# Patient Record
Sex: FEMALE | Race: WHITE | NOT HISPANIC OR LATINO
[De-identification: names, ages, dates, MRNs, and addresses within clinical notes are randomized per-mention and may not be internally consistent; named-entity substitution may affect disease eponyms.]

---

## 2017-01-06 ENCOUNTER — APPOINTMENT (OUTPATIENT)
Dept: FAMILY MEDICINE | Facility: CLINIC | Age: 72
End: 2017-01-06

## 2017-01-06 VITALS
WEIGHT: 162.5 LBS | BODY MASS INDEX: 29.9 KG/M2 | HEIGHT: 62 IN | DIASTOLIC BLOOD PRESSURE: 70 MMHG | SYSTOLIC BLOOD PRESSURE: 100 MMHG

## 2017-01-31 RX ORDER — MOMETASONE 50 UG/1
50 SPRAY, METERED NASAL DAILY
Qty: 1 | Refills: 3 | Status: COMPLETED | COMMUNITY
Start: 1900-01-01 | End: 2017-01-31

## 2017-03-27 DIAGNOSIS — Z87.891 PERSONAL HISTORY OF NICOTINE DEPENDENCE: ICD-10-CM

## 2017-04-07 ENCOUNTER — APPOINTMENT (OUTPATIENT)
Dept: FAMILY MEDICINE | Facility: CLINIC | Age: 72
End: 2017-04-07

## 2017-04-07 VITALS
WEIGHT: 164 LBS | SYSTOLIC BLOOD PRESSURE: 120 MMHG | DIASTOLIC BLOOD PRESSURE: 64 MMHG | HEIGHT: 62 IN | BODY MASS INDEX: 30.18 KG/M2

## 2017-04-07 DIAGNOSIS — R94.39 ABNORMAL RESULT OF OTHER CARDIOVASCULAR FUNCTION STUDY: ICD-10-CM

## 2017-04-07 RX ORDER — LEVOTHYROXINE SODIUM 0.07 MG/1
75 TABLET ORAL
Qty: 90 | Refills: 0 | Status: COMPLETED | COMMUNITY
Start: 2016-12-14

## 2017-07-07 ENCOUNTER — RX RENEWAL (OUTPATIENT)
Age: 72
End: 2017-07-07

## 2017-07-07 ENCOUNTER — APPOINTMENT (OUTPATIENT)
Dept: FAMILY MEDICINE | Facility: CLINIC | Age: 72
End: 2017-07-07

## 2017-07-07 VITALS
SYSTOLIC BLOOD PRESSURE: 130 MMHG | DIASTOLIC BLOOD PRESSURE: 60 MMHG | HEIGHT: 62 IN | BODY MASS INDEX: 30.73 KG/M2 | WEIGHT: 167 LBS

## 2017-07-07 DIAGNOSIS — Z92.29 PERSONAL HISTORY OF OTHER DRUG THERAPY: ICD-10-CM

## 2017-09-08 ENCOUNTER — APPOINTMENT (OUTPATIENT)
Dept: DERMATOLOGY | Facility: CLINIC | Age: 72
End: 2017-09-08
Payer: MEDICARE

## 2017-09-08 PROCEDURE — 99214 OFFICE O/P EST MOD 30 MIN: CPT

## 2017-10-20 ENCOUNTER — APPOINTMENT (OUTPATIENT)
Dept: FAMILY MEDICINE | Facility: CLINIC | Age: 72
End: 2017-10-20
Payer: MEDICARE

## 2017-10-20 VITALS
HEIGHT: 62 IN | SYSTOLIC BLOOD PRESSURE: 138 MMHG | OXYGEN SATURATION: 97 % | HEART RATE: 77 BPM | DIASTOLIC BLOOD PRESSURE: 60 MMHG

## 2017-10-20 LAB
BILIRUB UR QL STRIP: NORMAL
GLUCOSE UR-MCNC: NORMAL
HCG UR QL: NORMAL EU/DL
HGB UR QL STRIP.AUTO: NORMAL
KETONES UR-MCNC: NORMAL
LEUKOCYTE ESTERASE UR QL STRIP: NORMAL
NITRITE UR QL STRIP: POSITIVE
PH UR STRIP: 5
PROT UR STRIP-MCNC: NORMAL
SP GR UR STRIP: <=1.005

## 2017-10-20 PROCEDURE — 99214 OFFICE O/P EST MOD 30 MIN: CPT | Mod: 25

## 2017-10-20 PROCEDURE — 81003 URINALYSIS AUTO W/O SCOPE: CPT | Mod: QW

## 2017-10-20 PROCEDURE — 90662 IIV NO PRSV INCREASED AG IM: CPT

## 2017-10-20 PROCEDURE — G0008: CPT

## 2017-10-20 RX ORDER — LEVOTHYROXINE SODIUM 0.11 MG/1
112 TABLET ORAL
Qty: 90 | Refills: 0 | Status: COMPLETED | COMMUNITY
Start: 2017-07-28

## 2017-10-25 LAB
APPEARANCE: ABNORMAL
BACTERIA UR CULT: NORMAL
BACTERIA: NEGATIVE
BILIRUBIN URINE: NEGATIVE
BLOOD URINE: ABNORMAL
COLOR: ABNORMAL
GLUCOSE QUALITATIVE U: NEGATIVE MG/DL
HYALINE CASTS: 2 /LPF
KETONES URINE: NEGATIVE
LEUKOCYTE ESTERASE URINE: ABNORMAL
MICROSCOPIC-UA: NORMAL
NITRITE URINE: POSITIVE
PH URINE: 5.5
PROTEIN URINE: NEGATIVE MG/DL
RED BLOOD CELLS URINE: 1 /HPF
SPECIFIC GRAVITY URINE: 1.01
SQUAMOUS EPITHELIAL CELLS: 0 /HPF
URINE COMMENTS: NORMAL
UROBILINOGEN URINE: NEGATIVE MG/DL
WHITE BLOOD CELLS URINE: 303 /HPF

## 2017-11-03 ENCOUNTER — APPOINTMENT (OUTPATIENT)
Dept: FAMILY MEDICINE | Facility: CLINIC | Age: 72
End: 2017-11-03
Payer: MEDICARE

## 2017-11-03 VITALS
BODY MASS INDEX: 30.73 KG/M2 | SYSTOLIC BLOOD PRESSURE: 140 MMHG | OXYGEN SATURATION: 98 % | HEART RATE: 80 BPM | WEIGHT: 167 LBS | DIASTOLIC BLOOD PRESSURE: 68 MMHG | HEIGHT: 62 IN

## 2017-11-03 VITALS — TEMPERATURE: 98.3 F

## 2017-11-03 LAB
BILIRUB UR QL STRIP: NORMAL
GLUCOSE UR-MCNC: NORMAL
HCG UR QL: 0.2 EU/DL
HGB UR QL STRIP.AUTO: NORMAL
KETONES UR-MCNC: NORMAL
LEUKOCYTE ESTERASE UR QL STRIP: NORMAL
NITRITE UR QL STRIP: NORMAL
PH UR STRIP: 5.5
PROT UR STRIP-MCNC: 100
SP GR UR STRIP: 1.02

## 2017-11-03 PROCEDURE — 99213 OFFICE O/P EST LOW 20 MIN: CPT | Mod: 25

## 2017-11-03 PROCEDURE — 81003 URINALYSIS AUTO W/O SCOPE: CPT | Mod: QW

## 2017-11-03 RX ORDER — CIPROFLOXACIN HYDROCHLORIDE 500 MG/1
500 TABLET, FILM COATED ORAL
Qty: 10 | Refills: 0 | Status: COMPLETED | COMMUNITY
Start: 2017-10-23 | End: 2017-11-03

## 2017-11-13 LAB
APPEARANCE: ABNORMAL
BACTERIA UR CULT: NORMAL
BACTERIA: ABNORMAL
BILIRUBIN URINE: NEGATIVE
BLOOD URINE: ABNORMAL
COLOR: YELLOW
GLUCOSE QUALITATIVE U: NEGATIVE MG/DL
GRANULAR CASTS: 1 /LPF
HYALINE CASTS: 1 /LPF
KETONES URINE: NEGATIVE
LEUKOCYTE ESTERASE URINE: ABNORMAL
MICROSCOPIC-UA: NORMAL
NITRITE URINE: NEGATIVE
PH URINE: 5
PROTEIN URINE: 30 MG/DL
RED BLOOD CELLS URINE: 8 /HPF
SPECIFIC GRAVITY URINE: 1.02
SQUAMOUS EPITHELIAL CELLS: 8 /HPF
URINE COMMENTS: NORMAL
UROBILINOGEN URINE: NEGATIVE MG/DL
WHITE BLOOD CELLS URINE: 525 /HPF

## 2017-11-29 ENCOUNTER — APPOINTMENT (OUTPATIENT)
Dept: PULMONOLOGY | Facility: CLINIC | Age: 72
End: 2017-11-29
Payer: MEDICARE

## 2017-11-29 VITALS
OXYGEN SATURATION: 98 % | BODY MASS INDEX: 30.82 KG/M2 | SYSTOLIC BLOOD PRESSURE: 116 MMHG | HEART RATE: 82 BPM | DIASTOLIC BLOOD PRESSURE: 60 MMHG | WEIGHT: 168.5 LBS

## 2017-11-29 PROCEDURE — 99213 OFFICE O/P EST LOW 20 MIN: CPT

## 2017-11-29 RX ORDER — SULFAMETHOXAZOLE AND TRIMETHOPRIM 800; 160 MG/1; MG/1
800-160 TABLET ORAL TWICE DAILY
Qty: 10 | Refills: 0 | Status: DISCONTINUED | COMMUNITY
Start: 2017-11-03 | End: 2017-11-29

## 2017-11-29 RX ORDER — PHENAZOPYRIDINE HYDROCHLORIDE 200 MG/1
200 TABLET ORAL
Qty: 6 | Refills: 0 | Status: DISCONTINUED | COMMUNITY
Start: 2017-10-09 | End: 2017-11-29

## 2017-11-29 RX ORDER — NITROFURANTOIN (MONOHYDRATE/MACROCRYSTALS) 25; 75 MG/1; MG/1
100 CAPSULE ORAL
Qty: 14 | Refills: 0 | Status: DISCONTINUED | COMMUNITY
Start: 2017-10-09 | End: 2017-11-29

## 2017-12-28 ENCOUNTER — MEDICATION RENEWAL (OUTPATIENT)
Age: 72
End: 2017-12-28

## 2018-01-10 ENCOUNTER — APPOINTMENT (OUTPATIENT)
Dept: FAMILY MEDICINE | Facility: CLINIC | Age: 73
End: 2018-01-10
Payer: MEDICARE

## 2018-01-10 VITALS
WEIGHT: 168 LBS | BODY MASS INDEX: 30.91 KG/M2 | HEIGHT: 62 IN | SYSTOLIC BLOOD PRESSURE: 134 MMHG | DIASTOLIC BLOOD PRESSURE: 68 MMHG

## 2018-01-10 DIAGNOSIS — N13.30 UNSPECIFIED HYDRONEPHROSIS: ICD-10-CM

## 2018-01-10 PROCEDURE — 81003 URINALYSIS AUTO W/O SCOPE: CPT | Mod: QW

## 2018-01-10 PROCEDURE — 99214 OFFICE O/P EST MOD 30 MIN: CPT | Mod: 25

## 2018-01-12 LAB
APPEARANCE: ABNORMAL
BACTERIA UR CULT: NORMAL
BACTERIA: NEGATIVE
BILIRUB UR QL STRIP: NORMAL
BILIRUBIN URINE: NEGATIVE
BLOOD URINE: ABNORMAL
COLOR: YELLOW
GLUCOSE QUALITATIVE U: NEGATIVE MG/DL
GLUCOSE UR-MCNC: NORMAL
HCG UR QL: 0.2 EU/DL
HGB UR QL STRIP.AUTO: NORMAL
KETONES UR-MCNC: NORMAL
KETONES URINE: NEGATIVE
LEUKOCYTE ESTERASE UR QL STRIP: NORMAL
LEUKOCYTE ESTERASE URINE: ABNORMAL
MICROSCOPIC-UA: NORMAL
NITRITE UR QL STRIP: NORMAL
NITRITE URINE: NEGATIVE
PH UR STRIP: 5.5
PH URINE: 5.5
PROT UR STRIP-MCNC: NORMAL
PROTEIN URINE: NEGATIVE MG/DL
RED BLOOD CELLS URINE: 2 /HPF
SP GR UR STRIP: 1.01
SPECIFIC GRAVITY URINE: 1.01
SQUAMOUS EPITHELIAL CELLS: 1 /HPF
UROBILINOGEN URINE: NEGATIVE MG/DL
WHITE BLOOD CELLS URINE: 357 /HPF

## 2018-04-06 ENCOUNTER — APPOINTMENT (OUTPATIENT)
Dept: DERMATOLOGY | Facility: CLINIC | Age: 73
End: 2018-04-06
Payer: MEDICARE

## 2018-04-06 PROCEDURE — 99212 OFFICE O/P EST SF 10 MIN: CPT

## 2018-04-17 ENCOUNTER — APPOINTMENT (OUTPATIENT)
Dept: FAMILY MEDICINE | Facility: CLINIC | Age: 73
End: 2018-04-17
Payer: MEDICARE

## 2018-04-17 VITALS
BODY MASS INDEX: 29.44 KG/M2 | HEIGHT: 62 IN | SYSTOLIC BLOOD PRESSURE: 139 MMHG | DIASTOLIC BLOOD PRESSURE: 70 MMHG | HEART RATE: 77 BPM | WEIGHT: 160 LBS | OXYGEN SATURATION: 99 %

## 2018-04-17 DIAGNOSIS — E11.9 TYPE 2 DIABETES MELLITUS W/OUT COMPLICATIONS: ICD-10-CM

## 2018-04-17 DIAGNOSIS — Z92.29 PERSONAL HISTORY OF OTHER DRUG THERAPY: ICD-10-CM

## 2018-04-17 LAB — HBA1C MFR BLD HPLC: 6.4

## 2018-04-17 PROCEDURE — 99214 OFFICE O/P EST MOD 30 MIN: CPT

## 2018-04-17 RX ORDER — LEVOTHYROXINE SODIUM 0.12 MG/1
125 TABLET ORAL DAILY
Refills: 0 | Status: DISCONTINUED | COMMUNITY
End: 2018-04-17

## 2018-04-17 RX ORDER — HYDROCHLOROTHIAZIDE 25 MG/1
25 TABLET ORAL
Qty: 90 | Refills: 2 | Status: DISCONTINUED | COMMUNITY
End: 2018-04-17

## 2018-05-23 ENCOUNTER — CHART COPY (OUTPATIENT)
Age: 73
End: 2018-05-23

## 2018-06-21 ENCOUNTER — RESULT CHARGE (OUTPATIENT)
Age: 73
End: 2018-06-21

## 2018-06-21 ENCOUNTER — APPOINTMENT (OUTPATIENT)
Dept: UROGYNECOLOGY | Facility: CLINIC | Age: 73
End: 2018-06-21
Payer: MEDICARE

## 2018-06-21 VITALS
DIASTOLIC BLOOD PRESSURE: 65 MMHG | HEIGHT: 62 IN | BODY MASS INDEX: 30.18 KG/M2 | SYSTOLIC BLOOD PRESSURE: 160 MMHG | WEIGHT: 164 LBS

## 2018-06-21 LAB
BILIRUB UR QL STRIP: NEGATIVE
CLARITY UR: CLEAR
COLLECTION METHOD: NORMAL
GLUCOSE UR-MCNC: NORMAL
HCG UR QL: 0.2 EU/DL
HGB UR QL STRIP.AUTO: NEGATIVE
KETONES UR-MCNC: NORMAL
LEUKOCYTE ESTERASE UR QL STRIP: NEGATIVE
NITRITE UR QL STRIP: NEGATIVE
PH UR STRIP: 5.5
PROT UR STRIP-MCNC: NEGATIVE
SP GR UR STRIP: 1

## 2018-06-21 PROCEDURE — 51701 INSERT BLADDER CATHETER: CPT

## 2018-06-21 PROCEDURE — 81003 URINALYSIS AUTO W/O SCOPE: CPT | Mod: QW

## 2018-06-21 PROCEDURE — 99204 OFFICE O/P NEW MOD 45 MIN: CPT

## 2018-06-22 ENCOUNTER — RESULT REVIEW (OUTPATIENT)
Age: 73
End: 2018-06-22

## 2018-06-22 LAB

## 2018-06-25 ENCOUNTER — RESULT REVIEW (OUTPATIENT)
Age: 73
End: 2018-06-25

## 2018-06-25 LAB
BACTERIA UR CULT: NORMAL
CORE LAB FLUID CYTOLOGY: NORMAL

## 2018-06-28 ENCOUNTER — APPOINTMENT (OUTPATIENT)
Dept: FAMILY MEDICINE | Facility: CLINIC | Age: 73
End: 2018-06-28
Payer: MEDICARE

## 2018-06-28 VITALS
SYSTOLIC BLOOD PRESSURE: 140 MMHG | DIASTOLIC BLOOD PRESSURE: 60 MMHG | OXYGEN SATURATION: 98 % | HEART RATE: 79 BPM | HEIGHT: 62 IN | WEIGHT: 167 LBS | BODY MASS INDEX: 30.73 KG/M2

## 2018-06-28 PROCEDURE — 99214 OFFICE O/P EST MOD 30 MIN: CPT

## 2018-06-29 ENCOUNTER — RX RENEWAL (OUTPATIENT)
Age: 73
End: 2018-06-29

## 2018-06-30 NOTE — ASSESSMENT
[FreeTextEntry1] : Decrease Candesartan from 32 mg/d to 16 mg/d.\par Increase Amlodipine from 5 mg/d to 10 mg/d/\par Check 24 h urine, Creatinine clearance.\par Nephrology consult.\par F/U 1 month.\par Pt advised to monitor bp at home.

## 2018-06-30 NOTE — HISTORY OF PRESENT ILLNESS
[FreeTextEntry1] : Patient here for blood work results. Pt had blood work done June 08 2018. [de-identified] : Pt with increasing BUN/creat with recent results of 46/1.68 compared to creat 1.48 in 4/2018.\par HBA1c was 6.4 in 4/2018. \par Pt feels well. She has seen urology for pelvic organ prolapse and incomplete bladder emptying with rare urinary incontinence.

## 2018-07-17 ENCOUNTER — APPOINTMENT (OUTPATIENT)
Dept: UROGYNECOLOGY | Facility: CLINIC | Age: 73
End: 2018-07-17
Payer: MEDICARE

## 2018-07-17 LAB
BILIRUB UR QL STRIP: NEGATIVE
CLARITY UR: CLEAR
COLLECTION METHOD: NORMAL
GLUCOSE UR-MCNC: NEGATIVE
HCG UR QL: 0.2 EU/DL
HGB UR QL STRIP.AUTO: NEGATIVE
KETONES UR-MCNC: NEGATIVE
LEUKOCYTE ESTERASE UR QL STRIP: NORMAL
NITRITE UR QL STRIP: NEGATIVE
PH UR STRIP: 5.5
PROT UR STRIP-MCNC: NEGATIVE
SP GR UR STRIP: 1.01

## 2018-07-17 PROCEDURE — 51798 US URINE CAPACITY MEASURE: CPT

## 2018-07-17 PROCEDURE — 81003 URINALYSIS AUTO W/O SCOPE: CPT | Mod: QW

## 2018-07-17 PROCEDURE — 99213 OFFICE O/P EST LOW 20 MIN: CPT

## 2018-07-25 LAB — CYTOLOGY CVX/VAG DOC THIN PREP: NORMAL

## 2018-08-01 ENCOUNTER — RESULT REVIEW (OUTPATIENT)
Age: 73
End: 2018-08-01

## 2018-08-03 ENCOUNTER — APPOINTMENT (OUTPATIENT)
Dept: FAMILY MEDICINE | Facility: CLINIC | Age: 73
End: 2018-08-03
Payer: MEDICARE

## 2018-08-03 VITALS
BODY MASS INDEX: 30.55 KG/M2 | DIASTOLIC BLOOD PRESSURE: 56 MMHG | WEIGHT: 166 LBS | OXYGEN SATURATION: 99 % | HEIGHT: 62 IN | HEART RATE: 78 BPM | SYSTOLIC BLOOD PRESSURE: 130 MMHG

## 2018-08-03 DIAGNOSIS — I35.1 NONRHEUMATIC AORTIC (VALVE) INSUFFICIENCY: ICD-10-CM

## 2018-08-03 DIAGNOSIS — E11.9 TYPE 2 DIABETES MELLITUS W/OUT COMPLICATIONS: ICD-10-CM

## 2018-08-03 PROCEDURE — 99214 OFFICE O/P EST MOD 30 MIN: CPT

## 2018-08-03 RX ORDER — DESONIDE 0.5 MG/G
0.05 OINTMENT TOPICAL
Qty: 60 | Refills: 0 | Status: DISCONTINUED | COMMUNITY
Start: 2018-04-09 | End: 2018-08-03

## 2018-08-05 NOTE — PHYSICAL EXAM
[No Acute Distress] : no acute distress [Normal Oropharynx] : the oropharynx was normal [No Respiratory Distress] : no respiratory distress  [Clear to Auscultation] : lungs were clear to auscultation bilaterally [Normal Rate] : normal [Rhythm Regular] : regular [Normal S1] : normal S1 [Normal S2] : normal S2 [II] : a grade 2 [No Edema] : there was no peripheral edema [Soft] : abdomen soft [Non Tender] : non-tender [No HSM] : no HSM [Normal Bowel Sounds] : normal bowel sounds [Normal Anterior Cervical Nodes] : no anterior cervical lymphadenopathy [Normal Mood] : the mood was normal

## 2018-08-05 NOTE — HISTORY OF PRESENT ILLNESS
[FreeTextEntry1] : Patient here to follow up on her blood pressure. Last visit pt had Candesartan decreased from 32 mg/d to 16 mg/d and Amlodipine increased from 5 mg/ d to 10 mg/d. Her creatinine has increased from 1.48 to 1.68 on 6/2018 bw. Creatine was 1.04 1 yr ago 7/17/17.\par  [de-identified] : BS still labile. Pt sometimes eats when she is not hungry to avoid hypoglycemia and she is having difficulty getting the wt down.\par Sugars on avg are 140s at home. She sees Dr. Pierre, endocrine for diabetes management.

## 2018-08-10 ENCOUNTER — APPOINTMENT (OUTPATIENT)
Dept: NEPHROLOGY | Facility: CLINIC | Age: 73
End: 2018-08-10
Payer: MEDICARE

## 2018-08-10 VITALS
SYSTOLIC BLOOD PRESSURE: 136 MMHG | OXYGEN SATURATION: 99 % | WEIGHT: 168 LBS | HEART RATE: 78 BPM | BODY MASS INDEX: 30.91 KG/M2 | DIASTOLIC BLOOD PRESSURE: 68 MMHG | HEIGHT: 62 IN

## 2018-08-10 PROCEDURE — 36415 COLL VENOUS BLD VENIPUNCTURE: CPT

## 2018-08-10 PROCEDURE — 99205 OFFICE O/P NEW HI 60 MIN: CPT | Mod: 25

## 2018-08-11 LAB
25(OH)D3 SERPL-MCNC: 44.6 NG/ML
ALBUMIN SERPL ELPH-MCNC: 4.9 G/DL
ANION GAP SERPL CALC-SCNC: 18 MMOL/L
APPEARANCE: CLEAR
BACTERIA: ABNORMAL
BASOPHILS # BLD AUTO: 0.03 K/UL
BASOPHILS NFR BLD AUTO: 0.4 %
BILIRUBIN URINE: NEGATIVE
BLOOD URINE: NEGATIVE
BUN SERPL-MCNC: 39 MG/DL
CALCIUM SERPL-MCNC: 10.2 MG/DL
CHLORIDE SERPL-SCNC: 97 MMOL/L
CO2 SERPL-SCNC: 24 MMOL/L
COLOR: YELLOW
CREAT SERPL-MCNC: 1.65 MG/DL
EOSINOPHIL # BLD AUTO: 0.22 K/UL
EOSINOPHIL NFR BLD AUTO: 2.8 %
GLUCOSE QUALITATIVE U: NEGATIVE MG/DL
GLUCOSE SERPL-MCNC: 99 MG/DL
HBA1C MFR BLD HPLC: 7.8 %
HCT VFR BLD CALC: 38.4 %
HGB BLD-MCNC: 11.9 G/DL
HYALINE CASTS: 0 /LPF
IMM GRANULOCYTES NFR BLD AUTO: 0.3 %
KETONES URINE: NEGATIVE
LEUKOCYTE ESTERASE URINE: ABNORMAL
LYMPHOCYTES # BLD AUTO: 1.83 K/UL
LYMPHOCYTES NFR BLD AUTO: 23.5 %
MAN DIFF?: NORMAL
MCHC RBC-ENTMCNC: 29.9 PG
MCHC RBC-ENTMCNC: 31 GM/DL
MCV RBC AUTO: 96.5 FL
MICROSCOPIC-UA: NORMAL
MONOCYTES # BLD AUTO: 0.61 K/UL
MONOCYTES NFR BLD AUTO: 7.8 %
NEUTROPHILS # BLD AUTO: 5.09 K/UL
NEUTROPHILS NFR BLD AUTO: 65.2 %
NITRITE URINE: NEGATIVE
PH URINE: 5.5
PHOSPHATE SERPL-MCNC: 4.1 MG/DL
PLATELET # BLD AUTO: 238 K/UL
POTASSIUM SERPL-SCNC: 4.9 MMOL/L
PROTEIN URINE: NEGATIVE MG/DL
RBC # BLD: 3.98 M/UL
RBC # FLD: 15.8 %
RED BLOOD CELLS URINE: 1 /HPF
SODIUM SERPL-SCNC: 139 MMOL/L
SPECIFIC GRAVITY URINE: 1.01
SQUAMOUS EPITHELIAL CELLS: 1 /HPF
UROBILINOGEN URINE: NEGATIVE MG/DL
WBC # FLD AUTO: 7.8 K/UL
WHITE BLOOD CELLS URINE: 6 /HPF

## 2018-08-12 LAB
CALCIUM SERPL-MCNC: 10.2 MG/DL
CREAT SPEC-SCNC: 48 MG/DL
CREAT/PROT UR: 0.3 RATIO
PARATHYROID HORMONE INTACT: 31 PG/ML
PROT UR-MCNC: 14 MG/DL

## 2018-08-15 ENCOUNTER — RX RENEWAL (OUTPATIENT)
Age: 73
End: 2018-08-15

## 2018-08-29 ENCOUNTER — RESULT CHARGE (OUTPATIENT)
Age: 73
End: 2018-08-29

## 2018-08-29 ENCOUNTER — MED ADMIN CHARGE (OUTPATIENT)
Age: 73
End: 2018-08-29

## 2018-09-24 ENCOUNTER — APPOINTMENT (OUTPATIENT)
Dept: NEPHROLOGY | Facility: CLINIC | Age: 73
End: 2018-09-24
Payer: MEDICARE

## 2018-09-24 VITALS
BODY MASS INDEX: 31.46 KG/M2 | OXYGEN SATURATION: 98 % | SYSTOLIC BLOOD PRESSURE: 156 MMHG | HEART RATE: 85 BPM | DIASTOLIC BLOOD PRESSURE: 62 MMHG | WEIGHT: 172 LBS

## 2018-09-24 PROCEDURE — 36415 COLL VENOUS BLD VENIPUNCTURE: CPT

## 2018-09-24 PROCEDURE — 99215 OFFICE O/P EST HI 40 MIN: CPT | Mod: 25

## 2018-09-24 RX ORDER — CANDESARTAN CILEXETIL 32 MG/1
32 TABLET ORAL
Qty: 90 | Refills: 0 | Status: DISCONTINUED | COMMUNITY
Start: 2018-04-26 | End: 2018-09-24

## 2018-09-24 RX ORDER — AMLODIPINE BESYLATE 5 MG/1
5 TABLET ORAL
Qty: 90 | Refills: 0 | Status: DISCONTINUED | COMMUNITY
Start: 2018-03-26 | End: 2018-09-24

## 2018-09-24 RX ORDER — CARVEDILOL 25 MG/1
TABLET, FILM COATED ORAL
Refills: 0 | Status: DISCONTINUED | COMMUNITY
End: 2018-09-24

## 2018-09-25 LAB
ALBUMIN SERPL ELPH-MCNC: 4.8 G/DL
ANION GAP SERPL CALC-SCNC: 16 MMOL/L
APPEARANCE: CLEAR
BACTERIA: NEGATIVE
BASOPHILS # BLD AUTO: 0.04 K/UL
BASOPHILS NFR BLD AUTO: 0.4 %
BILIRUBIN URINE: NEGATIVE
BLOOD URINE: NEGATIVE
BUN SERPL-MCNC: 38 MG/DL
CALCIUM SERPL-MCNC: 10 MG/DL
CHLORIDE SERPL-SCNC: 99 MMOL/L
CO2 SERPL-SCNC: 23 MMOL/L
COLOR: YELLOW
CREAT SERPL-MCNC: 1.47 MG/DL
CREAT SPEC-SCNC: 79 MG/DL
CREAT/PROT UR: 0.2 RATIO
EOSINOPHIL # BLD AUTO: 0.28 K/UL
EOSINOPHIL NFR BLD AUTO: 2.9 %
GLUCOSE QUALITATIVE U: 1000 MG/DL
GLUCOSE SERPL-MCNC: 314 MG/DL
HCT VFR BLD CALC: 35.8 %
HGB BLD-MCNC: 11 G/DL
HYALINE CASTS: 0 /LPF
IMM GRANULOCYTES NFR BLD AUTO: 0.3 %
KETONES URINE: NEGATIVE
LEUKOCYTE ESTERASE URINE: ABNORMAL
LYMPHOCYTES # BLD AUTO: 1.99 K/UL
LYMPHOCYTES NFR BLD AUTO: 20.6 %
MAN DIFF?: NORMAL
MCHC RBC-ENTMCNC: 29.6 PG
MCHC RBC-ENTMCNC: 30.7 GM/DL
MCV RBC AUTO: 96.5 FL
MICROSCOPIC-UA: NORMAL
MONOCYTES # BLD AUTO: 0.8 K/UL
MONOCYTES NFR BLD AUTO: 8.3 %
NEUTROPHILS # BLD AUTO: 6.54 K/UL
NEUTROPHILS NFR BLD AUTO: 67.5 %
NITRITE URINE: NEGATIVE
PH URINE: 5
PHOSPHATE SERPL-MCNC: 3.4 MG/DL
PLATELET # BLD AUTO: 255 K/UL
POTASSIUM SERPL-SCNC: 5.2 MMOL/L
PROT UR-MCNC: 18 MG/DL
PROTEIN URINE: NEGATIVE MG/DL
RBC # BLD: 3.71 M/UL
RBC # FLD: 15.6 %
RED BLOOD CELLS URINE: 0 /HPF
SODIUM SERPL-SCNC: 138 MMOL/L
SPECIFIC GRAVITY URINE: 1.02
SQUAMOUS EPITHELIAL CELLS: 3 /HPF
URINE COMMENTS: NORMAL
UROBILINOGEN URINE: NEGATIVE MG/DL
WBC # FLD AUTO: 9.68 K/UL
WHITE BLOOD CELLS URINE: 17 /HPF

## 2018-09-28 ENCOUNTER — APPOINTMENT (OUTPATIENT)
Dept: DERMATOLOGY | Facility: CLINIC | Age: 73
End: 2018-09-28
Payer: MEDICARE

## 2018-09-28 PROCEDURE — 99214 OFFICE O/P EST MOD 30 MIN: CPT

## 2018-10-08 ENCOUNTER — RX RENEWAL (OUTPATIENT)
Age: 73
End: 2018-10-08

## 2018-10-09 ENCOUNTER — APPOINTMENT (OUTPATIENT)
Dept: UROGYNECOLOGY | Facility: CLINIC | Age: 73
End: 2018-10-09

## 2018-10-12 ENCOUNTER — APPOINTMENT (OUTPATIENT)
Dept: UROGYNECOLOGY | Facility: CLINIC | Age: 73
End: 2018-10-12
Payer: MEDICARE

## 2018-10-12 VITALS — SYSTOLIC BLOOD PRESSURE: 138 MMHG | DIASTOLIC BLOOD PRESSURE: 64 MMHG

## 2018-10-12 PROCEDURE — 99213 OFFICE O/P EST LOW 20 MIN: CPT

## 2018-10-12 PROCEDURE — 51798 US URINE CAPACITY MEASURE: CPT

## 2018-10-15 DIAGNOSIS — I34.0 NONRHEUMATIC MITRAL (VALVE) INSUFFICIENCY: ICD-10-CM

## 2018-11-07 ENCOUNTER — APPOINTMENT (OUTPATIENT)
Dept: FAMILY MEDICINE | Facility: CLINIC | Age: 73
End: 2018-11-07
Payer: MEDICARE

## 2018-11-07 VITALS
HEIGHT: 62 IN | SYSTOLIC BLOOD PRESSURE: 124 MMHG | BODY MASS INDEX: 30.36 KG/M2 | WEIGHT: 165 LBS | DIASTOLIC BLOOD PRESSURE: 68 MMHG

## 2018-11-07 PROCEDURE — 90662 IIV NO PRSV INCREASED AG IM: CPT

## 2018-11-07 PROCEDURE — 99214 OFFICE O/P EST MOD 30 MIN: CPT | Mod: 25

## 2018-11-07 PROCEDURE — G0008: CPT

## 2018-11-07 RX ORDER — AMLODIPINE BESYLATE 10 MG/1
10 TABLET ORAL DAILY
Qty: 90 | Refills: 1 | Status: DISCONTINUED | COMMUNITY
Start: 2016-10-26 | End: 2018-11-07

## 2018-11-07 RX ORDER — HYDROCHLOROTHIAZIDE 25 MG/1
25 TABLET ORAL DAILY
Qty: 90 | Refills: 3 | Status: DISCONTINUED | COMMUNITY
Start: 2018-09-24 | End: 2018-11-07

## 2018-11-09 NOTE — HISTORY OF PRESENT ILLNESS
[FreeTextEntry1] : Pt here for hypertension follow up. \par Pt needs flu vaccine. \par Pt denies needing any refills at this time.  [de-identified] : Candesartan decreased from 32 mg/d to 16 mg/d due to renal insufficiency. HCTZ was also added. Creatinine has stabilized.\par Pt has large left renal cyst, slightly larger, on recent u/s.

## 2018-11-09 NOTE — PHYSICAL EXAM
[No Acute Distress] : no acute distress [Normal Oropharynx] : the oropharynx was normal [No Respiratory Distress] : no respiratory distress  [Clear to Auscultation] : lungs were clear to auscultation bilaterally [Normal Rate] : normal [Rhythm Regular] : regular [Normal S1] : normal S1 [Normal S2] : normal S2 [II] : a grade 2 [No Edema] : there was no peripheral edema [Soft] : abdomen soft [Non Tender] : non-tender [No HSM] : no HSM [Normal Bowel Sounds] : normal bowel sounds [Normal Anterior Cervical Nodes] : no anterior cervical lymphadenopathy [Normal Affect] : the affect was normal [Normal Mood] : the mood was normal [Normal Insight/Judgement] : insight and judgment were intact [Loss Of Normal Lordosis] : a loss of normal lordosis [Left Paraspinal ___ (level)] : ~Ulevel [unfilled] left paraspinal [Paraspinal] : paraspinal [3] : L3 [4] : L4 [5] : L5 [Muscle Spasms, Bilateral] : bilateral muscle spasms

## 2018-11-09 NOTE — HEALTH RISK ASSESSMENT
[No falls in past year] : Patient reported no falls in the past year [0] : 2) Feeling down, depressed, or hopeless: Not at all (0) [] : No [de-identified] : very rare [[id number]] : 0

## 2018-11-09 NOTE — DATA REVIEWED
[FreeTextEntry1] : Cardio, Nephrology, Vascular and Endocrine notes reviewed.\par Abd u/s 8/20/18: No evidence of an abd aorta aneurysm.\par GULSHAN 8/20/18: mild distal trifurcation disease. R  .92   L  .80\par Echo 10/18/18 reviewed. EF 50%.

## 2018-11-09 NOTE — ASSESSMENT
[FreeTextEntry1] : Warm compresses, low back stretches and Tylenol prn for back pain. Consider PT if symptoms progress.\par Risks, benefits and potential side effects of the Shingrix vaccine was reviewed with the patient, including risk for low grade fever, myalgias, fatigue, malaise, headache and redness or soreness at the site of the injection. The pt is aware that two shots are recommended, two to six months apart, to complete the Shingrix vaccination series.\par F/U 3 months.

## 2018-11-12 ENCOUNTER — APPOINTMENT (OUTPATIENT)
Dept: FAMILY MEDICINE | Facility: CLINIC | Age: 73
End: 2018-11-12
Payer: MEDICARE

## 2018-11-12 ENCOUNTER — RX RENEWAL (OUTPATIENT)
Age: 73
End: 2018-11-12

## 2018-11-12 VITALS
HEART RATE: 68 BPM | HEIGHT: 62 IN | OXYGEN SATURATION: 98 % | BODY MASS INDEX: 30.36 KG/M2 | SYSTOLIC BLOOD PRESSURE: 120 MMHG | DIASTOLIC BLOOD PRESSURE: 60 MMHG | WEIGHT: 165 LBS

## 2018-11-12 PROCEDURE — G0009: CPT

## 2018-11-12 PROCEDURE — 90732 PPSV23 VACC 2 YRS+ SUBQ/IM: CPT

## 2018-11-26 ENCOUNTER — APPOINTMENT (OUTPATIENT)
Dept: UROGYNECOLOGY | Facility: CLINIC | Age: 73
End: 2018-11-26
Payer: MEDICARE

## 2018-11-26 ENCOUNTER — APPOINTMENT (OUTPATIENT)
Dept: NEPHROLOGY | Facility: CLINIC | Age: 73
End: 2018-11-26
Payer: MEDICARE

## 2018-11-26 VITALS
DIASTOLIC BLOOD PRESSURE: 64 MMHG | SYSTOLIC BLOOD PRESSURE: 162 MMHG | BODY MASS INDEX: 31.28 KG/M2 | HEIGHT: 62 IN | WEIGHT: 170 LBS | HEART RATE: 73 BPM | OXYGEN SATURATION: 98 %

## 2018-11-26 PROCEDURE — 99214 OFFICE O/P EST MOD 30 MIN: CPT | Mod: 25

## 2018-11-26 PROCEDURE — 99215 OFFICE O/P EST HI 40 MIN: CPT | Mod: 25

## 2018-11-26 PROCEDURE — 36415 COLL VENOUS BLD VENIPUNCTURE: CPT

## 2018-11-26 PROCEDURE — 52000 CYSTOURETHROSCOPY: CPT

## 2018-11-27 ENCOUNTER — RESULT REVIEW (OUTPATIENT)
Age: 73
End: 2018-11-27

## 2018-11-27 LAB
25(OH)D3 SERPL-MCNC: 29.7 NG/ML
ALBUMIN SERPL ELPH-MCNC: 4.6 G/DL
ANION GAP SERPL CALC-SCNC: 13 MMOL/L
APPEARANCE: ABNORMAL
BACTERIA: NEGATIVE
BASOPHILS # BLD AUTO: 0.03 K/UL
BASOPHILS NFR BLD AUTO: 0.3 %
BILIRUBIN URINE: NEGATIVE
BLOOD URINE: NEGATIVE
BUN SERPL-MCNC: 40 MG/DL
CALCIUM SERPL-MCNC: 9.9 MG/DL
CALCIUM SERPL-MCNC: 9.9 MG/DL
CHLORIDE SERPL-SCNC: 99 MMOL/L
CO2 SERPL-SCNC: 26 MMOL/L
COLOR: YELLOW
CREAT SERPL-MCNC: 1.75 MG/DL
CREAT SPEC-SCNC: 71 MG/DL
CREAT/PROT UR: 0.2 RATIO
EOSINOPHIL # BLD AUTO: 0.23 K/UL
EOSINOPHIL NFR BLD AUTO: 2.7 %
GLUCOSE QUALITATIVE U: NEGATIVE MG/DL
GLUCOSE SERPL-MCNC: 236 MG/DL
HBA1C MFR BLD HPLC: 8.4 %
HCT VFR BLD CALC: 35.1 %
HGB BLD-MCNC: 10.9 G/DL
HYALINE CASTS: 0 /LPF
IMM GRANULOCYTES NFR BLD AUTO: 0.3 %
KETONES URINE: NEGATIVE
LEUKOCYTE ESTERASE URINE: NEGATIVE
LYMPHOCYTES # BLD AUTO: 1.77 K/UL
LYMPHOCYTES NFR BLD AUTO: 20.5 %
MAN DIFF?: NORMAL
MCHC RBC-ENTMCNC: 29.8 PG
MCHC RBC-ENTMCNC: 31.1 GM/DL
MCV RBC AUTO: 95.9 FL
MICROSCOPIC-UA: NORMAL
MONOCYTES # BLD AUTO: 0.79 K/UL
MONOCYTES NFR BLD AUTO: 9.1 %
NEUTROPHILS # BLD AUTO: 5.79 K/UL
NEUTROPHILS NFR BLD AUTO: 67.1 %
NITRITE URINE: NEGATIVE
NT-PROBNP SERPL-MCNC: 56 PG/ML
PARATHYROID HORMONE INTACT: 36 PG/ML
PH URINE: 5.5
PHOSPHATE SERPL-MCNC: 4.7 MG/DL
PLATELET # BLD AUTO: 246 K/UL
POTASSIUM SERPL-SCNC: 4.6 MMOL/L
PROT UR-MCNC: 12 MG/DL
PROTEIN URINE: NEGATIVE MG/DL
RBC # BLD: 3.66 M/UL
RBC # FLD: 15.9 %
RED BLOOD CELLS URINE: 2 /HPF
SODIUM SERPL-SCNC: 138 MMOL/L
SPECIFIC GRAVITY URINE: 1.02
SQUAMOUS EPITHELIAL CELLS: 3 /HPF
UROBILINOGEN URINE: NEGATIVE MG/DL
WBC # FLD AUTO: 8.64 K/UL
WHITE BLOOD CELLS URINE: 4 /HPF

## 2018-11-28 ENCOUNTER — RESULT REVIEW (OUTPATIENT)
Age: 73
End: 2018-11-28

## 2018-11-28 LAB — BACTERIA UR CULT: NORMAL

## 2018-12-27 ENCOUNTER — MEDICATION RENEWAL (OUTPATIENT)
Age: 73
End: 2018-12-27

## 2018-12-28 ENCOUNTER — RX RENEWAL (OUTPATIENT)
Age: 73
End: 2018-12-28

## 2018-12-31 ENCOUNTER — RECORD ABSTRACTING (OUTPATIENT)
Age: 73
End: 2018-12-31

## 2018-12-31 ENCOUNTER — RX RENEWAL (OUTPATIENT)
Age: 73
End: 2018-12-31

## 2019-01-02 ENCOUNTER — RX RENEWAL (OUTPATIENT)
Age: 74
End: 2019-01-02

## 2019-01-02 ENCOUNTER — MEDICATION RENEWAL (OUTPATIENT)
Age: 74
End: 2019-01-02

## 2019-01-03 ENCOUNTER — RX RENEWAL (OUTPATIENT)
Age: 74
End: 2019-01-03

## 2019-01-08 ENCOUNTER — RESULT CHARGE (OUTPATIENT)
Age: 74
End: 2019-01-08

## 2019-01-08 ENCOUNTER — APPOINTMENT (OUTPATIENT)
Dept: ENDOCRINOLOGY | Facility: CLINIC | Age: 74
End: 2019-01-08
Payer: MEDICARE

## 2019-01-08 VITALS
BODY MASS INDEX: 31.65 KG/M2 | WEIGHT: 172 LBS | SYSTOLIC BLOOD PRESSURE: 140 MMHG | HEIGHT: 62 IN | DIASTOLIC BLOOD PRESSURE: 60 MMHG | HEART RATE: 84 BPM

## 2019-01-08 LAB — GLUCOSE BLDC GLUCOMTR-MCNC: 184

## 2019-01-08 PROCEDURE — 99214 OFFICE O/P EST MOD 30 MIN: CPT | Mod: 25

## 2019-01-08 PROCEDURE — 82962 GLUCOSE BLOOD TEST: CPT

## 2019-01-08 NOTE — HISTORY OF PRESENT ILLNESS
[FreeTextEntry1] : Quality:  Type 2.   \par Severity:  Moderate\par Duration:  21 years\par Associated Symptoms:  Retinopathy. Hypoglycemia. Nephropathy\par Modifying Factors:  Better with diet.   \par Notes:  Current regimen:\par 	metformin 1000 bid - stopped due to renal insufficiency\par 	glimepiride 2 bid\par 	januvia, precose ineffective. On avandia in the past.\par Levemir 54 units\par humalog before meals:\par 		2-8 units\par \par Routine cardiac evaluation-fluid around the heart. Eventual w/u included PET scan and sonography revealing subcentimeric thyroid nodules, pulmonary abnormality, and fatty liver. SHASHA 1:160, speckled, no diagnosis made by rheumatology.\par \par \par \par \par \par Diet:  weight watchers\par \par Weight History:  \par losing weight\par \par Blood Glucose Testing Information \par \par Patient is using the  meter and is testing 4 times per day.\par \par Past Medical History\par Notes:  ASHD, pacemaker, defibrillator\par PVD, s/p stents. \par renal cysts\par thyroid nodules, subcentimeric\par vitamin D deficiency\par

## 2019-01-08 NOTE — PHYSICAL EXAM
[Thyroid Not Enlarged] : the thyroid was not enlarged [Clear to Auscultation] : lungs were clear to auscultation bilaterally [Regular Rhythm] : with a regular rhythm

## 2019-01-08 NOTE — ASSESSMENT
[FreeTextEntry1] : 1. DM type 2, insulin treated, complicated by renal insufficiency. Pt. is not a candidate for very tight control due to risk of hypoglycemia, previously demonstrated on CGMS. Current A1C of 7.3% is satisfactory.\par 2. Hypothyroid, clinically euthyroid on replacement. Slight TSH elevation not signficant.\par 3. Hyperlipidemia, controlled

## 2019-01-09 ENCOUNTER — APPOINTMENT (OUTPATIENT)
Dept: NEPHROLOGY | Facility: CLINIC | Age: 74
End: 2019-01-09
Payer: MEDICARE

## 2019-01-09 VITALS
HEART RATE: 80 BPM | DIASTOLIC BLOOD PRESSURE: 68 MMHG | SYSTOLIC BLOOD PRESSURE: 128 MMHG | HEIGHT: 62 IN | WEIGHT: 172 LBS | BODY MASS INDEX: 31.65 KG/M2

## 2019-01-09 PROCEDURE — 99215 OFFICE O/P EST HI 40 MIN: CPT

## 2019-01-09 NOTE — HISTORY OF PRESENT ILLNESS
[FreeTextEntry1] : DM > 15 Years , \par \par +HTN - on ARBs,  + DMR ( Laser Treatment )\par \par + Vascular Disease ( Macro )\par \par A1 C 7.3 %,  Meds & Labs reviewed,

## 2019-01-09 NOTE — ASSESSMENT
[FreeTextEntry1] : DX : DMN , HTN , CKD \par \par ARB Dose Halved by ,\par \par Continue the current management,\par \par Thiazide Resumed for HTN Control(  154/60 - 160/70 )\par \par Needs Tighter control of DM, HTN,  Amlodipine & HCTZ  Uptitrated,\par \par Evaluate Home BP ,\par \par Controlled type 2 diabetes mellitus with chronic kidney disease, with long-term current\par use of insulin, unspecified CKD stage (250.40,585.9,V58.67) (E11.22,Z79.4)\par Hypothyroidism, unspecified (244.9) (E03.9)\par Elevated cholesterol (272.0) (E78.00)\par \par 1. DM type 2, insulin treated, complicated by renal insufficiency. Pt. is not a candidate for very tight control due to risk of hypoglycemia, previously demonstrated on CGMS. Current A1C of 7.3% is satisfactory.\par 2. Hypothyroid, clinically euthyroid on replacement. Slight TSH elevation not signficant.\par 3. Hyperlipidemia, controlled. \par

## 2019-01-10 LAB
APPEARANCE: ABNORMAL
BACTERIA: NEGATIVE
BILIRUBIN URINE: NEGATIVE
BLOOD URINE: NEGATIVE
COLOR: YELLOW
CREAT SPEC-SCNC: 97 MG/DL
CREAT/PROT UR: 0.1 RATIO
GLUCOSE QUALITATIVE U: NEGATIVE MG/DL
HYALINE CASTS: 1 /LPF
KETONES URINE: NEGATIVE
LEUKOCYTE ESTERASE URINE: ABNORMAL
MICROSCOPIC-UA: NORMAL
NITRITE URINE: NEGATIVE
PH URINE: 5
PROT UR-MCNC: 10 MG/DL
PROTEIN URINE: NEGATIVE MG/DL
RED BLOOD CELLS URINE: 1 /HPF
SPECIFIC GRAVITY URINE: 1.02
SQUAMOUS EPITHELIAL CELLS: 1 /HPF
UROBILINOGEN URINE: NEGATIVE MG/DL
WHITE BLOOD CELLS URINE: 3 /HPF

## 2019-01-15 ENCOUNTER — APPOINTMENT (OUTPATIENT)
Dept: UROGYNECOLOGY | Facility: CLINIC | Age: 74
End: 2019-01-15

## 2019-02-12 ENCOUNTER — APPOINTMENT (OUTPATIENT)
Dept: DERMATOLOGY | Facility: CLINIC | Age: 74
End: 2019-02-12
Payer: MEDICARE

## 2019-02-12 PROCEDURE — 99213 OFFICE O/P EST LOW 20 MIN: CPT

## 2019-02-13 ENCOUNTER — APPOINTMENT (OUTPATIENT)
Dept: FAMILY MEDICINE | Facility: CLINIC | Age: 74
End: 2019-02-13
Payer: MEDICARE

## 2019-02-13 VITALS
DIASTOLIC BLOOD PRESSURE: 72 MMHG | BODY MASS INDEX: 31.28 KG/M2 | OXYGEN SATURATION: 98 % | HEIGHT: 62 IN | HEART RATE: 85 BPM | SYSTOLIC BLOOD PRESSURE: 128 MMHG | WEIGHT: 170 LBS

## 2019-02-13 DIAGNOSIS — M53.9 DORSOPATHY, UNSPECIFIED: ICD-10-CM

## 2019-02-13 DIAGNOSIS — E11.9 TYPE 2 DIABETES MELLITUS W/OUT COMPLICATIONS: ICD-10-CM

## 2019-02-13 DIAGNOSIS — I51.9 HEART DISEASE, UNSPECIFIED: ICD-10-CM

## 2019-02-13 LAB — S PYO AG SPEC QL IA: NEGATIVE

## 2019-02-13 PROCEDURE — 87880 STREP A ASSAY W/OPTIC: CPT | Mod: QW

## 2019-02-13 PROCEDURE — 99214 OFFICE O/P EST MOD 30 MIN: CPT | Mod: 25

## 2019-02-13 NOTE — REVIEW OF SYSTEMS
[Fever] : no fever [Nasal Discharge] : nasal discharge [Sore Throat] : no sore throat [Postnasal Drip] : postnasal drip [Skin Rash] : skin rash [Negative] : Psychiatric [FreeTextEntry4] : clear

## 2019-02-13 NOTE — ASSESSMENT
[FreeTextEntry1] : Decrease amlodipine from 10 mg/d to 7.5 mg /d.\par Has repeat renal panel ordered.\par F/U 1 month, sooner if worse or new symptoms arise.

## 2019-02-13 NOTE — HISTORY OF PRESENT ILLNESS
[FreeTextEntry1] : Pt here to follow up on hypertension. \par Pt has seen Dr. Mcallister, nephrology and Dr. Odom, Dermatology. Dr. Odom recommended pt has a throat culture to rule out strep. Pt has a rash that he suspects is due to psoriasis but would like to rule out strep throat. \par c/o increase ankle and foot edema since increasing the amlodipine from 5 mg/d to 10 mg/d. No sob.\par c/o increased neck stiffness and pain x 1 month. No trauma. [de-identified] : Pt with new onset of guttate psoriasis 2 wkeeks ago. Pt denies sore throat, fever, ear pain.\par  has sore throat x 2-3 days. No fever or cough.\par

## 2019-02-13 NOTE — PHYSICAL EXAM
[No Acute Distress] : no acute distress [Normal Sclera/Conjunctiva] : normal sclera/conjunctiva [Normal Oropharynx] : the oropharynx was normal [Normal TMs] : both tympanic membranes were normal [No Respiratory Distress] : no respiratory distress  [Clear to Auscultation] : lungs were clear to auscultation bilaterally [Normal Rate] : normal [Rhythm Regular] : regular [Normal S1] : normal S1 [Normal S2] : normal S2 [II] : a grade 2 [No Edema] : there was no peripheral edema [Soft] : abdomen soft [Non Tender] : non-tender [No Masses] : no abdominal mass palpated [No HSM] : no HSM [Normal Bowel Sounds] : normal bowel sounds [No Hernias] : no hernias [Normal Anterior Cervical Nodes] : no anterior cervical lymphadenopathy [No Joint Swelling] : no joint swelling [Normal Affect] : the affect was normal [Normal Mood] : the mood was normal [Normal Insight/Judgement] : insight and judgment were intact [de-identified] : increased paracervical tone R>L with restricted ROM primarily rotation. s/p soft tissue OMT with some improvement in cervical rotation. [de-identified] : small round erythematous rash with mild white scales

## 2019-02-18 ENCOUNTER — APPOINTMENT (OUTPATIENT)
Dept: UROGYNECOLOGY | Facility: CLINIC | Age: 74
End: 2019-02-18

## 2019-02-20 LAB — BACTERIA THROAT CULT: NORMAL

## 2019-03-18 ENCOUNTER — RX RENEWAL (OUTPATIENT)
Age: 74
End: 2019-03-18

## 2019-03-26 ENCOUNTER — RX RENEWAL (OUTPATIENT)
Age: 74
End: 2019-03-26

## 2019-03-29 ENCOUNTER — RX RENEWAL (OUTPATIENT)
Age: 74
End: 2019-03-29

## 2019-04-10 ENCOUNTER — APPOINTMENT (OUTPATIENT)
Dept: UROGYNECOLOGY | Facility: CLINIC | Age: 74
End: 2019-04-10
Payer: MEDICARE

## 2019-04-10 VITALS
HEIGHT: 62 IN | WEIGHT: 170 LBS | DIASTOLIC BLOOD PRESSURE: 69 MMHG | SYSTOLIC BLOOD PRESSURE: 171 MMHG | BODY MASS INDEX: 31.28 KG/M2

## 2019-04-10 LAB
BILIRUB UR QL STRIP: NEGATIVE
CLARITY UR: CLEAR
COLLECTION METHOD: NORMAL
GLUCOSE UR-MCNC: NEGATIVE
HCG UR QL: 0.2 EU/DL
HGB UR QL STRIP.AUTO: NEGATIVE
KETONES UR-MCNC: NEGATIVE
LEUKOCYTE ESTERASE UR QL STRIP: NORMAL
NITRITE UR QL STRIP: NEGATIVE
PH UR STRIP: 5.5
PROT UR STRIP-MCNC: NORMAL
SP GR UR STRIP: 1.01

## 2019-04-10 PROCEDURE — 99213 OFFICE O/P EST LOW 20 MIN: CPT

## 2019-04-10 PROCEDURE — 81003 URINALYSIS AUTO W/O SCOPE: CPT | Mod: QW

## 2019-04-10 PROCEDURE — 51798 US URINE CAPACITY MEASURE: CPT

## 2019-05-08 ENCOUNTER — RECORD ABSTRACTING (OUTPATIENT)
Age: 74
End: 2019-05-08

## 2019-05-24 ENCOUNTER — APPOINTMENT (OUTPATIENT)
Dept: ENDOCRINOLOGY | Facility: CLINIC | Age: 74
End: 2019-05-24
Payer: MEDICARE

## 2019-05-24 ENCOUNTER — RESULT CHARGE (OUTPATIENT)
Age: 74
End: 2019-05-24

## 2019-05-24 VITALS
SYSTOLIC BLOOD PRESSURE: 150 MMHG | HEART RATE: 82 BPM | DIASTOLIC BLOOD PRESSURE: 60 MMHG | WEIGHT: 170 LBS | BODY MASS INDEX: 31.28 KG/M2 | HEIGHT: 62 IN

## 2019-05-24 LAB — GLUCOSE BLDC GLUCOMTR-MCNC: 230

## 2019-05-24 PROCEDURE — 99214 OFFICE O/P EST MOD 30 MIN: CPT | Mod: 25

## 2019-05-24 PROCEDURE — 82962 GLUCOSE BLOOD TEST: CPT

## 2019-05-24 NOTE — ASSESSMENT
[FreeTextEntry1] : 1. DM type 2, insulin treated, complicated by renal insufficiency. Pt. is not a candidate for very tight control due to risk of hypoglycemia, previously demonstrated on CGMS. Current A1C of 7.1% is satisfactory.Due to occasional hypoglycemia advised reduction in mealtime insulin by 1-2 units\par 2. Hypothyroid, euthyroid on replacement.\par 3. Hyperlipidemia, controlled

## 2019-05-24 NOTE — HISTORY OF PRESENT ILLNESS
[FreeTextEntry1] : Quality:  Type 2.   \par Severity:  Moderate\par Duration:  21 years\par Associated Symptoms:  Retinopathy. Hypoglycemia. Nephropathy\par Modifying Factors:  Better with diet.   \par Notes:  Current regimen:\par 	metformin 1000 bid - stopped due to renal insufficiency\par 	glimepiride 2 bid\par 	januvia, precose ineffective. On avandia in the past.\par Levemir 54 units\par humalog before meals:\par 		2-8 units\par \par Routine cardiac evaluation-fluid around the heart. Eventual w/u included PET scan and sonography revealing subcentimeric thyroid nodules, pulmonary abnormality, and fatty liver. SHASHA 1:160, speckled, no diagnosis made by rheumatology.\par \par \par \par \par \par Diet:  weight watchers\par \par Weight History:  \par losing weight\par \par Blood Glucose Testing Information - pt. reports variable glucose levels, and some lows after eating\par \par Patient is using the  meter and is testing 4 times per day.\par \par Past Medical History\par Notes:  ASHD, pacemaker, defibrillator\par PVD, s/p stents. \par renal cysts\par thyroid nodules, subcentimeric\par vitamin D deficiency\par

## 2019-05-28 ENCOUNTER — APPOINTMENT (OUTPATIENT)
Dept: FAMILY MEDICINE | Facility: CLINIC | Age: 74
End: 2019-05-28
Payer: MEDICARE

## 2019-05-28 VITALS
HEIGHT: 62 IN | WEIGHT: 165 LBS | HEART RATE: 80 BPM | SYSTOLIC BLOOD PRESSURE: 120 MMHG | OXYGEN SATURATION: 98 % | DIASTOLIC BLOOD PRESSURE: 60 MMHG | BODY MASS INDEX: 30.36 KG/M2

## 2019-05-28 DIAGNOSIS — Z87.2 PERSONAL HISTORY OF DISEASES OF THE SKIN AND SUBCUTANEOUS TISSUE: ICD-10-CM

## 2019-05-28 DIAGNOSIS — M48.061 SPINAL STENOSIS, LUMBAR REGION WITHOUT NEUROGENIC CLAUDICATION: ICD-10-CM

## 2019-05-28 DIAGNOSIS — Z92.29 PERSONAL HISTORY OF OTHER DRUG THERAPY: ICD-10-CM

## 2019-05-28 DIAGNOSIS — Z87.19 PERSONAL HISTORY OF OTHER DISEASES OF THE DIGESTIVE SYSTEM: ICD-10-CM

## 2019-05-28 DIAGNOSIS — M54.5 LOW BACK PAIN: ICD-10-CM

## 2019-05-28 DIAGNOSIS — Z92.89 PERSONAL HISTORY OF OTHER MEDICAL TREATMENT: ICD-10-CM

## 2019-05-28 DIAGNOSIS — Z87.440 PERSONAL HISTORY OF URINARY (TRACT) INFECTIONS: ICD-10-CM

## 2019-05-28 DIAGNOSIS — G89.29 LOW BACK PAIN: ICD-10-CM

## 2019-05-28 PROCEDURE — 99214 OFFICE O/P EST MOD 30 MIN: CPT

## 2019-05-28 RX ORDER — ACETAMINOPHEN 500 MG/1
500 TABLET ORAL EVERY 8 HOURS
Qty: 24 | Refills: 0 | Status: DISCONTINUED | COMMUNITY
Start: 2019-02-13 | End: 2019-05-28

## 2019-05-28 RX ORDER — LOVASTATIN 40 MG/1
40 TABLET ORAL
Refills: 0 | Status: DISCONTINUED | COMMUNITY
End: 2019-05-28

## 2019-05-28 NOTE — ASSESSMENT
[FreeTextEntry1] : Risks, benefits and potential side effects of the Shingrix vaccine was reviewed with the patient, including risk for low grade fever, myalgias, fatigue, malaise, headache and redness or soreness at the site of the injection. The pt is aware that two shots are recommended, two to six months apart, to complete the Shingrix vaccination series.\par F/U after PT. Xray L/S first.\par Return to office sooner if symptoms worsen.\par Continue Tylenol and Icy Hot.\par Will try to obtain blood  test results from endocrine.

## 2019-05-28 NOTE — PHYSICAL EXAM
[No Acute Distress] : no acute distress [Normal Oropharynx] : the oropharynx was normal [Normal Sclera/Conjunctiva] : normal sclera/conjunctiva [Normal TMs] : both tympanic membranes were normal [No Respiratory Distress] : no respiratory distress  [Clear to Auscultation] : lungs were clear to auscultation bilaterally [Normal Rate] : normal [Rhythm Regular] : regular [Normal S2] : normal S2 [Normal S1] : normal S1 [No Edema] : there was no peripheral edema [II] : a grade 2 [Left Carotid Bruit] : left carotid bruit heard [2+] : left 2+ [Left Femoral Bruit] : left femoral bruit heard [1+] : right 1+ [Soft] : abdomen soft [Non Tender] : non-tender [No Masses] : no abdominal mass palpated [No HSM] : no HSM [Normal Bowel Sounds] : normal bowel sounds [Normal Anterior Cervical Nodes] : no anterior cervical lymphadenopathy [No Hernias] : no hernias [Normal Affect] : the affect was normal [No Joint Swelling] : no joint swelling [Normal Mood] : the mood was normal [Normal Insight/Judgement] : insight and judgment were intact [de-identified] : No calf tenderness to palpation. [Loss Of Normal Lordosis] : a loss of normal lordosis [None] : None [Restricted] : was restricted [Pain] : was painful [FreeTextEntry3] : flexion 80 degrees

## 2019-05-28 NOTE — REVIEW OF SYSTEMS
[Muscle Pain] : muscle pain [Back Pain] : back pain [Unsteady Walking] : no ataxia [Negative] : Integumentary

## 2019-05-28 NOTE — COUNSELING
[Weight management counseling provided] : Weight management [Activity counseling provided] : activity [Healthy eating counseling provided] : healthy eating [Good understanding] : Patient has a good understanding of lifestyle changes and the steps needed to achieve self management goals

## 2019-05-28 NOTE — HISTORY OF PRESENT ILLNESS
[FreeTextEntry1] : c/o exacerbation of her chronic low back pain x 1 month, worse with bending forward. She is unable to walk moderate distance without stopping due to worsening low back pain. She is using acetaminophen with partial relief. Occ feels weakness in the legs, and intermittent cramping of the calves. Is due to f/u with vascular surgeon this summer. Has hx of aortic infrarenal endarterectomy with superficial femoral artery angioplasty with stenting. \par Patient is also here to follow up on her hypertension. \par Requesting refills on her Amlodipine and Hydrochlorothiazide. [de-identified] : Endocrine consult 5/24/19 reviewed.\par Cardio consult 4/8/19 reviewed.\par Had bw recently through endocrinologist. Results not available for review.

## 2019-05-31 ENCOUNTER — APPOINTMENT (OUTPATIENT)
Dept: NEPHROLOGY | Facility: CLINIC | Age: 74
End: 2019-05-31
Payer: MEDICARE

## 2019-05-31 VITALS
HEART RATE: 81 BPM | SYSTOLIC BLOOD PRESSURE: 140 MMHG | DIASTOLIC BLOOD PRESSURE: 68 MMHG | BODY MASS INDEX: 31.1 KG/M2 | WEIGHT: 169 LBS | HEIGHT: 62 IN

## 2019-05-31 PROCEDURE — 99215 OFFICE O/P EST HI 40 MIN: CPT | Mod: 25

## 2019-05-31 PROCEDURE — 36415 COLL VENOUS BLD VENIPUNCTURE: CPT

## 2019-05-31 NOTE — ASSESSMENT
[FreeTextEntry1] : DX : DMN , HTN , CKD , S/P AVR , AICD,  \par \par ARB Dose Halved by ,\par \par Continue the current management,\par \par Thiazide Resumed for HTN Control(  154/60 - 160/70 )\par \par Needs Tighter control of DM, HTN,  Amlodipine & HCTZ  Uptitrated,\par \par Evaluate Home BP ,\par \par Controlled type 2 diabetes mellitus with chronic kidney disease, with long-term current\par use of insulin, unspecified CKD stage (250.40,585.9,V58.67) (E11.22,Z79.4)\par Hypothyroidism, unspecified (244.9) (E03.9)\par Elevated cholesterol (272.0) (E78.00)\par \par 1. DM type 2, insulin treated, complicated by renal insufficiency. Pt. is not a candidate for very tight control due to risk of hypoglycemia, previously demonstrated on CGMS. Current A1C of 7.3% is satisfactory.\par 2. Hypothyroid, clinically euthyroid on replacement. Slight TSH elevation not significant.\par 3. Hyperlipidemia, controlled. \par \par Enrolled in [employer],\par

## 2019-06-01 LAB
APPEARANCE: CLEAR
BACTERIA: NEGATIVE
BASOPHILS # BLD AUTO: 0.05 K/UL
BASOPHILS NFR BLD AUTO: 0.5 %
BILIRUBIN URINE: NEGATIVE
BLOOD URINE: NEGATIVE
COLOR: NORMAL
CREAT SPEC-SCNC: 41 MG/DL
CREAT/PROT UR: 0.2 RATIO
EOSINOPHIL # BLD AUTO: 0.22 K/UL
EOSINOPHIL NFR BLD AUTO: 2.1 %
GLUCOSE QUALITATIVE U: NEGATIVE
HCT VFR BLD CALC: 35.6 %
HGB BLD-MCNC: 10.9 G/DL
HYALINE CASTS: 0 /LPF
IMM GRANULOCYTES NFR BLD AUTO: 0.3 %
KETONES URINE: NEGATIVE
LEUKOCYTE ESTERASE URINE: ABNORMAL
LYMPHOCYTES # BLD AUTO: 1.81 K/UL
LYMPHOCYTES NFR BLD AUTO: 17.5 %
MAN DIFF?: NORMAL
MCHC RBC-ENTMCNC: 29.4 PG
MCHC RBC-ENTMCNC: 30.6 GM/DL
MCV RBC AUTO: 96 FL
MICROSCOPIC-UA: NORMAL
MONOCYTES # BLD AUTO: 0.81 K/UL
MONOCYTES NFR BLD AUTO: 7.8 %
NEUTROPHILS # BLD AUTO: 7.4 K/UL
NEUTROPHILS NFR BLD AUTO: 71.8 %
NITRITE URINE: NEGATIVE
PH URINE: 5
PLATELET # BLD AUTO: 225 K/UL
PROT UR-MCNC: 9 MG/DL
PROTEIN URINE: NEGATIVE
RBC # BLD: 3.71 M/UL
RBC # FLD: 15.8 %
RED BLOOD CELLS URINE: 0 /HPF
SPECIFIC GRAVITY URINE: 1.01
SQUAMOUS EPITHELIAL CELLS: 2 /HPF
UROBILINOGEN URINE: NORMAL
WBC # FLD AUTO: 10.32 K/UL
WHITE BLOOD CELLS URINE: 8 /HPF

## 2019-06-04 ENCOUNTER — RX RENEWAL (OUTPATIENT)
Age: 74
End: 2019-06-04

## 2019-06-24 ENCOUNTER — RX RENEWAL (OUTPATIENT)
Age: 74
End: 2019-06-24

## 2019-06-24 ENCOUNTER — FORM ENCOUNTER (OUTPATIENT)
Age: 74
End: 2019-06-24

## 2019-06-25 ENCOUNTER — OUTPATIENT (OUTPATIENT)
Dept: OUTPATIENT SERVICES | Facility: HOSPITAL | Age: 74
LOS: 1 days | End: 2019-06-25
Payer: MEDICARE

## 2019-06-25 ENCOUNTER — APPOINTMENT (OUTPATIENT)
Dept: CT IMAGING | Facility: CLINIC | Age: 74
End: 2019-06-25
Payer: MEDICARE

## 2019-06-25 DIAGNOSIS — M54.16 RADICULOPATHY, LUMBAR REGION: ICD-10-CM

## 2019-06-25 DIAGNOSIS — H26.9 UNSPECIFIED CATARACT: Chronic | ICD-10-CM

## 2019-06-25 DIAGNOSIS — Z98.89 OTHER SPECIFIED POSTPROCEDURAL STATES: Chronic | ICD-10-CM

## 2019-06-25 PROCEDURE — 72131 CT LUMBAR SPINE W/O DYE: CPT

## 2019-06-25 PROCEDURE — 72131 CT LUMBAR SPINE W/O DYE: CPT | Mod: 26

## 2019-07-02 ENCOUNTER — APPOINTMENT (OUTPATIENT)
Dept: FAMILY MEDICINE | Facility: CLINIC | Age: 74
End: 2019-07-02
Payer: MEDICARE

## 2019-07-02 VITALS
HEIGHT: 62 IN | OXYGEN SATURATION: 97 % | WEIGHT: 165 LBS | HEART RATE: 70 BPM | BODY MASS INDEX: 30.36 KG/M2 | DIASTOLIC BLOOD PRESSURE: 60 MMHG | SYSTOLIC BLOOD PRESSURE: 130 MMHG

## 2019-07-02 PROCEDURE — 99214 OFFICE O/P EST MOD 30 MIN: CPT

## 2019-07-02 RX ORDER — MOMETASONE 50 UG/1
50 SPRAY, METERED NASAL DAILY
Qty: 1 | Refills: 2 | Status: DISCONTINUED | COMMUNITY
Start: 2019-05-28 | End: 2019-07-02

## 2019-07-02 RX ORDER — ACETAMINOPHEN 500 MG/1
500 TABLET ORAL EVERY 8 HOURS
Qty: 60 | Refills: 0 | Status: DISCONTINUED | COMMUNITY
Start: 2019-05-28 | End: 2019-07-02

## 2019-07-02 NOTE — PHYSICAL EXAM
[Normal Sclera/Conjunctiva] : normal sclera/conjunctiva [Normal Oropharynx] : the oropharynx was normal [No Respiratory Distress] : no respiratory distress  [No Lymphadenopathy] : no lymphadenopathy [Normal Rate] : normal [Rhythm Regular] : regular [Normal S2] : normal S2 [Normal S1] : normal S1 [II] : a grade 2 [1+] : left 1+ [No Edema] : there was no peripheral edema [No Focal Deficits] : no focal deficits [Deep Tendon Reflexes (DTR)] : deep tendon reflexes were 2+ and symmetric [Normal] : affect was normal and insight and judgment were intact

## 2019-07-02 NOTE — DATA REVIEWED
[FreeTextEntry1] : \par EXAM: CT LUMBAR SPINE \par \par \par PROCEDURE DATE: 06/25/2019 \par \par \par \par INTERPRETATION: EXAMINATION: CT of the lumbar spine without contrast \par \par CLINICAL INFORMATION: Low back pain. Spinal stenosis. \par \par TECHNIQUE: Axial CT images were obtained through the lumbar spine. Coronal \par and sagittal reformatted images were made. 3-D reconstruction images were \par performed on an independent workstation. \par \par Comparison is made to a prior CT of the lumbar spine from 9/19/2014 \par \par FINDINGS: Vertebral body heights are maintained. Alignment is normal. There \par is no acute fracture. Small multilevel anterior marginal osteophytes are \par present. \par \par T12-L1: No central canal or foraminal stenosis. \par \par L1-L2: Minimal circumferential disc bulge with a superimposed small right \par paracentral disc protrusion. No central canal or foraminal stenosis. \par \par L2-L3: No central canal or foraminal stenosis. \par \par L3-L4: Small circumferential disc bulge and mild bilateral facet \par arthropathy. There is a superimposed suspected \par Left foraminal disc herniation which likely abuts the left L3 nerve root. \par This is similar to prior. \par Mild central canal stenosis as well as moderate left and mild right \par foraminal stenosis. \par \par L4-L5: Disc bulge and moderate bilateral facet \par arthropathy with thickening of ligamentum flavum. These findings contribute \par to moderate central canal stenosis and moderate bilateral foraminal \par stenoses. This appears slightly progressed from the prior exam. \par \par L5-S1: Small circumferential disc bulge with mild posterior osseous ridging. \par Mild bilateral facet arthropathy with thickening of ligamentum flavum. These \par findings contribute to mild central canal stenosis and bilateral \par foraminal stenoses. \par \par There is an aortobiiliac stent graft with extensive vascular calcifications \par of the aorta and the iliacs which are poorly evaluated in the absence of \par contrast. There is a partially imaged fluid attenuation mass \par emanating from the left kidney which may represent a cyst but is \par incompletely evaluated on this exam. There are nonobstructing renal stones \par versus vascular calcifications in the right kidney. \par \par There are degenerative changes at the sacroiliac joints bilaterally. \par \par IMPRESSION: Multilevel lumbar spondylosis contributing to multilevel central \par canal stenosis and foraminal narrowing, as above. This is most pronounced at \par the L4-5 level where there is suspected to be at least moderate central \par canal stenosis, which appears slightly progressed from the prior exam. \par \par \par \par \par \par \par \par \par \par \par \par \par \par SWETA LUGO M.D., ATTENDING RADIOLOGIST \par This document has been electronically signed. Jun 27 2019 2:45PM \par \par Labs 5/21/19 reviewed.  BUN 39  Creat 1.56,  TC 11  HDL 36   LDL  47   LFTs wnl   HBA1 C 7.1  H&H  11.1 / 33.6\par \par

## 2019-07-02 NOTE — HISTORY OF PRESENT ILLNESS
[FreeTextEntry1] : Pt is here  to f/u on her low back pain, and to go over a CT scan of her spine from 06/25/19. Low back pain is persistent, radiates down right leg to level of calf, worse with bending forward. Unable to walk distance due to low back pain. Must stop and rest due to pain. Denies leg weakness or tingling.

## 2019-07-09 ENCOUNTER — APPOINTMENT (OUTPATIENT)
Dept: UROGYNECOLOGY | Facility: CLINIC | Age: 74
End: 2019-07-09
Payer: MEDICARE

## 2019-07-09 VITALS
HEIGHT: 62 IN | DIASTOLIC BLOOD PRESSURE: 58 MMHG | WEIGHT: 165 LBS | BODY MASS INDEX: 30.36 KG/M2 | SYSTOLIC BLOOD PRESSURE: 164 MMHG

## 2019-07-09 LAB
BILIRUB UR QL STRIP: NEGATIVE
CLARITY UR: CLEAR
COLLECTION METHOD: NORMAL
GLUCOSE UR-MCNC: NEGATIVE
HCG UR QL: 0.2 EU/DL
HGB UR QL STRIP.AUTO: NORMAL
KETONES UR-MCNC: NEGATIVE
LEUKOCYTE ESTERASE UR QL STRIP: NORMAL
NITRITE UR QL STRIP: NEGATIVE
PH UR STRIP: 5
PROT UR STRIP-MCNC: NORMAL
SP GR UR STRIP: 1.01

## 2019-07-09 PROCEDURE — 51798 US URINE CAPACITY MEASURE: CPT

## 2019-07-09 PROCEDURE — 81003 URINALYSIS AUTO W/O SCOPE: CPT | Mod: QW

## 2019-07-09 PROCEDURE — 99213 OFFICE O/P EST LOW 20 MIN: CPT

## 2019-08-05 ENCOUNTER — RX RENEWAL (OUTPATIENT)
Age: 74
End: 2019-08-05

## 2019-08-09 ENCOUNTER — APPOINTMENT (OUTPATIENT)
Dept: NEPHROLOGY | Facility: CLINIC | Age: 74
End: 2019-08-09
Payer: MEDICARE

## 2019-08-09 VITALS
HEIGHT: 62 IN | WEIGHT: 172 LBS | SYSTOLIC BLOOD PRESSURE: 140 MMHG | DIASTOLIC BLOOD PRESSURE: 62 MMHG | OXYGEN SATURATION: 99 % | BODY MASS INDEX: 31.65 KG/M2 | HEART RATE: 75 BPM

## 2019-08-09 PROCEDURE — 36415 COLL VENOUS BLD VENIPUNCTURE: CPT

## 2019-08-09 PROCEDURE — 99215 OFFICE O/P EST HI 40 MIN: CPT | Mod: 25

## 2019-08-09 RX ORDER — LIDOCAINE 5% 700 MG/1
5 PATCH TOPICAL
Qty: 1 | Refills: 0 | Status: DISCONTINUED | COMMUNITY
Start: 2019-07-02 | End: 2019-08-09

## 2019-08-09 RX ORDER — ZOSTER VACCINE RECOMBINANT, ADJUVANTED 50 MCG/0.5
50 KIT INTRAMUSCULAR
Qty: 1 | Refills: 1 | Status: DISCONTINUED | OUTPATIENT
Start: 2019-06-24 | End: 2019-08-09

## 2019-08-09 RX ORDER — PEN NEEDLE, DIABETIC 29 G X1/2"
31G X 8 MM NEEDLE, DISPOSABLE MISCELLANEOUS
Qty: 1 | Refills: 1 | Status: DISCONTINUED | COMMUNITY
Start: 2019-01-02 | End: 2019-08-09

## 2019-08-09 NOTE — PHYSICAL EXAM
[General Appearance - Alert] : alert [General Appearance - In No Acute Distress] : in no acute distress [General Appearance - Well Nourished] : well nourished [General Appearance - Well Developed] : well developed [General Appearance - Well-Appearing] : healthy appearing [Sclera] : the sclera and conjunctiva were normal [PERRL With Normal Accommodation] : pupils were equal in size, round, and reactive to light [Extraocular Movements] : extraocular movements were intact [Strabismus] : no strabismus was seen [Retinal Vessels - Neovascularization] : neovascularization noted [Outer Ear] : the ears and nose were normal in appearance [Hearing Threshold Finger Rub Not [place]] : hearing was normal [Examination Of The Oral Cavity] : the lips and gums were normal [Both Tympanic Membranes Were Examined] : both tympanic membranes were normal [Nasal Cavity] : the nasal mucosa and septum were normal [Oropharynx] : the oropharynx was normal [Neck Appearance] : the appearance of the neck was normal [Neck Cervical Mass (___cm)] : no neck mass was observed [Jugular Venous Distention Increased] : there was no jugular-venous distention [Thyroid Diffuse Enlargement] : the thyroid was not enlarged [Thyroid Nodule] : there were no palpable thyroid nodules [Neck Circumference: ___] : neck circumference is [unfilled] [Respiration, Rhythm And Depth] : normal respiratory rhythm and effort [Exaggerated Use Of Accessory Muscles For Inspiration] : no accessory muscle use [Auscultation Breath Sounds / Voice Sounds] : lungs were clear to auscultation bilaterally [Chest Palpation] : palpation of the chest revealed no abnormalities [Lungs Percussion] : the lungs were normal to percussion [Apical Impulse] : the apical impulse was normal [Heart Rate And Rhythm] : heart rate was normal and rhythm regular [Heart Sounds] : normal S1 and S2 [Heart Sounds Gallop] : no gallops [Heart Sounds Pericardial Friction Rub] : no pericardial rub [Systolic grade ___/6] : A grade [unfilled]/6 systolic murmur was heard. [Arterial Pulses Carotid] : carotid pulses were normal with no bruits [Arterial Pulses Femoral] : femoral pulses were normal without bruits [Full Pulse] : the pedal pulses are present [Edema] : there was no peripheral edema [Veins - Varicosity Changes] : there were no varicosital changes [Breast Palpation Mass] : no palpable masses [Breast Appearance] : normal in appearance [Breast Abnormal Lactation (Galactorrhea)] : no nipple discharge [Bowel Sounds] : normal bowel sounds [Abdomen Soft] : soft [Abdomen Tenderness] : non-tender [Abdomen Mass (___ Cm)] : no abdominal mass palpated [Abdomen Hernia] : no hernia was discovered [Normal Sphincter Tone] : normal sphincter tone [No Rectal Mass] : no rectal mass [Urinary Bladder Findings] : the bladder was normal on palpation [Cervical Lymph Nodes Enlarged Posterior Bilaterally] : posterior cervical [Cervical Lymph Nodes Enlarged Anterior Bilaterally] : anterior cervical [Supraclavicular Lymph Nodes Enlarged Bilaterally] : supraclavicular [Axillary Lymph Nodes Enlarged Bilaterally] : axillary [Inguinal Lymph Nodes Enlarged Bilaterally] : inguinal [Femoral Lymph Nodes Enlarged Bilaterally] : femoral [No CVA Tenderness] : no ~M costovertebral angle tenderness [No Spinal Tenderness] : no spinal tenderness [Abnormal Walk] : normal gait [Nail Clubbing] : no clubbing  or cyanosis of the fingernails [Involuntary Movements] : no involuntary movements were seen [Musculoskeletal - Swelling] : no joint swelling seen [Motor Tone] : muscle strength and tone were normal [Skin Color & Pigmentation] : normal skin color and pigmentation Admitted [] : no rash [Skin Turgor] : normal skin turgor [Cranial Nerves] : cranial nerves 2-12 were intact [Deep Tendon Reflexes (DTR)] : deep tendon reflexes were 2+ and symmetric [Sensation] : the sensory exam was normal to light touch and pinprick [No Focal Deficits] : no focal deficits [Motor Exam] : the motor exam was normal [Oriented To Time, Place, And Person] : oriented to person, place, and time [Impaired Insight] : insight and judgment were intact [Affect] : the affect was normal [Mood] : the mood was normal [Memory Recent] : recent memory was not impaired [Memory Remote] : remote memory was not impaired [FreeTextEntry1] : Pale, [Occult Blood Positive] : stool was negative for occult blood

## 2019-08-09 NOTE — ASSESSMENT
[FreeTextEntry1] : DX : DMN , HTN , CKD , S/P AVR , AICD,  \par \par Continue the current management,\par \par Needs Tighter control of DM, HTN,  Amlodipine & HCTZ  Uptitrated,\par \par Controlled type 2 diabetes mellitus with chronic kidney disease, with long-term current\par use of insulin, unspecified CKD stage (250.40,585.9,V58.67) (E11.22,Z79.4)\par Hypothyroidism, unspecified (244.9) (E03.9)\par Elevated cholesterol (272.0) (E78.00)\par \par 1. DM type 2, insulin treated, complicated by CKD, \par 2. Hypothyroid, clinically euthyroid on replacement.\par 3. Hyperlipidemia, controlled. \par \par Enrolled in [place],\par

## 2019-08-09 NOTE — HISTORY OF PRESENT ILLNESS
[FreeTextEntry1] : DM > 15 Years , \par \par +HTN - on ARBs,  + DMR ( Laser Treatment )\par \par + Vascular Disease ( Macro )\par \par A1 C 7.3 %,  \par \par Meds & Labs reviewed,

## 2019-08-10 LAB
25(OH)D3 SERPL-MCNC: 37.3 NG/ML
ALBUMIN SERPL ELPH-MCNC: 4.3 G/DL
ANION GAP SERPL CALC-SCNC: 13 MMOL/L
APPEARANCE: CLEAR
BACTERIA: NEGATIVE
BASOPHILS # BLD AUTO: 0.05 K/UL
BASOPHILS NFR BLD AUTO: 0.6 %
BILIRUBIN URINE: NEGATIVE
BLOOD URINE: NORMAL
BUN SERPL-MCNC: 29 MG/DL
CALCIUM SERPL-MCNC: 9.6 MG/DL
CALCIUM SERPL-MCNC: 9.6 MG/DL
CHLORIDE SERPL-SCNC: 95 MMOL/L
CO2 SERPL-SCNC: 24 MMOL/L
COLOR: NORMAL
CREAT SERPL-MCNC: 1.52 MG/DL
CREAT SPEC-SCNC: 39 MG/DL
CREAT/PROT UR: 0.2 RATIO
EOSINOPHIL # BLD AUTO: 0.18 K/UL
EOSINOPHIL NFR BLD AUTO: 2.1 %
ESTIMATED AVERAGE GLUCOSE: 174 MG/DL
GLUCOSE QUALITATIVE U: NEGATIVE
GLUCOSE SERPL-MCNC: 136 MG/DL
HBA1C MFR BLD HPLC: 7.7 %
HCT VFR BLD CALC: 31.7 %
HGB BLD-MCNC: 9.9 G/DL
HYALINE CASTS: 2 /LPF
IMM GRANULOCYTES NFR BLD AUTO: 0.5 %
KETONES URINE: NEGATIVE
LEUKOCYTE ESTERASE URINE: ABNORMAL
LYMPHOCYTES # BLD AUTO: 1.84 K/UL
LYMPHOCYTES NFR BLD AUTO: 21.1 %
MAN DIFF?: NORMAL
MCHC RBC-ENTMCNC: 29.4 PG
MCHC RBC-ENTMCNC: 31.2 GM/DL
MCV RBC AUTO: 94.1 FL
MICROSCOPIC-UA: NORMAL
MONOCYTES # BLD AUTO: 0.68 K/UL
MONOCYTES NFR BLD AUTO: 7.8 %
NEUTROPHILS # BLD AUTO: 5.94 K/UL
NEUTROPHILS NFR BLD AUTO: 67.9 %
NITRITE URINE: NEGATIVE
PARATHYROID HORMONE INTACT: 44 PG/ML
PH URINE: 6
PHOSPHATE SERPL-MCNC: 3.5 MG/DL
PLATELET # BLD AUTO: 233 K/UL
POTASSIUM SERPL-SCNC: 4.3 MMOL/L
PROT UR-MCNC: 6 MG/DL
PROTEIN URINE: NEGATIVE
RBC # BLD: 3.37 M/UL
RBC # FLD: 15.2 %
RED BLOOD CELLS URINE: 2 /HPF
SODIUM SERPL-SCNC: 132 MMOL/L
SPECIFIC GRAVITY URINE: 1.01
SQUAMOUS EPITHELIAL CELLS: 1 /HPF
UROBILINOGEN URINE: NORMAL
WBC # FLD AUTO: 8.73 K/UL
WHITE BLOOD CELLS URINE: 64 /HPF

## 2019-08-12 ENCOUNTER — RX RENEWAL (OUTPATIENT)
Age: 74
End: 2019-08-12

## 2019-08-27 ENCOUNTER — APPOINTMENT (OUTPATIENT)
Dept: FAMILY MEDICINE | Facility: CLINIC | Age: 74
End: 2019-08-27
Payer: MEDICARE

## 2019-08-27 ENCOUNTER — RX RENEWAL (OUTPATIENT)
Age: 74
End: 2019-08-27

## 2019-08-27 VITALS
OXYGEN SATURATION: 98 % | SYSTOLIC BLOOD PRESSURE: 110 MMHG | BODY MASS INDEX: 30.91 KG/M2 | HEIGHT: 62 IN | HEART RATE: 74 BPM | DIASTOLIC BLOOD PRESSURE: 60 MMHG | WEIGHT: 168 LBS

## 2019-08-27 DIAGNOSIS — I10 ESSENTIAL (PRIMARY) HYPERTENSION: ICD-10-CM

## 2019-08-27 PROCEDURE — 36415 COLL VENOUS BLD VENIPUNCTURE: CPT

## 2019-08-27 PROCEDURE — 99214 OFFICE O/P EST MOD 30 MIN: CPT | Mod: 25

## 2019-08-27 NOTE — ASSESSMENT
[FreeTextEntry1] : Increase Gabapentin from 100 mg bid to tid.\par Pt to call me with nasal rx medication she is using.\par Check CBC for stability. Check iFOBT\par Eye scrub, no tears shampoo with warm water to right lid 2x/d, avoid eye contact.\par F/U 2 months.\par Have lipid levels at f/u with endocrine and forward here.

## 2019-08-27 NOTE — PHYSICAL EXAM
[No Acute Distress] : no acute distress [Normal Sclera/Conjunctiva] : normal sclera/conjunctiva [PERRL] : pupils equal round and reactive to light [EOMI] : extraocular movements intact [No Lymphadenopathy] : no lymphadenopathy [No Respiratory Distress] : no respiratory distress  [Supple] : supple [Clear to Auscultation] : lungs were clear to auscultation bilaterally [Normal Rate] : normal [Rhythm Regular] : regular [Normal S1] : normal S1 [Normal S2] : normal S2 [II] : a grade 2 [Pedal Pulses Present] : the pedal pulses are present [No Edema] : there was no peripheral edema [Normal Anterior Cervical Nodes] : no anterior cervical lymphadenopathy [Normal] : affect was normal and insight and judgment were intact [de-identified] : Mild medial upper right lid edema, erythema. No d/c. Scant dried flaking at upper lid [de-identified] : Increased paralumbar spasm left>right

## 2019-08-27 NOTE — DATA REVIEWED
[FreeTextEntry1] : Vascular testing 8/5/19 reveiwed.\par Arterial doppler of the LEs b/l:  aorto iliac disease b/l, left superficial femoral stenosis mid thigh and distal trifurcation disease b/l. This is a change since the prior exam.

## 2019-08-27 NOTE — HISTORY OF PRESENT ILLNESS
[FreeTextEntry1] : Pt is here to follow up on her htn, low back pain and her diabetes. \par Low back pain b/l L>R persists, occ radiating down the legs b/l making it difficult to walk, or with prolonged standing. Legs feel heavy at times. Legs are painful at night sometimes.\par c/o nasal congestion x 2 wks. She is on a rx nasal spray, and using it prn. She does not recall the name.\par c/o right eyelid swelling x 2 wks.\par Saw Vascular surgeon. Has b/l aorto-iilac disease, progressed. Is due to have angiogram and poss surgical intervention.\par Saw Nephrology 2 wks ago. No change in meds.\par To see Endocrine next month. [de-identified] : She had 1st Shingrix vaccine, about 1-2 months ago.

## 2019-08-27 NOTE — REVIEW OF SYSTEMS
[Back Pain] : back pain [Negative] : Psychiatric [Discharge] : no discharge [Nasal Discharge] : no nasal discharge [Sore Throat] : no sore throat [Postnasal Drip] : no postnasal drip [Easy Bleeding] : no easy bleeding [FreeTextEntry3] : mild redness right upper medial eyelid [FreeTextEntry4] : nasal congestion

## 2019-09-06 ENCOUNTER — APPOINTMENT (OUTPATIENT)
Dept: FAMILY MEDICINE | Facility: CLINIC | Age: 74
End: 2019-09-06
Payer: MEDICARE

## 2019-09-06 VITALS
BODY MASS INDEX: 30.91 KG/M2 | TEMPERATURE: 98 F | HEART RATE: 82 BPM | WEIGHT: 168 LBS | HEIGHT: 62 IN | SYSTOLIC BLOOD PRESSURE: 130 MMHG | DIASTOLIC BLOOD PRESSURE: 70 MMHG

## 2019-09-06 DIAGNOSIS — H01.003 UNSPECIFIED BLEPHARITIS RIGHT EYE, UNSPECIFIED EYELID: ICD-10-CM

## 2019-09-06 PROCEDURE — 99214 OFFICE O/P EST MOD 30 MIN: CPT

## 2019-09-09 ENCOUNTER — RX RENEWAL (OUTPATIENT)
Age: 74
End: 2019-09-09

## 2019-09-09 PROBLEM — H01.003 BLEPHARITIS OF RIGHT EYE: Status: RESOLVED | Noted: 2019-08-27 | Resolved: 2019-09-09

## 2019-09-09 NOTE — HEALTH RISK ASSESSMENT
[Never (0 pts)] : Never (0 points) [No] : In the past 12 months have you used drugs other than those required for medical reasons? No [No falls in past year] : Patient reported no falls in the past year [0] : 2) Feeling down, depressed, or hopeless: Not at all (0) [] : No [[id number]] : 0 [Audit-CScore] : 0

## 2019-09-09 NOTE — HISTORY OF PRESENT ILLNESS
[FreeTextEntry1] : Pt is here for Physical therapy referral\par Stated she has bilateral Iliac stents and under care of Vascular Surgeon\par Also, Aortic Stent due to 90% blockage. [Spouse] : spouse [de-identified] : Diet is balanced and drinking water daily. Compliant w/medications

## 2019-09-09 NOTE — PLAN
[FreeTextEntry1] : Advised to continue medications as directed. Life style and diet modifications were discussed\par Referral for PT was provided to the patient.

## 2019-09-09 NOTE — PHYSICAL EXAM
[No Acute Distress] : no acute distress [Normal Sclera/Conjunctiva] : normal sclera/conjunctiva [Well Nourished] : well nourished [Well Developed] : well developed [EOMI] : extraocular movements intact [PERRL] : pupils equal round and reactive to light [Normal Outer Ear/Nose] : the outer ears and nose were normal in appearance [Normal Oropharynx] : the oropharynx was normal [No Lymphadenopathy] : no lymphadenopathy [Clear to Auscultation] : lungs were clear to auscultation bilaterally [No Respiratory Distress] : no respiratory distress  [Supple] : supple [Normal Rate] : normal rate  [Normal S1, S2] : normal S1 and S2 [Regular Rhythm] : with a regular rhythm [Coordination Grossly Intact] : coordination grossly intact [Grossly Normal Strength/Tone] : grossly normal strength/tone [Normal Affect] : the affect was normal [Normal Insight/Judgement] : insight and judgment were intact

## 2019-09-09 NOTE — REVIEW OF SYSTEMS
[Joint Pain] : joint pain [Back Pain] : back pain [Muscle Pain] : muscle pain [Negative] : Heme/Lymph

## 2019-09-10 LAB
BASOPHILS # BLD AUTO: 0.08 K/UL
BASOPHILS NFR BLD AUTO: 1 %
EOSINOPHIL # BLD AUTO: 0.21 K/UL
EOSINOPHIL NFR BLD AUTO: 2.6 %
FERRITIN SERPL-MCNC: 135 NG/ML
HCT VFR BLD CALC: 32.7 %
HGB BLD-MCNC: 10.2 G/DL
IMM GRANULOCYTES NFR BLD AUTO: 0.5 %
IRON SATN MFR SERPL: 19 %
IRON SERPL-MCNC: 56 UG/DL
LYMPHOCYTES # BLD AUTO: 1.68 K/UL
LYMPHOCYTES NFR BLD AUTO: 20.5 %
MAN DIFF?: NORMAL
MCHC RBC-ENTMCNC: 30 PG
MCHC RBC-ENTMCNC: 31.2 GM/DL
MCV RBC AUTO: 96.2 FL
MONOCYTES # BLD AUTO: 0.73 K/UL
MONOCYTES NFR BLD AUTO: 8.9 %
NEUTROPHILS # BLD AUTO: 5.45 K/UL
NEUTROPHILS NFR BLD AUTO: 66.5 %
PLATELET # BLD AUTO: 244 K/UL
RBC # BLD: 3.4 M/UL
RBC # FLD: 15.1 %
TIBC SERPL-MCNC: 302 UG/DL
TRANSFERRIN SERPL-MCNC: 249 MG/DL
UIBC SERPL-MCNC: 246 UG/DL
WBC # FLD AUTO: 8.19 K/UL

## 2019-09-27 ENCOUNTER — APPOINTMENT (OUTPATIENT)
Dept: DERMATOLOGY | Facility: CLINIC | Age: 74
End: 2019-09-27
Payer: MEDICARE

## 2019-09-27 ENCOUNTER — RX RENEWAL (OUTPATIENT)
Age: 74
End: 2019-09-27

## 2019-09-27 PROCEDURE — 99214 OFFICE O/P EST MOD 30 MIN: CPT

## 2019-09-30 ENCOUNTER — APPOINTMENT (OUTPATIENT)
Dept: ENDOCRINOLOGY | Facility: CLINIC | Age: 74
End: 2019-09-30
Payer: MEDICARE

## 2019-09-30 VITALS
BODY MASS INDEX: 31.28 KG/M2 | DIASTOLIC BLOOD PRESSURE: 64 MMHG | SYSTOLIC BLOOD PRESSURE: 120 MMHG | HEIGHT: 62 IN | WEIGHT: 170 LBS | HEART RATE: 72 BPM

## 2019-09-30 LAB
GLUCOSE BLDC GLUCOMTR-MCNC: 117
GLUCOSE SERPL-MCNC: 163
HBA1C MFR BLD HPLC: 7.3
LDLC SERPL DIRECT ASSAY-MCNC: 47
MICROALBUMIN/CREAT 24H UR-RTO: 36.8

## 2019-09-30 PROCEDURE — 99214 OFFICE O/P EST MOD 30 MIN: CPT | Mod: 25

## 2019-09-30 PROCEDURE — 82962 GLUCOSE BLOOD TEST: CPT

## 2019-09-30 NOTE — ASSESSMENT
[FreeTextEntry1] : 1. DM type 2, insulin treated, complicated by renal insufficiency. Pt. is not a candidate for very tight control due to risk of hypoglycemia, previously demonstrated on CGMS. Current A1C of 7.3% is satisfactory.\par 2. Hypothyroid, euthyroid on replacement.\par 3. Hyperlipidemia, controlled

## 2019-10-08 ENCOUNTER — APPOINTMENT (OUTPATIENT)
Dept: UROGYNECOLOGY | Facility: CLINIC | Age: 74
End: 2019-10-08
Payer: MEDICARE

## 2019-10-08 VITALS
BODY MASS INDEX: 31.28 KG/M2 | HEIGHT: 62 IN | DIASTOLIC BLOOD PRESSURE: 66 MMHG | WEIGHT: 170 LBS | SYSTOLIC BLOOD PRESSURE: 186 MMHG

## 2019-10-08 LAB
BILIRUB UR QL STRIP: NEGATIVE
CLARITY UR: CLEAR
COLLECTION METHOD: NORMAL
GLUCOSE UR-MCNC: NEGATIVE
HCG UR QL: 0.2 EU/DL
HGB UR QL STRIP.AUTO: NORMAL
KETONES UR-MCNC: NEGATIVE
LEUKOCYTE ESTERASE UR QL STRIP: NORMAL
NITRITE UR QL STRIP: NEGATIVE
PH UR STRIP: 5.5
PROT UR STRIP-MCNC: NEGATIVE
SP GR UR STRIP: 1.01

## 2019-10-08 PROCEDURE — 51798 US URINE CAPACITY MEASURE: CPT

## 2019-10-08 PROCEDURE — 81003 URINALYSIS AUTO W/O SCOPE: CPT | Mod: QW

## 2019-10-08 PROCEDURE — 99213 OFFICE O/P EST LOW 20 MIN: CPT

## 2019-10-30 ENCOUNTER — RX RENEWAL (OUTPATIENT)
Age: 74
End: 2019-10-30

## 2019-11-01 ENCOUNTER — APPOINTMENT (OUTPATIENT)
Dept: FAMILY MEDICINE | Facility: CLINIC | Age: 74
End: 2019-11-01
Payer: MEDICARE

## 2019-11-01 VITALS
DIASTOLIC BLOOD PRESSURE: 56 MMHG | TEMPERATURE: 97.8 F | BODY MASS INDEX: 31.28 KG/M2 | SYSTOLIC BLOOD PRESSURE: 122 MMHG | WEIGHT: 170 LBS | HEIGHT: 62 IN | HEART RATE: 71 BPM | OXYGEN SATURATION: 98 %

## 2019-11-01 PROCEDURE — 90653 IIV ADJUVANT VACCINE IM: CPT

## 2019-11-01 PROCEDURE — 99214 OFFICE O/P EST MOD 30 MIN: CPT | Mod: 25

## 2019-11-01 PROCEDURE — G0008: CPT

## 2019-11-01 NOTE — HISTORY OF PRESENT ILLNESS
[FreeTextEntry1] : Patient presents today for her annual flu shot. Feels well.Denies cough,fever or sore throat [de-identified] : Diet is good and drinking water daily.Compliant w/medications

## 2019-11-03 LAB — HEMOCCULT STL QL IA: NEGATIVE

## 2019-11-05 ENCOUNTER — RX RENEWAL (OUTPATIENT)
Age: 74
End: 2019-11-05

## 2019-11-08 ENCOUNTER — APPOINTMENT (OUTPATIENT)
Dept: NEPHROLOGY | Facility: CLINIC | Age: 74
End: 2019-11-08
Payer: MEDICARE

## 2019-11-08 VITALS
WEIGHT: 171 LBS | BODY MASS INDEX: 31.47 KG/M2 | DIASTOLIC BLOOD PRESSURE: 60 MMHG | HEIGHT: 62 IN | SYSTOLIC BLOOD PRESSURE: 154 MMHG | HEART RATE: 80 BPM

## 2019-11-08 PROCEDURE — 36415 COLL VENOUS BLD VENIPUNCTURE: CPT

## 2019-11-08 PROCEDURE — 99215 OFFICE O/P EST HI 40 MIN: CPT | Mod: 25

## 2019-11-09 LAB
25(OH)D3 SERPL-MCNC: 39.5 NG/ML
ALBUMIN SERPL ELPH-MCNC: 4.3 G/DL
ANION GAP SERPL CALC-SCNC: 13 MMOL/L
APPEARANCE: CLEAR
BACTERIA: ABNORMAL
BASOPHILS # BLD AUTO: 0.08 K/UL
BASOPHILS NFR BLD AUTO: 0.9 %
BILIRUBIN URINE: NEGATIVE
BLOOD URINE: NEGATIVE
BUN SERPL-MCNC: 30 MG/DL
CALCIUM SERPL-MCNC: 10.2 MG/DL
CALCIUM SERPL-MCNC: 10.2 MG/DL
CHLORIDE SERPL-SCNC: 96 MMOL/L
CO2 SERPL-SCNC: 26 MMOL/L
COLOR: NORMAL
CREAT SERPL-MCNC: 1.52 MG/DL
CREAT SPEC-SCNC: 32 MG/DL
CREAT/PROT UR: 0.3 RATIO
EOSINOPHIL # BLD AUTO: 0.22 K/UL
EOSINOPHIL NFR BLD AUTO: 2.4 %
ESTIMATED AVERAGE GLUCOSE: 157 MG/DL
GLUCOSE QUALITATIVE U: NEGATIVE
GLUCOSE SERPL-MCNC: 140 MG/DL
HBA1C MFR BLD HPLC: 7.1 %
HCT VFR BLD CALC: 32.6 %
HGB BLD-MCNC: 10.4 G/DL
HYALINE CASTS: 0 /LPF
IMM GRANULOCYTES NFR BLD AUTO: 0.4 %
KETONES URINE: NEGATIVE
LEUKOCYTE ESTERASE URINE: ABNORMAL
LYMPHOCYTES # BLD AUTO: 1.69 K/UL
LYMPHOCYTES NFR BLD AUTO: 18.2 %
MAN DIFF?: NORMAL
MCHC RBC-ENTMCNC: 29.8 PG
MCHC RBC-ENTMCNC: 31.9 GM/DL
MCV RBC AUTO: 93.4 FL
MICROSCOPIC-UA: NORMAL
MONOCYTES # BLD AUTO: 0.6 K/UL
MONOCYTES NFR BLD AUTO: 6.5 %
NEUTROPHILS # BLD AUTO: 6.65 K/UL
NEUTROPHILS NFR BLD AUTO: 71.6 %
NITRITE URINE: NEGATIVE
PARATHYROID HORMONE INTACT: 42 PG/ML
PH URINE: 6.5
PHOSPHATE SERPL-MCNC: 4.1 MG/DL
PLATELET # BLD AUTO: 234 K/UL
POTASSIUM SERPL-SCNC: 5.1 MMOL/L
PROT UR-MCNC: 10 MG/DL
PROTEIN URINE: NEGATIVE
RBC # BLD: 3.49 M/UL
RBC # FLD: 15 %
RED BLOOD CELLS URINE: 1 /HPF
SODIUM SERPL-SCNC: 135 MMOL/L
SPECIFIC GRAVITY URINE: 1.01
SQUAMOUS EPITHELIAL CELLS: 1 /HPF
UROBILINOGEN URINE: NORMAL
WBC # FLD AUTO: 9.28 K/UL
WHITE BLOOD CELLS URINE: 8 /HPF

## 2019-11-09 RX ORDER — MOMETASONE FUROATE MONOHYDRATE 50 UG/1
50 SPRAY, METERED NASAL
Refills: 0 | Status: DISCONTINUED | COMMUNITY
End: 2019-11-09

## 2019-11-09 RX ORDER — AMLODIPINE BESYLATE 5 MG/1
5 TABLET ORAL TWICE DAILY
Qty: 180 | Refills: 2 | Status: DISCONTINUED | COMMUNITY
End: 2019-11-09

## 2019-11-09 NOTE — HISTORY OF PRESENT ILLNESS
[FreeTextEntry1] : DM > 15 Years , \par \par +HTN - on ARBs,  + DMR ( Laser Treatment )\par \par + Vascular Disease ( Macro )\par \par A1 C 7.3 %,  \par \par Meds & Labs reviewed, Doing well,

## 2019-11-09 NOTE — ASSESSMENT
[FreeTextEntry1] : DX : DMN , HTN , CKD , S/P AVR , AICD,  \par \par Continue the current management,\par \par Needs Tighter control of DM, HTN,  Amlodipine & HCTZ  Uptitrated,\par \par Controlled type 2 diabetes mellitus with chronic kidney disease, with long-term current\par use of insulin, unspecified CKD stage (250.40,585.9,V58.67) (E11.22,Z79.4)\par Hypothyroidism, unspecified (244.9) (E03.9)\par Elevated cholesterol (272.0) (E78.00)\par \par 1. DM type 2, insulin treated, complicated by CKD, \par 2. Hypothyroid, clinically euthyroid on replacement.\par 3. Hyperlipidemia, controlled. \par \par F.U PCP Re: DM Control, \par \par Meds & Labs Reviewed, \par

## 2019-11-09 NOTE — PHYSICAL EXAM
[General Appearance - In No Acute Distress] : in no acute distress [General Appearance - Alert] : alert [General Appearance - Well Developed] : well developed [General Appearance - Well Nourished] : well nourished [General Appearance - Well-Appearing] : healthy appearing [Sclera] : the sclera and conjunctiva were normal [PERRL With Normal Accommodation] : pupils were equal in size, round, and reactive to light [Extraocular Movements] : extraocular movements were intact [Strabismus] : no strabismus was seen [Outer Ear] : the ears and nose were normal in appearance [Retinal Vessels - Neovascularization] : neovascularization noted [Hearing Threshold Finger Rub Not [place]] : hearing was normal [Both Tympanic Membranes Were Examined] : both tympanic membranes were normal [Examination Of The Oral Cavity] : the lips and gums were normal [Oropharynx] : the oropharynx was normal [Nasal Cavity] : the nasal mucosa and septum were normal [Neck Appearance] : the appearance of the neck was normal [Jugular Venous Distention Increased] : there was no jugular-venous distention [Neck Cervical Mass (___cm)] : no neck mass was observed [Thyroid Diffuse Enlargement] : the thyroid was not enlarged [Thyroid Nodule] : there were no palpable thyroid nodules [Neck Circumference: ___] : neck circumference is [unfilled] [Respiration, Rhythm And Depth] : normal respiratory rhythm and effort [Exaggerated Use Of Accessory Muscles For Inspiration] : no accessory muscle use [Auscultation Breath Sounds / Voice Sounds] : lungs were clear to auscultation bilaterally [Chest Palpation] : palpation of the chest revealed no abnormalities [Lungs Percussion] : the lungs were normal to percussion [Apical Impulse] : the apical impulse was normal [Heart Sounds] : normal S1 and S2 [Heart Rate And Rhythm] : heart rate was normal and rhythm regular [Systolic grade ___/6] : A grade [unfilled]/6 systolic murmur was heard. [Heart Sounds Pericardial Friction Rub] : no pericardial rub [Heart Sounds Gallop] : no gallops [Arterial Pulses Femoral] : femoral pulses were normal without bruits [Arterial Pulses Carotid] : carotid pulses were normal with no bruits [Edema] : there was no peripheral edema [Full Pulse] : the pedal pulses are present [Veins - Varicosity Changes] : there were no varicosital changes [Breast Appearance] : normal in appearance [Bowel Sounds] : normal bowel sounds [Breast Abnormal Lactation (Galactorrhea)] : no nipple discharge [Breast Palpation Mass] : no palpable masses [Abdomen Soft] : soft [Abdomen Tenderness] : non-tender [Normal Sphincter Tone] : normal sphincter tone [Abdomen Mass (___ Cm)] : no abdominal mass palpated [Abdomen Hernia] : no hernia was discovered [No Rectal Mass] : no rectal mass [Cervical Lymph Nodes Enlarged Posterior Bilaterally] : posterior cervical [Urinary Bladder Findings] : the bladder was normal on palpation [Supraclavicular Lymph Nodes Enlarged Bilaterally] : supraclavicular [Cervical Lymph Nodes Enlarged Anterior Bilaterally] : anterior cervical [Axillary Lymph Nodes Enlarged Bilaterally] : axillary [No CVA Tenderness] : no ~M costovertebral angle tenderness [Inguinal Lymph Nodes Enlarged Bilaterally] : inguinal [Femoral Lymph Nodes Enlarged Bilaterally] : femoral [Abnormal Walk] : normal gait [Nail Clubbing] : no clubbing  or cyanosis of the fingernails [No Spinal Tenderness] : no spinal tenderness [Motor Tone] : muscle strength and tone were normal [Involuntary Movements] : no involuntary movements were seen [Musculoskeletal - Swelling] : no joint swelling seen [Skin Color & Pigmentation] : normal skin color and pigmentation [Skin Turgor] : normal skin turgor [] : no rash [Cranial Nerves] : cranial nerves 2-12 were intact [Deep Tendon Reflexes (DTR)] : deep tendon reflexes were 2+ and symmetric [Sensation] : the sensory exam was normal to light touch and pinprick [Motor Exam] : the motor exam was normal [No Focal Deficits] : no focal deficits [Oriented To Time, Place, And Person] : oriented to person, place, and time [Memory Recent] : recent memory was not impaired [Affect] : the affect was normal [Mood] : the mood was normal [Impaired Insight] : insight and judgment were intact [Memory Remote] : remote memory was not impaired [FreeTextEntry1] : Pale, [Occult Blood Positive] : stool was negative for occult blood

## 2019-11-11 ENCOUNTER — RX RENEWAL (OUTPATIENT)
Age: 74
End: 2019-11-11

## 2019-11-14 ENCOUNTER — OUTPATIENT (OUTPATIENT)
Dept: OUTPATIENT SERVICES | Facility: HOSPITAL | Age: 74
LOS: 1 days | End: 2019-11-14
Payer: MEDICARE

## 2019-11-14 VITALS
RESPIRATION RATE: 20 BRPM | HEART RATE: 86 BPM | TEMPERATURE: 98 F | DIASTOLIC BLOOD PRESSURE: 80 MMHG | WEIGHT: 169.98 LBS | HEIGHT: 62 IN | SYSTOLIC BLOOD PRESSURE: 160 MMHG

## 2019-11-14 DIAGNOSIS — H26.9 UNSPECIFIED CATARACT: Chronic | ICD-10-CM

## 2019-11-14 DIAGNOSIS — Z86.79 PERSONAL HISTORY OF OTHER DISEASES OF THE CIRCULATORY SYSTEM: ICD-10-CM

## 2019-11-14 DIAGNOSIS — Z98.890 OTHER SPECIFIED POSTPROCEDURAL STATES: Chronic | ICD-10-CM

## 2019-11-14 DIAGNOSIS — Z01.818 ENCOUNTER FOR OTHER PREPROCEDURAL EXAMINATION: ICD-10-CM

## 2019-11-14 DIAGNOSIS — Z46.89 ENCOUNTER FOR FITTING AND ADJUSTMENT OF OTHER SPECIFIED DEVICES: Chronic | ICD-10-CM

## 2019-11-14 DIAGNOSIS — E11.9 TYPE 2 DIABETES MELLITUS WITHOUT COMPLICATIONS: ICD-10-CM

## 2019-11-14 DIAGNOSIS — N18.3 CHRONIC KIDNEY DISEASE, STAGE 3 (MODERATE): ICD-10-CM

## 2019-11-14 DIAGNOSIS — I73.9 PERIPHERAL VASCULAR DISEASE, UNSPECIFIED: ICD-10-CM

## 2019-11-14 DIAGNOSIS — Z98.89 OTHER SPECIFIED POSTPROCEDURAL STATES: Chronic | ICD-10-CM

## 2019-11-14 DIAGNOSIS — H34.8311 TRIBUTARY (BRANCH) RETINAL VEIN OCCLUSION, RIGHT EYE, WITH RETINAL NEOVASCULARIZATION: Chronic | ICD-10-CM

## 2019-11-14 DIAGNOSIS — Z29.9 ENCOUNTER FOR PROPHYLACTIC MEASURES, UNSPECIFIED: ICD-10-CM

## 2019-11-14 LAB
ALBUMIN SERPL ELPH-MCNC: 4.7 G/DL — SIGNIFICANT CHANGE UP (ref 3.3–5.2)
ALP SERPL-CCNC: 74 U/L — SIGNIFICANT CHANGE UP (ref 40–120)
ALT FLD-CCNC: 18 U/L — SIGNIFICANT CHANGE UP
ANION GAP SERPL CALC-SCNC: 17 MMOL/L — SIGNIFICANT CHANGE UP (ref 5–17)
APTT BLD: 30.6 SEC — SIGNIFICANT CHANGE UP (ref 27.5–36.3)
AST SERPL-CCNC: 20 U/L — SIGNIFICANT CHANGE UP
BASOPHILS # BLD AUTO: 0.05 K/UL — SIGNIFICANT CHANGE UP (ref 0–0.2)
BASOPHILS NFR BLD AUTO: 0.5 % — SIGNIFICANT CHANGE UP (ref 0–2)
BILIRUB SERPL-MCNC: 0.4 MG/DL — SIGNIFICANT CHANGE UP (ref 0.4–2)
BLD GP AB SCN SERPL QL: SIGNIFICANT CHANGE UP
BUN SERPL-MCNC: 30 MG/DL — HIGH (ref 8–20)
CALCIUM SERPL-MCNC: 10.2 MG/DL — SIGNIFICANT CHANGE UP (ref 8.6–10.2)
CHLORIDE SERPL-SCNC: 100 MMOL/L — SIGNIFICANT CHANGE UP (ref 98–107)
CO2 SERPL-SCNC: 24 MMOL/L — SIGNIFICANT CHANGE UP (ref 22–29)
CREAT SERPL-MCNC: 1.4 MG/DL — HIGH (ref 0.5–1.3)
EOSINOPHIL # BLD AUTO: 0.16 K/UL — SIGNIFICANT CHANGE UP (ref 0–0.5)
EOSINOPHIL NFR BLD AUTO: 1.7 % — SIGNIFICANT CHANGE UP (ref 0–6)
GLUCOSE SERPL-MCNC: 76 MG/DL — SIGNIFICANT CHANGE UP (ref 70–115)
HBA1C BLD-MCNC: 6.9 % — HIGH (ref 4–5.6)
HCT VFR BLD CALC: 34.4 % — LOW (ref 34.5–45)
HGB BLD-MCNC: 10.8 G/DL — LOW (ref 11.5–15.5)
IMM GRANULOCYTES NFR BLD AUTO: 0.5 % — SIGNIFICANT CHANGE UP (ref 0–1.5)
INR BLD: 1.06 RATIO — SIGNIFICANT CHANGE UP (ref 0.88–1.16)
LYMPHOCYTES # BLD AUTO: 1.54 K/UL — SIGNIFICANT CHANGE UP (ref 1–3.3)
LYMPHOCYTES # BLD AUTO: 16.2 % — SIGNIFICANT CHANGE UP (ref 13–44)
MCHC RBC-ENTMCNC: 29.7 PG — SIGNIFICANT CHANGE UP (ref 27–34)
MCHC RBC-ENTMCNC: 31.4 GM/DL — LOW (ref 32–36)
MCV RBC AUTO: 94.5 FL — SIGNIFICANT CHANGE UP (ref 80–100)
MONOCYTES # BLD AUTO: 0.71 K/UL — SIGNIFICANT CHANGE UP (ref 0–0.9)
MONOCYTES NFR BLD AUTO: 7.5 % — SIGNIFICANT CHANGE UP (ref 2–14)
NEUTROPHILS # BLD AUTO: 7 K/UL — SIGNIFICANT CHANGE UP (ref 1.8–7.4)
NEUTROPHILS NFR BLD AUTO: 73.6 % — SIGNIFICANT CHANGE UP (ref 43–77)
PLATELET # BLD AUTO: 260 K/UL — SIGNIFICANT CHANGE UP (ref 150–400)
POTASSIUM SERPL-MCNC: 4.5 MMOL/L — SIGNIFICANT CHANGE UP (ref 3.5–5.3)
POTASSIUM SERPL-SCNC: 4.5 MMOL/L — SIGNIFICANT CHANGE UP (ref 3.5–5.3)
PROT SERPL-MCNC: 9 G/DL — HIGH (ref 6.6–8.7)
PROTHROM AB SERPL-ACNC: 12.2 SEC — SIGNIFICANT CHANGE UP (ref 10–12.9)
RBC # BLD: 3.64 M/UL — LOW (ref 3.8–5.2)
RBC # FLD: 15 % — HIGH (ref 10.3–14.5)
SODIUM SERPL-SCNC: 141 MMOL/L — SIGNIFICANT CHANGE UP (ref 135–145)
WBC # BLD: 9.51 K/UL — SIGNIFICANT CHANGE UP (ref 3.8–10.5)
WBC # FLD AUTO: 9.51 K/UL — SIGNIFICANT CHANGE UP (ref 3.8–10.5)

## 2019-11-14 PROCEDURE — 93010 ELECTROCARDIOGRAM REPORT: CPT

## 2019-11-14 PROCEDURE — 93005 ELECTROCARDIOGRAM TRACING: CPT

## 2019-11-14 PROCEDURE — G0463: CPT

## 2019-11-14 NOTE — H&P PST ADULT - RS GEN PE MLT RESP DETAILS PC
no rhonchi/no rales/good air movement/breath sounds equal/airway patent/no wheezes/respirations non-labored/clear to auscultation bilaterally

## 2019-11-14 NOTE — H&P PST ADULT - NSICDXFAMILYHX_GEN_ALL_CORE_FT
FAMILY HISTORY:  Father  Still living? No  Family history of heart attack, Age at diagnosis: 41-50    Mother  Still living? No  Family history of colon cancer in mother, Age at diagnosis: 61-70    Sibling  Still living? Yes, Estimated age: 61-70  Family history of heart attack, Age at diagnosis: Age Unknown

## 2019-11-14 NOTE — H&P PST ADULT - HISTORY OF PRESENT ILLNESS
A 74-year-old female with evidence of aortoiliac occlusive disease who has had prior iliac stents in the past.  Patient state on recent Vascular Pressure Monitoring test it was noted with decrease in pressure readings.  She is now schedule for open left femoral access, aortogram with runoff, left iliac and left femoral artery angioplasty and stenting.

## 2019-11-14 NOTE — H&P PST ADULT - MUSCULOSKELETAL
negative detailed exam no joint swelling/no joint erythema/ROM intact/no joint warmth/no calf tenderness/normal strength/decreased ROM due to pain

## 2019-11-14 NOTE — H&P PST ADULT - NSICDXPASTSURGICALHX_GEN_ALL_CORE_FT
PAST SURGICAL HISTORY:  Acute cataract     Branch retinal vein occlusion with neovascularization of right eye treated with laser    H/O vascular surgery Infrarenal aortic endarterectomy with aortic bi iliac bypass, and superficial femoral artery  angioplasty and stenting, 3/24/16.    History of intravascular stent placement femoral artery angioplasty and stents, 3/24/16 PAST SURGICAL HISTORY:  Acute cataract     Branch retinal vein occlusion with neovascularization of right eye treated with laser    H/O vascular surgery Infrarenal aortic endarterectomy with aortic bi iliac bypass, and superficial femoral artery  angioplasty and stenting, 3/24/16.    History of intravascular stent placement femoral artery angioplasty and stents, 3/24/16    Pessary maintenance     S/P evacuation of hematoma right groin, then required wound vac

## 2019-11-14 NOTE — H&P PST ADULT - ASSESSMENT
A 74-year-old female with h/o IDDM, CKD, CHF s/p AICD, HTn, HLD, PVD present with evidence of aortoiliac occlusive disease who has had prior iliac stents in the past.  Patient state on recent Vascular Pressure Monitoring test it was noted with decrease in pressure readings.  She is now schedule for open left femoral access, aortogram with runoff, left iliac and left femoral artery angioplasty and stenting.     CAPRINI VTE 2.0 SCORE [CLOT updated 2019]    AGE RELATED RISK FACTORS                                                       MOBILITY RELATED FACTORS  [ ] Age 41-60 years                                            (1 Point)                    [ ] Bed rest                                                        (1 Point)  [ ] Age: 61-74 years                                           (2 Points)                  [ ] Plaster cast                                                   (2 Points)  [x] Age= 75 years                                              (3 Points)                    [ ] Bed bound for more than 72 hours                 (2 Points)    DISEASE RELATED RISK FACTORS                                               GENDER SPECIFIC FACTORS  [ ] Edema in the lower extremities                       (1 Point)              [ ] Pregnancy                                                     (1 Point)  [x ] Varicose veins                                               (1 Point)                     [ ] Post-partum < 6 weeks                                   (1 Point)             [ x] BMI > 25 Kg/m2                                            (1 Point)                     [ ] Hormonal therapy  or oral contraception          (1 Point)                 [ ] Sepsis (in the previous month)                        (1 Point)               [ ] History of pregnancy complications                 (1 point)  [ ] Pneumonia or serious lung disease                                               [ ] Unexplained or recurrent                     (1 Point)           (in the previous month)                               (1 Point)  [ ] Abnormal pulmonary function test                     (1 Point)                 SURGERY RELATED RISK FACTORS  [ ] Acute myocardial infarction                              (1 Point)               [ ]  Section                                             (1 Point)  [ ] Congestive heart failure (in the previous month)  (1 Point)      [ ] Minor surgery                                                  (1 Point)   [ ] Inflammatory bowel disease                             (1 Point)               [ ] Arthroscopic surgery                                        (2 Points)  [ ] Central venous access                                      (2 Points)                x] General surgery lasting more than 45 minutes (2 points)  [ ] Malignancy- Present or previous                   (2 Points)                [ ] Elective arthroplasty                                         (5 points)    [ ] Stroke (in the previous month)                          (5 Points)                                                                                                                                                           HEMATOLOGY RELATED FACTORS                                                 TRAUMA RELATED RISK FACTORS  [ ] Prior episodes of VTE                                     (3 Points)                [ ] Fracture of the hip, pelvis, or leg                       (5 Points)  [ ] Positive family history for VTE                         (3 Points)             [ ] Acute spinal cord injury (in the previous month)  (5 Points)  [ ] Prothrombin 00158 A                                     (3 Points)               [ ] Paralysis  (less than 1 month)                             (5 Points)  [ ] Factor V Leiden                                             (3 Points)                  [ ] Multiple Trauma within 1 month                        (5 Points)  [ ] Lupus anticoagulants                                     (3 Points)                                                           [ ] Anticardiolipin antibodies                               (3 Points)                                                       [ ] High homocysteine in the blood                      (3 Points)                                             [ ] Other congenital or acquired thrombophilia      (3 Points)                                                [ ] Heparin induced thrombocytopenia                  (3 Points)                                     Total Score [    7    ]

## 2019-11-14 NOTE — H&P PST ADULT - NSICDXPASTMEDICALHX_GEN_ALL_CORE_FT
PAST MEDICAL HISTORY:  AICD (automatic cardioverter/defibrillator) present rep notified [facility]    Anxiety     Aorto-iliac disease     CAD (coronary artery disease)     Carotid stenosis, bilateral     CKD (chronic kidney disease) stage 3, GFR 30-59 ml/min     Congenital heart failure     Diabetes IDDM    History of anemia due to chronic kidney disease     HLD (hyperlipidemia)     Hypertension     Hypothyroid     Lung nodule     Peripheral neuropathy     Psoriasis     Pulmonic regurgitation     PVD (peripheral vascular disease) with claudication     Spinal stenosis, lumbar region, with neurogenic claudication

## 2019-11-14 NOTE — ASU PATIENT PROFILE, ADULT - PMH
AICD (automatic cardioverter/defibrillator) present  rep notified [facility]  Anxiety    Aorto-iliac disease    CAD (coronary artery disease)    Carotid stenosis, bilateral    CKD (chronic kidney disease) stage 3, GFR 30-59 ml/min    Congenital heart failure    Diabetes  IDDM  History of anemia due to chronic kidney disease    HLD (hyperlipidemia)    Hypertension    Hypothyroid    Lung nodule    Peripheral neuropathy    Psoriasis    Pulmonic regurgitation    PVD (peripheral vascular disease) with claudication    Spinal stenosis, lumbar region, with neurogenic claudication

## 2019-11-14 NOTE — H&P PST ADULT - NSICDXPROBLEM_GEN_ALL_CORE_FT
PROBLEM DIAGNOSES  Problem: History of heart disease  Assessment and Plan: continue medication as directed  cardiac clearance     Problem: DM (diabetes mellitus)  Assessment and Plan: routine labs and EKg  medical clearance     Problem: CKD (chronic kidney disease) stage 3, GFR 30-59 ml/min  Assessment and Plan: will follow up with Dr. Dino jerez last renal office note     Problem: Need for prophylactic measure  Assessment and Plan: high risk for VTE event, the surgical team will evaluate for appropriate VTE prohylaxis     Problem: PVD (peripheral vascular disease)  Assessment and Plan: open left femoral access, aortogram with runoff, left iliac and left femoral artery angioplasty and stenting.

## 2019-11-14 NOTE — ASU PATIENT PROFILE, ADULT - PSH
Acute cataract    Branch retinal vein occlusion with neovascularization of right eye  treated with laser  H/O vascular surgery  Infrarenal aortic endarterectomy with aortic bi iliac bypass, and superficial femoral artery  angioplasty and stenting, 3/24/16.  History of intravascular stent placement  femoral artery angioplasty and stents, 3/24/16

## 2019-11-18 RX ORDER — CEFAZOLIN SODIUM 1 G
2000 VIAL (EA) INJECTION ONCE
Refills: 0 | Status: DISCONTINUED | OUTPATIENT
Start: 2019-11-26 | End: 2019-11-27

## 2019-11-20 ENCOUNTER — APPOINTMENT (OUTPATIENT)
Dept: FAMILY MEDICINE | Facility: CLINIC | Age: 74
End: 2019-11-20

## 2019-11-22 ENCOUNTER — APPOINTMENT (OUTPATIENT)
Dept: FAMILY MEDICINE | Facility: CLINIC | Age: 74
End: 2019-11-22
Payer: MEDICARE

## 2019-11-22 VITALS
TEMPERATURE: 98 F | SYSTOLIC BLOOD PRESSURE: 138 MMHG | DIASTOLIC BLOOD PRESSURE: 70 MMHG | HEIGHT: 62 IN | BODY MASS INDEX: 30.55 KG/M2 | OXYGEN SATURATION: 97 % | HEART RATE: 72 BPM | WEIGHT: 166 LBS

## 2019-11-22 DIAGNOSIS — Z86.79 PERSONAL HISTORY OF OTHER DISEASES OF THE CIRCULATORY SYSTEM: ICD-10-CM

## 2019-11-22 DIAGNOSIS — Z80.9 FAMILY HISTORY OF MALIGNANT NEOPLASM, UNSPECIFIED: ICD-10-CM

## 2019-11-22 DIAGNOSIS — Z92.29 PERSONAL HISTORY OF OTHER DRUG THERAPY: ICD-10-CM

## 2019-11-22 DIAGNOSIS — Z86.39 PERSONAL HISTORY OF OTHER ENDOCRINE, NUTRITIONAL AND METABOLIC DISEASE: ICD-10-CM

## 2019-11-22 DIAGNOSIS — Z23 ENCOUNTER FOR IMMUNIZATION: ICD-10-CM

## 2019-11-22 DIAGNOSIS — D64.9 ANEMIA, UNSPECIFIED: ICD-10-CM

## 2019-11-22 PROCEDURE — 99215 OFFICE O/P EST HI 40 MIN: CPT

## 2019-11-22 NOTE — ASSESSMENT
[No Further Testing Recommended] : no further testing recommended [Patient Optimized for Surgery] : Patient optimized for surgery [Cardiology consultation] : Cardiology consultation [As per surgery] : as per surgery [FreeTextEntry4] : There is no medical contraindication to the proposed procedure. The patient is medically cleared to proceed with the planned surgery.\par Pt to receive Mucomyst and IV hydration prior to her procedure.\par  [FreeTextEntry6] : Continue aspirin 81 mg 1 tab/ day on the day of the procedure. [FreeTextEntry7] : The evening prior to her procedure, the pt is to take 30 units of her Levemir. In the am of the procedure, she is to hold her Glimepiride and her Novolog. She will take Carvedilol 25 mg, and Amlodipine 5 mg with a small sip of water.

## 2019-11-22 NOTE — REVIEW OF SYSTEMS
[Negative] : Heme/Lymph [FreeTextEntry9] : Chronic low back pain, radiates to the legs.   B/l calf pain worse with walking. [de-identified] : m

## 2019-11-22 NOTE — RESULTS/DATA
[] : results reviewed [de-identified] : Labs 11/14/19: H&H  10.8/34.4 [de-identified] : WNL [de-identified] : BUN 30   Creatinine 1.40.   tot prot 9.0 [de-identified] : Biventricular paced 79 bpm. [de-identified] : HBA1C 6.9\par ABO Rh:  A neg\par Antibody screen: neg

## 2019-11-22 NOTE — HISTORY OF PRESENT ILLNESS
[FreeTextEntry1] : Open left femoral access aortogram with runoff. Left Iliac and left femoral artery angioplasty and stenting. [FreeTextEntry2] : 11/26/19 [FreeTextEntry3] : Dr. Bean [FreeTextEntry4] : Pt with hx of htn, insulin dependent DM, chronic kidney disease, cardiomyopathy, lumbar radiculopathy, and peripheral arterial disease with symptoms of lower extremity claudication is here for preoperative clearance for a peripheral angiogram on 11/26/19 with Dr. Bean.  [FreeTextEntry6] : Pt denies any cp, sob, or palpitations. [FreeTextEntry7] : Hx of Infrarenal aortic endarterectomy with aortic iliac bypass with superficial femoral artery bypass and stenting, 3/24/19.\par Nonischemic cardiomyopathy, cardiac pacemaker, cardiac defibrillator, aortic valve replacement.\par Echo 10/8/19: LVEF 50%, Asynchronous septal contractions secondary to pacemaker wire. Apical wall motin abnormality may reflect pacemaker activation. There is borderline concentric LVH. The LA is mildly dilated. There is trace to mild MR. There is mild TR. RVSP is approx. 37 mm HG. Mod AI. Tr to mild pulmonic valvular regurgitation. Small pericardial effusion. \par Myocardial perfusion scan 1/21/2015, negative for ischemia.

## 2019-11-22 NOTE — PHYSICAL EXAM
[Normal] : no acute distress, well nourished, well developed and well-appearing [Normal Sclera/Conjunctiva] : normal sclera/conjunctiva [PERRL] : pupils equal round and reactive to light [EOMI] : extraocular movements intact [Normal Oropharynx] : the oropharynx was normal [Normal TMs] : both tympanic membranes were normal [Normal Nasal Mucosa] : the nasal mucosa was normal [No JVD] : no jugular venous distention [No Lymphadenopathy] : no lymphadenopathy [Supple] : supple [No Respiratory Distress] : no respiratory distress  [Clear to Auscultation] : lungs were clear to auscultation bilaterally [Normal Rate] : normal rate  [Regular Rhythm] : with a regular rhythm [Normal S1, S2] : normal S1 and S2 [II] : a grade 2 [0] : left 0 [1+] : left 1+ [No Edema] : there was no peripheral edema [Soft] : abdomen soft [Non Tender] : non-tender [Non-distended] : non-distended [No Masses] : no abdominal mass palpated [No HSM] : no HSM [Normal Bowel Sounds] : normal bowel sounds [Normal Posterior Cervical Nodes] : no posterior cervical lymphadenopathy [Normal Anterior Cervical Nodes] : no anterior cervical lymphadenopathy [No Spinal Tenderness] : no spinal tenderness [Grossly Normal Strength/Tone] : grossly normal strength/tone [No Focal Deficits] : no focal deficits [Normal Gait] : normal gait [Deep Tendon Reflexes (DTR)] : deep tendon reflexes were 2+ and symmetric [Normal Affect] : the affect was normal [Normal Mood] : the mood was normal [Normal Insight/Judgement] : insight and judgment were intact [de-identified] : Lumbar lordosis absent. [de-identified] : macular rash left inguinal region.

## 2019-11-25 ENCOUNTER — TRANSCRIPTION ENCOUNTER (OUTPATIENT)
Age: 74
End: 2019-11-25

## 2019-11-26 ENCOUNTER — INPATIENT (INPATIENT)
Facility: HOSPITAL | Age: 74
LOS: 0 days | Discharge: HOME | DRG: 253 | End: 2019-11-27
Attending: SURGERY | Admitting: SURGERY
Payer: MEDICARE

## 2019-11-26 ENCOUNTER — RESULT REVIEW (OUTPATIENT)
Age: 74
End: 2019-11-26

## 2019-11-26 VITALS
WEIGHT: 169.76 LBS | SYSTOLIC BLOOD PRESSURE: 154 MMHG | HEART RATE: 80 BPM | RESPIRATION RATE: 16 BRPM | DIASTOLIC BLOOD PRESSURE: 50 MMHG | OXYGEN SATURATION: 100 % | HEIGHT: 62 IN | TEMPERATURE: 98 F

## 2019-11-26 DIAGNOSIS — H26.9 UNSPECIFIED CATARACT: Chronic | ICD-10-CM

## 2019-11-26 DIAGNOSIS — Z98.890 OTHER SPECIFIED POSTPROCEDURAL STATES: Chronic | ICD-10-CM

## 2019-11-26 DIAGNOSIS — Z46.89 ENCOUNTER FOR FITTING AND ADJUSTMENT OF OTHER SPECIFIED DEVICES: Chronic | ICD-10-CM

## 2019-11-26 DIAGNOSIS — I73.9 PERIPHERAL VASCULAR DISEASE, UNSPECIFIED: ICD-10-CM

## 2019-11-26 DIAGNOSIS — H34.8311 TRIBUTARY (BRANCH) RETINAL VEIN OCCLUSION, RIGHT EYE, WITH RETINAL NEOVASCULARIZATION: Chronic | ICD-10-CM

## 2019-11-26 DIAGNOSIS — Z98.89 OTHER SPECIFIED POSTPROCEDURAL STATES: Chronic | ICD-10-CM

## 2019-11-26 LAB
ALBUMIN SERPL ELPH-MCNC: 3.8 G/DL — SIGNIFICANT CHANGE UP (ref 3.3–5.2)
ALP SERPL-CCNC: 62 U/L — SIGNIFICANT CHANGE UP (ref 40–120)
ALT FLD-CCNC: 17 U/L — SIGNIFICANT CHANGE UP
ANION GAP SERPL CALC-SCNC: 17 MMOL/L — SIGNIFICANT CHANGE UP (ref 5–17)
AST SERPL-CCNC: 19 U/L — SIGNIFICANT CHANGE UP
BILIRUB SERPL-MCNC: 0.4 MG/DL — SIGNIFICANT CHANGE UP (ref 0.4–2)
BLD GP AB SCN SERPL QL: SIGNIFICANT CHANGE UP
BUN SERPL-MCNC: 28 MG/DL — HIGH (ref 8–20)
CALCIUM SERPL-MCNC: 8.6 MG/DL — SIGNIFICANT CHANGE UP (ref 8.6–10.2)
CHLORIDE SERPL-SCNC: 100 MMOL/L — SIGNIFICANT CHANGE UP (ref 98–107)
CO2 SERPL-SCNC: 20 MMOL/L — LOW (ref 22–29)
CREAT SERPL-MCNC: 1.45 MG/DL — HIGH (ref 0.5–1.3)
GLUCOSE SERPL-MCNC: 329 MG/DL — HIGH (ref 70–115)
HCT VFR BLD CALC: 28.6 % — LOW (ref 34.5–45)
HGB BLD-MCNC: 9.4 G/DL — LOW (ref 11.5–15.5)
MCHC RBC-ENTMCNC: 30 PG — SIGNIFICANT CHANGE UP (ref 27–34)
MCHC RBC-ENTMCNC: 32.9 GM/DL — SIGNIFICANT CHANGE UP (ref 32–36)
MCV RBC AUTO: 91.4 FL — SIGNIFICANT CHANGE UP (ref 80–100)
PLATELET # BLD AUTO: 212 K/UL — SIGNIFICANT CHANGE UP (ref 150–400)
POTASSIUM SERPL-MCNC: 4.2 MMOL/L — SIGNIFICANT CHANGE UP (ref 3.5–5.3)
POTASSIUM SERPL-SCNC: 4.2 MMOL/L — SIGNIFICANT CHANGE UP (ref 3.5–5.3)
PROT SERPL-MCNC: 7.2 G/DL — SIGNIFICANT CHANGE UP (ref 6.6–8.7)
RBC # BLD: 3.13 M/UL — LOW (ref 3.8–5.2)
RBC # FLD: 15.2 % — HIGH (ref 10.3–14.5)
SODIUM SERPL-SCNC: 137 MMOL/L — SIGNIFICANT CHANGE UP (ref 135–145)
TROPONIN T SERPL-MCNC: <0.01 NG/ML — SIGNIFICANT CHANGE UP (ref 0–0.06)
WBC # BLD: 11.91 K/UL — HIGH (ref 3.8–10.5)
WBC # FLD AUTO: 11.91 K/UL — HIGH (ref 3.8–10.5)

## 2019-11-26 PROCEDURE — 88311 DECALCIFY TISSUE: CPT | Mod: 26

## 2019-11-26 PROCEDURE — 88304 TISSUE EXAM BY PATHOLOGIST: CPT | Mod: 26

## 2019-11-26 PROCEDURE — 71045 X-RAY EXAM CHEST 1 VIEW: CPT | Mod: 26

## 2019-11-26 RX ORDER — ENOXAPARIN SODIUM 100 MG/ML
30 INJECTION SUBCUTANEOUS DAILY
Refills: 0 | Status: DISCONTINUED | OUTPATIENT
Start: 2019-11-26 | End: 2019-11-27

## 2019-11-26 RX ORDER — SODIUM CHLORIDE 9 MG/ML
1000 INJECTION INTRAMUSCULAR; INTRAVENOUS; SUBCUTANEOUS
Refills: 0 | Status: DISCONTINUED | OUTPATIENT
Start: 2019-11-26 | End: 2019-11-26

## 2019-11-26 RX ORDER — SODIUM CHLORIDE 9 MG/ML
3 INJECTION INTRAMUSCULAR; INTRAVENOUS; SUBCUTANEOUS ONCE
Refills: 0 | Status: DISCONTINUED | OUTPATIENT
Start: 2019-11-26 | End: 2019-11-26

## 2019-11-26 RX ORDER — GLUCAGON INJECTION, SOLUTION 0.5 MG/.1ML
1 INJECTION, SOLUTION SUBCUTANEOUS ONCE
Refills: 0 | Status: DISCONTINUED | OUTPATIENT
Start: 2019-11-26 | End: 2019-11-27

## 2019-11-26 RX ORDER — SODIUM CHLORIDE 9 MG/ML
1000 INJECTION, SOLUTION INTRAVENOUS
Refills: 0 | Status: DISCONTINUED | OUTPATIENT
Start: 2019-11-26 | End: 2019-11-26

## 2019-11-26 RX ORDER — ONDANSETRON 8 MG/1
4 TABLET, FILM COATED ORAL ONCE
Refills: 0 | Status: DISCONTINUED | OUTPATIENT
Start: 2019-11-26 | End: 2019-11-26

## 2019-11-26 RX ORDER — SODIUM CHLORIDE 9 MG/ML
1000 INJECTION, SOLUTION INTRAVENOUS
Refills: 0 | Status: DISCONTINUED | OUTPATIENT
Start: 2019-11-26 | End: 2019-11-27

## 2019-11-26 RX ORDER — OXYCODONE HYDROCHLORIDE 5 MG/1
5 TABLET ORAL ONCE
Refills: 0 | Status: DISCONTINUED | OUTPATIENT
Start: 2019-11-26 | End: 2019-11-26

## 2019-11-26 RX ORDER — OXYCODONE AND ACETAMINOPHEN 5; 325 MG/1; MG/1
2 TABLET ORAL EVERY 6 HOURS
Refills: 0 | Status: DISCONTINUED | OUTPATIENT
Start: 2019-11-26 | End: 2019-11-27

## 2019-11-26 RX ORDER — ACETYLCYSTEINE 200 MG/ML
1200 VIAL (ML) MISCELLANEOUS EVERY 12 HOURS
Refills: 0 | Status: COMPLETED | OUTPATIENT
Start: 2019-11-26 | End: 2019-11-27

## 2019-11-26 RX ORDER — LOSARTAN POTASSIUM 100 MG/1
100 TABLET, FILM COATED ORAL DAILY
Refills: 0 | Status: DISCONTINUED | OUTPATIENT
Start: 2019-11-26 | End: 2019-11-27

## 2019-11-26 RX ORDER — DEXTROSE 50 % IN WATER 50 %
12.5 SYRINGE (ML) INTRAVENOUS ONCE
Refills: 0 | Status: DISCONTINUED | OUTPATIENT
Start: 2019-11-26 | End: 2019-11-27

## 2019-11-26 RX ORDER — ASPIRIN/CALCIUM CARB/MAGNESIUM 324 MG
81 TABLET ORAL DAILY
Refills: 0 | Status: DISCONTINUED | OUTPATIENT
Start: 2019-11-26 | End: 2019-11-27

## 2019-11-26 RX ORDER — DEXTROSE 50 % IN WATER 50 %
25 SYRINGE (ML) INTRAVENOUS ONCE
Refills: 0 | Status: DISCONTINUED | OUTPATIENT
Start: 2019-11-26 | End: 2019-11-27

## 2019-11-26 RX ORDER — ACETYLCYSTEINE 200 MG/ML
1200 VIAL (ML) MISCELLANEOUS
Refills: 0 | Status: DISCONTINUED | OUTPATIENT
Start: 2019-11-26 | End: 2019-11-26

## 2019-11-26 RX ORDER — FUROSEMIDE 40 MG
40 TABLET ORAL ONCE
Refills: 0 | Status: COMPLETED | OUTPATIENT
Start: 2019-11-26 | End: 2019-11-26

## 2019-11-26 RX ORDER — DEXTROSE 50 % IN WATER 50 %
15 SYRINGE (ML) INTRAVENOUS ONCE
Refills: 0 | Status: DISCONTINUED | OUTPATIENT
Start: 2019-11-26 | End: 2019-11-27

## 2019-11-26 RX ORDER — IPRATROPIUM/ALBUTEROL SULFATE 18-103MCG
3 AEROSOL WITH ADAPTER (GRAM) INHALATION ONCE
Refills: 0 | Status: COMPLETED | OUTPATIENT
Start: 2019-11-26 | End: 2019-11-26

## 2019-11-26 RX ORDER — OXYCODONE AND ACETAMINOPHEN 5; 325 MG/1; MG/1
1 TABLET ORAL EVERY 4 HOURS
Refills: 0 | Status: DISCONTINUED | OUTPATIENT
Start: 2019-11-26 | End: 2019-11-27

## 2019-11-26 RX ORDER — INSULIN LISPRO 100/ML
VIAL (ML) SUBCUTANEOUS AT BEDTIME
Refills: 0 | Status: DISCONTINUED | OUTPATIENT
Start: 2019-11-26 | End: 2019-11-27

## 2019-11-26 RX ORDER — INSULIN LISPRO 100/ML
VIAL (ML) SUBCUTANEOUS
Refills: 0 | Status: DISCONTINUED | OUTPATIENT
Start: 2019-11-26 | End: 2019-11-27

## 2019-11-26 RX ORDER — FENTANYL CITRATE 50 UG/ML
25 INJECTION INTRAVENOUS
Refills: 0 | Status: DISCONTINUED | OUTPATIENT
Start: 2019-11-26 | End: 2019-11-26

## 2019-11-26 RX ADMIN — Medication 8: at 12:46

## 2019-11-26 RX ADMIN — Medication 3 MILLILITER(S): at 13:20

## 2019-11-26 RX ADMIN — Medication 1200 MILLIGRAM(S): at 08:24

## 2019-11-26 RX ADMIN — ENOXAPARIN SODIUM 30 MILLIGRAM(S): 100 INJECTION SUBCUTANEOUS at 18:41

## 2019-11-26 RX ADMIN — Medication 1200 MILLIGRAM(S): at 18:42

## 2019-11-26 RX ADMIN — Medication 40 MILLIGRAM(S): at 13:48

## 2019-11-26 RX ADMIN — Medication 6: at 21:27

## 2019-11-26 NOTE — BRIEF OPERATIVE NOTE - NSICDXBRIEFPOSTOP_GEN_ALL_CORE_FT
POST-OP DIAGNOSIS:  PVD (peripheral vascular disease) with claudication 26-Nov-2019 12:18:05  Juan Ramon Bean

## 2019-11-26 NOTE — PROGRESS NOTE ADULT - SUBJECTIVE AND OBJECTIVE BOX
POST-OPERATIVE CHECK NOTE    Subjective:    Patient is s/p thromboendarterectomy of the deep femoral artery, endarterectomy of the Left CFA, insertion of stent in the superficial femoral artery. Patient is recovering appropriately post procedure. Pulses DP were 2+ biphasic bilaterally. Patient has no complaints of pain, or numbness down both lower extremities. Patient's left dressing is secure. Patient denies fevers/chills, nausea, vomiting, chest pain, shortness of breath, no abdominal pain, and no pain at incision site. Patient is urinating adequately, and is back on a regular diet.      Vital Signs Last 24 Hrs  T(C): 36.7 (26 Nov 2019 17:47), Max: 36.9 (26 Nov 2019 16:06)  T(F): 98.1 (26 Nov 2019 17:47), Max: 98.5 (26 Nov 2019 16:06)  HR: 81 (26 Nov 2019 17:47) (71 - 81)  BP: 122/56 (26 Nov 2019 17:47) (115/61 - 154/51)  BP(mean): --  RR: 17 (26 Nov 2019 17:47) (11 - 18)  SpO2: 94% (26 Nov 2019 17:47) (93% - 100%)  I&O's Detail    26 Nov 2019 07:01  -  26 Nov 2019 19:40  --------------------------------------------------------  IN:  Total IN: 0 mL    OUT:    Indwelling Catheter - Urethral: 1700 mL    Voided: 700 mL  Total OUT: 2400 mL    Total NET: -2400 mL    ceFAZolin   IVPB 2000  aspirin enteric coated 81  ceFAZolin   IVPB 2000  enoxaparin Injectable 30  losartan 100    PAST MEDICAL & SURGICAL HISTORY:  Pulmonic regurgitation  HLD (hyperlipidemia)  Lung nodule  Spinal stenosis, lumbar region, with neurogenic claudication  Carotid stenosis, bilateral  Aorto-iliac disease  Psoriasis  Congenital heart failure  History of anemia due to chronic kidney disease  Peripheral neuropathy  Anxiety  CKD (chronic kidney disease) stage 3, GFR 30-59 ml/min  PVD (peripheral vascular disease) with claudication  AICD (automatic cardioverter/defibrillator) present: rep notified senia hoang sci  CAD (coronary artery disease)  Hypertension  Hypothyroid  Diabetes: IDDM  S/P evacuation of hematoma: right groin, then required wound vac  Pessary maintenance  Branch retinal vein occlusion with neovascularization of right eye: treated with laser  H/O vascular surgery: Infrarenal aortic endarterectomy with aortic bi iliac bypass, and superficial femoral artery  angioplasty and stenting, 3/24/16.  History of intravascular stent placement: femoral artery angioplasty and stents, 3/24/16  Acute cataract        Physical Exam:  General: NAD, resting comfortably in bed  Pulmonary: Nonlabored breathing, no respiratory distress  Cardiovascular: NSR  Abdominal: soft, Non tender, non distended  Extremities: RLL+ LLL Normal ROM, pulses PT, and femoral 2+ B/L and equal. Dorsalis pedis- monophasic and 2+ B/L for LLE and RLE. Dressing is secure at incision site. Some mild saturation noted, but gail blood through dressing was not observed.       LABS:                        9.4    11.91 )-----------( 212      ( 26 Nov 2019 13:40 )             28.6     11-26    137  |  100  |  28.0<H>  ----------------------------<  329<H>  4.2   |  20.0<L>  |  1.45<H>    Ca    8.6      26 Nov 2019 13:40    TPro  7.2  /  Alb  3.8  /  TBili  0.4  /  DBili  x   /  AST  19  /  ALT  17  /  AlkPhos  62  11-26      CAPILLARY BLOOD GLUCOSE      POCT Blood Glucose.: 275 mg/dL (26 Nov 2019 14:51)  POCT Blood Glucose.: 311 mg/dL (26 Nov 2019 12:39)  POCT Blood Glucose.: 214 mg/dL (26 Nov 2019 07:10)      Radiology and Additional Studies:    Assessment:  The patient is a 74y Female who is now several hours post-op from a     Plan:  - Pain control as needed  - DVT ppx  - OOB and ambulating as tolerated  - F/u AM labs

## 2019-11-26 NOTE — CONSULT NOTE ADULT - SUBJECTIVE AND OBJECTIVE BOX
[facility]                              [address] [address]                                                 PHONE: [phone]                                                 FAX: [phone]  ------------------------------------------------------------------------------------------------  74y Female with past medical history as under s/p thromboendarterectomy of deep femoral artery and Endarterectomy of left common femoral artery for symptomatic PAD. Pt reported chest pressure and some difficulty in catching her breath in the PACU.   At the time of evaluation, pt is on o2. Received about 1500 ml of iv fluids due to CRI. Pt appears mildly dyspneic and reports mild tightness in her throat. She is hypertensive as well.   She follows with Dr. Rasmussen.    PAST MEDICAL & SURGICAL HISTORY:  Pulmonic regurgitation  HLD (hyperlipidemia)  Lung nodule  Spinal stenosis, lumbar region, with neurogenic claudication  Carotid stenosis, bilateral  Aorto-iliac disease  Psoriasis  Congenital heart failure  History of anemia due to chronic kidney disease  Peripheral neuropathy  Anxiety  CKD (chronic kidney disease) stage 3, GFR 30-59 ml/min  PVD (peripheral vascular disease) with claudication  AICD (automatic cardioverter/defibrillator) present: rep notified senia hoang sci  CAD (coronary artery disease)  Hypertension  Hypothyroid  Diabetes: IDDM  S/P evacuation of hematoma: right groin, then required wound vac  Pessary maintenance  Branch retinal vein occlusion with neovascularization of right eye: treated with laser  H/O vascular surgery: Infrarenal aortic endarterectomy with aortic bi iliac bypass, and superficial femoral artery  angioplasty and stenting, 3/24/16.  History of intravascular stent placement: femoral artery angioplasty and stents, 3/24/16  Acute cataract      latex (Rash)  No Known Drug Allergies      Review of Systems:  Positive for shortness of breath  Rest of the systems were reviewed and was negative.     Family history:   No pertinent family history in first degree relative of early CAD, sudden cardiac death or  congenital heart disease    Social History:  No smoking   No alcohol  No other drug use    Vital Signs Last 24 Hrs  T(C): 36.4 (26 Nov 2019 12:39), Max: 36.7 (26 Nov 2019 07:31)  T(F): 97.5 (26 Nov 2019 12:39), Max: 98 (26 Nov 2019 07:31)  HR: 72 (26 Nov 2019 13:15) (72 - 80)  BP: 146/50 (26 Nov 2019 13:15) (134/49 - 154/50)  BP(mean): --  RR: 15 (26 Nov 2019 13:15) (14 - 16)  SpO2: 100% (26 Nov 2019 13:15) (97% - 100%)    PHYSICAL EXAM:  Constitutional: Appears well developed, well nourished; alert and co-operative  HEENT:     Head: Normocephalic and atraumatic  Eyes: Conjunctiva normal, No scleral icterus  Neck: Supple, No JVD  Mouth/Throat: Mucous membranes are normal. Mucosa are not pale or dry.  Cardiovascular: regular S1, S2  Respiratory: Lungs clear to auscultation; basal anterior crepitations, no wheeze  Gastrointestinal:  Soft, Non-tender, + BS	  Musculoskeletal: Normal range of motion. No edema  Skin: Warm and dry. No cyanosis . No diaphoresis.  Neurologic: Alert oriented to time place and person. Normal strength and no tremor.  Psychiatric: Normal mood and affect, Speech is normal and behavior is normal.        LABS:    RADIOLOGY & ADDITIONAL STUDIES: (reviewed)    CARDIOLOGY TESTING:(reviewed)     ECG (Independent visualization): Atrial paced rhythm with no significant ST changes    Echocardiogram from October 2018 showed LVEF 50%, asynchonrous septal contraction secdnary to pacemaker wire, borderline LVH, trace to mild MR, moderate AI, mild TR,  RVSP 37 mmHg, small pericardial effusion, no indications of cardiac tamponade.     Carotid US in June 2019 showed no significant stenosis    MEDICATIONS:(reviewed)  Home Medications:  Home Medications:  candesartan 32 mg oral tablet: 1 tab(s) orally once a day (26 Nov 2019 07:31)  glimepiride 2 mg oral tablet: 1 tab(s) orally once a day (26 Nov 2019 07:31)  Levemir 100 units/mL subcutaneous solution: 54 unit(s) subcutaneous once a day (26 Nov 2019 07:31)  LORazepam 0.5 mg oral tablet: 1 tab(s) orally 3 times a day, As Needed (26 Nov 2019 07:31)  lovastatin 40 mg oral tablet: 1 tab(s) orally once a day (26 Nov 2019 07:31)  metFORMIN 1000 mg oral tablet: 1 tab(s) orally 2 times a day (26 Nov 2019 07:31)  NovoLOG 100 units/mL subcutaneous solution:  subcutaneous 3 times a day (26 Nov 2019 07:31)      MEDICATIONS  (STANDING):  acetylcysteine  Oral Solution 1200 milliGRAM(s) Oral every 12 hours  aspirin enteric coated 81 milliGRAM(s) Oral daily  ceFAZolin   IVPB 2000 milliGRAM(s) IV Intermittent Once  dextrose 5%. 1000 milliLiter(s) (50 mL/Hr) IV Continuous <Continuous>  dextrose 50% Injectable 12.5 Gram(s) IV Push once  dextrose 50% Injectable 25 Gram(s) IV Push once  dextrose 50% Injectable 25 Gram(s) IV Push once  enoxaparin Injectable 30 milliGRAM(s) SubCutaneous daily  furosemide   Injectable 40 milliGRAM(s) IV Push once  insulin lispro (HumaLOG) corrective regimen sliding scale   SubCutaneous three times a day before meals  insulin lispro (HumaLOG) corrective regimen sliding scale   SubCutaneous at bedtime  lactated ringers. 1000 milliLiter(s) (75 mL/Hr) IV Continuous <Continuous>  lactated ringers. 1000 milliLiter(s) (125 mL/Hr) IV Continuous <Continuous>  losartan 100 milliGRAM(s) Oral daily    MEDICATIONS  (PRN):  dextrose 40% Gel 15 Gram(s) Oral once PRN Blood Glucose LESS THAN 70 milliGRAM(s)/deciliter  fentaNYL    Injectable 25 MICROGram(s) IV Push every 5 minutes PRN Moderate Pain (4 - 6)  glucagon  Injectable 1 milliGRAM(s) IntraMuscular once PRN Glucose LESS THAN 70 milligrams/deciliter  LORazepam     Tablet 0.5 milliGRAM(s) Oral three times a day PRN Anxiety  ondansetron Injectable 4 milliGRAM(s) IV Push once PRN Nausea and/or Vomiting  oxycodone    5 mG/acetaminophen 325 mG 1 Tablet(s) Oral every 4 hours PRN Moderate Pain (4 - 6)  oxycodone    5 mG/acetaminophen 325 mG 2 Tablet(s) Oral every 6 hours PRN Severe Pain (7 - 10)  oxyCODONE    IR 5 milliGRAM(s) Oral once PRN Moderate Pain (4 - 6)

## 2019-11-26 NOTE — CONSULT NOTE ADULT - ASSESSMENT
74-year-old female with a nonischemic cardiomyopathy with normalization of her LV function, biventricular pacemaker with AICD, moderate CAD in 2007, hypertension, hyperlipidemia, diabetes, peripheral vascular disease status post peripheral intervention s/p thromboendarterectomy of deep femoral artery and Endarterectomy of left common femoral artery for symptomatic PAD. Pt reported chest pressure and some difficulty in catching her breath in the PACU.     Shortness of breath  - Likely volume overload from fluids in the setting of mild CRI  - Will give 1 dose of 40 mg iv lasix now  - ECG with no ST changes although masked by paced rhythm  - Telemetry monitoring    PAD  - Continue ASA    HTN  - BP elevated. Reassess post lasix and resuming po meds    Dyslipidemia  - Continue statin

## 2019-11-26 NOTE — PROGRESS NOTE ADULT - ASSESSMENT
Assessment:    74-year-old female with a H/O Infrarenal aortic endarterectomy with aortic bi Iliac bypass, and superficial femoral artery angioplasty and stenting, 3/24/16. History of intravascular stent placement: femoral artery angioplasty and stents, 3/24/16. A biventricular pacemaker with AICD, peripheral vascular disease s/p thromboendarterectomy of deep femoral artery and Endarterectomy of left common femoral artery for symptomatic PAD on 11/26/19. Insertion of stent in the superficial femoral artery.     Plan:  -Continue to monitor pulses bilaterally, DP and and femoral RLE and LLE  -Monitor urinary output 24hrs  -Check for hematoma at site of incision daily  -Continue to monitor vitals  -Regular diet as tolerated  -Await Bowel function  -dispo: home tomorrow 11/27; pending clinical eval in AM

## 2019-11-26 NOTE — BRIEF OPERATIVE NOTE - NSICDXBRIEFPROCEDURE_GEN_ALL_CORE_FT
PROCEDURES:  Insertion, femoral artery stent 26-Nov-2019 12:17:22  Juan Ramon Bean  Thromboendarterectomy of deep femoral artery 26-Nov-2019 12:16:53  Juan Ramon Bean  Endarterectomy of left common femoral artery 26-Nov-2019 12:16:31  Juan Ramon Bean

## 2019-11-26 NOTE — BRIEF OPERATIVE NOTE - NSICDXBRIEFPREOP_GEN_ALL_CORE_FT
PRE-OP DIAGNOSIS:  PVD (peripheral vascular disease) with claudication 26-Nov-2019 12:17:54  Juan Ramon Bean

## 2019-11-27 ENCOUNTER — TRANSCRIPTION ENCOUNTER (OUTPATIENT)
Age: 74
End: 2019-11-27

## 2019-11-27 VITALS
HEART RATE: 85 BPM | SYSTOLIC BLOOD PRESSURE: 126 MMHG | RESPIRATION RATE: 18 BRPM | DIASTOLIC BLOOD PRESSURE: 64 MMHG | TEMPERATURE: 98 F | OXYGEN SATURATION: 95 %

## 2019-11-27 LAB
ANION GAP SERPL CALC-SCNC: 16 MMOL/L — SIGNIFICANT CHANGE UP (ref 5–17)
BASOPHILS # BLD AUTO: 0.02 K/UL — SIGNIFICANT CHANGE UP (ref 0–0.2)
BASOPHILS NFR BLD AUTO: 0.1 % — SIGNIFICANT CHANGE UP (ref 0–2)
BUN SERPL-MCNC: 25 MG/DL — HIGH (ref 8–20)
CALCIUM SERPL-MCNC: 8.8 MG/DL — SIGNIFICANT CHANGE UP (ref 8.6–10.2)
CHLORIDE SERPL-SCNC: 99 MMOL/L — SIGNIFICANT CHANGE UP (ref 98–107)
CO2 SERPL-SCNC: 23 MMOL/L — SIGNIFICANT CHANGE UP (ref 22–29)
CREAT SERPL-MCNC: 1.31 MG/DL — HIGH (ref 0.5–1.3)
EOSINOPHIL # BLD AUTO: 0 K/UL — SIGNIFICANT CHANGE UP (ref 0–0.5)
EOSINOPHIL NFR BLD AUTO: 0 % — SIGNIFICANT CHANGE UP (ref 0–6)
GLUCOSE SERPL-MCNC: 268 MG/DL — HIGH (ref 70–115)
HCT VFR BLD CALC: 29.1 % — LOW (ref 34.5–45)
HGB BLD-MCNC: 9.3 G/DL — LOW (ref 11.5–15.5)
IMM GRANULOCYTES NFR BLD AUTO: 0.5 % — SIGNIFICANT CHANGE UP (ref 0–1.5)
LYMPHOCYTES # BLD AUTO: 0.87 K/UL — LOW (ref 1–3.3)
LYMPHOCYTES # BLD AUTO: 5.9 % — LOW (ref 13–44)
MCHC RBC-ENTMCNC: 30 PG — SIGNIFICANT CHANGE UP (ref 27–34)
MCHC RBC-ENTMCNC: 32 GM/DL — SIGNIFICANT CHANGE UP (ref 32–36)
MCV RBC AUTO: 93.9 FL — SIGNIFICANT CHANGE UP (ref 80–100)
MONOCYTES # BLD AUTO: 1.01 K/UL — HIGH (ref 0–0.9)
MONOCYTES NFR BLD AUTO: 6.8 % — SIGNIFICANT CHANGE UP (ref 2–14)
NEUTROPHILS # BLD AUTO: 12.86 K/UL — HIGH (ref 1.8–7.4)
NEUTROPHILS NFR BLD AUTO: 86.7 % — HIGH (ref 43–77)
PLATELET # BLD AUTO: 233 K/UL — SIGNIFICANT CHANGE UP (ref 150–400)
POTASSIUM SERPL-MCNC: 4.3 MMOL/L — SIGNIFICANT CHANGE UP (ref 3.5–5.3)
POTASSIUM SERPL-SCNC: 4.3 MMOL/L — SIGNIFICANT CHANGE UP (ref 3.5–5.3)
RBC # BLD: 3.1 M/UL — LOW (ref 3.8–5.2)
RBC # FLD: 15.3 % — HIGH (ref 10.3–14.5)
SODIUM SERPL-SCNC: 138 MMOL/L — SIGNIFICANT CHANGE UP (ref 135–145)
WBC # BLD: 14.83 K/UL — HIGH (ref 3.8–10.5)
WBC # FLD AUTO: 14.83 K/UL — HIGH (ref 3.8–10.5)

## 2019-11-27 PROCEDURE — C1874: CPT

## 2019-11-27 PROCEDURE — 86900 BLOOD TYPING SEROLOGIC ABO: CPT

## 2019-11-27 PROCEDURE — C1769: CPT

## 2019-11-27 PROCEDURE — C1725: CPT

## 2019-11-27 PROCEDURE — 88304 TISSUE EXAM BY PATHOLOGIST: CPT

## 2019-11-27 PROCEDURE — 94640 AIRWAY INHALATION TREATMENT: CPT

## 2019-11-27 PROCEDURE — 80053 COMPREHEN METABOLIC PANEL: CPT

## 2019-11-27 PROCEDURE — 86850 RBC ANTIBODY SCREEN: CPT

## 2019-11-27 PROCEDURE — 86901 BLOOD TYPING SEROLOGIC RH(D): CPT

## 2019-11-27 PROCEDURE — C1889: CPT

## 2019-11-27 PROCEDURE — 80048 BASIC METABOLIC PNL TOTAL CA: CPT

## 2019-11-27 PROCEDURE — 88311 DECALCIFY TISSUE: CPT

## 2019-11-27 PROCEDURE — C1894: CPT

## 2019-11-27 PROCEDURE — 84484 ASSAY OF TROPONIN QUANT: CPT

## 2019-11-27 PROCEDURE — 85027 COMPLETE CBC AUTOMATED: CPT

## 2019-11-27 PROCEDURE — C1887: CPT

## 2019-11-27 PROCEDURE — 36415 COLL VENOUS BLD VENIPUNCTURE: CPT

## 2019-11-27 PROCEDURE — 71045 X-RAY EXAM CHEST 1 VIEW: CPT

## 2019-11-27 PROCEDURE — 93005 ELECTROCARDIOGRAM TRACING: CPT

## 2019-11-27 PROCEDURE — 82962 GLUCOSE BLOOD TEST: CPT

## 2019-11-27 PROCEDURE — 76000 FLUOROSCOPY <1 HR PHYS/QHP: CPT

## 2019-11-27 RX ORDER — CLOPIDOGREL BISULFATE 75 MG/1
75 TABLET, FILM COATED ORAL DAILY
Refills: 0 | Status: DISCONTINUED | OUTPATIENT
Start: 2019-11-27 | End: 2019-11-27

## 2019-11-27 RX ORDER — ASPIRIN/CALCIUM CARB/MAGNESIUM 324 MG
1 TABLET ORAL
Qty: 0 | Refills: 0 | DISCHARGE
Start: 2019-11-27

## 2019-11-27 RX ORDER — CLOPIDOGREL BISULFATE 75 MG/1
1 TABLET, FILM COATED ORAL
Qty: 30 | Refills: 0
Start: 2019-11-27

## 2019-11-27 RX ADMIN — Medication 81 MILLIGRAM(S): at 12:35

## 2019-11-27 RX ADMIN — Medication 1200 MILLIGRAM(S): at 05:49

## 2019-11-27 RX ADMIN — LOSARTAN POTASSIUM 100 MILLIGRAM(S): 100 TABLET, FILM COATED ORAL at 05:50

## 2019-11-27 RX ADMIN — Medication 12: at 08:27

## 2019-11-27 RX ADMIN — CLOPIDOGREL BISULFATE 75 MILLIGRAM(S): 75 TABLET, FILM COATED ORAL at 12:35

## 2019-11-27 RX ADMIN — Medication 6: at 12:35

## 2019-11-27 RX ADMIN — ENOXAPARIN SODIUM 30 MILLIGRAM(S): 100 INJECTION SUBCUTANEOUS at 12:35

## 2019-11-27 NOTE — DISCHARGE NOTE PROVIDER - NSDCMRMEDTOKEN_GEN_ALL_CORE_FT
aspirin 81 mg oral delayed release tablet: 1 tab(s) orally once a day  candesartan 32 mg oral tablet: 1 tab(s) orally once a day  glimepiride 2 mg oral tablet: 1 tab(s) orally once a day  Levemir 100 units/mL subcutaneous solution: 54 unit(s) subcutaneous once a day  LORazepam 0.5 mg oral tablet: 1 tab(s) orally 3 times a day, As Needed  lovastatin 40 mg oral tablet: 1 tab(s) orally once a day  metFORMIN 1000 mg oral tablet: 1 tab(s) orally 2 times a day  NovoLOG 100 units/mL subcutaneous solution:  subcutaneous 3 times a day  oxycodone-acetaminophen 5 mg-325 mg oral tablet: 1 tab(s) orally every 6 hours, As Needed -Pain  MDD:4  Plavix 75 mg oral tablet: 1 tab(s) orally once a day

## 2019-11-27 NOTE — PROGRESS NOTE ADULT - SUBJECTIVE AND OBJECTIVE BOX
Chief Complaint: chart and hx reviewed.    HPI: 73 yo F s/p surgery left leg for pvd developed sob. Hx of NICM with LVEF 50%/cad/bi V aicd/cri/hypothyroidism/hpt.    PAST MEDICAL & SURGICAL HISTORY:  Pulmonic regurgitation  HLD (hyperlipidemia)  Lung nodule  Spinal stenosis, lumbar region, with neurogenic claudication  Carotid stenosis, bilateral  Aorto-iliac disease  Psoriasis  Congenital heart failure  History of anemia due to chronic kidney disease  Peripheral neuropathy  Anxiety  CKD (chronic kidney disease) stage 3, GFR 30-59 ml/min  PVD (peripheral vascular disease) with claudication  AICD (automatic cardioverter/defibrillator) present: rep notified senia hoang sci  CAD (coronary artery disease)  Hypertension  Hypothyroid  Diabetes: IDDM  S/P evacuation of hematoma: right groin, then required wound vac  Pessary maintenance  Branch retinal vein occlusion with neovascularization of right eye: treated with laser  H/O vascular surgery: Infrarenal aortic endarterectomy with aortic bi iliac bypass, and superficial femoral artery  angioplasty and stenting, 3/24/16.  History of intravascular stent placement: femoral artery angioplasty and stents, 3/24/16  Acute cataract      PREVIOUS DIAGNOSTIC TESTING:      ECHO  FINDINGS:    STRESS  FINDINGS:    CATHETERIZATION  FINDINGS:    MEDICATIONS  (STANDING):  aspirin enteric coated 81 milliGRAM(s) Oral daily  ceFAZolin   IVPB 2000 milliGRAM(s) IV Intermittent Once  clopidogrel Tablet 75 milliGRAM(s) Oral daily  dextrose 5%. 1000 milliLiter(s) (50 mL/Hr) IV Continuous <Continuous>  dextrose 50% Injectable 12.5 Gram(s) IV Push once  dextrose 50% Injectable 25 Gram(s) IV Push once  dextrose 50% Injectable 25 Gram(s) IV Push once  enoxaparin Injectable 30 milliGRAM(s) SubCutaneous daily  insulin lispro (HumaLOG) corrective regimen sliding scale   SubCutaneous three times a day before meals  insulin lispro (HumaLOG) corrective regimen sliding scale   SubCutaneous at bedtime  losartan 100 milliGRAM(s) Oral daily    MEDICATIONS  (PRN):  dextrose 40% Gel 15 Gram(s) Oral once PRN Blood Glucose LESS THAN 70 milliGRAM(s)/deciliter  glucagon  Injectable 1 milliGRAM(s) IntraMuscular once PRN Glucose LESS THAN 70 milligrams/deciliter  LORazepam     Tablet 0.5 milliGRAM(s) Oral three times a day PRN Anxiety  oxycodone    5 mG/acetaminophen 325 mG 1 Tablet(s) Oral every 4 hours PRN Moderate Pain (4 - 6)  oxycodone    5 mG/acetaminophen 325 mG 2 Tablet(s) Oral every 6 hours PRN Severe Pain (7 - 10)      FAMILY HISTORY:  Family history of heart attack (Sibling)  Family history of heart attack  Family history of colon cancer in mother      ROS: Negative other than as mentioned in HPI.    Vital Signs Last 24 Hrs  T(C): 36.8 (27 Nov 2019 07:21), Max: 36.9 (26 Nov 2019 16:06)  T(F): 98.3 (27 Nov 2019 07:21), Max: 98.5 (26 Nov 2019 16:06)  HR: 80 reg (27 Nov 2019 07:21) (71 - 93)  BP: 120/80 (27 Nov 2019 07:21) (113/55 - 154/51)  BP(mean): --  RR: 14 flat (27 Nov 2019 07:21) (11 - 18)  SpO2: 97% (27 Nov 2019 07:21) (93% - 100%)    PHYSICAL EXAM:  General: Appears well developed, well nourished alert and cooperative. afebrile elderly F nad and comfortable  HEENT: Head; normocephalic, atraumatic.  Eyes;   Pupils reactive, cornea wnl.  Neck; Supple, no nodes adenopathy, no NVD or carotid bruit or thyromegaly.  CARDIOVASCULAR; No murmur, rub, gallop or lift. Normal S1 and S2.  LUNGS; No rales, rhonchi or wheeze. Normal breath sounds bilaterally.  ABDOMEN ; Soft, nontender without mass or organomegaly. bowel sounds normoactive.  EXTREMITIES; No clubbing, cyanosis or edema. feet warm.   SKIN; warm and dry with normal turgor.  NEURO; Alert/oriented x 3/normal motor exam.     PSYCH; normal affect.            INTERPRETATION OF TELEMETRY:    ECG: SR V paced-reviewed  cxr; image reviewed: clear lungs    I&O's Detail    26 Nov 2019 07:01  -  27 Nov 2019 07:00  --------------------------------------------------------  IN:    lactated ringers.: 1500 mL    Oral Fluid: 480 mL  Total IN: 1980 mL    OUT:    Indwelling Catheter - Urethral: 2550 mL    Voided: 700 mL  Total OUT: 3250 mL    Total NET: -1270 mL          LABS:                        9.3    14.83 )-----------( 233      ( 27 Nov 2019 05:43 )             29.1     11-27    138  |  99  |  25.0<H>  ----------------------------<  268<H>  4.3   |  23.0  |  1.31<H>    Ca    8.8      27 Nov 2019 05:43    TPro  7.2  /  Alb  3.8  /  TBili  0.4  /  DBili  x   /  AST  19  /  ALT  17  /  AlkPhos  62  11-26    CARDIAC MARKERS ( 26 Nov 2019 13:40 )  x     / <0.01 ng/mL / x     / x     / x              I&O's Summary    26 Nov 2019 07:01  -  27 Nov 2019 07:00  --------------------------------------------------------  IN: 1980 mL / OUT: 3250 mL / NET: -1270 mL        RADIOLOGY & ADDITIONAL STUDIES:

## 2019-11-27 NOTE — DISCHARGE NOTE PROVIDER - HOSPITAL COURSE
74F known to Dr bhandari admitted from same day surgery s/p thromboendarterectomy of deep femoral artery, endarterectomy of left CFA insertion of SFA stent. No complications. Stable post op. Tolerating diet. Pain well controlled. Plavix started 11/27/19. Uneventful hospital course. Stable for discharge to home on 11/27/19. Will plan for outpatient follow up.

## 2019-11-27 NOTE — PROGRESS NOTE ADULT - ASSESSMENT
1. 75 yo F with PVD developed dyspnea post op-likely volume overload related-has responded todose of iv lasix  2. NICM  3. cad  4. Bi V AICD.  5. anemia-likely chronic

## 2019-11-27 NOTE — PROGRESS NOTE ADULT - ASSESSMENT
Assessment:    74-year-old female with a H/O Infrarenal aortic endarterectomy with aortic bi Iliac bypass, and superficial femoral artery angioplasty and stenting, 3/24/16. History of intravascular stent placement: femoral artery angioplasty and stents, 3/24/16. A biventricular pacemaker with AICD, peripheral vascular disease s/p thromboendarterectomy of deep femoral artery and Endarterectomy of left common femoral artery for symptomatic PAD on 11/26/19. Insertion of stent in the superficial femoral artery. Patient is POD #1 this morning.    Plan:  -Continue to monitor pulses bilaterally, DP and and femoral RLE and LLE  -Monitor urinary output 24hrs  -Check for hematoma at site of incision daily  -Continue to monitor vitals  -Regular diet as tolerated  -Await Bowel function  -dispo: home today; pending clinical eval in AM

## 2019-11-27 NOTE — DISCHARGE NOTE PROVIDER - NSDCFUSCHEDAPPT_GEN_ALL_CORE_FT
YE LAWSON ; 01/10/2020 ; NPP [facility] [address] [facility]  YE LAWSON ; 02/10/2020 ; [address] [facility] [address]

## 2019-11-27 NOTE — DISCHARGE NOTE PROVIDER - NSDCCPCAREPLAN_GEN_ALL_CORE_FT
PRINCIPAL DISCHARGE DIAGNOSIS  Diagnosis: Aortoiliac stenosis  Assessment and Plan of Treatment: s/p endartectomy , pain control , outpatient follow up. Cont with ASA and plavix as instructed in med reconcillation   Follow up: Please call and make an appointment with Dr bhandari for approx 7-10days post op.  Also, please call and make an appointment with your primary care physician as per your usual schedule. You will have any staples or sutures removed in office.   Activity: May return to normal activities as tolerated, however refrain from heavy lifting > 10-15 lbs.  Diet: May continue regular diet.  Medications: Please take all medications listed on your discharge paperwork as prescribed. Pain medication has been prescribed for you. Please, take it as it has been prescribed, do not drive or operate heavy machinery while taking narcotics.  You are encouraged to take over-the-counter tylenol and/or ibuprofen for pain relief when you feel your pain no longer warrants the use of narcotic pain medications, however DO NOT TAKE percocet and tylenol at the same time as they contain the same active ingredient (acetaminophen). Take only percocet OR tylenol.  Wound Care: Please, keep wound site clean and dry. You may shower, but do not bathe.  Patient is advised to RETURN TO THE EMERGENCY DEPARTMENT for any of the following - worsening pain, fever/chills, nausea/vomiting, altered mental status, chest pain, shortness of breath, or any other new / worsening symptom.

## 2019-11-27 NOTE — PROGRESS NOTE ADULT - SUBJECTIVE AND OBJECTIVE BOX
Pt seen and examined.  Left foot warm and well perfused.  Dressing clean and dry.  BUN/Cr stable.  Plan ambulation, d/c tyson, d/c IV fluids.  Possible d/c this afternoon pending clinical course.

## 2019-11-27 NOTE — DISCHARGE NOTE NURSING/CASE MANAGEMENT/SOCIAL WORK - PATIENT PORTAL LINK FT
You can access the FollowMyHealth Patient Portal offered by [facility] by registering at the following website: [email or web address]. By joining [facility]’s FollowMyHealth portal, you will also be able to view your health information using other applications (apps) compatible with our system.

## 2019-11-27 NOTE — PROGRESS NOTE ADULT - SUBJECTIVE AND OBJECTIVE BOX
HPI/OVERNIGHT EVENTS:    Patient was seen and examined in the AM. Patient is s/p thromboendarterectomy of the deep femoral artery, endarterectomy of the Left CFA, insertion of stent in the superficial femoral artery. Patient is sleeping comfortably in bed. Pulses DP were 2+ biphasic bilaterally. Patient has no complaints of pain, or numbness down both lower extremities. Patient's left dressing is secure. Patient denies fevers/chills, nausea, vomiting, chest pain, shortness of breath, no abdominal pain, and no pain at incision site. Patient is urinating adequately, and is back on a regular diet.     MEDICATIONS  (STANDING):  acetylcysteine  Oral Solution 1200 milliGRAM(s) Oral every 12 hours  aspirin enteric coated 81 milliGRAM(s) Oral daily  ceFAZolin   IVPB 2000 milliGRAM(s) IV Intermittent Once  dextrose 5%. 1000 milliLiter(s) (50 mL/Hr) IV Continuous <Continuous>  dextrose 50% Injectable 12.5 Gram(s) IV Push once  dextrose 50% Injectable 25 Gram(s) IV Push once  dextrose 50% Injectable 25 Gram(s) IV Push once  enoxaparin Injectable 30 milliGRAM(s) SubCutaneous daily  insulin lispro (HumaLOG) corrective regimen sliding scale   SubCutaneous three times a day before meals  insulin lispro (HumaLOG) corrective regimen sliding scale   SubCutaneous at bedtime  lactated ringers. 1000 milliLiter(s) (125 mL/Hr) IV Continuous <Continuous>  losartan 100 milliGRAM(s) Oral daily    MEDICATIONS  (PRN):  dextrose 40% Gel 15 Gram(s) Oral once PRN Blood Glucose LESS THAN 70 milliGRAM(s)/deciliter  glucagon  Injectable 1 milliGRAM(s) IntraMuscular once PRN Glucose LESS THAN 70 milligrams/deciliter  LORazepam     Tablet 0.5 milliGRAM(s) Oral three times a day PRN Anxiety  oxycodone    5 mG/acetaminophen 325 mG 1 Tablet(s) Oral every 4 hours PRN Moderate Pain (4 - 6)  oxycodone    5 mG/acetaminophen 325 mG 2 Tablet(s) Oral every 6 hours PRN Severe Pain (7 - 10)      Vital Signs Last 24 Hrs  T(C): 36.7 (26 Nov 2019 23:28), Max: 36.9 (26 Nov 2019 16:06)  T(F): 98.1 (26 Nov 2019 23:28), Max: 98.5 (26 Nov 2019 16:06)  HR: 90 (26 Nov 2019 23:28) (71 - 90)  BP: 113/55 (26 Nov 2019 23:28) (113/55 - 154/51)  BP(mean): --  RR: 18 (26 Nov 2019 23:28) (11 - 18)  SpO2: 93% (26 Nov 2019 23:28) (93% - 100%)    Constitutional: patient resting comfortably in bed, in no acute distress  HEENT: EOMI / PERRL b/l, no active drainage or redness  Neck: No JVD, full ROM without pain  Respiratory: respirations are unlabored, no accessory muscle use, no conversational dyspnea  Cardiovascular: regular rate & rhythm  Gastrointestinal: Abdomen soft, non-tender, non-distended, no rebound tenderness / guarding  Neurological: GCS: 15 (4/5/6). A&O x 3; no gross sensory / motor / coordination deficits  Extremities: RLL+ LLL Normal ROM, pulses PT, and femoral 2+ B/L and equal. Dorsalis pedis- monophasic and 2+ B/L for LLE and RLE. Dressing is secure at incision site. Some mild saturation noted, but gail blood through dressing was not observed.     Musculoskeletal: No joint pain, swelling or deformity; no limitation of movement    I&O's Detail    26 Nov 2019 07:01  -  27 Nov 2019 02:53  --------------------------------------------------------  IN:  Total IN: 0 mL    OUT:    Indwelling Catheter - Urethral: 2050 mL    Voided: 700 mL  Total OUT: 2750 mL    Total NET: -2750 mL          LABS:                        9.4    11.91 )-----------( 212      ( 26 Nov 2019 13:40 )             28.6     11-26    137  |  100  |  28.0<H>  ----------------------------<  329<H>  4.2   |  20.0<L>  |  1.45<H>    Ca    8.6      26 Nov 2019 13:40    TPro  7.2  /  Alb  3.8  /  TBili  0.4  /  DBili  x   /  AST  19  /  ALT  17  /  AlkPhos  62  11-26

## 2019-11-27 NOTE — DISCHARGE NOTE PROVIDER - CARE PROVIDER_API CALL
Juan Ramon Bean)  Surgery  [address] [place] [address]  Phone: [phone]  Fax: [phone]  Follow Up Time:

## 2019-12-02 ENCOUNTER — CHART COPY (OUTPATIENT)
Age: 74
End: 2019-12-02

## 2019-12-02 PROBLEM — N18.3 CHRONIC KIDNEY DISEASE, STAGE 3 (MODERATE): Chronic | Status: ACTIVE | Noted: 2019-11-14

## 2019-12-02 PROBLEM — I74.09 OTHER ARTERIAL EMBOLISM AND THROMBOSIS OF ABDOMINAL AORTA: Chronic | Status: ACTIVE | Noted: 2019-11-14

## 2019-12-02 PROBLEM — E78.5 HYPERLIPIDEMIA, UNSPECIFIED: Chronic | Status: ACTIVE | Noted: 2019-11-14

## 2019-12-02 PROBLEM — I37.1 NONRHEUMATIC PULMONARY VALVE INSUFFICIENCY: Chronic | Status: ACTIVE | Noted: 2019-11-14

## 2019-12-02 PROBLEM — N18.9 CHRONIC KIDNEY DISEASE, UNSPECIFIED: Chronic | Status: ACTIVE | Noted: 2019-11-14

## 2019-12-02 PROBLEM — F41.9 ANXIETY DISORDER, UNSPECIFIED: Chronic | Status: ACTIVE | Noted: 2019-11-14

## 2019-12-02 PROBLEM — G62.9 POLYNEUROPATHY, UNSPECIFIED: Chronic | Status: ACTIVE | Noted: 2019-11-14

## 2019-12-02 PROBLEM — M48.062 SPINAL STENOSIS, LUMBAR REGION WITH NEUROGENIC CLAUDICATION: Chronic | Status: ACTIVE | Noted: 2019-11-14

## 2019-12-02 PROBLEM — L40.9 PSORIASIS, UNSPECIFIED: Chronic | Status: ACTIVE | Noted: 2019-11-14

## 2019-12-02 PROBLEM — R91.1 SOLITARY PULMONARY NODULE: Chronic | Status: ACTIVE | Noted: 2019-11-14

## 2019-12-02 PROBLEM — I65.23 OCCLUSION AND STENOSIS OF BILATERAL CAROTID ARTERIES: Chronic | Status: ACTIVE | Noted: 2019-11-14

## 2019-12-09 ENCOUNTER — APPOINTMENT (OUTPATIENT)
Dept: FAMILY MEDICINE | Facility: CLINIC | Age: 74
End: 2019-12-09
Payer: MEDICARE

## 2019-12-09 VITALS
OXYGEN SATURATION: 98 % | SYSTOLIC BLOOD PRESSURE: 124 MMHG | DIASTOLIC BLOOD PRESSURE: 70 MMHG | BODY MASS INDEX: 30 KG/M2 | HEART RATE: 76 BPM | HEIGHT: 62 IN | TEMPERATURE: 98.1 F | WEIGHT: 163 LBS

## 2019-12-09 PROCEDURE — 99495 TRANSJ CARE MGMT MOD F2F 14D: CPT

## 2019-12-09 NOTE — PHYSICAL EXAM
[EOMI] : extraocular movements intact [Normal Sclera/Conjunctiva] : normal sclera/conjunctiva [Normal Outer Ear/Nose] : the outer ears and nose were normal in appearance [Supple] : supple [Pedal Pulses Present] : the pedal pulses are present [No Rash] : no rash [Normal] : affect was normal and insight and judgment were intact [de-identified] : left upper inner thigh with staples in place, C/D/I

## 2019-12-09 NOTE — HISTORY OF PRESENT ILLNESS
[Admitted on: ___] : The patient was admitted on [unfilled] [Post-hospitalization from ___ Hospital] : Post-hospitalization from [unfilled] Hospital [Discharged on ___] : discharged on [unfilled] [Discharge Summary] : discharge summary [Pertinent Labs] : pertinent labs [Med Reconciliation] : medication reconciliation has been completed [Discharge Med List] : discharge medication list [Patient Contacted By: ____] : and contacted by [unfilled] [FreeTextEntry2] : Ms. YE LAWSON is a 74 year female pmh as below, presenting for a HFU, Pt was at [facility], Pt states the scheduled procedure was an open left femoral access aortogram with runoff. Left iliac and left femoral artery angioplasty and stenting. Patient is due for follow up with vascular surgeon tomorrow 12/10/2019. Says she has no pain in left leg when she ambulates.

## 2019-12-16 NOTE — H&P PST ADULT - TEMPERATURE IN FAHRENHEIT (DEGREES F)
Cardiology Progress Note - José Luis Ramirez 66 y o  male MRN: [id number]    Unit/Bed#: [facility] 426-01 Encounter: [id number]        Subjective:    No significant events overnight  Doing well  Review of Systems   Constitution: Negative for malaise/fatigue  Cardiovascular: Negative for chest pain  Respiratory: Negative for shortness of breath  Objective:   Vitals: Blood pressure 131/62, pulse 75, temperature 97 7 °F (36 5 °C), temperature source Oral, resp  rate 16, height 5' 10" (1 778 m), weight 71 kg (156 lb 9 6 oz), SpO2 96 %  , Body mass index is 22 47 kg/m² , Orthostatic Blood Pressures      Most Recent Value   Blood Pressure  131/62 filed at 12/16/2019 0700   Patient Position - Orthostatic VS  Lying filed at 12/16/2019 7993         Systolic (99QPW), PZX:492 , Min:99 , MKW:890     Diastolic (37MAC), NYR:96, Min:55, Max:65      Intake/Output Summary (Last 24 hours) at 12/16/2019 0922  Last data filed at 12/16/2019 0502  Gross per 24 hour   Intake 629 7 ml   Output 1160 ml   Net -530 3 ml     Weight (last 2 days)     Date/Time   Weight    12/16/19 0600   71 (156 6)    12/15/19 0600   69 4 (153)    12/14/19 0600   83 3 (183 64)                Telemetry Review: No significant arrhythmias seen on telemetry review  NSR      Physical Exam   Constitutional: He is oriented to person, place, and time  No distress  HENT:   Mouth/Throat: No oropharyngeal exudate  Eyes: No scleral icterus  Neck: No JVD present  Cardiovascular: Normal rate and regular rhythm  Exam reveals no friction rub  No murmur heard  Pulmonary/Chest: Effort normal and breath sounds normal  No stridor  No respiratory distress  He has no wheezes  Abdominal: Soft  Bowel sounds are normal  He exhibits no distension  There is no tenderness  There is no guarding  Musculoskeletal: He exhibits no edema  Neurological: He is alert and oriented to person, place, and time  Skin: Skin is warm and dry  He is not diaphoretic     Psychiatric: He has a normal mood and affect   His behavior is normal          Laboratory Results:        CBC with diff: Results from last 7 days   Lab Units 12/15/19  0410 12/14/19  0406 12/13/19  1752 12/13/19  1750 12/13/19  1718 12/13/19  1658 12/13/19  1645 12/13/19  1543 12/13/19  1530   WBC Thousand/uL 13 33* 16 59*  --   --   --   --   --   --   --    HEMOGLOBIN g/dL 9 7* 10 1*  --  11 7*  --   --   --   --   --    I STAT HEMOGLOBIN g/dl  --   --  10 2*  --  10 5*  --  10 2* 9 2*  --    HEMATOCRIT % 29 7* 30 1*  --  33 5*  --   --   --   --   --    HEMATOCRIT, ISTAT %  --   --  30*  --  31*  --  30* 27*  --    MCV fL 92 92  --   --   --   --   --   --   --    PLATELETS Thousands/uL 152 242  --  101*  --  106*  --   --  172   MCH pg 29 9 30 7  --   --   --   --   --   --   --    MCHC g/dL 32 7 33 6  --   --   --   --   --   --   --    RDW % 13 8 13 3  --   --   --   --   --   --   --    MPV fL 10 1 10 1  --  9 3  --  9 3  --   --  9 6         CMP:  Results from last 7 days   Lab Units 12/16/19  0453 12/15/19  0410 12/14/19 0406 12/13/19  2143 12/13/19  1752 12/13/19  1750 12/13/19  1718 12/13/19  1645 12/13/19  1543 12/13/19  1509 12/13/19  1444 12/13/19  1404  12/10/19  0918   POTASSIUM mmol/L 3 8 3 7 4 0 3 7  --  3 2*  --   --   --   --   --   --   --  4 3   CHLORIDE mmol/L 102 105 110*  --   --  115*  --   --   --   --   --   --   --  106   CO2 mmol/L 28 32 26  --   --  25  --   --   --   --   --   --   --  30   CO2, I-STAT mmol/L  --   --   --   --  26  --  26 30 32 30 33* 30   < >  --    BUN mg/dL 19 19 14  --   --  12  --   --   --   --   --   --   --  12   CREATININE mg/dL 1 00 1 01 0 91  --   --  0 79  --   --   --   --   --   --   --  0 99   GLUCOSE, ISTAT mg/dl  --   --   --   --  100  --  114 134 158* 157* 176* 177*  --   --    CALCIUM mg/dL 8 9 8 5 7 8*  --   --  7 6*  --   --   --   --   --   --   --  9 5   EGFR ml/min/1 73sq m 75 74 84  --   --  90  --   --   --   --   --   --   --  76    < > = values in this interval not displayed  BMP:  Results from last 7 days   Lab Units 19  0453 12/15/19  0410 19  0406 19  2143 19  1752 19  1750 19  1718 19  1645  12/10/19  0918   POTASSIUM mmol/L 3 8 3 7 4 0 3 7  --  3 2*  --   --   --  4 3   CHLORIDE mmol/L 102 105 110*  --   --  115*  --   --   --  106   CO2 mmol/L 28 32 26  --   --  25  --   --   --  30   CO2, I-STAT mmol/L  --   --   --   --  26  --  26 30   < >  --    BUN mg/dL   --   --  12  --   --   --  12   CREATININE mg/dL 1 00 1 01 0 91  --   --  0 79  --   --   --  0 99   GLUCOSE, ISTAT mg/dl  --   --   --   --  100  --  114 134   < >  --    CALCIUM mg/dL 8 9 8 5 7 8*  --   --  7 6*  --   --   --  9 5    < > = values in this interval not displayed  BNP: No results for input(s): BNP in the last 72 hours  Magnesium:   Results from last 7 days   Lab Units 12/15/19  0410 19  0406   MAGNESIUM mg/dL 1 9 2 0       Coags:   Results from last 7 days   Lab Units 19  0453 19  1658 19  1328   PTT seconds  --  36 31   INR  1 28* 1 60*  --        TSH: No results found for: TSH    Hemoglobin A1C   Results from last 7 days   Lab Units 19  1328   HEMOGLOBIN A1C % 6 9*       Lipid Profile: Results from last 7 days   Lab Units 19  1328   TRIGLYCERIDES mg/dL 114   HDL mg/dL 34*       Cardiac testing:   Results for orders placed during the hospital encounter of 19   Echo complete with contrast if indicated    Lauren Nguyen 175  [address]  [phone]    Transthoracic Echocardiogram  2D, M-mode, Doppler, and Color Doppler    Study date:  09-Dec-2019    Patient: Delphine Frank  MR number: [id number]  Account number: [de-identified]  : 1948  Age: 66 years  Gender: Male  Status: Outpatient  Location: Bedside  Height: 70 in  Weight: 178 lb  BP: 116/ 62 mmHg    Indications: Coronary artery disease      Diagnoses: I25 118 - Atherosclerotic heart disease of native coronary artery with other forms of angina pectoris    Sonographer:  Eliezer Parker RDCS  Primary Physician:  Tatyana Adler MD  Referring Physician:  Keven Jackson PA-C  Group:  [facility]  Cardiology Fellow: Bisi Meraz MD  Interpreting Physician:  Phyllis Castillo MD    SUMMARY    LEFT VENTRICLE:  Systolic function was normal  Ejection fraction was estimated to be 60 %  There were no regional wall motion abnormalities  There was no evidence of concentric hypertrophy  MITRAL VALVE:  There was trace regurgitation  AORTIC VALVE:  There was trace regurgitation  TRICUSPID VALVE:  There was trace regurgitation  HISTORY: PRIOR HISTORY: Hypertension, diabetes mellitus type 2, hyperlipidemia, stable angina pectoris, cardiac catheterization 12/09/19  Family history of heart failure, coronary artery disease, myocardial infarction, and diabetes  mellitus  PROCEDURE: The procedure was performed at the bedside  This was a stat study  The transthoracic approach was used  The study included complete 2D imaging, M-mode, complete spectral Doppler, and color Doppler  The heart rate was 72 bpm, at  the start of the study  Images were obtained from the parasternal, apical, subcostal, and suprasternal notch acoustic windows  Echocardiographic views were limited due to poor acoustic window availability  Image quality was adequate  LEFT VENTRICLE: Size was normal  Systolic function was normal  Ejection fraction was estimated to be 60 %  There were no regional wall motion abnormalities  Wall thickness was normal  There was no evidence of concentric hypertrophy    DOPPLER: Left ventricular diastolic function parameters were normal     RIGHT VENTRICLE: The size was normal  Systolic function was normal  Wall thickness was normal     LEFT ATRIUM: Size was normal     RIGHT ATRIUM: Size was normal     MITRAL VALVE: Valve structure was normal  There was normal leaflet separation  DOPPLER: The transmitral velocity was within the normal range  There was no evidence for stenosis  There was trace regurgitation  AORTIC VALVE: The valve was trileaflet  Leaflets exhibited normal thickness and normal cuspal separation  DOPPLER: Transaortic velocity was within the normal range  There was no evidence for stenosis  There was trace regurgitation  TRICUSPID VALVE: The valve structure was normal  There was normal leaflet separation  DOPPLER: The transtricuspid velocity was within the normal range  There was no evidence for stenosis  There was trace regurgitation  Pulmonary artery  systolic pressure was within the normal range  Estimated peak PA pressure was 25 mmHg  PULMONIC VALVE: Leaflets exhibited normal thickness, no calcification, and normal cuspal separation  DOPPLER: The transpulmonic velocity was within the normal range  There was trace regurgitation  PERICARDIUM: There was no pericardial effusion  The pericardium was normal in appearance  AORTA: The root exhibited normal size  The ascending aorta was normal in size  SYSTEMIC VEINS: IVC: The inferior vena cava was normal in size  SYSTEM MEASUREMENT TABLES    2D mode  AV Diam: 3 2 cm  AoR Diam; Mean (2D): 3 2 cm  LA Diam (2D): 3 8 cm  LA Dimension; Mean (2D): 3 8 cm  LA/Ao (2D): 1 19  EDV (2D-Cubed): 104 cm3  EF (2D-Cubed): 68 5 %  ESV (2D-Cubed): 32 8 cm3  FS (2D-Cubed): 31 9 %  FS (2D-Teich): 31 9 %  IVS/LVPW (2D): 1  IVSd (2D): 0 8 cm  IVSd; Mean chosen (2D): 0 8 cm  LVIDd (2D): 4 7 cm  LVIDd; Mean (2D): 4 7 cm  LVIDs (2D): 3 2 cm  LVIDs; Mean (2D): 3 2 cm  LVPWd (2D): 0 8 cm  LVPWd; Mean (2D): 0 8 cm  Left Ventricular Ejection Fraction; Teichholz; 2D mode;: 59 8 %  Left Ventricular End Diastolic Volume; Teichholz; 2D mode;: 102 cm3  Left Ventricular End Systolic Volume; Teichholz; 2D mode;: 41 cm3  SV (2D-Cubed): 71 2 cm3  Stroke Volume;  Teichholz; 2D mode;: 61 cm3  RVIDd (2D): 2 7 cm  RVIDd; Mean (2D): 2 7 cm  Right and Left Ventricular End Diastolic Diameter Ratio; 2D mode;: 0 57    Apical four chamber  Left Atrium MOD Diam; Most recent value chosen; End Systole; Apical four chamber;: 1 83 cm  Left Atrium MOD Diam; Most recent value chosen; End Systole; Apical four chamber;: 1 24 cm  Left Atrium MOD Diam; Most recent value chosen; End Systole; Apical four chamber;: 1 83 cm  Left Atrium MOD Diam; Most recent value chosen; End Systole; Apical four chamber;: 3 24 cm  Left Atrium MOD Diam; Most recent value chosen; End Systole; Apical four chamber;: 3 48 cm  Left Atrium MOD Diam; Most recent value chosen; End Systole; Apical four chamber;: 3 67 cm  Left Atrium MOD Diam; Most recent value chosen; End Systole; Apical four chamber;: 3 86 cm  Left Atrium MOD Diam; Most recent value chosen; End Systole; Apical four chamber;: 3 94 cm  Left Atrium MOD Diam; Most recent value chosen; End Systole; Apical four chamber;: 3 94 cm  Left Atrium MOD Diam; Most recent value chosen; End Systole; Apical four chamber;: 3 96 cm  Left Atrium MOD Diam; Most recent value chosen; End Systole; Apical four chamber;: 3 96 cm  Left Atrium MOD Diam; Most recent value chosen; End Systole; Apical four chamber;: 3 94 cm  Left Atrium MOD Diam; Most recent value chosen; End Systole; Apical four chamber;: 3 83 cm  Left Atrium MOD Diam; Most recent value chosen; End Systole; Apical four chamber;: 3 75 cm  Left Atrium MOD Diam; Most recent value chosen; End Systole; Apical four chamber;: 3 61 cm  Left Atrium MOD Diam; Most recent value chosen; End Systole; Apical four chamber;: 3 48 cm  Left Atrium MOD Diam; Most recent value chosen; End Systole; Apical four chamber;: 3 29 cm  Left Atrium MOD Diam; Most recent value chosen; End Systole; Apical four chamber;: 2 99 cm  Left Atrium MOD Diam; Most recent value chosen; End Systole; Apical four chamber;: 2 7 cm  Left Atrium MOD Diam; Most recent value chosen; End Systole;  Apical four chamber;: 2 4 cm  Left Atrium Systolic Area; Most recent value chosen; Method of Disks, Single Plane; 2D mode; Apical four chamber;: 1320 mm2  Left Atrium Systolic Volume; Most recent value chosen; Method of Disks, Single Plane; 2D mode; Apical four chamber;: 35 6 cm3  Left Atrium systolic major axis; Most recent value chosen; Method of Disks, Single Plane; 2D mode; Apical four chamber;: 4 05 cm  LV MOD Diam; Recent value; End Diastole (A4C): 2 61 cm  LV MOD Diam; Recent value; End Diastole (A4C): 1 82 cm  LV MOD Diam; Recent value; End Diastole (A4C): 3 2 cm  LV MOD Diam; Recent value; End Diastole (A4C): 3 6 cm  LV MOD Diam; Recent value; End Diastole (A4C): 3 89 cm  LV MOD Diam; Recent value; End Diastole (A4C): 4 17 cm  LV MOD Diam; Recent value; End Diastole (A4C): 4 39 cm  LV MOD Diam; Recent value; End Diastole (A4C): 4 65 cm  LV MOD Diam; Recent value; End Diastole (A4C): 4 81 cm  LV MOD Diam; Recent value; End Diastole (A4C): 4 83 cm  LV MOD Diam; Recent value; End Diastole (A4C): 4 58 cm  LV MOD Diam; Recent value; End Diastole (A4C): 4 21 cm  LV MOD Diam; Recent value; End Diastole (A4C): 4 25 cm  LV MOD Diam; Recent value; End Diastole (A4C): 4 56 cm  LV MOD Diam; Recent value; End Diastole (A4C): 4 92 cm  LV MOD Diam; Recent value; End Diastole (A4C): 5 13 cm  LV MOD Diam; Recent value; End Diastole (A4C): 5 15 cm  LV MOD Diam; Recent value; End Diastole (A4C): 5 06 cm  LV MOD Diam; Recent value; End Diastole (A4C): 4 75 cm  LV MOD Diam; Recent value; End Diastole (A4C): 2 47 cm  LV MOD Diam; Recent value; End Systole (A4C): 3 93 cm  LV MOD Diam; Recent value; End Systole (A4C): 3 62 cm  LV MOD Diam; Recent value; End Systole (A4C): 3 79 cm  LV MOD Diam; Recent value; End Systole (A4C): 3 85 cm  LV MOD Diam; Recent value; End Systole (A4C): 3 93 cm  LV MOD Diam; Recent value; End Systole (A4C): 3 95 cm  LV MOD Diam; Recent value; End Systole (A4C): 1 04 cm  LV MOD Diam; Recent value;  End Systole (A4C): 1 54 cm  LV MOD Diam; Recent value; End Systole (A4C): 1 91 cm  LV MOD Diam; Recent value; End Systole (A4C): 2 29 cm  LV MOD Diam; Recent value; End Systole (A4C): 2 54 cm  LV MOD Diam; Recent value; End Systole (A4C): 2 79 cm  LV MOD Diam; Recent value; End Systole (A4C): 2 97 cm  LV MOD Diam; Recent value; End Systole (A4C): 3 08 cm  LV MOD Diam; Recent value; End Systole (A4C): 3 2 cm  LV MOD Diam; Recent value; End Systole (A4C): 3 25 cm  LV MOD Diam; Recent value; End Systole (A4C): 3 31 cm  LV MOD Diam; Recent value; End Systole (A4C): 3 37 cm  LV MOD Diam; Recent value; End Systole (A4C): 3 47 cm  LV MOD Diam; Recent value; End Systole (A4C): 3 89 cm  LVEF MOD A4C: 55 2 %  Left Ventricle diastolic major axis; Most recent value chosen; Method of Disks, Single Plane; 2D mode; Apical four chamber;: 8 75 cm  Left Ventricle systolic major axis; Most recent value chosen; Method of Disks, Single Plane; 2D mode; Apical four chamber;: 6 93 cm  Left Ventricular Diastolic Area; Most recent value chosen; Method of Disks, Single Plane; 2D mode; Apical four chamber;: 3640 mm2  Left Ventricular End Diastolic Volume; Most recent value chosen; Method of Disks, Single Plane; 2D mode; Apical four chamber;: 124 cm3  Left Ventricular End Systolic Volume; Most recent value chosen; Method of Disks, Single Plane; 2D mode; Apical four chamber;: 55 5 cm3  Left Ventricular Systolic Area; Most recent value chosen; Method of Disks, Single Plane; 2D mode; Apical four chamber;: 2080 mm2  SV MOD A4C: 68 5 cm3  Right Atrium Systolic Area; End Systole; 2D mode; Apical four chamber;: 1000 mm2  Right Atrium Systolic Area; Mean; Mean value chosen; End Systole; 2D mode; Apical four chamber;: 1000 mm2    Tissue Doppler Imaging  LV Peak Early Ortez Tissue Daljit; Medial MA (TDI): 49 mm/s  Left Ventricular Peak Early Diastolic Tissue Velocity; Mean; Mean value chosen; Medial Mitral Annulus;  Tissue Doppler Imaging;: 49 mm/s    Unspecified Scan Mode  Dec [place]; Mean; Regurgitant Flow: 1320 mm/s2  Dec [place]; Regurgitant Flow: 996 mm/s2  Dec [place]; Regurgitant Flow: 1630 mm/s2  Dec [place]; Regurgitant Flow: 1320 mm/s2  PHT; Mean; Regurgitant Flow: 460 ms  PHT; Regurgitant Flow: 355 ms  PHT; Regurgitant Flow: 371 ms  PHT; Regurgitant Flow: 655 ms  Peak Grad; Mean; Regurgitant Flow: 15 mm[Hg]  Vmax; Mean; Regurgitant Flow: 1960 mm/s  Vmax; Regurgitant Flow: 1980 mm/s  Vmax; Regurgitant Flow: 1670 mm/s  Vmax; Regurgitant Flow: 2230 mm/s  MV Peak Daljit/LV Peak Tissue Daljit E-Wave; Medial MA: 13  DT; Antegrade Flow: 301 ms  DT; Mean; Antegrade Flow: 301 ms  Dec [place]; Antegrade Flow: 2110 mm/s2  Dec [place]; Mean; Antegrade Flow: 2110 mm/s2  MV A Daljit: 844 mm/s  MV E Daljit: 635 mm/s  MV E/A Ratio: 0 8  MV Peak A Daljit: 844 mm/s  MV Peak E Daljit; Mean; Antegrade Flow: 635 mm/s  MVA (PHT): 250 mm2  PHT: 88 ms  PHT; Mean: 88 ms  End Ortez Daljit; Mean; Regurgitant Flow: 950 mm/s  End Ortez Daljit; Regurgitant Flow: 950 mm/s  Peak Gradient; Mean; Mean value chosen; Regurgitant Flow; End Diastole;: 4 mm[Hg]  Peak Grad; Mean; Regurgitant Flow: 16 mm[Hg]  Vmax; Mean; Regurgitant Flow: 2010 mm/s  Vmax; Regurgitant Flow: 1820 mm/s  Vmax; Regurgitant Flow: 2190 mm/s    [facility] Accredited [facility]    Prepared and electronically signed by    Nicole Gonzalez MD  Signed 09-Dec-2019 17:26:57       No results found for this or any previous visit  No results found for this or any previous visit    Results for orders placed during the hospital encounter of 19   NM myocardial perfusion spect (stress and/or rest)    Lauren Nguyen 175  [address]  [phone]    Stress Gated SPECT Myocardial Perfusion Imaging after Exercise    Patient: Lawanda Watts  MR number: [id number]  Account number: [de-identified]  : 1948  Age: 69 years  Gender: Male  Status: Outpatient  Location: [address] [place] [address] [facility]  Height: 70 in  Weight: 179 lb  BP: 122/ 78 mmHg    Allergies: POLLEN EXTRACT    Diagnosis: R07 9 - Chest pain, unspecified    Interpreting Physician:  Brit Avitia MD  Referring Physician:  Jenelle Nagy MD  Primary Physician:  Jenelle Nagy MD  Technician:  Ct Spivey  RN:  Josee Mitchell RN  Group:  [facility]  Report Prepared by[de-identified]  Josee Mitchell RN    INDICATIONS: Detection for coronary artery disease  HISTORY: The patient is a 69year old  male  Chest pain status: recent onset chest pain  Coronary artery disease risk factors: dyslipidemia, hypertension, family history of premature coronary artery disease, and diabetes mellitus  Cardiovascular history: none significant  Medications: a calcium channel blocker, a diuretic, a lipid lowering agent, and diabetic medications  PHYSICAL EXAM: Baseline physical exam screening: normal     REST ECG: Normal sinus rhythm  Normal baseline ECG  PROCEDURE: The study was performed in the the [address] [facility]  Treadmill exercise testing was performed, using the Frantz protocol  Gated SPECT myocardial perfusion imaging was performed during stress  Systolic blood  pressure was 122 mmHg, at the start of the study  Diastolic blood pressure was 78 mmHg, at the start of the study  The heart rate was 68 bpm, at the start of the study  IV double checked  FRANTZ PROTOCOL:  HR bpm SBP mmHg DBP mmHg Symptoms  Baseline 68 122 78 none  Stage 1 130 144 64 mild chest discomfort  Recovery 1 83 140 60 subsiding  Recovery 2 81 136 66 subsiding  Recovery 3 80 140 70 none  No medications or fluids given  STRESS SUMMARY: Duration of exercise was 3 min and 33 sec  The patient exercised to protocol stage 1  Maximal work rate was 4 6 METs  Maximal heart rate during stress was 130 bpm ( 87 % of maximal predicted heart rate)   The heart rate  response to stress was normal  There was normal resting blood pressure with an appropriate response to stress  The rate-pressure product for the peak heart rate and blood pressure was 82009  The patient experienced chest pain during  stress; pain resolved spontaneously  The stress test was terminated due to achievement of maximal (symptom limited) exercise  Pre oxygen saturation: 98 %  Peak oxygen saturation: 96 %  The stress ECG was consistent with myocardial  ischemia  Arrhythmia during stress: isolated atrial premature beats, isolated premature ventricular beats, pairs of premature ventricular beats, and ventricular premature beats, triplets  ISOTOPE ADMINISTRATION:  The radiopharmaceutical was injected one minute before the end of exercise  MYOCARDIAL PERFUSION IMAGING:  The image quality was good  PERFUSION DEFECTS:  -  There was a large, severe, reversible myocardial perfusion defect of the entire septal and inferior wall  -  There was a moderate-sized, moderately severe, reversible myocardial perfusion defect of the apical anterior and apical wall  GATED SPECT:  The calculated left ventricular ejection fraction was 57 %  There was mildly reduced myocardial thickening and motion of the septal wall and inferior wall of the left ventricle  SUMMARY:  -  Stress results: Duration of exercise was 3 min and 33 sec  Target heart rate was achieved  The patient experienced chest pain during stress; pain resolved spontaneously  -  ECG conclusions: The stress ECG was consistent with myocardial ischemia  -  Perfusion imaging: There was a large, severe, reversible myocardial perfusion defect of the entire septal and inferior wall  There was a moderate-sized, moderately severe, reversible myocardial perfusion defect of the apical anterior  and apical wall  -  Gated SPECT: The calculated left ventricular ejection fraction was 57 %  There was mildly reduced myocardial thickening and motion of the septal wall and inferior wall of the left ventricle      IMPRESSIONS: Markedly abnormal study after maximal exercise with reproduction of symptoms  There was a large amount of ischemia in the septal region extending to the inferior wall  There was a moderate amount of ischemia in the anterior  and apical regions      Prepared and signed by    Lawanda Marshall MD  Signed 11/27/2019 14:47:41         Meds/Allergies   current meds:   Current Facility-Administered Medications   Medication Dose Route Frequency    acetaminophen (TYLENOL) tablet 650 mg  650 mg Oral Q4H PRN    amiodarone (CORDARONE) 900 mg in dextrose 5 % 500 mL infusion  0 5 mg/min Intravenous Continuous    amiodarone tablet 200 mg  200 mg Oral Q8H [address]    aspirin tablet 325 mg  325 mg Oral Daily    atorvastatin (LIPITOR) tablet 80 mg  80 mg Oral Daily With Dinner    bisacodyl (DULCOLAX) rectal suppository 10 mg  10 mg Rectal Daily PRN    docusate sodium (COLACE) capsule 100 mg  100 mg Oral BID    fondaparinux (ARIXTRA) subcutaneous injection 2 5 mg  2 5 mg Subcutaneous Daily    furosemide (LASIX) injection 40 mg  40 mg Intravenous BID (diuretic)    insulin glargine (LANTUS) subcutaneous injection 25 Units 0 25 mL  25 Units Subcutaneous HS    insulin lispro (HumaLOG) 100 units/mL subcutaneous injection 1-5 Units  1-5 Units Subcutaneous HS    insulin lispro (HumaLOG) 100 units/mL subcutaneous injection 2-12 Units  2-12 Units Subcutaneous TID AC    insulin lispro (HumaLOG) 100 units/mL subcutaneous injection 8 Units  8 Units Subcutaneous TID With Meals    metoprolol tartrate (LOPRESSOR) partial tablet 12 5 mg  12 5 mg Oral Q12H MARISSA    mupirocin (BACTROBAN) 2 % nasal ointment 1 application  1 application Nasal E45Z [address]    ondansetron (ZOFRAN) injection 4 mg  4 mg Intravenous Q6H PRN    oxyCODONE-acetaminophen (PERCOCET) 5-325 mg per tablet 1 tablet  1 tablet Oral Q4H PRN    oxyCODONE-acetaminophen (PERCOCET) 5-325 mg per tablet 2 tablet  2 tablet Oral Q6H PRN    pantoprazole (PROTONIX) EC tablet 40 mg  40 mg Oral Early Morning    polyethylene glycol (MIRALAX) packet 17 g  17 g Oral Daily    potassium chloride (K-DUR,KLOR-CON) CR tablet 20 mEq  20 mEq Oral BID    sodium chloride infusion 0 45 %  20 mL/hr Intravenous Continuous    temazepam (RESTORIL) capsule 15 mg  15 mg Oral HS PRN     Medications Prior to Admission   Medication    amLODIPine (NORVASC) 10 mg tablet    aspirin 81 mg chewable tablet    atorvastatin (LIPITOR) 40 mg tablet    Coenzyme Q10 (COQ10) 100 MG CAPS    Glucosamine-Chondroitin 750-600 MG TABS    hydrochlorothiazide (HYDRODIURIL) 25 mg tablet    metFORMIN (GLUCOPHAGE) 1000 MG tablet    mupirocin (BACTROBAN) 2 % nasal ointment    Omega-3 Fatty Acids (FISH OIL) 645 MG CAPS         amiodarone 0 5 mg/min Last Rate: 0 5 mg/min (12/15/19 1648)   sodium chloride 20 mL/hr Last Rate: Stopped (12/15/19 2200)     Assessment:  Principal Problem: Atherosclerosis of native coronary artery with angina pectoris [facility])  Active Problems:    Benign essential hypertension    Type 2 diabetes mellitus (HCC)    Hypercholesteremia    Diabetic retinopathy (HCC)    S/P CABG x 4      Plan:    CAD s/p CABG / Post op Afib    Remains in NSR  Continue medical therapy for CAD  Diurese as tolerated  Counseling / Coordination of Care  Total floor / unit time spent today 25 minutes  Greater than 50% of total time was spent with the patient and / or family counseling and / or coordination of care  A description of the counseling / coordination of care  98.2

## 2019-12-17 ENCOUNTER — RX RENEWAL (OUTPATIENT)
Age: 74
End: 2019-12-17

## 2020-01-06 ENCOUNTER — RX RENEWAL (OUTPATIENT)
Age: 75
End: 2020-01-06

## 2020-01-09 ENCOUNTER — RX RENEWAL (OUTPATIENT)
Age: 75
End: 2020-01-09

## 2020-02-05 ENCOUNTER — RX RENEWAL (OUTPATIENT)
Age: 75
End: 2020-02-05

## 2020-02-10 ENCOUNTER — APPOINTMENT (OUTPATIENT)
Dept: FAMILY MEDICINE | Facility: CLINIC | Age: 75
End: 2020-02-10
Payer: MEDICARE

## 2020-02-10 VITALS
DIASTOLIC BLOOD PRESSURE: 56 MMHG | SYSTOLIC BLOOD PRESSURE: 124 MMHG | OXYGEN SATURATION: 97 % | BODY MASS INDEX: 30.73 KG/M2 | TEMPERATURE: 97.8 F | HEIGHT: 62 IN | HEART RATE: 66 BPM | WEIGHT: 167 LBS

## 2020-02-10 DIAGNOSIS — B36.9 SUPERFICIAL MYCOSIS, UNSPECIFIED: ICD-10-CM

## 2020-02-10 PROCEDURE — 99214 OFFICE O/P EST MOD 30 MIN: CPT

## 2020-02-10 NOTE — PHYSICAL EXAM
[Normal Sclera/Conjunctiva] : normal sclera/conjunctiva [No Acute Distress] : no acute distress [Normal Oropharynx] : the oropharynx was normal [No Respiratory Distress] : no respiratory distress  [Supple] : supple [No Lymphadenopathy] : no lymphadenopathy [Clear to Auscultation] : lungs were clear to auscultation bilaterally [Rhythm Regular] : regular [Normal Rate] : normal [Normal S1] : normal S1 [Normal S2] : normal S2 [II] : a grade 2 [Right Carotid Bruit] : right carotid bruit heard [Left Femoral Bruit] : left femoral bruit heard [1+] : right 1+ [No Edema] : there was no peripheral edema [Decreased Lordosis] : decreased lordosis [Paraspinal] : paraspinal [Left Paraspinal ___ (level)] : ~Ulevel [unfilled] left paraspinal [4] : L4 [5] : L5 [2] : S2 [1] : S1 [3] : S3 [Full] : Full [Pain] : was painful [de-identified] : No cyanosis

## 2020-02-10 NOTE — HISTORY OF PRESENT ILLNESS
[FreeTextEntry1] : Patient is here for 3 month follow up on chronic lower back pain. Low back pain is daily, persistent across lower sacral region and radiating to both legs to lateral lower lateral aspect to level of ankles. Back pain is worse with twisting or bending. She has had consult with pain management and she is considering getting shots to her back for added relief, as well as PT. The gabapentin has been helping.\par She has not had any complications since her left femoral and iliac angioplasty with stenting done on 11/26/19.\par Patient needs medication refills. [de-identified] : To see Dr. Pierre, endocrine in a few weeks and has req for lab work.

## 2020-02-10 NOTE — REVIEW OF SYSTEMS
[Back Pain] : back pain [Negative] : Integumentary [Easy Bruising] : no easy bruising [FreeTextEntry9] : radiates to legs b/l [de-identified] : leg pain radiating from back

## 2020-02-10 NOTE — PLAN
[FreeTextEntry1] : Recommend Physical Therapy for chronic low back pain and f/u with pain management for interventional approach. Cont gabapentin.\par HTN stable.\par Continue current meds.\par Request copy of bw ordered by Endocrine.\par F/U in 3 months.

## 2020-02-13 DIAGNOSIS — R93.89 ABNORMAL FINDINGS ON DIAGNOSTIC IMAGING OF OTHER SPECIFIED BODY STRUCTURES: ICD-10-CM

## 2020-02-13 DIAGNOSIS — I31.3 PERICARDIAL EFFUSION (NONINFLAMMATORY): ICD-10-CM

## 2020-02-13 DIAGNOSIS — H26.9 UNSPECIFIED CATARACT: ICD-10-CM

## 2020-02-13 DIAGNOSIS — Z87.39 PERSONAL HISTORY OF OTHER DISEASES OF THE MUSCULOSKELETAL SYSTEM AND CONNECTIVE TISSUE: ICD-10-CM

## 2020-02-13 DIAGNOSIS — I25.84 ATHEROSCLEROTIC HEART DISEASE OF NATIVE CORONARY ARTERY W/OUT ANGINA PECTORIS: ICD-10-CM

## 2020-02-13 DIAGNOSIS — Z79.4 OTHER SPECIFIED DIABETES MELLITUS WITH OTHER CIRCULATORY COMPLICATIONS: ICD-10-CM

## 2020-02-13 DIAGNOSIS — R79.89 OTHER SPECIFIED ABNORMAL FINDINGS OF BLOOD CHEMISTRY: ICD-10-CM

## 2020-02-13 DIAGNOSIS — Z86.79 PERSONAL HISTORY OF OTHER DISEASES OF THE CIRCULATORY SYSTEM: ICD-10-CM

## 2020-02-13 DIAGNOSIS — Z09 ENCOUNTER FOR FOLLOW-UP EXAMINATION AFTER COMPLETED TREATMENT FOR CONDITIONS OTHER THAN MALIGNANT NEOPLASM: ICD-10-CM

## 2020-02-13 DIAGNOSIS — R33.9 RETENTION OF URINE, UNSPECIFIED: ICD-10-CM

## 2020-02-13 DIAGNOSIS — I25.10 ATHEROSCLEROTIC HEART DISEASE OF NATIVE CORONARY ARTERY W/OUT ANGINA PECTORIS: ICD-10-CM

## 2020-02-13 DIAGNOSIS — Z87.09 PERSONAL HISTORY OF OTHER DISEASES OF THE RESPIRATORY SYSTEM: ICD-10-CM

## 2020-02-13 DIAGNOSIS — I51.7 CARDIOMEGALY: ICD-10-CM

## 2020-02-13 DIAGNOSIS — R94.30 ABNORMAL RESULT OF CARDIOVASCULAR FUNCTION STUDY, UNSPECIFIED: ICD-10-CM

## 2020-02-13 DIAGNOSIS — Z96.1 PRESENCE OF INTRAOCULAR LENS: ICD-10-CM

## 2020-02-13 DIAGNOSIS — Z86.018 PERSONAL HISTORY OF OTHER BENIGN NEOPLASM: ICD-10-CM

## 2020-02-13 DIAGNOSIS — H35.371 PUCKERING OF MACULA, RIGHT EYE: ICD-10-CM

## 2020-02-13 DIAGNOSIS — N18.9 CHRONIC KIDNEY DISEASE, UNSPECIFIED: ICD-10-CM

## 2020-02-13 DIAGNOSIS — Z87.898 PERSONAL HISTORY OF OTHER SPECIFIED CONDITIONS: ICD-10-CM

## 2020-02-13 DIAGNOSIS — H33.102 UNSPECIFIED RETINOSCHISIS, LEFT EYE: ICD-10-CM

## 2020-02-13 DIAGNOSIS — E13.59 OTHER SPECIFIED DIABETES MELLITUS WITH OTHER CIRCULATORY COMPLICATIONS: ICD-10-CM

## 2020-02-13 DIAGNOSIS — Z86.59 PERSONAL HISTORY OF OTHER MENTAL AND BEHAVIORAL DISORDERS: ICD-10-CM

## 2020-02-13 LAB — CYTOLOGY CVX/VAG DOC THIN PREP: NEGATIVE

## 2020-02-14 ENCOUNTER — RX RENEWAL (OUTPATIENT)
Age: 75
End: 2020-02-14

## 2020-02-14 ENCOUNTER — APPOINTMENT (OUTPATIENT)
Dept: NEPHROLOGY | Facility: CLINIC | Age: 75
End: 2020-02-14
Payer: MEDICARE

## 2020-02-14 VITALS
BODY MASS INDEX: 31.1 KG/M2 | SYSTOLIC BLOOD PRESSURE: 155 MMHG | DIASTOLIC BLOOD PRESSURE: 60 MMHG | WEIGHT: 169 LBS | HEART RATE: 82 BPM | HEIGHT: 62 IN

## 2020-02-14 DIAGNOSIS — R76.8 OTHER SPECIFIED ABNORMAL IMMUNOLOGICAL FINDINGS IN SERUM: ICD-10-CM

## 2020-02-14 DIAGNOSIS — Z87.2 PERSONAL HISTORY OF DISEASES OF THE SKIN AND SUBCUTANEOUS TISSUE: ICD-10-CM

## 2020-02-14 DIAGNOSIS — I37.1 NONRHEUMATIC PULMONARY VALVE INSUFFICIENCY: ICD-10-CM

## 2020-02-14 DIAGNOSIS — Z87.448 PERSONAL HISTORY OF OTHER DISEASES OF URINARY SYSTEM: ICD-10-CM

## 2020-02-14 DIAGNOSIS — M43.10 SPONDYLOLISTHESIS, SITE UNSPECIFIED: ICD-10-CM

## 2020-02-14 DIAGNOSIS — M53.9 DORSOPATHY, UNSPECIFIED: ICD-10-CM

## 2020-02-14 DIAGNOSIS — Z86.018 PERSONAL HISTORY OF OTHER BENIGN NEOPLASM: ICD-10-CM

## 2020-02-14 DIAGNOSIS — Z86.39 PERSONAL HISTORY OF OTHER ENDOCRINE, NUTRITIONAL AND METABOLIC DISEASE: ICD-10-CM

## 2020-02-14 DIAGNOSIS — N28.1 CYST OF KIDNEY, ACQUIRED: ICD-10-CM

## 2020-02-14 DIAGNOSIS — N81.10 CYSTOCELE, UNSPECIFIED: ICD-10-CM

## 2020-02-14 DIAGNOSIS — G47.9 SLEEP DISORDER, UNSPECIFIED: ICD-10-CM

## 2020-02-14 DIAGNOSIS — Z87.19 PERSONAL HISTORY OF OTHER DISEASES OF THE DIGESTIVE SYSTEM: ICD-10-CM

## 2020-02-14 DIAGNOSIS — Z87.898 PERSONAL HISTORY OF OTHER SPECIFIED CONDITIONS: ICD-10-CM

## 2020-02-14 DIAGNOSIS — Z87.39 PERSONAL HISTORY OF OTHER DISEASES OF THE MUSCULOSKELETAL SYSTEM AND CONNECTIVE TISSUE: ICD-10-CM

## 2020-02-14 DIAGNOSIS — I74.09 OTHER ARTERIAL EMBOLISM AND THROMBOSIS OF ABDOMINAL AORTA: ICD-10-CM

## 2020-02-14 DIAGNOSIS — S16.1XXA STRAIN OF MUSCLE, FASCIA AND TENDON AT NECK LEVEL, INITIAL ENCOUNTER: ICD-10-CM

## 2020-02-14 DIAGNOSIS — I70.202 UNSPECIFIED ATHEROSCLEROSIS OF NATIVE ARTERIES OF EXTREMITIES, LEFT LEG: ICD-10-CM

## 2020-02-14 DIAGNOSIS — R91.1 SOLITARY PULMONARY NODULE: ICD-10-CM

## 2020-02-14 PROCEDURE — 36415 COLL VENOUS BLD VENIPUNCTURE: CPT

## 2020-02-14 PROCEDURE — 99214 OFFICE O/P EST MOD 30 MIN: CPT | Mod: 25

## 2020-02-14 RX ORDER — CARVEDILOL 25 MG/1
25 TABLET, FILM COATED ORAL TWICE DAILY
Qty: 270 | Refills: 1 | Status: DISCONTINUED | COMMUNITY
Start: 1900-01-01 | End: 2020-02-14

## 2020-02-14 NOTE — HISTORY OF PRESENT ILLNESS
[FreeTextEntry1] : HX: CKD 3, CAD, CHF, kidney cyst, DM 2, HLD, HTN, LVEF 50%, hypothyroidism.\par \par Here for f/u visit\par \par Patient admitted to [facility] on 11/26/19\par OR procedure 11/26 (Dr. Bean) - Heavily calcified plaque left femoral artery, recurrent left SFA stenosis\par admitted for same day surgery s/p thromboendarterectomy of deep femoral artery, endarterectomy of left CFA insertion of SFA stent. \par developed sob, responded to dose of iv lasix\par \par Saw Dr. Bean last month for surgical follow-up (left leg ok post-surgery)\par Seeing Dr. Bean in 6 months again- will evaluate right leg - patient states she has decreased blood flow in right leg \par \par Went to orthopedist last month for complaint of back pain- may start injections\par \par

## 2020-02-14 NOTE — ASSESSMENT
[FreeTextEntry1] : Labs/medications reviewed\par \par New labs ordered\par \par HCTZ increased from 12.5 mg BID to 25mg BID (1+edema, HTN)\par \par F/U in 4 months

## 2020-02-14 NOTE — PHYSICAL EXAM
[General Appearance - Alert] : alert [General Appearance - In No Acute Distress] : in no acute distress [General Appearance - Well Nourished] : well nourished [General Appearance - Well Developed] : well developed [General Appearance - Well-Appearing] : healthy appearing [Sclera] : the sclera and conjunctiva were normal [Strabismus] : no strabismus was seen [Outer Ear] : the ears and nose were normal in appearance [Examination Of The Oral Cavity] : the lips and gums were normal [Hearing Threshold Finger Rub Not [place]] : hearing was normal [Neck Appearance] : the appearance of the neck was normal [Neck Cervical Mass (___cm)] : no neck mass was observed [Jugular Venous Distention Increased] : there was no jugular-venous distention [Respiration, Rhythm And Depth] : normal respiratory rhythm and effort [Exaggerated Use Of Accessory Muscles For Inspiration] : no accessory muscle use [Auscultation Breath Sounds / Voice Sounds] : lungs were clear to auscultation bilaterally [Apical Impulse] : the apical impulse was normal [Heart Rate And Rhythm] : heart rate was normal and rhythm regular [Heart Sounds] : normal S1 and S2 [Heart Sounds Gallop] : no gallops [Murmurs] : no murmurs [Heart Sounds Pericardial Friction Rub] : no pericardial rub [Arterial Pulses Carotid] : carotid pulses were normal with no bruits [Abdominal Aorta] : the abdominal aorta was normal [Pitting Edema] : pitting edema present [___ +] : bilateral [unfilled]+ pretibial pitting edema [Bowel Sounds] : normal bowel sounds [Abdomen Soft] : soft [Abdomen Tenderness] : non-tender [Abdomen Mass (___ Cm)] : no abdominal mass palpated [Abdomen Hernia] : no hernia was discovered [Cervical Lymph Nodes Enlarged Posterior Bilaterally] : posterior cervical [Cervical Lymph Nodes Enlarged Anterior Bilaterally] : anterior cervical [No CVA Tenderness] : no ~M costovertebral angle tenderness [Involuntary Movements] : no involuntary movements were seen [] : no rash [Skin Turgor] : normal skin turgor [No Focal Deficits] : no focal deficits [Oriented To Time, Place, And Person] : oriented to person, place, and time [Impaired Insight] : insight and judgment were intact [Affect] : the affect was normal [Mood] : the mood was normal [FreeTextEntry1] : +AICD

## 2020-02-17 LAB
25(OH)D3 SERPL-MCNC: 32.2 NG/ML
ALBUMIN SERPL ELPH-MCNC: 4.3 G/DL
ANION GAP SERPL CALC-SCNC: 16 MMOL/L
APPEARANCE: CLEAR
BACTERIA: NEGATIVE
BASOPHILS # BLD AUTO: 0.04 K/UL
BASOPHILS NFR BLD AUTO: 0.4 %
BILIRUBIN URINE: NEGATIVE
BLOOD URINE: NEGATIVE
BUN SERPL-MCNC: 37 MG/DL
CALCIUM SERPL-MCNC: 9.7 MG/DL
CALCIUM SERPL-MCNC: 9.7 MG/DL
CHLORIDE SERPL-SCNC: 101 MMOL/L
CO2 SERPL-SCNC: 22 MMOL/L
COLOR: NORMAL
CREAT SERPL-MCNC: 1.49 MG/DL
CREAT SPEC-SCNC: 50 MG/DL
CREAT/PROT UR: 0.2 RATIO
EOSINOPHIL # BLD AUTO: 0.14 K/UL
EOSINOPHIL NFR BLD AUTO: 1.5 %
GLUCOSE QUALITATIVE U: NEGATIVE
GLUCOSE SERPL-MCNC: 212 MG/DL
HCT VFR BLD CALC: 33.8 %
HGB BLD-MCNC: 10.6 G/DL
HYALINE CASTS: 0 /LPF
IMM GRANULOCYTES NFR BLD AUTO: 0.3 %
KETONES URINE: NEGATIVE
LEUKOCYTE ESTERASE URINE: ABNORMAL
LYMPHOCYTES # BLD AUTO: 1.24 K/UL
LYMPHOCYTES NFR BLD AUTO: 13 %
MAGNESIUM SERPL-MCNC: 1.8 MG/DL
MAN DIFF?: NORMAL
MCHC RBC-ENTMCNC: 30.3 PG
MCHC RBC-ENTMCNC: 31.4 GM/DL
MCV RBC AUTO: 96.6 FL
MICROSCOPIC-UA: NORMAL
MONOCYTES # BLD AUTO: 0.78 K/UL
MONOCYTES NFR BLD AUTO: 8.2 %
NEUTROPHILS # BLD AUTO: 7.33 K/UL
NEUTROPHILS NFR BLD AUTO: 76.6 %
NITRITE URINE: NEGATIVE
PARATHYROID HORMONE INTACT: 49 PG/ML
PH URINE: 5.5
PHOSPHATE SERPL-MCNC: 3.8 MG/DL
PLATELET # BLD AUTO: 261 K/UL
POTASSIUM SERPL-SCNC: 4.4 MMOL/L
PROT UR-MCNC: 11 MG/DL
PROTEIN URINE: NEGATIVE
RBC # BLD: 3.5 M/UL
RBC # FLD: 15.5 %
RED BLOOD CELLS URINE: 1 /HPF
SODIUM SERPL-SCNC: 139 MMOL/L
SPECIFIC GRAVITY URINE: 1.01
SQUAMOUS EPITHELIAL CELLS: 2 /HPF
UROBILINOGEN URINE: NORMAL
WBC # FLD AUTO: 9.56 K/UL
WHITE BLOOD CELLS URINE: 10 /HPF

## 2020-02-21 ENCOUNTER — LABORATORY RESULT (OUTPATIENT)
Age: 75
End: 2020-02-21

## 2020-02-26 ENCOUNTER — APPOINTMENT (OUTPATIENT)
Dept: ENDOCRINOLOGY | Facility: CLINIC | Age: 75
End: 2020-02-26
Payer: MEDICARE

## 2020-02-26 VITALS
DIASTOLIC BLOOD PRESSURE: 60 MMHG | SYSTOLIC BLOOD PRESSURE: 128 MMHG | WEIGHT: 169 LBS | BODY MASS INDEX: 31.1 KG/M2 | HEART RATE: 67 BPM | HEIGHT: 62 IN

## 2020-02-26 LAB
GLUCOSE BLDC GLUCOMTR-MCNC: 186
PODIATRY EVAL: NORMAL

## 2020-02-26 PROCEDURE — 82962 GLUCOSE BLOOD TEST: CPT

## 2020-02-26 PROCEDURE — 99214 OFFICE O/P EST MOD 30 MIN: CPT | Mod: 25

## 2020-02-26 NOTE — REVIEW OF SYSTEMS
[Back Pain] : back pain [Chest Pain] : no chest pain [FreeTextEntry9] : may need injections [Shortness Of Breath] : no shortness of breath

## 2020-02-26 NOTE — ASSESSMENT
[FreeTextEntry1] : 1. DM type 2, insulin treated, complicated by renal insufficiency. Pt. is not a candidate for very tight control due to risk of hypoglycemia, previously demonstrated on CGMS. Current A1C of 7.4% is satisfactory.\par 2. Hypothyroid, euthyroid on replacement.\par 3. Hyperlipidemia, controlled

## 2020-03-02 ENCOUNTER — APPOINTMENT (OUTPATIENT)
Dept: UROGYNECOLOGY | Facility: CLINIC | Age: 75
End: 2020-03-02
Payer: MEDICARE

## 2020-03-02 VITALS — DIASTOLIC BLOOD PRESSURE: 68 MMHG | SYSTOLIC BLOOD PRESSURE: 181 MMHG

## 2020-03-02 PROCEDURE — 51798 US URINE CAPACITY MEASURE: CPT

## 2020-03-02 PROCEDURE — 81003 URINALYSIS AUTO W/O SCOPE: CPT | Mod: QW

## 2020-03-02 PROCEDURE — 99213 OFFICE O/P EST LOW 20 MIN: CPT

## 2020-03-24 ENCOUNTER — RX RENEWAL (OUTPATIENT)
Age: 75
End: 2020-03-24

## 2020-04-27 ENCOUNTER — RX RENEWAL (OUTPATIENT)
Age: 75
End: 2020-04-27

## 2020-05-07 ENCOUNTER — RX RENEWAL (OUTPATIENT)
Age: 75
End: 2020-05-07

## 2020-05-26 ENCOUNTER — RX RENEWAL (OUTPATIENT)
Age: 75
End: 2020-05-26

## 2020-06-02 ENCOUNTER — APPOINTMENT (OUTPATIENT)
Dept: UROGYNECOLOGY | Facility: CLINIC | Age: 75
End: 2020-06-02

## 2020-06-09 ENCOUNTER — RX RENEWAL (OUTPATIENT)
Age: 75
End: 2020-06-09

## 2020-06-12 ENCOUNTER — RX RENEWAL (OUTPATIENT)
Age: 75
End: 2020-06-12

## 2020-06-15 ENCOUNTER — RX RENEWAL (OUTPATIENT)
Age: 75
End: 2020-06-15

## 2020-06-15 ENCOUNTER — APPOINTMENT (OUTPATIENT)
Dept: NEPHROLOGY | Facility: CLINIC | Age: 75
End: 2020-06-15

## 2020-06-17 ENCOUNTER — RX RENEWAL (OUTPATIENT)
Age: 75
End: 2020-06-17

## 2020-06-21 ENCOUNTER — RX RENEWAL (OUTPATIENT)
Age: 75
End: 2020-06-21

## 2020-06-26 ENCOUNTER — APPOINTMENT (OUTPATIENT)
Dept: NEPHROLOGY | Facility: CLINIC | Age: 75
End: 2020-06-26
Payer: MEDICARE

## 2020-06-26 VITALS
WEIGHT: 169 LBS | DIASTOLIC BLOOD PRESSURE: 59 MMHG | SYSTOLIC BLOOD PRESSURE: 162 MMHG | BODY MASS INDEX: 31.1 KG/M2 | HEIGHT: 62 IN | HEART RATE: 74 BPM

## 2020-06-26 DIAGNOSIS — N28.1 CYST OF KIDNEY, ACQUIRED: ICD-10-CM

## 2020-06-26 PROCEDURE — 99214 OFFICE O/P EST MOD 30 MIN: CPT | Mod: 25

## 2020-06-26 PROCEDURE — 36415 COLL VENOUS BLD VENIPUNCTURE: CPT

## 2020-06-26 RX ORDER — NYSTATIN 100000 [USP'U]/G
100000 CREAM TOPICAL TWICE DAILY
Qty: 1 | Refills: 0 | Status: DISCONTINUED | COMMUNITY
Start: 2019-11-22 | End: 2020-06-26

## 2020-06-26 RX ORDER — PEN NEEDLE, DIABETIC 29 G X1/2"
31G X 5 MM NEEDLE, DISPOSABLE MISCELLANEOUS
Qty: 4 | Refills: 3 | Status: DISCONTINUED | COMMUNITY
Start: 2020-01-09 | End: 2020-06-26

## 2020-06-26 NOTE — PHYSICAL EXAM
[General Appearance - Well Nourished] : well nourished [General Appearance - In No Acute Distress] : in no acute distress [General Appearance - Alert] : alert [General Appearance - Well-Appearing] : healthy appearing [General Appearance - Well Developed] : well developed [Sclera] : the sclera and conjunctiva were normal [Strabismus] : no strabismus was seen [Examination Of The Oral Cavity] : the lips and gums were normal [Outer Ear] : the ears and nose were normal in appearance [Hearing Threshold Finger Rub Not [place]] : hearing was normal [Neck Appearance] : the appearance of the neck was normal [Jugular Venous Distention Increased] : there was no jugular-venous distention [Neck Cervical Mass (___cm)] : no neck mass was observed [Respiration, Rhythm And Depth] : normal respiratory rhythm and effort [Exaggerated Use Of Accessory Muscles For Inspiration] : no accessory muscle use [Auscultation Breath Sounds / Voice Sounds] : lungs were clear to auscultation bilaterally [Apical Impulse] : the apical impulse was normal [Heart Rate And Rhythm] : heart rate was normal and rhythm regular [Heart Sounds] : normal S1 and S2 [Heart Sounds Gallop] : no gallops [Murmurs] : no murmurs [Heart Sounds Pericardial Friction Rub] : no pericardial rub [Abdominal Aorta] : the abdominal aorta was normal [Arterial Pulses Carotid] : carotid pulses were normal with no bruits [Pitting Edema] : pitting edema present [___ +] : bilateral [unfilled]+ pretibial pitting edema [Bowel Sounds] : normal bowel sounds [Abdomen Soft] : soft [Abdomen Tenderness] : non-tender [Abdomen Hernia] : no hernia was discovered [Abdomen Mass (___ Cm)] : no abdominal mass palpated [Cervical Lymph Nodes Enlarged Posterior Bilaterally] : posterior cervical [Cervical Lymph Nodes Enlarged Anterior Bilaterally] : anterior cervical [No CVA Tenderness] : no ~M costovertebral angle tenderness [Involuntary Movements] : no involuntary movements were seen [No Focal Deficits] : no focal deficits [Skin Turgor] : normal skin turgor [] : no rash [Oriented To Time, Place, And Person] : oriented to person, place, and time [Impaired Insight] : insight and judgment were intact [Affect] : the affect was normal [Mood] : the mood was normal [FreeTextEntry1] : +AICD

## 2020-06-26 NOTE — ASSESSMENT
[FreeTextEntry1] : Labs/medications reviewed,\par \par DX : CKD - 3, DN, PAD,\par \par Stable,\par \par F/U in 4 months

## 2020-06-26 NOTE — HISTORY OF PRESENT ILLNESS
[FreeTextEntry1] : HX: CKD 3, CAD, CHF, kidney cyst, DM 2, HLD, HTN, LVEF 50%, hypothyroidism.\par \par OR procedure 11/26 (Dr. Bean) - Heavily calcified plaque left femoral artery, recurrent left SFA stenosis\par admitted for same day surgery s/p thromboendarterectomy of deep femoral artery, endarterectomy of left CFA insertion of SFA stent. \par \par \par

## 2020-06-29 ENCOUNTER — APPOINTMENT (OUTPATIENT)
Dept: UROGYNECOLOGY | Facility: CLINIC | Age: 75
End: 2020-06-29
Payer: MEDICARE

## 2020-06-29 VITALS
OXYGEN SATURATION: 99 % | RESPIRATION RATE: 20 BRPM | SYSTOLIC BLOOD PRESSURE: 160 MMHG | HEART RATE: 80 BPM | DIASTOLIC BLOOD PRESSURE: 66 MMHG

## 2020-06-29 VITALS
SYSTOLIC BLOOD PRESSURE: 196 MMHG | BODY MASS INDEX: 31.1 KG/M2 | DIASTOLIC BLOOD PRESSURE: 64 MMHG | HEIGHT: 62 IN | WEIGHT: 169 LBS

## 2020-06-29 LAB
ALBUMIN SERPL ELPH-MCNC: 4.3 G/DL
ANION GAP SERPL CALC-SCNC: 15 MMOL/L
APPEARANCE: ABNORMAL
BACTERIA: ABNORMAL
BASOPHILS # BLD AUTO: 0.04 K/UL
BASOPHILS NFR BLD AUTO: 0.4 %
BILIRUB UR QL STRIP: NEGATIVE
BILIRUBIN URINE: NEGATIVE
BLOOD URINE: NEGATIVE
BUN SERPL-MCNC: 41 MG/DL
CALCIUM SERPL-MCNC: 9.6 MG/DL
CHLORIDE SERPL-SCNC: 95 MMOL/L
CLARITY UR: CLEAR
CO2 SERPL-SCNC: 24 MMOL/L
COLLECTION METHOD: NORMAL
COLOR: NORMAL
CREAT SERPL-MCNC: 1.66 MG/DL
CREAT SPEC-SCNC: 69 MG/DL
CREAT/PROT UR: 0.3 RATIO
EOSINOPHIL # BLD AUTO: 0.14 K/UL
EOSINOPHIL NFR BLD AUTO: 1.5 %
GLUCOSE QUALITATIVE U: NEGATIVE
GLUCOSE SERPL-MCNC: 118 MG/DL
GLUCOSE UR-MCNC: 100
HCG UR QL: 0.2 EU/DL
HCT VFR BLD CALC: 33.1 %
HGB BLD-MCNC: 10.3 G/DL
HGB UR QL STRIP.AUTO: NEGATIVE
HYALINE CASTS: 4 /LPF
IMM GRANULOCYTES NFR BLD AUTO: 0.4 %
KETONES UR-MCNC: NEGATIVE
KETONES URINE: NEGATIVE
LEUKOCYTE ESTERASE UR QL STRIP: NORMAL
LEUKOCYTE ESTERASE URINE: ABNORMAL
LYMPHOCYTES # BLD AUTO: 1.16 K/UL
LYMPHOCYTES NFR BLD AUTO: 12.6 %
MAN DIFF?: NORMAL
MCHC RBC-ENTMCNC: 30.4 PG
MCHC RBC-ENTMCNC: 31.1 GM/DL
MCV RBC AUTO: 97.6 FL
MICROSCOPIC-UA: NORMAL
MONOCYTES # BLD AUTO: 1 K/UL
MONOCYTES NFR BLD AUTO: 10.8 %
NEUTROPHILS # BLD AUTO: 6.86 K/UL
NEUTROPHILS NFR BLD AUTO: 74.3 %
NITRITE UR QL STRIP: NEGATIVE
NITRITE URINE: POSITIVE
PH UR STRIP: 5.5
PH URINE: 6
PHOSPHATE SERPL-MCNC: 4.6 MG/DL
PLATELET # BLD AUTO: 232 K/UL
POTASSIUM SERPL-SCNC: 5.1 MMOL/L
PROT UR STRIP-MCNC: NEGATIVE
PROT UR-MCNC: 17 MG/DL
PROTEIN URINE: NEGATIVE
RBC # BLD: 3.39 M/UL
RBC # FLD: 15.7 %
RED BLOOD CELLS URINE: 0 /HPF
SODIUM SERPL-SCNC: 134 MMOL/L
SP GR UR STRIP: 1.01
SPECIFIC GRAVITY URINE: 1.01
SQUAMOUS EPITHELIAL CELLS: 5 /HPF
URINE COMMENTS: NORMAL
UROBILINOGEN URINE: NORMAL
WBC # FLD AUTO: 9.24 K/UL
WHITE BLOOD CELLS URINE: 89 /HPF

## 2020-06-29 PROCEDURE — 99214 OFFICE O/P EST MOD 30 MIN: CPT | Mod: 25

## 2020-06-29 PROCEDURE — 51798 US URINE CAPACITY MEASURE: CPT

## 2020-06-29 PROCEDURE — 81003 URINALYSIS AUTO W/O SCOPE: CPT | Mod: QW

## 2020-06-29 PROCEDURE — A4561: CPT

## 2020-06-29 PROCEDURE — 57150 TREAT VAGINA INFECTION: CPT

## 2020-07-17 ENCOUNTER — APPOINTMENT (OUTPATIENT)
Dept: FAMILY MEDICINE | Facility: CLINIC | Age: 75
End: 2020-07-17
Payer: MEDICARE

## 2020-07-17 ENCOUNTER — NON-APPOINTMENT (OUTPATIENT)
Age: 75
End: 2020-07-17

## 2020-07-17 ENCOUNTER — APPOINTMENT (OUTPATIENT)
Dept: ULTRASOUND IMAGING | Facility: CLINIC | Age: 75
End: 2020-07-17

## 2020-07-17 VITALS
SYSTOLIC BLOOD PRESSURE: 132 MMHG | HEART RATE: 79 BPM | DIASTOLIC BLOOD PRESSURE: 58 MMHG | WEIGHT: 167 LBS | BODY MASS INDEX: 30.73 KG/M2 | TEMPERATURE: 98.1 F | OXYGEN SATURATION: 100 % | HEIGHT: 62 IN

## 2020-07-17 DIAGNOSIS — M54.16 RADICULOPATHY, LUMBAR REGION: ICD-10-CM

## 2020-07-17 PROCEDURE — 36415 COLL VENOUS BLD VENIPUNCTURE: CPT

## 2020-07-17 PROCEDURE — 99214 OFFICE O/P EST MOD 30 MIN: CPT | Mod: 25

## 2020-07-17 NOTE — PLAN
[FreeTextEntry1] : Patient to go to vascular surgeon's office this afternoon for evaluation.  Will hold off on venous Doppler.\par Compression socks recommended, knee-high's.\par Rule out exacerbation of chronic renal disease.\par Labs drawn in office.\par Follow-up after vascular evaluation complete.\par

## 2020-07-17 NOTE — REVIEW OF SYSTEMS
[Chest Pain] : no chest pain [Back Pain] : back pain [Negative] : Psychiatric [FreeTextEntry9] : Left leg pain and swelling of the legs bilaterally. [de-identified] : Lumbar radiculopathy

## 2020-07-17 NOTE — DATA REVIEWED
[FreeTextEntry1] : Neurology and endocrinology progress notes reviewed.\par EKG: NSR 72 bpm. IVCD. paced

## 2020-07-17 NOTE — PHYSICAL EXAM
[No Acute Distress] : no acute distress [No Lymphadenopathy] : no lymphadenopathy [Normal Sclera/Conjunctiva] : normal sclera/conjunctiva [No Respiratory Distress] : no respiratory distress  [Clear to Auscultation] : lungs were clear to auscultation bilaterally [Normal Rate] : normal [Rhythm Regular] : regular [Normal S1] : normal S1 [Normal S2] : normal S2 [II] : a grade 2 [1+] : right 1+ [0] : left 0 [Normal] : affect was normal and insight and judgment were intact [de-identified] : No calf tenderness bilaterally. [de-identified] : 1 cm round superficial abrasion of the left anterior tibial region.  Scabbed.  No discharge. No erythema

## 2020-07-17 NOTE — HISTORY OF PRESENT ILLNESS
[Spouse] : spouse [FreeTextEntry1] : Patient presents in office today to follow up on CAD and HTN with BP check.\par Pt c/o b/l LE edema L>R x 2 wks. She has also been experiencing increased pain to the left lower extremity.\par She does have a history of left femoral artery angioplasty and stenting in 11/2019.  She also has a history of lumbar spinal stenosis with radiculopathy to the left leg.  She does note that the pain to the left lower extremity is worse with walking.  She denies any chest pain, palpitations, shortness of breath.

## 2020-07-20 ENCOUNTER — APPOINTMENT (OUTPATIENT)
Dept: FAMILY MEDICINE | Facility: CLINIC | Age: 75
End: 2020-07-20
Payer: MEDICARE

## 2020-07-20 ENCOUNTER — RX RENEWAL (OUTPATIENT)
Age: 75
End: 2020-07-20

## 2020-07-20 ENCOUNTER — NON-APPOINTMENT (OUTPATIENT)
Age: 75
End: 2020-07-20

## 2020-07-20 VITALS
HEIGHT: 62 IN | OXYGEN SATURATION: 99 % | DIASTOLIC BLOOD PRESSURE: 60 MMHG | TEMPERATURE: 97.6 F | WEIGHT: 167 LBS | SYSTOLIC BLOOD PRESSURE: 122 MMHG | BODY MASS INDEX: 30.73 KG/M2 | HEART RATE: 73 BPM

## 2020-07-20 LAB
ANION GAP SERPL CALC-SCNC: 16 MMOL/L
BUN SERPL-MCNC: 36 MG/DL
CALCIUM SERPL-MCNC: 9.8 MG/DL
CHLORIDE SERPL-SCNC: 93 MMOL/L
CO2 SERPL-SCNC: 24 MMOL/L
CREAT SERPL-MCNC: 1.52 MG/DL
GLUCOSE SERPL-MCNC: 170 MG/DL
POTASSIUM SERPL-SCNC: 4.8 MMOL/L
SODIUM SERPL-SCNC: 133 MMOL/L

## 2020-07-20 PROCEDURE — 36415 COLL VENOUS BLD VENIPUNCTURE: CPT

## 2020-07-20 PROCEDURE — 99214 OFFICE O/P EST MOD 30 MIN: CPT | Mod: 25

## 2020-07-20 PROCEDURE — 93000 ELECTROCARDIOGRAM COMPLETE: CPT

## 2020-07-20 NOTE — REVIEW OF SYSTEMS
[Back Pain] : back pain [Dizziness] : dizziness [Unsteady Walking] : ataxia [Negative] : Respiratory [Fever] : no fever [Sore Throat] : no sore throat [Postnasal Drip] : no postnasal drip [Palpitations] : no palpitations [Chest Pain] : no chest pain [Skin Rash] : no skin rash [FreeTextEntry9] : Left leg pain [FreeTextEntry4] : Right ear pressure.  Mild pressure right maxillary and frontal region.

## 2020-07-20 NOTE — ASSESSMENT
[FreeTextEntry1] : Vertigo likely due to labyrinthitis.  There are no other focal neurologic deficits relating to the new onset of vertigo.\par Increase hydration.\par Start Antivert.\par Recheck in 1 week.\par Return sooner if symptoms worsen.  Patient to go directly to the ER if she develops severe or unremitting dizziness, severe headache, rash, vomiting, muscle weakness, or visual disturbance.\par Labs drawn in office.\par

## 2020-07-20 NOTE — HISTORY OF PRESENT ILLNESS
[FreeTextEntry8] : Patient presents with c/o dizziness and right-sided sinus and ear pressure x1 day. She did also notice a whooshing sound in the left ear yesterday, which has resolved. No fever, no recent URI, no hearing loss, no rash, no sore throat, cough, nasal discharge or postnasal drip. Pt reports that the dizziness is much better today than it was yesterday. She does have symptoms free intervals. She was advised to increase her gabapentin from 100 mg daily to 400 mg daily 3 days ago, but patient did not yet institute the increase.  She has been on the same dose of gabapentin for several months.  Blood work from 3 days ago showed a mild decrease in sodium, and stable renal function. \par She reports her onset was yesterday morning and worsens when she needs to stand, also when her head is flexed downward.  Dizziness is described as a room spinning sensation.  Patient denies any muscle weakness, numbness, change in speech, or vision.\par Patient was seen by vascular surgeon 3 days ago for left leg edema, and left leg pain in the setting of left lumbar radiculopathy.  Patient told she has a collapsed stent in her left leg.  She is due to follow-up with her vascular surgeon. She states she had her pacemaker checked recently and it was fine.

## 2020-07-20 NOTE — PHYSICAL EXAM
[No Acute Distress] : no acute distress [Normal Sclera/Conjunctiva] : normal sclera/conjunctiva [EOMI] : extraocular movements intact [Normal Oropharynx] : the oropharynx was normal [Normal Nasal Mucosa] : the nasal mucosa was normal [PERRL] : pupils equal round and reactive to light [No Respiratory Distress] : no respiratory distress  [No Lymphadenopathy] : no lymphadenopathy [Normal Rate] : normal [Clear to Auscultation] : lungs were clear to auscultation bilaterally [Normal S2] : normal S2 [Rhythm Regular] : regular [Normal S1] : normal S1 [II] : a grade 2 [0] : left 0 [No Rash] : no rash [1+] : right 1+ [Cranial Nerves Optic (II)] : visual acuity and visual fields were intact [Cranial Nerves Trigeminal (V)] : sensation to the face and masseter strength were intact [Cranial Nerves Oculomotor (III)] : the extraocular motions were intact [Cranial Nerves Facial (VII)] : facial strength was intact bilaterally [Cranial Nerves Vestibulocochlear (VIII)] : hearing was intact [Cranial Nerves Glossopharyngeal (IX)] : there was normal movement of the soft palate and normal gag [Cranial Nerves Accessory (XI - Cranial And Spinal)] : shoulder shrug was intact bilaterally [Cranial Nerves Hypoglossal (XII)] : there was no tongue deviation with protrusion [Limited Balance] : the patient's balance was impaired [2+] : right 2+ [Normal] : affect was normal and insight and judgment were intact [Past-pointing] : there was no past-pointing [Plantar Reflex Right Only] : absent on the right [Coordination - Dysmetria Impaired Finger-to-Nose Bilateral] : not present [Plantar Reflex Left Only] : absent on the left [de-identified] : Right TM appears normal.  Left TM visualization partially occluded by cerumen but portion visualized appears normal.  [de-identified] : Motor strength 5/5 upper and lower extremities, and equal bilaterally.

## 2020-07-22 LAB
ANION GAP SERPL CALC-SCNC: 16 MMOL/L
BASOPHILS # BLD AUTO: 0.04 K/UL
BASOPHILS NFR BLD AUTO: 0.4 %
BUN SERPL-MCNC: 27 MG/DL
CALCIUM SERPL-MCNC: 10.3 MG/DL
CHLORIDE SERPL-SCNC: 98 MMOL/L
CO2 SERPL-SCNC: 25 MMOL/L
CREAT SERPL-MCNC: 1.28 MG/DL
EOSINOPHIL # BLD AUTO: 0.13 K/UL
EOSINOPHIL NFR BLD AUTO: 1.3 %
GLUCOSE SERPL-MCNC: 66 MG/DL
HCT VFR BLD CALC: 34.6 %
HGB BLD-MCNC: 11 G/DL
IMM GRANULOCYTES NFR BLD AUTO: 0.3 %
LYMPHOCYTES # BLD AUTO: 1.47 K/UL
LYMPHOCYTES NFR BLD AUTO: 14.1 %
MAN DIFF?: NORMAL
MCHC RBC-ENTMCNC: 29.8 PG
MCHC RBC-ENTMCNC: 31.8 GM/DL
MCV RBC AUTO: 93.8 FL
MONOCYTES # BLD AUTO: 0.78 K/UL
MONOCYTES NFR BLD AUTO: 7.5 %
NEUTROPHILS # BLD AUTO: 7.95 K/UL
NEUTROPHILS NFR BLD AUTO: 76.4 %
PLATELET # BLD AUTO: 287 K/UL
POTASSIUM SERPL-SCNC: 3.8 MMOL/L
RBC # BLD: 3.69 M/UL
RBC # FLD: 15.9 %
SODIUM SERPL-SCNC: 139 MMOL/L
TSH SERPL-ACNC: 1.26 UIU/ML
WBC # FLD AUTO: 10.4 K/UL

## 2020-07-27 ENCOUNTER — RX RENEWAL (OUTPATIENT)
Age: 75
End: 2020-07-27

## 2020-07-27 ENCOUNTER — APPOINTMENT (OUTPATIENT)
Dept: FAMILY MEDICINE | Facility: CLINIC | Age: 75
End: 2020-07-27
Payer: MEDICARE

## 2020-07-27 VITALS
DIASTOLIC BLOOD PRESSURE: 56 MMHG | TEMPERATURE: 99.4 F | HEIGHT: 62 IN | WEIGHT: 162 LBS | HEART RATE: 99 BPM | SYSTOLIC BLOOD PRESSURE: 148 MMHG | OXYGEN SATURATION: 97 % | BODY MASS INDEX: 29.81 KG/M2

## 2020-07-27 DIAGNOSIS — E11.9 TYPE 2 DIABETES MELLITUS W/OUT COMPLICATIONS: ICD-10-CM

## 2020-07-27 PROCEDURE — 99215 OFFICE O/P EST HI 40 MIN: CPT

## 2020-07-29 NOTE — RESULTS/DATA
[] : results reviewed [de-identified] : H&H: 10.8/35.5  WBC 9.7   Plt 233 [de-identified] : Glucose 168  Na+ 143  K+ 4.3  BUN/Creat  29/1.6 [de-identified] : Atherosclerosis. AICD device [de-identified] : Paced 68 bpm [de-identified] : U/A: Nitrite +, LE large, Prot 30 mg/dl  sp gr 1.014.\par Blood Type: A    Rh Type: negative   Antibody Screen: negative.\par SARS COV 2 RNA: Not detected\par

## 2020-07-29 NOTE — PHYSICAL EXAM
[No Acute Distress] : no acute distress [Normal Sclera/Conjunctiva] : normal sclera/conjunctiva [PERRL] : pupils equal round and reactive to light [EOMI] : extraocular movements intact [Normal Oropharynx] : the oropharynx was normal [No Lymphadenopathy] : no lymphadenopathy [Supple] : supple [No Respiratory Distress] : no respiratory distress  [Clear to Auscultation] : lungs were clear to auscultation bilaterally [Normal Rate] : normal [Rhythm Regular] : regular [Normal S1] : normal S1 [Normal S2] : normal S2 [II] : a grade 2 [Right Carotid Bruit] : right carotid bruit heard [0] : left 0 [1+] : left 1+ [Grossly Normal Strength/Tone] : grossly normal strength/tone [No Joint Swelling] : no joint swelling [No Focal Deficits] : no focal deficits [No Rash] : no rash [Normal] : affect was normal and insight and judgment were intact [Deep Tendon Reflexes (DTR)] : deep tendon reflexes were 2+ and symmetric [Left Carotid Bruit] : no bruit heard over the left carotid [de-identified] : Mild edema of the LEs b/l.

## 2020-07-29 NOTE — REVIEW OF SYSTEMS
[Leg Claudication] : leg claudication [Lower Ext Edema] : lower extremity edema [Back Pain] : back pain [Negative] : Heme/Lymph [Chest Pain] : no chest pain [Palpitations] : no palpitations [Dysuria] : no dysuria [FreeTextEntry8] : No urgency [Frequency] : no frequency [FreeTextEntry9] : Left leg pain, worse with walking.

## 2020-07-29 NOTE — HISTORY OF PRESENT ILLNESS
[(Patient denies any chest pain, claudication, dyspnea on exertion, orthopnea, palpitations or syncope)] : Patient denies any chest pain, claudication, dyspnea on exertion, orthopnea, palpitations or syncope [FreeTextEntry1] : Vascular Surgery [FreeTextEntry3] : Dr. Bean [FreeTextEntry2] : 07/30/2020 [FreeTextEntry7] : PET/CT 1/20/2020: Extensive coronary calcifications noted in all 3 vascular distributions.  Normal myocardial perfusion study.  No evidence for significant ischemia or scar is noted.\par Echo 7/22/2020: Normal LVEF, trace pericardial effusion, and moderate AI, from prior study. [FreeTextEntry4] : Patient is a 74-year-old female with a past medical history of peripheral arterial disease s/p peripheral artery stenting, nonischemic cardiomyopathy,  biventricular pacemaker with AICD, coronary artery disease, hypertension, hyperlipidemia, insulin-dependent diabetes and lumbar spinal stenosis, who presents with increasing claudication of the left lower extremity.\par

## 2020-07-29 NOTE — ASSESSMENT
[Patient Optimized for Surgery] : Patient optimized for surgery [As per surgery] : as per surgery [Modify medications prior to procedure] : Modify medications prior to procedure [No Further Testing Recommended] : no further testing recommended [FreeTextEntry4] : There is no medical contraindication to the proposed procedure. The patient is medically cleared to proceed with the planned surgery.\par Fingerstick blood sugar monitoring with regular insulin coverage is recommended perioperatively.\par  [FreeTextEntry6] : Pt to hold Plavix for 5 days prior to procedure. [FreeTextEntry7] : Mrs. Henriquez is to take half of her usual Levemir dose the evening before her procedure, at 27 units SC. In the am of the procedure, she will take her Amlodipine, Carvedilol, and her Aspirin 81 mg, with a small sip of water.

## 2020-08-10 ENCOUNTER — RX RENEWAL (OUTPATIENT)
Age: 75
End: 2020-08-10

## 2020-08-14 ENCOUNTER — RX RENEWAL (OUTPATIENT)
Age: 75
End: 2020-08-14

## 2020-08-20 ENCOUNTER — RX RENEWAL (OUTPATIENT)
Age: 75
End: 2020-08-20

## 2020-08-24 ENCOUNTER — APPOINTMENT (OUTPATIENT)
Dept: FAMILY MEDICINE | Facility: CLINIC | Age: 75
End: 2020-08-24
Payer: MEDICARE

## 2020-08-24 VITALS
TEMPERATURE: 98.4 F | OXYGEN SATURATION: 99 % | BODY MASS INDEX: 29.81 KG/M2 | HEART RATE: 73 BPM | HEIGHT: 62 IN | SYSTOLIC BLOOD PRESSURE: 152 MMHG | DIASTOLIC BLOOD PRESSURE: 58 MMHG | WEIGHT: 162 LBS

## 2020-08-24 PROCEDURE — 99214 OFFICE O/P EST MOD 30 MIN: CPT

## 2020-08-24 NOTE — ASSESSMENT
[FreeTextEntry1] : PAD s/p vascular surgery left leg, doing well.\par Check lipid panel for optimization of tx .\par Abdominal rash is combination of contact dermatitis and cutaneous candidiasis.\par Patient to have blood work in 4 days by endocrinologist.\par Recommend follow-up with podiatrist.\par Dry skin of the left foot: Patient to apply moisturizer twice daily to prevent dry skin.\par F/U 2 months.

## 2020-08-24 NOTE — PHYSICAL EXAM
[No Acute Distress] : no acute distress [Normal Sclera/Conjunctiva] : normal sclera/conjunctiva [PERRL] : pupils equal round and reactive to light [EOMI] : extraocular movements intact [Normal Oropharynx] : the oropharynx was normal [No Lymphadenopathy] : no lymphadenopathy [Supple] : supple [No Respiratory Distress] : no respiratory distress  [Clear to Auscultation] : lungs were clear to auscultation bilaterally [Normal Rate] : normal [Rhythm Regular] : regular [Normal S1] : normal S1 [Normal S2] : normal S2 [II] : a grade 2 [Right Carotid Bruit] : right carotid bruit heard [Left Carotid Bruit] : no bruit heard over the left carotid [1+] : left 1+ [Soft] : abdomen soft [Non Tender] : non-tender [No HSM] : no HSM [Normal Bowel Sounds] : normal bowel sounds [No Joint Swelling] : no joint swelling [Grossly Normal Strength/Tone] : grossly normal strength/tone [No Rash] : no rash [No Focal Deficits] : no focal deficits [Deep Tendon Reflexes (DTR)] : deep tendon reflexes were 2+ and symmetric [Normal] : affect was normal and insight and judgment were intact [de-identified] : Mild edema of the LEs b/l. [de-identified] : Left inguinal catheterization wound site healing, no d/c. There is marinal erythema approx 3 cm, symmetric, rectangular, with linear erythema of left and right abdomen under pannus. [de-identified] : d [de-identified] : Dry skin LEs most prominent left lateral foot. Feet are warm to touch b/l.

## 2020-08-24 NOTE — HISTORY OF PRESENT ILLNESS
[FreeTextEntry1] : Patient presents in office today for post op f/u from vascular surgery / revision of stent left lower extremity on 7/30/20.\par She denies any leg pain.

## 2020-08-25 ENCOUNTER — LABORATORY RESULT (OUTPATIENT)
Age: 75
End: 2020-08-25

## 2020-08-28 ENCOUNTER — APPOINTMENT (OUTPATIENT)
Dept: ENDOCRINOLOGY | Facility: CLINIC | Age: 75
End: 2020-08-28
Payer: MEDICARE

## 2020-08-28 VITALS
HEART RATE: 73 BPM | HEIGHT: 72 IN | DIASTOLIC BLOOD PRESSURE: 62 MMHG | SYSTOLIC BLOOD PRESSURE: 122 MMHG | OXYGEN SATURATION: 100 % | BODY MASS INDEX: 22.08 KG/M2 | WEIGHT: 163 LBS

## 2020-08-28 LAB — GLUCOSE BLDC GLUCOMTR-MCNC: 121

## 2020-08-28 PROCEDURE — 99214 OFFICE O/P EST MOD 30 MIN: CPT | Mod: 25

## 2020-08-28 PROCEDURE — 82962 GLUCOSE BLOOD TEST: CPT

## 2020-08-28 NOTE — HISTORY OF PRESENT ILLNESS
[FreeTextEntry1] : Quality:  Type 2.   \par Severity:  Moderate\par Duration:  22 years\par Associated Symptoms:  Retinopathy. Hypoglycemia. Nephropathy\par Modifying Factors:  Better with diet.   \par Notes:  Current regimen:\par 	metformin 1000 bid - stopped due to renal insufficiency\par 	glimepiride 2 bid\par 	januvia, precose ineffective. On avandia in the past.\par Levemir 54 units\par humalog before meals:\par 		2-8 units\par \par Routine cardiac evaluation-fluid around the heart. Eventual w/u included PET scan and sonography revealing subcentimeric thyroid nodules, pulmonary abnormality, and fatty liver. SHASHA 1:160, speckled, no diagnosis made by rheumatology.\par \par \par \par \par \par Diet:  weight watchers\par \par Weight History:  \par losing weight\par \par Blood Glucose Testing Information - pt. reports variable glucose levels, and some lows after eating\par \par Patient is using the  meter and is testing 4 times per day.\par \par Past Medical History\par Notes:  ASHD, pacemaker, defibrillator\par PVD, s/p stents. \par renal cysts\par thyroid nodules, subcentimeric\par vitamin D deficiency\par

## 2020-08-28 NOTE — ASSESSMENT
[FreeTextEntry1] : 1. DM type 2, insulin treated, complicated by renal insufficiency. DOing well, discussed lowering prandial insulin to prevent hypoglycemia\par 2. Hypothyroid, euthyroid on replacement.\par 3. Hyperlipidemia, controlled

## 2020-09-13 ENCOUNTER — RX RENEWAL (OUTPATIENT)
Age: 75
End: 2020-09-13

## 2020-09-25 ENCOUNTER — APPOINTMENT (OUTPATIENT)
Dept: DERMATOLOGY | Facility: CLINIC | Age: 75
End: 2020-09-25
Payer: MEDICARE

## 2020-09-25 PROCEDURE — 99214 OFFICE O/P EST MOD 30 MIN: CPT

## 2020-09-29 ENCOUNTER — RESULT CHARGE (OUTPATIENT)
Age: 75
End: 2020-09-29

## 2020-09-29 ENCOUNTER — APPOINTMENT (OUTPATIENT)
Dept: UROGYNECOLOGY | Facility: CLINIC | Age: 75
End: 2020-09-29
Payer: MEDICARE

## 2020-09-29 VITALS — DIASTOLIC BLOOD PRESSURE: 56 MMHG | SYSTOLIC BLOOD PRESSURE: 148 MMHG

## 2020-09-29 VITALS
WEIGHT: 163 LBS | DIASTOLIC BLOOD PRESSURE: 50 MMHG | HEIGHT: 62 IN | SYSTOLIC BLOOD PRESSURE: 188 MMHG | BODY MASS INDEX: 30 KG/M2

## 2020-09-29 LAB
BILIRUB UR QL STRIP: NEGATIVE
CLARITY UR: NORMAL
COLLECTION METHOD: NORMAL
GLUCOSE UR-MCNC: 250
HCG UR QL: 0.2 EU/DL
HGB UR QL STRIP.AUTO: NORMAL
KETONES UR-MCNC: NEGATIVE
LEUKOCYTE ESTERASE UR QL STRIP: NORMAL
NITRITE UR QL STRIP: POSITIVE
PH UR STRIP: 5.5
PROT UR STRIP-MCNC: NORMAL
SP GR UR STRIP: 1.01

## 2020-09-29 PROCEDURE — 57150 TREAT VAGINA INFECTION: CPT | Mod: 59

## 2020-09-29 PROCEDURE — 99214 OFFICE O/P EST MOD 30 MIN: CPT | Mod: 25

## 2020-09-29 PROCEDURE — 57160 INSERT PESSARY/OTHER DEVICE: CPT

## 2020-09-29 PROCEDURE — 51798 US URINE CAPACITY MEASURE: CPT

## 2020-10-01 ENCOUNTER — APPOINTMENT (OUTPATIENT)
Dept: FAMILY MEDICINE | Facility: CLINIC | Age: 75
End: 2020-10-01
Payer: MEDICARE

## 2020-10-01 VITALS
WEIGHT: 164 LBS | HEART RATE: 83 BPM | DIASTOLIC BLOOD PRESSURE: 60 MMHG | BODY MASS INDEX: 30.18 KG/M2 | SYSTOLIC BLOOD PRESSURE: 140 MMHG | OXYGEN SATURATION: 99 % | HEIGHT: 62 IN | TEMPERATURE: 97.6 F

## 2020-10-01 PROCEDURE — G0008: CPT

## 2020-10-01 PROCEDURE — 90662 IIV NO PRSV INCREASED AG IM: CPT

## 2020-10-01 PROCEDURE — 99214 OFFICE O/P EST MOD 30 MIN: CPT | Mod: 25

## 2020-10-01 NOTE — HISTORY OF PRESENT ILLNESS
[FreeTextEntry1] : Patient is here to follow-up on her peripheral arterial disease, hypertension, chronic kidney disease, and diabetes.\par She is status post vascular stenting to the left leg.\par She is unable to walk more than 200 feet without stopping.  She is able to get from her bedroom to the kitchen and then must stop.  She is not able to complete cooking a full dinner without stopping due to pain in her left leg leading to severe peripheral arterial disease.\par Her left foot does get numb frequently.  However, it is not cold.  She denies any discoloration to the foot.  She is seeing her podiatrist every 2 to 3 months.\par Diabetes is being managed by her endocrinologist.\par Pt requesting handicap parking permit.

## 2020-10-01 NOTE — PHYSICAL EXAM
[No Acute Distress] : no acute distress [Normal Sclera/Conjunctiva] : normal sclera/conjunctiva [No Lymphadenopathy] : no lymphadenopathy [Supple] : supple [No Respiratory Distress] : no respiratory distress  [Clear to Auscultation] : lungs were clear to auscultation bilaterally [Normal Rate] : normal [Rhythm Regular] : regular [Normal S1] : normal S1 [Normal S2] : normal S2 [II] : a grade 2 [Right Carotid Bruit] : right carotid bruit heard [Left Carotid Bruit] : no bruit heard over the left carotid [1+] : left 1+ [No Joint Swelling] : no joint swelling [Grossly Normal Strength/Tone] : grossly normal strength/tone [No Rash] : no rash [No Focal Deficits] : no focal deficits [Deep Tendon Reflexes (DTR)] : deep tendon reflexes were 2+ and symmetric [Normal] : affect was normal and insight and judgment were intact [de-identified] : Mild edema of the LEs b/l. [de-identified] : Feet are warm to touch b/l.

## 2020-10-01 NOTE — PLAN
[FreeTextEntry1] : Patient with significant walking limitations relating to severe peripheral arterial disease.\par Handicap parking permit completed for patient.\par Follow-up 2 months

## 2020-10-05 LAB
APPEARANCE: CLEAR
BACTERIA UR CULT: ABNORMAL
BACTERIA: ABNORMAL
BILIRUBIN URINE: NEGATIVE
BLOOD URINE: NEGATIVE
COLOR: NORMAL
GLUCOSE QUALITATIVE U: ABNORMAL
HYALINE CASTS: 0 /LPF
KETONES URINE: NEGATIVE
LEUKOCYTE ESTERASE URINE: ABNORMAL
MICROSCOPIC-UA: NORMAL
NITRITE URINE: POSITIVE
PH URINE: 6
PROTEIN URINE: ABNORMAL
RED BLOOD CELLS URINE: 0 /HPF
SPECIFIC GRAVITY URINE: 1.01
SQUAMOUS EPITHELIAL CELLS: 1 /HPF
UROBILINOGEN URINE: NORMAL
WHITE BLOOD CELLS URINE: 28 /HPF

## 2020-10-07 ENCOUNTER — RX RENEWAL (OUTPATIENT)
Age: 75
End: 2020-10-07

## 2020-10-09 LAB
APPEARANCE: CLEAR
BACTERIA UR CULT: NORMAL
BACTERIA: NEGATIVE
BILIRUBIN URINE: NEGATIVE
BLOOD URINE: NEGATIVE
COLOR: YELLOW
GLUCOSE QUALITATIVE U: NEGATIVE MG/DL
HYALINE CASTS: 0 /LPF
KETONES URINE: NEGATIVE
LEUKOCYTE ESTERASE URINE: NEGATIVE
MICROSCOPIC-UA: NORMAL
NITRITE URINE: NEGATIVE
PH URINE: 5.5
PROTEIN URINE: NEGATIVE MG/DL
RED BLOOD CELLS URINE: 1 /HPF
SPECIFIC GRAVITY URINE: 1.02
SQUAMOUS EPITHELIAL CELLS: 3 /HPF
UROBILINOGEN URINE: NEGATIVE MG/DL
WHITE BLOOD CELLS URINE: 1 /HPF

## 2020-10-23 ENCOUNTER — APPOINTMENT (OUTPATIENT)
Dept: NEPHROLOGY | Facility: CLINIC | Age: 75
End: 2020-10-23
Payer: MEDICARE

## 2020-10-23 VITALS
BODY MASS INDEX: 31.47 KG/M2 | HEIGHT: 62 IN | DIASTOLIC BLOOD PRESSURE: 62 MMHG | HEART RATE: 76 BPM | WEIGHT: 171 LBS | TEMPERATURE: 97 F | SYSTOLIC BLOOD PRESSURE: 158 MMHG

## 2020-10-23 PROCEDURE — 36415 COLL VENOUS BLD VENIPUNCTURE: CPT

## 2020-10-23 PROCEDURE — 99214 OFFICE O/P EST MOD 30 MIN: CPT | Mod: 25

## 2020-10-23 RX ORDER — GABAPENTIN 100 MG/1
100 CAPSULE ORAL
Qty: 180 | Refills: 0 | Status: DISCONTINUED | COMMUNITY
Start: 2020-05-28 | End: 2020-10-23

## 2020-10-23 RX ORDER — CLOTRIMAZOLE AND BETAMETHASONE DIPROPIONATE 10; .5 MG/G; MG/G
1-0.05 CREAM TOPICAL TWICE DAILY
Qty: 1 | Refills: 0 | Status: DISCONTINUED | COMMUNITY
Start: 2020-08-24 | End: 2020-10-23

## 2020-10-23 RX ORDER — CEPHALEXIN 500 MG/1
500 CAPSULE ORAL
Qty: 14 | Refills: 0 | Status: DISCONTINUED | COMMUNITY
Start: 2020-10-09 | End: 2020-10-23

## 2020-10-23 RX ORDER — NITROFURANTOIN (MONOHYDRATE/MACROCRYSTALS) 25; 75 MG/1; MG/1
100 CAPSULE ORAL TWICE DAILY
Qty: 14 | Refills: 0 | Status: DISCONTINUED | COMMUNITY
Start: 2020-10-05 | End: 2020-10-23

## 2020-10-23 NOTE — PHYSICAL EXAM
[General Appearance - Alert] : alert [General Appearance - In No Acute Distress] : in no acute distress [General Appearance - Well Nourished] : well nourished [General Appearance - Well Developed] : well developed [General Appearance - Well-Appearing] : healthy appearing [Sclera] : the sclera and conjunctiva were normal [Strabismus] : no strabismus was seen [Outer Ear] : the ears and nose were normal in appearance [Hearing Threshold Finger Rub Not Evans] : hearing was normal [Examination Of The Oral Cavity] : the lips and gums were normal [Neck Appearance] : the appearance of the neck was normal [Neck Cervical Mass (___cm)] : no neck mass was observed [Jugular Venous Distention Increased] : there was no jugular-venous distention [Respiration, Rhythm And Depth] : normal respiratory rhythm and effort [Exaggerated Use Of Accessory Muscles For Inspiration] : no accessory muscle use [Auscultation Breath Sounds / Voice Sounds] : lungs were clear to auscultation bilaterally [Apical Impulse] : the apical impulse was normal [Heart Rate And Rhythm] : heart rate was normal and rhythm regular [Heart Sounds] : normal S1 and S2 [Heart Sounds Gallop] : no gallops [Murmurs] : no murmurs [Heart Sounds Pericardial Friction Rub] : no pericardial rub [Arterial Pulses Carotid] : carotid pulses were normal with no bruits [Abdominal Aorta] : the abdominal aorta was normal [Pitting Edema] : pitting edema present [___ +] : bilateral [unfilled]+ pretibial pitting edema [Bowel Sounds] : normal bowel sounds [Abdomen Soft] : soft [Abdomen Tenderness] : non-tender [Abdomen Mass (___ Cm)] : no abdominal mass palpated [Abdomen Hernia] : no hernia was discovered [Cervical Lymph Nodes Enlarged Posterior Bilaterally] : posterior cervical [Cervical Lymph Nodes Enlarged Anterior Bilaterally] : anterior cervical [No CVA Tenderness] : no ~M costovertebral angle tenderness [Involuntary Movements] : no involuntary movements were seen [Skin Turgor] : normal skin turgor [] : no rash [No Focal Deficits] : no focal deficits [Oriented To Time, Place, And Person] : oriented to person, place, and time [Impaired Insight] : insight and judgment were intact [Affect] : the affect was normal [Mood] : the mood was normal [FreeTextEntry1] : +AICD

## 2020-10-23 NOTE — HISTORY OF PRESENT ILLNESS
[FreeTextEntry1] : HX: CKD 3, CAD, CHF, kidney cyst, DM 2, HLD, HTN, LVEF 50%, hypothyroidism.\par \par Surgery 11/26/ 2019 (Dr. Baen) - Heavily calcified plaque left femoral artery, Recurrent left SFA stenosis\par \par S/P  thromboendarterectomy of deep femoral artery, Left CFA &  insertion of SFA stent. \par \par \par

## 2020-10-23 NOTE — ASSESSMENT
[FreeTextEntry1] : Labs/medications reviewed,\par \par DX : CKD - 3, DN, PAD ( S/P Revascularization - LLE ) \par \par F.U Dr. Bean,\par \par Recently completed a course of antibiotics,\par \par F.U Urine C/S, \par \par Stable,\par

## 2020-10-26 LAB
25(OH)D3 SERPL-MCNC: 29.5 NG/ML
ALBUMIN SERPL ELPH-MCNC: 4 G/DL
ANION GAP SERPL CALC-SCNC: 18 MMOL/L
APPEARANCE: ABNORMAL
BACTERIA: ABNORMAL
BASOPHILS # BLD AUTO: 0.06 K/UL
BASOPHILS NFR BLD AUTO: 0.8 %
BILIRUBIN URINE: NEGATIVE
BLOOD URINE: NEGATIVE
BUN SERPL-MCNC: 25 MG/DL
CALCIUM SERPL-MCNC: 9.8 MG/DL
CALCIUM SERPL-MCNC: 9.8 MG/DL
CHLORIDE SERPL-SCNC: 92 MMOL/L
CO2 SERPL-SCNC: 22 MMOL/L
COLOR: YELLOW
CREAT SERPL-MCNC: 1.37 MG/DL
CREAT SPEC-SCNC: 96 MG/DL
CREAT/PROT UR: 0.7 RATIO
EOSINOPHIL # BLD AUTO: 0.19 K/UL
EOSINOPHIL NFR BLD AUTO: 2.5 %
GLUCOSE QUALITATIVE U: NEGATIVE
GLUCOSE SERPL-MCNC: 153 MG/DL
HCT VFR BLD CALC: 31.4 %
HGB BLD-MCNC: 9.7 G/DL
HYALINE CASTS: 4 /LPF
IMM GRANULOCYTES NFR BLD AUTO: 0.4 %
KETONES URINE: NEGATIVE
LEUKOCYTE ESTERASE URINE: ABNORMAL
LYMPHOCYTES # BLD AUTO: 1.4 K/UL
LYMPHOCYTES NFR BLD AUTO: 18.6 %
MAN DIFF?: NORMAL
MCHC RBC-ENTMCNC: 30.4 PG
MCHC RBC-ENTMCNC: 30.9 GM/DL
MCV RBC AUTO: 98.4 FL
MICROSCOPIC-UA: NORMAL
MONOCYTES # BLD AUTO: 0.81 K/UL
MONOCYTES NFR BLD AUTO: 10.8 %
NEUTROPHILS # BLD AUTO: 5.02 K/UL
NEUTROPHILS NFR BLD AUTO: 66.9 %
NITRITE URINE: POSITIVE
PARATHYROID HORMONE INTACT: 28 PG/ML
PH URINE: 5.5
PHOSPHATE SERPL-MCNC: 3.8 MG/DL
PLATELET # BLD AUTO: 221 K/UL
POTASSIUM SERPL-SCNC: 4.4 MMOL/L
PROT UR-MCNC: 70 MG/DL
PROTEIN URINE: ABNORMAL
RBC # BLD: 3.19 M/UL
RBC # FLD: 15.5 %
RED BLOOD CELLS URINE: 1 /HPF
SODIUM SERPL-SCNC: 133 MMOL/L
SPECIFIC GRAVITY URINE: 1.01
SQUAMOUS EPITHELIAL CELLS: 1 /HPF
UROBILINOGEN URINE: NORMAL
WBC # FLD AUTO: 7.51 K/UL
WHITE BLOOD CELLS URINE: 41 /HPF

## 2020-10-27 LAB — BACTERIA UR CULT: ABNORMAL

## 2020-11-03 ENCOUNTER — RX RENEWAL (OUTPATIENT)
Age: 75
End: 2020-11-03

## 2020-11-10 ENCOUNTER — RX RENEWAL (OUTPATIENT)
Age: 75
End: 2020-11-10

## 2020-11-16 ENCOUNTER — RX RENEWAL (OUTPATIENT)
Age: 75
End: 2020-11-16

## 2020-11-22 LAB
ALBUMIN SERPL ELPH-MCNC: 4.7 G/DL
ANION GAP SERPL CALC-SCNC: 16 MMOL/L
BUN SERPL-MCNC: 28 MG/DL
CALCIUM SERPL-MCNC: 9.9 MG/DL
CHLORIDE SERPL-SCNC: 95 MMOL/L
CO2 SERPL-SCNC: 24 MMOL/L
CREAT SERPL-MCNC: 1.45 MG/DL
FERRITIN SERPL-MCNC: 136 NG/ML
FOLATE SERPL-MCNC: >20 NG/ML
GLUCOSE SERPL-MCNC: 150 MG/DL
IRON SATN MFR SERPL: 16 %
IRON SERPL-MCNC: 51 UG/DL
PHOSPHATE SERPL-MCNC: 4.5 MG/DL
POTASSIUM SERPL-SCNC: 4.8 MMOL/L
SODIUM SERPL-SCNC: 135 MMOL/L
TIBC SERPL-MCNC: 324 UG/DL
UIBC SERPL-MCNC: 273 UG/DL
VIT B12 SERPL-MCNC: 351 PG/ML

## 2020-11-24 LAB — BACTERIA UR CULT: ABNORMAL

## 2020-12-01 ENCOUNTER — APPOINTMENT (OUTPATIENT)
Dept: UROGYNECOLOGY | Facility: CLINIC | Age: 75
End: 2020-12-01

## 2020-12-02 ENCOUNTER — RX RENEWAL (OUTPATIENT)
Age: 75
End: 2020-12-02

## 2020-12-02 ENCOUNTER — APPOINTMENT (OUTPATIENT)
Dept: FAMILY MEDICINE | Facility: CLINIC | Age: 75
End: 2020-12-02
Payer: MEDICARE

## 2020-12-02 VITALS
RESPIRATION RATE: 19 BRPM | SYSTOLIC BLOOD PRESSURE: 166 MMHG | DIASTOLIC BLOOD PRESSURE: 52 MMHG | TEMPERATURE: 97.7 F | OXYGEN SATURATION: 99 % | HEART RATE: 89 BPM

## 2020-12-02 VITALS — DIASTOLIC BLOOD PRESSURE: 60 MMHG | SYSTOLIC BLOOD PRESSURE: 140 MMHG

## 2020-12-02 DIAGNOSIS — Z92.29 PERSONAL HISTORY OF OTHER DRUG THERAPY: ICD-10-CM

## 2020-12-02 DIAGNOSIS — L53.9 ERYTHEMATOUS CONDITION, UNSPECIFIED: ICD-10-CM

## 2020-12-02 DIAGNOSIS — L23.1 ALLERGIC CONTACT DERMATITIS DUE TO ADHESIVES: ICD-10-CM

## 2020-12-02 DIAGNOSIS — Z86.39 PERSONAL HISTORY OF OTHER ENDOCRINE, NUTRITIONAL AND METABOLIC DISEASE: ICD-10-CM

## 2020-12-02 DIAGNOSIS — I42.0 DILATED CARDIOMYOPATHY: ICD-10-CM

## 2020-12-02 DIAGNOSIS — E87.1 HYPO-OSMOLALITY AND HYPONATREMIA: ICD-10-CM

## 2020-12-02 DIAGNOSIS — Z87.440 PERSONAL HISTORY OF URINARY (TRACT) INFECTIONS: ICD-10-CM

## 2020-12-02 LAB
BILIRUB UR QL STRIP: NEGATIVE
GLUCOSE UR-MCNC: NEGATIVE
HCG UR QL: 0.2 EU/DL
HGB UR QL STRIP.AUTO: NEGATIVE
KETONES UR-MCNC: NEGATIVE
LEUKOCYTE ESTERASE UR QL STRIP: NEGATIVE
NITRITE UR QL STRIP: NEGATIVE
PH UR STRIP: 5.5
PROT UR STRIP-MCNC: NORMAL
SP GR UR STRIP: 1.01

## 2020-12-02 PROCEDURE — 81003 URINALYSIS AUTO W/O SCOPE: CPT | Mod: QW

## 2020-12-02 PROCEDURE — 99215 OFFICE O/P EST HI 40 MIN: CPT | Mod: 25

## 2020-12-02 RX ORDER — CEPHALEXIN 500 MG/1
500 CAPSULE ORAL 3 TIMES DAILY
Qty: 21 | Refills: 0 | Status: DISCONTINUED | COMMUNITY
Start: 2020-11-24 | End: 2020-12-02

## 2020-12-02 RX ORDER — OXYCODONE AND ACETAMINOPHEN 5; 325 MG/1; MG/1
5-325 TABLET ORAL
Qty: 16 | Refills: 0 | Status: DISCONTINUED | COMMUNITY
Start: 2020-07-31 | End: 2020-12-02

## 2020-12-02 RX ORDER — GABAPENTIN 300 MG/1
300 CAPSULE ORAL
Qty: 60 | Refills: 0 | Status: DISCONTINUED | COMMUNITY
Start: 2020-07-17 | End: 2020-12-02

## 2020-12-02 NOTE — HISTORY OF PRESENT ILLNESS
[No Adverse Anesthesia Reaction] : no adverse anesthesia reaction in self or family member [(Patient denies any chest pain, claudication, dyspnea on exertion, orthopnea, palpitations or syncope)] : Patient denies any chest pain, claudication, dyspnea on exertion, orthopnea, palpitations or syncope [FreeTextEntry1] : Left Femoral-Popliteal Bypass, Left Femoral Angiogram [FreeTextEntry2] : 12/4/20 [FreeTextEntry3] : Dr. Bean [FreeTextEntry4] : Patient is a 74-year-old female with a past medical history of peripheral arterial disease s/p peripheral artery stenting, nonischemic cardiomyopathy,  biventricular pacemaker with AICD, coronary artery disease, hypertension, hyperlipidemia, insulin-dependent diabetes, lumbar spinal stenosis and recent left ankle sprain who presents for preoperative clearance due to restenosis of the left SFA.\par  [FreeTextEntry7] : PET/CT 1/20/2020: Extensive coronary calcifications noted in all 3 vascular distributions.  Normal myocardial perfusion study.  No evidence for significant ischemia or scar is noted.\par Echo 7/22/2020: Normal LVEF, trace pericardial effusion, and moderate AI, from prior study.

## 2020-12-02 NOTE — PHYSICAL EXAM
[No Acute Distress] : no acute distress [Normal Sclera/Conjunctiva] : normal sclera/conjunctiva [Normal Oropharynx] : the oropharynx was normal [No Lymphadenopathy] : no lymphadenopathy [No Respiratory Distress] : no respiratory distress  [Clear to Auscultation] : lungs were clear to auscultation bilaterally [Normal Rate] : normal [Rhythm Regular] : regular [Normal S1] : normal S1 [Normal S2] : normal S2 [II] : a grade 2 [2+] : left 2+ [Right Carotid Bruit] : right carotid bruit heard [Left Carotid Bruit] : left carotid bruit heard [1+] : right 1+ [0] : left 0 [No Edema] : there was no peripheral edema [Grossly Normal Strength/Tone] : grossly normal strength/tone [No Rash] : no rash [No Focal Deficits] : no focal deficits [Normal] : affect was normal and insight and judgment were intact

## 2020-12-02 NOTE — REVIEW OF SYSTEMS
[Negative] : Heme/Lymph [FreeTextEntry9] : Chronic intermittent left lower extremity pain from thigh to foot with tingling to left foot. Left ankle pain since sprain 2 wks ago. [de-identified] : Mild redness to left foot noted, chronic. [de-identified] : Tingling left foot

## 2020-12-02 NOTE — RESULTS/DATA
[] : results reviewed [de-identified] : 12/1/20:  WBC 8.85  H&H 10.5/33.1  Platelet 229 [de-identified] : WNL [de-identified] : Gluc 114  BUN/creat  35/1.4  otherwise WNL. [de-identified] : Sinus 76 bpm. Paced. [de-identified] : 12/1/2020: SARS-CoV-2  not detected.\par UA: Glucose negative bilirubin negative, ketones negative, specific gravity 1.010.  Blood negative, pH 5.5, protein 30 mg/dL urobilinogen 0.28. E.U. /deciliter, nitrite negative, Leukocyte negative.\par

## 2020-12-02 NOTE — ASSESSMENT
[Patient Optimized for Surgery] : Patient optimized for surgery [No Further Testing Recommended] : no further testing recommended [Modify medications prior to procedure] : Modify medications prior to procedure [As per surgery] : as per surgery [FreeTextEntry4] : There is no medical contraindication to the proposed procedure. The patient is medically cleared to proceed with the planned surgery.\par Perioperative insulin coverage with regular insulin as needed is recommended.\par \par  [FreeTextEntry6] : Hold Plavix 5 days prior to procedure. [FreeTextEntry7] : Mrs. Henriquez is to take half of her usual Levemir dose the evening of the procedure at 27 units SC. She will also take her Aspirin 81 mg, Carvedilol, Amlodipine, and Candesartan in the am of the procedure with a small sip of water.

## 2020-12-06 ENCOUNTER — RX RENEWAL (OUTPATIENT)
Age: 75
End: 2020-12-06

## 2020-12-10 ENCOUNTER — RX RENEWAL (OUTPATIENT)
Age: 75
End: 2020-12-10

## 2020-12-19 ENCOUNTER — RX RENEWAL (OUTPATIENT)
Age: 75
End: 2020-12-19

## 2021-01-04 ENCOUNTER — APPOINTMENT (OUTPATIENT)
Dept: ENDOCRINOLOGY | Facility: CLINIC | Age: 76
End: 2021-01-04

## 2021-01-04 ENCOUNTER — APPOINTMENT (OUTPATIENT)
Dept: FAMILY MEDICINE | Facility: CLINIC | Age: 76
End: 2021-01-04
Payer: MEDICARE

## 2021-01-04 PROCEDURE — 99496 TRANSJ CARE MGMT HIGH F2F 7D: CPT

## 2021-01-04 NOTE — REVIEW OF SYSTEMS
[Dysuria] : no dysuria [Negative] : Psychiatric [FreeTextEntry9] : Pain at left inguinal region s/p vascular surgery LLE.

## 2021-01-04 NOTE — PLAN
[FreeTextEntry1] : Home blood draw ordered but not done yet.  Will investigate timing for blood draw.\par Follow-up 2 weeks, sooner if worse.  Patient to see her vascular surgeon in 4 days.\par Increase gabapentin from 200 mg once daily to 300 mg once daily for added pain control.  Continue Tylenol as needed.\par F/U 2 wks sooner if worse.

## 2021-01-04 NOTE — HISTORY OF PRESENT ILLNESS
[Home] : at home, [unfilled] , at the time of the visit. [Medical Office: ([facility])___] : at the medical office located in  [Verbal consent obtained from patient] : the patient, [unfilled] [FreeTextEntry1] : \par Patient is following up after a hospital stay. \par Patient was admitted on 12/27/20 for Ischemic LLE, LE angiogram and Bypass.  Patient was discharged to home on 12/29/2020.\par Patient had presented to [facility] Emergency Room with complaints of a cold left foot.  She was found to have an occlusion of the left superficial femoral artery.  She underwent bypass and is following up today by phone.  She did have anemia at the time of discharge with hemoglobin of 8.6.\par Patient is c/o fatigue and no sleep due to pain at the left groin operating site. She is taking Tylenol with some relief. She does have a small supply of opioid pain relievers rxed by surgeon, but she does not like to take them due to ill side effects.\par Pt states left foot no longer feels ice cold. The left leg is mildly swollen and the foot appears slightly red. She is ambulating with a rolling walker. Also, she is still recovering from a left ankle sprain. She has a visiting nurse coming 2 days/week. \par No fever. /60 today. BS  this am. Needed orange juice to bring BS up with reading of 66.

## 2021-01-04 NOTE — DATA REVIEWED
[FreeTextEntry1] : Hospital records 12/27-12/29 reviewed.\par Labs 12/29/2020 WBC 7 9 hemoglobin 8.6 hematocrit 27 platelets 161 BUN 21 creatinine 1.2 glucose 228\par Left lower extremity arterial Doppler 12/27/2020 no flow is detected extending from the mid left superficial femoral artery through the distal left superficial femoral artery consistent with occlusion.  Flow was reconstituted within the popliteal artery.  Present within the posterior tibial artery with findings suggesting hemodynamically significant stenosis between the popliteal and posterior tibial artery.  No flow could be detected within the left dorsalis pedis artery.  Left common femoral artery could not be examined consequent to recent surgery in the region.

## 2021-01-06 LAB
ANION GAP SERPL CALC-SCNC: 14 MMOL/L
BASOPHILS # BLD AUTO: 0.05 K/UL
BASOPHILS NFR BLD AUTO: 0.7 %
BUN SERPL-MCNC: 27 MG/DL
CALCIUM SERPL-MCNC: 9.1 MG/DL
CHLORIDE SERPL-SCNC: 102 MMOL/L
CO2 SERPL-SCNC: 23 MMOL/L
CREAT SERPL-MCNC: 1.55 MG/DL
EOSINOPHIL # BLD AUTO: 0.2 K/UL
EOSINOPHIL NFR BLD AUTO: 2.6 %
FERRITIN SERPL-MCNC: 122 NG/ML
GLUCOSE SERPL-MCNC: 66 MG/DL
HCT VFR BLD CALC: 29.6 %
HGB BLD-MCNC: 8.9 G/DL
IMM GRANULOCYTES NFR BLD AUTO: 0.5 %
IRON SERPL-MCNC: 43 UG/DL
LYMPHOCYTES # BLD AUTO: 1.92 K/UL
LYMPHOCYTES NFR BLD AUTO: 25.3 %
MAN DIFF?: NORMAL
MCHC RBC-ENTMCNC: 30.1 GM/DL
MCHC RBC-ENTMCNC: 30.2 PG
MCV RBC AUTO: 100.3 FL
MONOCYTES # BLD AUTO: 0.67 K/UL
MONOCYTES NFR BLD AUTO: 8.8 %
NEUTROPHILS # BLD AUTO: 4.71 K/UL
NEUTROPHILS NFR BLD AUTO: 62.1 %
PLATELET # BLD AUTO: 294 K/UL
POTASSIUM SERPL-SCNC: 4.4 MMOL/L
RBC # BLD: 2.95 M/UL
RBC # FLD: 16.1 %
SODIUM SERPL-SCNC: 139 MMOL/L
WBC # FLD AUTO: 7.59 K/UL

## 2021-01-22 ENCOUNTER — APPOINTMENT (OUTPATIENT)
Dept: FAMILY MEDICINE | Facility: CLINIC | Age: 76
End: 2021-01-22
Payer: MEDICARE

## 2021-01-22 VITALS
SYSTOLIC BLOOD PRESSURE: 122 MMHG | HEIGHT: 62 IN | WEIGHT: 161 LBS | BODY MASS INDEX: 29.63 KG/M2 | TEMPERATURE: 97.3 F | OXYGEN SATURATION: 100 % | DIASTOLIC BLOOD PRESSURE: 52 MMHG | HEART RATE: 78 BPM

## 2021-01-22 PROCEDURE — 36415 COLL VENOUS BLD VENIPUNCTURE: CPT

## 2021-01-22 PROCEDURE — 99214 OFFICE O/P EST MOD 30 MIN: CPT | Mod: 25

## 2021-01-22 NOTE — REVIEW OF SYSTEMS
[Lower Ext Edema] : lower extremity edema [Itching] : Itching [Negative] : Psychiatric [FreeTextEntry9] : See HPI [de-identified] : Right anterior tibia

## 2021-01-22 NOTE — PHYSICAL EXAM
[Normal Sclera/Conjunctiva] : normal sclera/conjunctiva [No Acute Distress] : no acute distress [No Lymphadenopathy] : no lymphadenopathy [Supple] : supple [No Respiratory Distress] : no respiratory distress  [Clear to Auscultation] : lungs were clear to auscultation bilaterally [Normal Rate] : normal [Rhythm Regular] : regular [Normal S1] : normal S1 [Normal S2] : normal S2 [II] : a grade 2 [1+] : left 1+ [Normal] : affect was normal and insight and judgment were intact [de-identified] : Mild pallor [de-identified] : There is trace edema of the right lower extremity.  There is mild edema of the left lower extremity.  The left foot and leg is warm to touch.  No cyanosis. [de-identified] : There is a healing open wound of the left inguinal region which appears clean.  There is no discharge.  There is a dry sterile dressing overlying the wound. [de-identified] : Excoriations, scabbed, at the right anterior tibial region.

## 2021-01-22 NOTE — PLAN
[FreeTextEntry1] : Left lower extremity appears to have good perfusion.\par Persistent edema left lower extremity greater than right lower extremity likely related to recent surgery along with healing left ankle injury.  Recommend compression socks and elevation of the leg when sitting.\par Ambulatory difficulty.  Medically necessary for patient to have a rolling walker.\par Patient to follow-up with endocrine regarding diabetes management.  Good control encouraged to help enable healing.\par Patient appears pale.  Check CBC to evaluate anemia.\par Continue local wound care, as per surgery.\par Avoid scratching skin.\par Further recommendations pending blood work.\par Follow-up 1 month, sooner if worse.

## 2021-01-22 NOTE — HISTORY OF PRESENT ILLNESS
[Spouse] : spouse [FreeTextEntry1] : Patient is following up on left leg bypass surgery after ER visit to [facility] on 12/27/2020.\par Patient complains of persistent swelling of the left lower extremity.  Swelling is mild to moderate.  He denies any left leg pain.  The leg and foot feel cold to her. \par She has been wearing a surgical boot on the left lower leg due to an ankle injury that is still healing.\par Patient also complains of dyspnea on exertion since being released from the hospital.\par She denies any chest pain, or palpitations.  No fever.\par Ambulation is still a little bit difficult for the patient.  She does find that she needs more support for balance with walking, especially with the boot on her vascular surgical leg.  She would benefit from a rolling walker.  \par She is still having a visiting nurse 2 days/week.  She states her blood pressure has been in a good range, 120s over 60s.\par \par \par 1/4/21:\par Patient is following up after a hospital stay. \par Patient was admitted on 12/27/20 for Ischemic LLE, LE angiogram and Bypass.  Patient was discharged to home on 12/29/2020.\par Patient had presented to [facility] Emergency Room with complaints of a cold left foot.  She was found to have an occlusion of the left superficial femoral artery.  She underwent bypass and is following up today by phone.  She did have anemia at the time of discharge with hemoglobin of 8.6.\par Patient is c/o fatigue and no sleep due to pain at the left groin operating site. She is taking Tylenol with some relief. She does have a small supply of opioid pain relievers rxed by surgeon, but she does not like to take them due to ill side effects.\par Pt states left foot no longer feels ice cold. The left leg is mildly swollen and the foot appears slightly red. She is ambulating with a rolling walker. Also, she is still recovering from a left ankle sprain. She has a visiting nurse coming 2 days/week. \par No fever. /60 today. BS  this am. Needed orange juice to bring BS up with reading of 66.

## 2021-01-24 LAB
ANION GAP SERPL CALC-SCNC: 17 MMOL/L
BASOPHILS # BLD AUTO: 0.09 K/UL
BASOPHILS NFR BLD AUTO: 0.9 %
BUN SERPL-MCNC: 30 MG/DL
CALCIUM SERPL-MCNC: 9.5 MG/DL
CHLORIDE SERPL-SCNC: 104 MMOL/L
CO2 SERPL-SCNC: 21 MMOL/L
CREAT SERPL-MCNC: 1.66 MG/DL
EOSINOPHIL # BLD AUTO: 0.21 K/UL
EOSINOPHIL NFR BLD AUTO: 2 %
FERRITIN SERPL-MCNC: 83 NG/ML
GLUCOSE SERPL-MCNC: 150 MG/DL
HCT VFR BLD CALC: 28.9 %
HGB BLD-MCNC: 8.5 G/DL
IMM GRANULOCYTES NFR BLD AUTO: 0.4 %
IRON SATN MFR SERPL: 15 %
IRON SERPL-MCNC: 50 UG/DL
LYMPHOCYTES # BLD AUTO: 1.39 K/UL
LYMPHOCYTES NFR BLD AUTO: 13.5 %
MAN DIFF?: NORMAL
MCHC RBC-ENTMCNC: 29.4 GM/DL
MCHC RBC-ENTMCNC: 30.6 PG
MCV RBC AUTO: 104 FL
MONOCYTES # BLD AUTO: 0.69 K/UL
MONOCYTES NFR BLD AUTO: 6.7 %
NEUTROPHILS # BLD AUTO: 7.88 K/UL
NEUTROPHILS NFR BLD AUTO: 76.5 %
PLATELET # BLD AUTO: 283 K/UL
POTASSIUM SERPL-SCNC: 5.2 MMOL/L
RBC # BLD: 2.78 M/UL
RBC # FLD: 17.3 %
SODIUM SERPL-SCNC: 142 MMOL/L
TIBC SERPL-MCNC: 333 UG/DL
TRANSFERRIN SERPL-MCNC: 265 MG/DL
UIBC SERPL-MCNC: 283 UG/DL
VIT B12 SERPL-MCNC: 294 PG/ML
WBC # FLD AUTO: 10.3 K/UL

## 2021-02-01 ENCOUNTER — RX RENEWAL (OUTPATIENT)
Age: 76
End: 2021-02-01

## 2021-02-03 ENCOUNTER — APPOINTMENT (OUTPATIENT)
Dept: UROGYNECOLOGY | Facility: CLINIC | Age: 76
End: 2021-02-03
Payer: MEDICARE

## 2021-02-03 PROCEDURE — 57180 TREAT VAGINAL BLEEDING: CPT

## 2021-02-03 PROCEDURE — 51798 US URINE CAPACITY MEASURE: CPT

## 2021-02-03 PROCEDURE — 99213 OFFICE O/P EST LOW 20 MIN: CPT | Mod: 25

## 2021-02-08 ENCOUNTER — RX RENEWAL (OUTPATIENT)
Age: 76
End: 2021-02-08

## 2021-02-10 ENCOUNTER — TRANSCRIPTION ENCOUNTER (OUTPATIENT)
Age: 76
End: 2021-02-10

## 2021-02-12 ENCOUNTER — RX RENEWAL (OUTPATIENT)
Age: 76
End: 2021-02-12

## 2021-02-16 ENCOUNTER — LABORATORY RESULT (OUTPATIENT)
Age: 76
End: 2021-02-16

## 2021-02-18 ENCOUNTER — LABORATORY RESULT (OUTPATIENT)
Age: 76
End: 2021-02-18

## 2021-02-21 ENCOUNTER — RESULT CHARGE (OUTPATIENT)
Age: 76
End: 2021-02-21

## 2021-02-24 ENCOUNTER — APPOINTMENT (OUTPATIENT)
Dept: FAMILY MEDICINE | Facility: CLINIC | Age: 76
End: 2021-02-24
Payer: MEDICARE

## 2021-02-24 ENCOUNTER — INPATIENT (INPATIENT)
Facility: HOSPITAL | Age: 76
LOS: 6 days | Discharge: HOME | DRG: 811 | End: 2021-03-03
Attending: HOSPITALIST | Admitting: HOSPITALIST
Payer: MEDICARE

## 2021-02-24 VITALS
RESPIRATION RATE: 18 BRPM | HEART RATE: 73 BPM | HEIGHT: 62 IN | WEIGHT: 156.97 LBS | OXYGEN SATURATION: 100 % | DIASTOLIC BLOOD PRESSURE: 48 MMHG | SYSTOLIC BLOOD PRESSURE: 102 MMHG | TEMPERATURE: 98 F

## 2021-02-24 VITALS — DIASTOLIC BLOOD PRESSURE: 46 MMHG | SYSTOLIC BLOOD PRESSURE: 108 MMHG | HEART RATE: 72 BPM

## 2021-02-24 VITALS
HEIGHT: 62 IN | HEART RATE: 67 BPM | OXYGEN SATURATION: 99 % | WEIGHT: 157 LBS | DIASTOLIC BLOOD PRESSURE: 52 MMHG | SYSTOLIC BLOOD PRESSURE: 98 MMHG | BODY MASS INDEX: 28.89 KG/M2 | TEMPERATURE: 97.3 F

## 2021-02-24 VITALS — HEART RATE: 64 BPM | DIASTOLIC BLOOD PRESSURE: 52 MMHG | SYSTOLIC BLOOD PRESSURE: 110 MMHG

## 2021-02-24 DIAGNOSIS — H34.8311 TRIBUTARY (BRANCH) RETINAL VEIN OCCLUSION, RIGHT EYE, WITH RETINAL NEOVASCULARIZATION: Chronic | ICD-10-CM

## 2021-02-24 DIAGNOSIS — Z46.89 ENCOUNTER FOR FITTING AND ADJUSTMENT OF OTHER SPECIFIED DEVICES: Chronic | ICD-10-CM

## 2021-02-24 DIAGNOSIS — Z98.890 OTHER SPECIFIED POSTPROCEDURAL STATES: Chronic | ICD-10-CM

## 2021-02-24 DIAGNOSIS — H26.9 UNSPECIFIED CATARACT: Chronic | ICD-10-CM

## 2021-02-24 DIAGNOSIS — D64.9 ANEMIA, UNSPECIFIED: ICD-10-CM

## 2021-02-24 DIAGNOSIS — Z98.89 OTHER SPECIFIED POSTPROCEDURAL STATES: Chronic | ICD-10-CM

## 2021-02-24 LAB
ALBUMIN SERPL ELPH-MCNC: 3.5 G/DL — SIGNIFICANT CHANGE UP (ref 3.3–5.2)
ALBUMIN SERPL ELPH-MCNC: 3.7 G/DL
ALP BLD-CCNC: 56 U/L
ALP SERPL-CCNC: 51 U/L — SIGNIFICANT CHANGE UP (ref 40–120)
ALT FLD-CCNC: 7 U/L — SIGNIFICANT CHANGE UP
ALT SERPL-CCNC: 8 U/L
ANION GAP SERPL CALC-SCNC: 14 MMOL/L
ANION GAP SERPL CALC-SCNC: 14 MMOL/L — SIGNIFICANT CHANGE UP (ref 5–17)
ANISOCYTOSIS BLD QL: SLIGHT — SIGNIFICANT CHANGE UP
APTT BLD: 32.9 SEC — SIGNIFICANT CHANGE UP (ref 27.5–35.5)
APTT BLD: 34.3 SEC
AST SERPL-CCNC: 11 U/L
AST SERPL-CCNC: 11 U/L — SIGNIFICANT CHANGE UP
BASOPHILS # BLD AUTO: 0.03 K/UL — SIGNIFICANT CHANGE UP (ref 0–0.2)
BASOPHILS # BLD AUTO: 0.05 K/UL
BASOPHILS NFR BLD AUTO: 0.3 % — SIGNIFICANT CHANGE UP (ref 0–2)
BASOPHILS NFR BLD AUTO: 0.5 %
BILIRUB SERPL-MCNC: 0.3 MG/DL
BILIRUB SERPL-MCNC: 0.3 MG/DL — LOW (ref 0.4–2)
BLD GP AB SCN SERPL QL: SIGNIFICANT CHANGE UP
BUN SERPL-MCNC: 48 MG/DL
BUN SERPL-MCNC: 53 MG/DL — HIGH (ref 8–20)
CALCIUM SERPL-MCNC: 8.6 MG/DL — SIGNIFICANT CHANGE UP (ref 8.6–10.2)
CALCIUM SERPL-MCNC: 8.7 MG/DL
CHLORIDE SERPL-SCNC: 102 MMOL/L
CHLORIDE SERPL-SCNC: 104 MMOL/L — SIGNIFICANT CHANGE UP (ref 98–107)
CO2 SERPL-SCNC: 21 MMOL/L — LOW (ref 22–29)
CO2 SERPL-SCNC: 22 MMOL/L
CREAT SERPL-MCNC: 1.54 MG/DL
CREAT SERPL-MCNC: 1.76 MG/DL — HIGH (ref 0.5–1.3)
DACRYOCYTES BLD QL SMEAR: SLIGHT — SIGNIFICANT CHANGE UP
ELLIPTOCYTES BLD QL SMEAR: SLIGHT — SIGNIFICANT CHANGE UP
EOSINOPHIL # BLD AUTO: 0.07 K/UL
EOSINOPHIL # BLD AUTO: 0.18 K/UL — SIGNIFICANT CHANGE UP (ref 0–0.5)
EOSINOPHIL NFR BLD AUTO: 0.6 %
EOSINOPHIL NFR BLD AUTO: 1.7 % — SIGNIFICANT CHANGE UP (ref 0–6)
FERRITIN SERPL-MCNC: 30 NG/ML
GLUCOSE SERPL-MCNC: 107 MG/DL — HIGH (ref 70–99)
GLUCOSE SERPL-MCNC: 216 MG/DL
HCT VFR BLD CALC: 16.4 % — CRITICAL LOW (ref 34.5–45)
HCT VFR BLD CALC: 18.2 %
HGB BLD-MCNC: 4.9 G/DL — CRITICAL LOW (ref 11.5–15.5)
HGB BLD-MCNC: 5.3 G/DL
HYPOCHROMIA BLD QL: SLIGHT — SIGNIFICANT CHANGE UP
IMM GRANULOCYTES NFR BLD AUTO: 0.7 % — SIGNIFICANT CHANGE UP (ref 0–1.5)
INR BLD: 1.47 RATIO — HIGH (ref 0.88–1.16)
IRON SATN MFR SERPL: 9 %
IRON SERPL-MCNC: 32 UG/DL
LYMPHOCYTES # BLD AUTO: 1.33 K/UL
LYMPHOCYTES # BLD AUTO: 1.54 K/UL — SIGNIFICANT CHANGE UP (ref 1–3.3)
LYMPHOCYTES # BLD AUTO: 14.9 % — SIGNIFICANT CHANGE UP (ref 13–44)
LYMPHOCYTES NFR BLD AUTO: 12.3 %
MACROCYTES BLD QL: SLIGHT — SIGNIFICANT CHANGE UP
MAN DIFF?: NO
MANUAL SMEAR VERIFICATION: SIGNIFICANT CHANGE UP
MCHC RBC-ENTMCNC: 29.1 GM/DL
MCHC RBC-ENTMCNC: 29.9 GM/DL — LOW (ref 32–36)
MCHC RBC-ENTMCNC: 30.8 PG
MCHC RBC-ENTMCNC: 31.4 PG — SIGNIFICANT CHANGE UP (ref 27–34)
MCV RBC AUTO: 105.1 FL — HIGH (ref 80–100)
MCV RBC AUTO: 105.8 FL
MONOCYTES # BLD AUTO: 0.6 K/UL
MONOCYTES # BLD AUTO: 0.99 K/UL — HIGH (ref 0–0.9)
MONOCYTES NFR BLD AUTO: 5.5 %
MONOCYTES NFR BLD AUTO: 9.6 % — SIGNIFICANT CHANGE UP (ref 2–14)
NEUTROPHILS # BLD AUTO: 7.52 K/UL — HIGH (ref 1.8–7.4)
NEUTROPHILS # BLD AUTO: 8.71 K/UL
NEUTROPHILS NFR BLD AUTO: 72.8 % — SIGNIFICANT CHANGE UP (ref 43–77)
NEUTROPHILS NFR BLD AUTO: 80.5 %
OVALOCYTES BLD QL SMEAR: SLIGHT — SIGNIFICANT CHANGE UP
PLAT MORPH BLD: NORMAL — SIGNIFICANT CHANGE UP
PLATELET # BLD AUTO: 261 K/UL — SIGNIFICANT CHANGE UP (ref 150–400)
PLATELET # BLD AUTO: 284 K/UL
POIKILOCYTOSIS BLD QL AUTO: SLIGHT — SIGNIFICANT CHANGE UP
POLYCHROMASIA BLD QL SMEAR: SIGNIFICANT CHANGE UP
POTASSIUM SERPL-MCNC: 3.7 MMOL/L — SIGNIFICANT CHANGE UP (ref 3.5–5.3)
POTASSIUM SERPL-SCNC: 3.7 MMOL/L — SIGNIFICANT CHANGE UP (ref 3.5–5.3)
POTASSIUM SERPL-SCNC: 4.4 MMOL/L
PROT SERPL-MCNC: 6.2 G/DL
PROT SERPL-MCNC: 6.2 G/DL — LOW (ref 6.6–8.7)
PROTHROM AB SERPL-ACNC: 16.7 SEC — HIGH (ref 10.6–13.6)
RAPID RVP RESULT: SIGNIFICANT CHANGE UP
RBC # BLD: 1.56 M/UL — LOW (ref 3.8–5.2)
RBC # BLD: 1.72 M/UL
RBC # FLD: 16.7 %
RBC # FLD: 16.8 % — HIGH (ref 10.3–14.5)
RBC BLD AUTO: ABNORMAL
SARS-COV-2 RNA SPEC QL NAA+PROBE: SIGNIFICANT CHANGE UP
SODIUM SERPL-SCNC: 138 MMOL/L
SODIUM SERPL-SCNC: 139 MMOL/L — SIGNIFICANT CHANGE UP (ref 135–145)
TIBC SERPL-MCNC: 372 UG/DL
WBC # BLD: 10.33 K/UL — SIGNIFICANT CHANGE UP (ref 3.8–10.5)
WBC # FLD AUTO: 10.33 K/UL — SIGNIFICANT CHANGE UP (ref 3.8–10.5)
WBC # FLD AUTO: 10.83 K/UL

## 2021-02-24 PROCEDURE — G2212 PROLONG OUTPT/OFFICE VIS: CPT

## 2021-02-24 PROCEDURE — 71045 X-RAY EXAM CHEST 1 VIEW: CPT | Mod: 26

## 2021-02-24 PROCEDURE — 93010 ELECTROCARDIOGRAM REPORT: CPT

## 2021-02-24 PROCEDURE — 99223 1ST HOSP IP/OBS HIGH 75: CPT

## 2021-02-24 PROCEDURE — 99215 OFFICE O/P EST HI 40 MIN: CPT | Mod: 25

## 2021-02-24 PROCEDURE — 99285 EMERGENCY DEPT VISIT HI MDM: CPT

## 2021-02-24 RX ORDER — GABAPENTIN 400 MG/1
1 CAPSULE ORAL
Qty: 0 | Refills: 0 | DISCHARGE

## 2021-02-24 RX ORDER — LEVOTHYROXINE SODIUM 125 MCG
1 TABLET ORAL
Qty: 0 | Refills: 0 | DISCHARGE

## 2021-02-24 RX ORDER — LEVOTHYROXINE SODIUM 125 MCG
112 TABLET ORAL DAILY
Refills: 0 | Status: DISCONTINUED | OUTPATIENT
Start: 2021-02-24 | End: 2021-03-03

## 2021-02-24 RX ORDER — GABAPENTIN 400 MG/1
100 CAPSULE ORAL
Refills: 0 | Status: DISCONTINUED | OUTPATIENT
Start: 2021-02-24 | End: 2021-03-03

## 2021-02-24 RX ORDER — PREGABALIN 225 MG/1
1 CAPSULE ORAL
Qty: 0 | Refills: 0 | DISCHARGE

## 2021-02-24 RX ORDER — PREGABALIN 225 MG/1
1000 CAPSULE ORAL DAILY
Refills: 0 | Status: DISCONTINUED | OUTPATIENT
Start: 2021-02-24 | End: 2021-03-03

## 2021-02-24 NOTE — ASSESSMENT
[FreeTextEntry1] : Suspect blood loss anemia with secondary hypotension.\par Hold hydrochlorothiazide.\par Hold amlodipine.\par Stat blood work.\par Increase fluids.\par To ER if increase in dizziness or noting any gail rectal bleeding.

## 2021-02-24 NOTE — ED PROVIDER NOTE - OBJECTIVE STATEMENT
76 y/o referred by PMD with h/o aortic bypass , fempop bypass   DM , comes in for low h/h she was feeling dizzy , no chest pain   teste + for occult blood in stool

## 2021-02-24 NOTE — HISTORY OF PRESENT ILLNESS
[Spouse] : spouse [FreeTextEntry8] : Pt is following up after her left lower extremity bypass surgery with subsequent anemia.\par Patient had a left femoral popliteal bypass on 12/4/2020 and experienced acute LLE ischemia with reocclusion of the left superficial femoral artery and had subsequent bypass on 12/27/2020.  Postop course has been complicated by persistent anemia.  Patient also found to have hemoccult positive stool testing.  She denies any abdominal pain, or persistent diarrhea. She is currently on aspirin and Plavix for her peripheral vascular disease.  Stool is black but she is on iron 325 mg once daily. Last colonoscopy was 10/2017 which showed diverticulosis and polyp x 2.\par Patient complains of dizziness worse with standing x 1 week. \par She has noted her home bp to be in the 90/50-60 range.\par She is also experiencing increased dyspnea on exertion since her bypass surgery on 12/27/2020. [FreeTextEntry1] : 1/22/21:\par Patient is following up on left leg bypass surgery after ER visit to [facility] on 12/27/2020.\par Patient complains of persistent swelling of the left lower extremity.  Swelling is mild to moderate.  He denies any left leg pain.  The leg and foot feel cold to her. \par She has been wearing a surgical boot on the left lower leg due to an ankle injury that is still healing.\par Patient also complains of dyspnea on exertion since being released from the hospital.\par She denies any chest pain, or palpitations.  No fever.\par Ambulation is still a little bit difficult for the patient.  She does find that she needs more support for balance with walking, especially with the boot on her vascular surgical leg.  She would benefit from a rolling walker.  \par She is still having a visiting nurse 2 days/week.  She states her blood pressure has been in a good range, 120s over 60s.\par \par \par 1/4/21:\par Patient is following up after a hospital stay. \par Patient was admitted on 12/27/20 for Ischemic LLE, LE angiogram and Bypass.  Patient was discharged to home on 12/29/2020.\par Patient had presented to [facility] Emergency Room with complaints of a cold left foot.  She was found to have an occlusion of the left superficial femoral artery.  She underwent bypass and is following up today by phone.  She did have anemia at the time of discharge with hemoglobin of 8.6.\par Patient is c/o fatigue and no sleep due to pain at the left groin operating site. She is taking Tylenol with some relief. She does have a small supply of opioid pain relievers rxed by surgeon, but she does not like to take them due to ill side effects.\par Pt states left foot no longer feels ice cold. The left leg is mildly swollen and the foot appears slightly red. She is ambulating with a rolling walker. Also, she is still recovering from a left ankle sprain. She has a visiting nurse coming 2 days/week. \par No fever. /60 today. BS  this am. Needed orange juice to bring BS up with reading of 66.

## 2021-02-24 NOTE — ED PROVIDER NOTE - ENDOCRINE NEGATIVE STATEMENT, MLM
Hospital Medicine Daily Progress Note    Date of Service  9/12/2020    Chief Complaint  27 y.o. male admitted 9/11/2020 with RLE pain, redness and swelling    Hospital Course    27M w hx of IVDU and recent R shoulder abscess s/p drainage in ER, discharged on PO abx, admitted with R calf pain redness and swelling found to have cellulitis. Ultrasound negative for DVT      Interval Problem Update  9/12: Still with severe lower extremity pain, worsening WBC today despite antibiotics.  No fever.  Check CPK, ESR, CRP, procalcitonin, lactate.  Ortho consult    Consultants/Specialty  Orthopedic surgery, Dr. Santooley    Code Status  Full Code    Disposition  To be determined when medically stable    Review of Systems  Review of Systems   Constitutional: Positive for malaise/fatigue. Negative for chills and fever.   HENT: Negative for sore throat.    Respiratory: Negative for cough and shortness of breath.    Cardiovascular: Negative for chest pain and palpitations.   Gastrointestinal: Negative for abdominal pain, blood in stool, diarrhea, heartburn, nausea and vomiting.   Genitourinary: Negative for dysuria and frequency.   Musculoskeletal: Positive for myalgias (Severe pain right lower leg, anterior and posterior). Negative for back pain.   Skin: Positive for rash.   Neurological: Negative for dizziness, focal weakness, weakness and headaches.   Psychiatric/Behavioral: Negative for depression and hallucinations.   All other systems reviewed and are negative.       Physical Exam  Temp:  [36.7 °C (98.1 °F)-37.2 °C (98.9 °F)] 36.8 °C (98.3 °F)  Pulse:  [] 90  Resp:  [18] 18  BP: (119-145)/(49-64) 125/49  SpO2:  [94 %-100 %] 94 %    Physical Exam  Constitutional:       General: He is in acute distress.      Appearance: Normal appearance.   HENT:      Head: Normocephalic and atraumatic.      Nose: Nose normal.      Mouth/Throat:      Mouth: Mucous membranes are moist.   Eyes:      Extraocular Movements: Extraocular movements  intact.      Pupils: Pupils are equal, round, and reactive to light.   Neck:      Musculoskeletal: Normal range of motion and neck supple.   Cardiovascular:      Rate and Rhythm: Normal rate and regular rhythm.      Heart sounds: No murmur.   Pulmonary:      Effort: Pulmonary effort is normal. No respiratory distress.      Breath sounds: Normal breath sounds. No stridor.   Abdominal:      General: Abdomen is flat. Bowel sounds are normal. There is no distension.      Palpations: Abdomen is soft.      Tenderness: There is no abdominal tenderness.   Musculoskeletal:         General: Swelling and tenderness (To even light touch) present. No deformity.      Right lower leg: Edema present.   Skin:     General: Skin is warm and dry.      Coloration: Skin is not pale.      Findings: Erythema present.   Neurological:      General: No focal deficit present.      Mental Status: He is alert and oriented to person, place, and time. Mental status is at baseline.   Psychiatric:         Mood and Affect: Mood normal.         Behavior: Behavior normal.         Thought Content: Thought content normal.         Fluids    Intake/Output Summary (Last 24 hours) at 9/12/2020 1204  Last data filed at 9/12/2020 0800  Gross per 24 hour   Intake 210 ml   Output 1250 ml   Net -1040 ml       Laboratory  Recent Labs     09/10/20  1530 09/12/20  0503   WBC 9.3 16.3*   RBC 4.28* 4.53*   HEMOGLOBIN 11.9* 12.4*   HEMATOCRIT 36.1* 37.7*   MCV 84.3 83.2   MCH 27.8 27.4   MCHC 33.0* 32.9*   RDW 36.5 37.4   PLATELETCT 172 188   MPV 9.4 9.7     Recent Labs     09/10/20  1530 09/12/20  0503   SODIUM 139 134*   POTASSIUM 4.6 3.8   CHLORIDE 101 97   CO2 28 25   GLUCOSE 89 126*   BUN 18 13   CREATININE 0.95 0.71   CALCIUM 8.9 8.8     Recent Labs     09/10/20  1530   APTT 27.8   INR 0.91               Imaging  CT-EXTREMITY, LOWER WITH RIGHT   Final Result      1.  No evidence of tibial or fibular fracture or bony destructive change.      2.  Extensive  edema/induration of the subcutaneous fat circumferentially involving the lower leg. There is fluid extending superficial to the lower leg muscles most prominently overlying the medial head of the gastrocnemius muscle which measures 9 mm in    depth. Fluid also dissects between the deep muscle layers most prominently between the gastrocnemius and soleus muscles. Consideration should be given for fasciitis as well as myositis. Cellulitis is seen as well.      3.  No evidence of intramuscular abscess.           Assessment/Plan  * Cellulitis of right leg  Assessment & Plan  CT shows extensive soft tissue edema, concern for fasciitis vs myositis  Minimal improvement over last 24h w IV abx (Unasyn/Vanc)  WBC increased today and lower ext is severely tender  -Doppler ultrasound negative for DVT.  -Patient had a positive wound cultures (right shoulder wound) with Strep viridans on 9/3/2020.  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  -Tried to enter rehab facility however they would not take him with his right leg infection  -Hold Suboxone as it will negate any pain control with narcotics  -Due to severe infection continue pain management with morphine, oxy  -Do not prescribe narcotics on DC    Shoulder abscess  Assessment & Plan  Status post successful treatment with antibiotics  He underwent I&D in the ER on 9/3/2020 and cultures grew strep viridans  He had resolution of his infection with oral antibiotics prior to his right lower extremity developing redness and pain    Anxiety- (present on admission)  Assessment & Plan  -Continue citalopram         VTE prophylaxis: Lovenox  Total time:  40 minutes.  I spent greater than 50% of the time for patient care, counseling, and coordination on this date,  including unit/floor time, and face-to-face time with the patient as per interval events and assessment and plan above         no diabetes and no thyroid trouble.

## 2021-02-24 NOTE — PHYSICAL EXAM
[No Acute Distress] : no acute distress [Normal Sclera/Conjunctiva] : normal sclera/conjunctiva [PERRL] : pupils equal round and reactive to light [EOMI] : extraocular movements intact [No Lymphadenopathy] : no lymphadenopathy [Supple] : supple [No Respiratory Distress] : no respiratory distress  [Rhythm Regular] : regular [Normal Rate] : normal [Normal S1] : normal S1 [Normal S2] : normal S2 [II] : a grade 2 [1+] : left 1+ 09-Sep-2020 17:31 [Soft] : abdomen soft [Non Tender] : non-tender [Non-distended] : non-distended [No Masses] : no abdominal mass palpated [No HSM] : no HSM [Normal Bowel Sounds] : normal bowel sounds [No Rash] : no rash [No Focal Deficits] : no focal deficits [Normal] : affect was normal and insight and judgment were intact [de-identified] : General pallor [de-identified] : Mild edema of the LEs b/l.

## 2021-02-24 NOTE — H&P ADULT - NSICDXFAMILYHX_GEN_ALL_CORE_FT
FAMILY HISTORY:  Family history of colon cancer in mother  Family history of heart attack    Sibling  Still living? Yes, Estimated age: 61-70  Family history of heart attack, Age at diagnosis: Age Unknown

## 2021-02-24 NOTE — ED ADULT TRIAGE NOTE - CHIEF COMPLAINT QUOTE
sent from MD office for HGB 5.3. As per daughter patient having dark stools and has hx of anemia. pt c/o dizziness.

## 2021-02-24 NOTE — H&P ADULT - ASSESSMENT
74 y/o female with PMH of  aortoiliac occlusive disease s/p thromboendarterectomy of deep femoral artery, endarterectomy of left CFA insertion of SFA stent on Xarelto, Plavix and Aspirin, HTN, HLD, CKD-3, hypothyroidism was sent to the ED by PCP for abnormal lab; Hb: 5.3. In the ED, hb: 4.9.    Symptomatic anemia   Admit to medical floor   Patient to get 3 units of PRBC; will monitor for fluid overload; if needs be will give Lasix at the end of 3 units   GI consulted   Hold Xarelto, Plavix, Aspirin   Monitor Hb, transfuse to keep Hb > 7     YUDI on CKD-3   Likely due to hypovolemia in the setting of anemia vs diuretic use   Avoid nephrotoxic agent   Monitor renal function   Follow with nephrology    HTN/HLD   Will hold all medications for now, BP soft   Coreg 25mg bid   HCTZ 25mg bid   Amlodipine 5mg   Candesartan 16mg   Lovastatin 40mg     Aortoiliac occlusive disease   S/p thromboendarterectomy of deep femoral artery, endarterectomy of left CFA insertion of SFA stent   On Xarelto, Plavix and Aspirin will hold   Consider vascular surg and cardiology consult as needed     Hypothyroidism   Synthroid 112mg     DM-2   Novolog on sliding scale   Levemir 54mg HS (since patient will be NPO will give 25 units; adjust as needed)     Supportive   DVT prophylaxis: CSD   Diet: NPO

## 2021-02-24 NOTE — H&P ADULT - NSICDXPASTSURGICALHX_GEN_ALL_CORE_FT
PAST SURGICAL HISTORY:  Acute cataract     Branch retinal vein occlusion with neovascularization of right eye treated with laser    H/O vascular surgery Infrarenal aortic endarterectomy with aortic bi iliac bypass, and superficial femoral artery  angioplasty and stenting, 3/24/16.    History of intravascular stent placement femoral artery angioplasty and stents, 3/24/16    Pessary maintenance     S/P evacuation of hematoma right groin, then required wound vac

## 2021-02-24 NOTE — ED PROVIDER NOTE - PSH
Acute cataract    Branch retinal vein occlusion with neovascularization of right eye  treated with laser  H/O vascular surgery  Infrarenal aortic endarterectomy with aortic bi iliac bypass, and superficial femoral artery  angioplasty and stenting, 3/24/16.  History of intravascular stent placement  femoral artery angioplasty and stents, 3/24/16  Pessary maintenance    S/P evacuation of hematoma  right groin, then required wound vac

## 2021-02-24 NOTE — ADDENDUM
[FreeTextEntry1] : Patient with critical value of hemoglobin 5.3.\par Patient was notified of the results and advised to go immediately to the emergency room.\par Patient is currently with her daughter, Katheryn.  ER transport via ambulance offered, but patient declined.\par Patient states she feels well enough to be driven in by her daughter.\par Spoke with daughter Katheryn who states that if mother looks like she is struggling with the transport, she will call for ambulance assist.\par I spoke with pt's GI, Dr. Ramos to do hospital consult, but he does not have active privileges there.\par Incidentally, pt's  is currently admitted at [facility].

## 2021-02-24 NOTE — REVIEW OF SYSTEMS
[Fatigue] : fatigue [Chest Pain] : no chest pain [Palpitations] : no palpitations [Lower Ext Edema] : lower extremity edema [Dyspnea on Exertion] : dyspnea on exertion [Abdominal Pain] : no abdominal pain [Nausea] : no nausea [Constipation] : constipation [Diarrhea] : diarrhea [Heartburn] : no heartburn [Melena] : meljes [Back Pain] : back pain [Negative] : Heme/Lymph

## 2021-02-24 NOTE — H&P ADULT - HISTORY OF PRESENT ILLNESS
76 y/o female with PMH of  aortoiliac occlusive disease s/p thromboendarterectomy of deep femoral artery, endarterectomy of left CFA insertion of SFA stent on Xarelto, Plavix and Aspirin, HTN, HLD, CKD-3, hypothyroidism was sent to the ED by PCP for abnormal lab. Patient said she has been having shortness of breath on exertion, dizziness and dark stool (which she attributed to iron); her PCP did a stool sample and also blood work today. She got a call saying her hb was 5.3, sent to ED for possible transfusion. She has no chest pain, palpitation, nausea, vomiting, abdominal pain, hematuria, sick contact, recent travel.

## 2021-02-25 ENCOUNTER — TRANSCRIPTION ENCOUNTER (OUTPATIENT)
Age: 76
End: 2021-02-25

## 2021-02-25 DIAGNOSIS — D64.9 ANEMIA, UNSPECIFIED: ICD-10-CM

## 2021-02-25 LAB
A1C WITH ESTIMATED AVERAGE GLUCOSE RESULT: 5 % — SIGNIFICANT CHANGE UP (ref 4–5.6)
ANION GAP SERPL CALC-SCNC: 12 MMOL/L — SIGNIFICANT CHANGE UP (ref 5–17)
BUN SERPL-MCNC: 52 MG/DL — HIGH (ref 8–20)
CALCIUM SERPL-MCNC: 8.5 MG/DL — LOW (ref 8.6–10.2)
CHLORIDE SERPL-SCNC: 107 MMOL/L — SIGNIFICANT CHANGE UP (ref 98–107)
CO2 SERPL-SCNC: 21 MMOL/L — LOW (ref 22–29)
CREAT SERPL-MCNC: 1.61 MG/DL — HIGH (ref 0.5–1.3)
ESTIMATED AVERAGE GLUCOSE: 97 MG/DL — SIGNIFICANT CHANGE UP (ref 68–114)
GLUCOSE BLDC GLUCOMTR-MCNC: 141 MG/DL — HIGH (ref 70–99)
GLUCOSE BLDC GLUCOMTR-MCNC: 173 MG/DL — HIGH (ref 70–99)
GLUCOSE BLDC GLUCOMTR-MCNC: 216 MG/DL — HIGH (ref 70–99)
GLUCOSE BLDC GLUCOMTR-MCNC: 247 MG/DL — HIGH (ref 70–99)
GLUCOSE BLDC GLUCOMTR-MCNC: 357 MG/DL — HIGH (ref 70–99)
GLUCOSE SERPL-MCNC: 250 MG/DL — HIGH (ref 70–99)
HCT VFR BLD CALC: 26.9 % — LOW (ref 34.5–45)
HCT VFR BLD CALC: 27.5 % — LOW (ref 34.5–45)
HGB BLD-MCNC: 8.9 G/DL — LOW (ref 11.5–15.5)
HGB BLD-MCNC: 9 G/DL — LOW (ref 11.5–15.5)
MCHC RBC-ENTMCNC: 31.2 PG — SIGNIFICANT CHANGE UP (ref 27–34)
MCHC RBC-ENTMCNC: 33.1 GM/DL — SIGNIFICANT CHANGE UP (ref 32–36)
MCV RBC AUTO: 94.4 FL — SIGNIFICANT CHANGE UP (ref 80–100)
PLATELET # BLD AUTO: 215 K/UL — SIGNIFICANT CHANGE UP (ref 150–400)
POTASSIUM SERPL-MCNC: 4.5 MMOL/L — SIGNIFICANT CHANGE UP (ref 3.5–5.3)
POTASSIUM SERPL-SCNC: 4.5 MMOL/L — SIGNIFICANT CHANGE UP (ref 3.5–5.3)
RBC # BLD: 2.85 M/UL — LOW (ref 3.8–5.2)
RBC # FLD: 18.6 % — HIGH (ref 10.3–14.5)
SARS-COV-2 IGG SERPL QL IA: NEGATIVE — SIGNIFICANT CHANGE UP
SARS-COV-2 IGM SERPL IA-ACNC: 0.3 INDEX — SIGNIFICANT CHANGE UP
SODIUM SERPL-SCNC: 140 MMOL/L — SIGNIFICANT CHANGE UP (ref 135–145)
WBC # BLD: 9.28 K/UL — SIGNIFICANT CHANGE UP (ref 3.8–10.5)
WBC # FLD AUTO: 9.28 K/UL — SIGNIFICANT CHANGE UP (ref 3.8–10.5)

## 2021-02-25 PROCEDURE — 99233 SBSQ HOSP IP/OBS HIGH 50: CPT

## 2021-02-25 PROCEDURE — 99223 1ST HOSP IP/OBS HIGH 75: CPT

## 2021-02-25 RX ORDER — SODIUM CHLORIDE 9 MG/ML
1000 INJECTION, SOLUTION INTRAVENOUS
Refills: 0 | Status: DISCONTINUED | OUTPATIENT
Start: 2021-02-25 | End: 2021-03-03

## 2021-02-25 RX ORDER — DEXTROSE 50 % IN WATER 50 %
25 SYRINGE (ML) INTRAVENOUS ONCE
Refills: 0 | Status: DISCONTINUED | OUTPATIENT
Start: 2021-02-25 | End: 2021-03-03

## 2021-02-25 RX ORDER — INSULIN GLARGINE 100 [IU]/ML
25 INJECTION, SOLUTION SUBCUTANEOUS AT BEDTIME
Refills: 0 | Status: DISCONTINUED | OUTPATIENT
Start: 2021-02-25 | End: 2021-03-03

## 2021-02-25 RX ORDER — DEXTROSE 50 % IN WATER 50 %
12.5 SYRINGE (ML) INTRAVENOUS ONCE
Refills: 0 | Status: DISCONTINUED | OUTPATIENT
Start: 2021-02-25 | End: 2021-03-03

## 2021-02-25 RX ORDER — PANTOPRAZOLE SODIUM 20 MG/1
40 TABLET, DELAYED RELEASE ORAL
Refills: 0 | Status: DISCONTINUED | OUTPATIENT
Start: 2021-02-25 | End: 2021-03-03

## 2021-02-25 RX ORDER — INSULIN LISPRO 100/ML
5 VIAL (ML) SUBCUTANEOUS ONCE
Refills: 0 | Status: DISCONTINUED | OUTPATIENT
Start: 2021-02-25 | End: 2021-02-25

## 2021-02-25 RX ORDER — GLUCAGON INJECTION, SOLUTION 0.5 MG/.1ML
1 INJECTION, SOLUTION SUBCUTANEOUS ONCE
Refills: 0 | Status: DISCONTINUED | OUTPATIENT
Start: 2021-02-25 | End: 2021-03-03

## 2021-02-25 RX ORDER — DEXTROSE 50 % IN WATER 50 %
15 SYRINGE (ML) INTRAVENOUS ONCE
Refills: 0 | Status: DISCONTINUED | OUTPATIENT
Start: 2021-02-25 | End: 2021-03-03

## 2021-02-25 RX ORDER — INSULIN LISPRO 100/ML
VIAL (ML) SUBCUTANEOUS AT BEDTIME
Refills: 0 | Status: DISCONTINUED | OUTPATIENT
Start: 2021-02-25 | End: 2021-03-03

## 2021-02-25 RX ORDER — INSULIN LISPRO 100/ML
VIAL (ML) SUBCUTANEOUS
Refills: 0 | Status: DISCONTINUED | OUTPATIENT
Start: 2021-02-25 | End: 2021-03-03

## 2021-02-25 RX ADMIN — Medication 1 TABLET(S): at 10:04

## 2021-02-25 RX ADMIN — Medication 112 MICROGRAM(S): at 05:12

## 2021-02-25 RX ADMIN — PANTOPRAZOLE SODIUM 40 MILLIGRAM(S): 20 TABLET, DELAYED RELEASE ORAL at 16:09

## 2021-02-25 RX ADMIN — INSULIN GLARGINE 25 UNIT(S): 100 INJECTION, SOLUTION SUBCUTANEOUS at 21:19

## 2021-02-25 RX ADMIN — Medication 5: at 11:32

## 2021-02-25 RX ADMIN — PREGABALIN 1000 MICROGRAM(S): 225 CAPSULE ORAL at 10:04

## 2021-02-25 RX ADMIN — PANTOPRAZOLE SODIUM 40 MILLIGRAM(S): 20 TABLET, DELAYED RELEASE ORAL at 05:11

## 2021-02-25 RX ADMIN — GABAPENTIN 100 MILLIGRAM(S): 400 CAPSULE ORAL at 16:09

## 2021-02-25 RX ADMIN — GABAPENTIN 100 MILLIGRAM(S): 400 CAPSULE ORAL at 05:12

## 2021-02-25 NOTE — PROGRESS NOTE ADULT - SUBJECTIVE AND OBJECTIVE BOX
CHIEF COMPLAINT/INTERVAL HISTORY:    Patient is a 75y old  Female who presents with a chief complaint of Anemia (25 Feb 2021 09:24)    SUBJECTIVE & OBJECTIVE: Pt seen and examined at bedside. Transfused 3 units with improvement in H/H. Patient currently denies any acute complaints. Sugars elevated; home dose of levemir was held. Patient received humalog and repeat FSBS is now 144.     ROS: No chest pain, palpitations, SOB, light headedness, dizziness, headache, nausea/vomiting, fevers/chills, abdominal pain, dysuria or increased urinary frequency.    ICU Vital Signs Last 24 Hrs  T(C): 36.8 (25 Feb 2021 15:16), Max: 36.9 (24 Feb 2021 22:52)  T(F): 98.3 (25 Feb 2021 15:16), Max: 98.4 (24 Feb 2021 22:52)  HR: 81 (25 Feb 2021 15:16) (73 - 88)  BP: 116/60 (25 Feb 2021 15:16) (102/48 - 133/88)  RR: 18 (25 Feb 2021 15:16) (18 - 18)  SpO2: 98% (25 Feb 2021 15:16) (96% - 100%)      MEDICATIONS  (STANDING):  cyanocobalamin 1000 MICROGram(s) Oral daily  dextrose 40% Gel 15 Gram(s) Oral once  dextrose 5%. 1000 milliLiter(s) (50 mL/Hr) IV Continuous <Continuous>  dextrose 5%. 1000 milliLiter(s) (100 mL/Hr) IV Continuous <Continuous>  dextrose 50% Injectable 25 Gram(s) IV Push once  dextrose 50% Injectable 12.5 Gram(s) IV Push once  dextrose 50% Injectable 25 Gram(s) IV Push once  gabapentin 100 milliGRAM(s) Oral two times a day  glucagon  Injectable 1 milliGRAM(s) IntraMuscular once  insulin glargine Injectable (LANTUS) 25 Unit(s) SubCutaneous at bedtime  insulin lispro (ADMELOG) corrective regimen sliding scale   SubCutaneous three times a day before meals  insulin lispro (ADMELOG) corrective regimen sliding scale   SubCutaneous at bedtime  levothyroxine 112 MICROGram(s) Oral daily  multivitamin 1 Tablet(s) Oral daily  pantoprazole  Injectable 40 milliGRAM(s) IV Push two times a day    MEDICATIONS  (PRN):  LORazepam     Tablet 0.5 milliGRAM(s) Oral three times a day PRN Anxiety      LABS:                        8.9    9.28  )-----------( 215      ( 25 Feb 2021 07:02 )             26.9     02-25    140  |  107  |  52.0<H>  ----------------------------<  250<H>  4.5   |  21.0<L>  |  1.61<H>    Ca    8.5<L>      25 Feb 2021 07:02    TPro  6.2<L>  /  Alb  3.5  /  TBili  0.3<L>  /  DBili  x   /  AST  11  /  ALT  7   /  AlkPhos  51  02-24    PT/INR - ( 24 Feb 2021 18:05 )   PT: 16.7 sec;   INR: 1.47 ratio         PTT - ( 24 Feb 2021 18:05 )  PTT:32.9 sec      CAPILLARY BLOOD GLUCOSE      POCT Blood Glucose.: 357 mg/dL (25 Feb 2021 11:29)  POCT Blood Glucose.: 247 mg/dL (25 Feb 2021 05:08)      PHYSICAL EXAM:    GENERAL:  elderly female, laying in bed, NAD  HEAD:  Atraumatic, Normocephalic  EYES: EOMI, PERRLA, conjunctiva and sclera clear  ENMT: Moist mucous membranes  NECK: Supple   NERVOUS SYSTEM:  Alert & Oriented X3, Motor Strength 5/5 B/L upper and lower extremities  CHEST/LUNG: bilateral air entry, coarse breath sounds  HEART: Regular rate and rhythm; + S1/S2  ABDOMEN: Soft, Nontender, Nondistended; Bowel sounds present  EXTREMITIES:  trace pedal edema, LLE incision well healed

## 2021-02-25 NOTE — PROGRESS NOTE ADULT - ASSESSMENT
74 y/o female with PMH of aortoiliac occlusive disease s/p thromboendarterectomy of deep femoral artery, endarterectomy of left CFA insertion of SFA stent on Xarelto, Plavix and Aspirin, HTN, HLD, CKD-3, hypothyroidism was sent to the ED by PCP for abnormal lab; Hb: 5.3. In the ED, hb: 4.9 on arrival. Transfused 3 units overnight with repeat Hb of 8.9. NPO after midnight for EGD.     Symptomatic anemia   Hb 8.9 s/p 3 units of PRBC  Continue to Xarelto, Plavix, Aspirin   Repeat H/H ordered  PPI IV BID  Clear liquid diet  NPO after midnight for EGD  telemonitoring  GI recommendations appreciated    YUDI on CKD-3   -creatinine 1.3-1.4 in 2019; follows with Dr. Mcallister as outpatient  -likely due to intravascular volume depletion due to blood loss and HCTZ  -creatinine 1.76-1.61  -continue to hold diuretics and ARB  -strict I/Os, daily weights  -avoid nephrotoxic agents and renally dose medications  -repeat labs in AM    HTN   BP controlled; continue to monitor off anti-hypertensives  Monitor vitals closely    Aortoiliac occlusive disease   S/p thromboendarterectomy of deep femoral artery, endarterectomy of left CFA insertion of SFA stent   Continue to hold Xarelto, Plavix and Aspirin    Follows with Dr. Bean as outpatient    Hypothyroidism   check TSH  continue Synthroid      DM-2   HbA1c 5.0  Levemir 54mg HS (since patient will be NPO will give 25 units; adjust as needed)   Novolog on sliding scale   Sugars are labile; Repeat fingerstick 144; will not increase insulin and continue to monitor fingersticks closely  ADA diet  Follows with Dr. Pierre as outpatient    HLD  -not on statin?   -will clarify    DVT ppx - hold Xarelto, SCDs    Dispo - Repeat H/H ordered. NPO after midnight for EGD. Continued reduced dose of lantus (on levemir 54 units at home) given NPO status.    74 y/o female with PMH of aortoiliac occlusive disease s/p thromboendarterectomy of deep femoral artery, endarterectomy of left CFA insertion of SFA stent on Xarelto, Plavix and Aspirin, HTN, HLD, CKD-3, hypothyroidism was sent to the ED by PCP for abnormal lab; Hb: 5.3. In the ED, hb: 4.9 on arrival. Transfused 3 units overnight with repeat Hb of 8.9. NPO after midnight for EGD.     Symptomatic anemia   Hb 8.9 s/p 3 units of PRBC  Continue to Xarelto, Plavix, Aspirin   Repeat H/H ordered  PPI IV BID  Clear liquid diet  NPO after midnight for EGD  telemonitoring  GI recommendations appreciated    YUDI on CKD-3   -creatinine 1.3-1.4 in 2019; follows with Dr. Mcallister as outpatient  -likely due to intravascular volume depletion due to blood loss and HCTZ  -creatinine 1.76-1.61  -continue to hold diuretics and ARB  -strict I/Os, daily weights  -avoid nephrotoxic agents and renally dose medications  -repeat labs in AM    HTN   BP controlled; continue to monitor off anti-hypertensives  Monitor vitals closely    Aortoiliac occlusive disease   S/p thromboendarterectomy of deep femoral artery, endarterectomy of left CFA insertion of SFA stent   Continue to hold Xarelto, Plavix and Aspirin    Follows with Dr. Bean as outpatient    Hypothyroidism   check TSH  continue Synthroid      DM-2   HbA1c 5.0  Levemir 54mg HS (since patient will be NPO will give 25 units; adjust as needed)   Novolog on sliding scale   Sugars are labile; Repeat fingerstick 144; will not increase insulin and continue to monitor fingersticks closely  ADA diet  Follows with Dr. Pierre as outpatient    HLD  -not on statin?   -will clarify    Anxiety  -xanax as needed    Neuropathy  -continue gapapentin    DVT ppx - hold Xarelto, SCDs    Dispo - Repeat H/H ordered. NPO after midnight for EGD. Continued reduced dose of lantus (on levemir 54 units at home) given NPO status.

## 2021-02-25 NOTE — CONSULT NOTE ADULT - SUBJECTIVE AND OBJECTIVE BOX
[facility], [facility]                                   [address] [place] [address]                                                      PHONE: [phone]                                                         FAX: [phone]  [email or web address]  ---------------------------------------------------------------------------------------------------------------------------------    Reason for Consult: pre op     HPI:  YE LAWSON is an 75y Female  history of with a nonischemic cardiomyopathy with normalization of her LV function, biventricular pacemaker with AICD, moderate CAD in 2007, hypertension, hyperlipidemia, diabetes, peripheral vascular disease status post peripheral intervention s/p thromboendarterectomy of deep femoral artery and Endarterectomy of left common femoral artery for symptomatic PVD and prior cardiac work up PET CT 1/2020 normal perfusion and TTE 7/2020 normal EF without any significant valvular disease who presents with [facility] ER due to abnormal lab. Patient said she has been having shortness of breath on exertion, dizziness and dark stool (which she attributed to iron); her PCP did a stool sample and also blood work and she was found to have severe anemia 4.9 s/p PRBC.  Denies any chest pain orthopnea or PND.        PAST MEDICAL & SURGICAL HISTORY:  Pulmonic regurgitation    HLD (hyperlipidemia)    Lung nodule    Spinal stenosis, lumbar region, with neurogenic claudication    Carotid stenosis, bilateral    Aorto-iliac disease    Psoriasis    Congenital heart failure    History of anemia due to chronic kidney disease    Peripheral neuropathy    Anxiety    CKD (chronic kidney disease) stage 3, GFR 30-59 ml/min    PVD (peripheral vascular disease) with claudication    AICD (automatic cardioverter/defibrillator) present  rep notified senia hoang sci    CAD (coronary artery disease)    Hypertension    Hypothyroid    Diabetes  IDDM    S/P evacuation of hematoma  right groin, then required wound vac    Pessary maintenance    Branch retinal vein occlusion with neovascularization of right eye  treated with laser    H/O vascular surgery  Infrarenal aortic endarterectomy with aortic bi iliac bypass, and superficial femoral artery  angioplasty and stenting, 3/24/16.    History of intravascular stent placement  femoral artery angioplasty and stents, 3/24/16    Acute cataract        latex (Rash)  No Known Drug Allergies      MEDICATIONS  (STANDING):  cyanocobalamin 1000 MICROGram(s) Oral daily  dextrose 40% Gel 15 Gram(s) Oral once  dextrose 5%. 1000 milliLiter(s) (50 mL/Hr) IV Continuous <Continuous>  dextrose 5%. 1000 milliLiter(s) (100 mL/Hr) IV Continuous <Continuous>  dextrose 50% Injectable 25 Gram(s) IV Push once  dextrose 50% Injectable 12.5 Gram(s) IV Push once  dextrose 50% Injectable 25 Gram(s) IV Push once  gabapentin 100 milliGRAM(s) Oral two times a day  glucagon  Injectable 1 milliGRAM(s) IntraMuscular once  insulin glargine Injectable (LANTUS) 25 Unit(s) SubCutaneous at bedtime  insulin lispro (ADMELOG) corrective regimen sliding scale   SubCutaneous three times a day before meals  insulin lispro (ADMELOG) corrective regimen sliding scale   SubCutaneous at bedtime  levothyroxine 112 MICROGram(s) Oral daily  multivitamin 1 Tablet(s) Oral daily  pantoprazole  Injectable 40 milliGRAM(s) IV Push two times a day    MEDICATIONS  (PRN):  LORazepam     Tablet 0.5 milliGRAM(s) Oral three times a day PRN Anxiety      Social History:  Cigarettes:         ex smoker            Alchohol:        none          Illicit Drug Abuse:  none     ROS: Negative other than as mentioned in HPI.    Vital Signs Last 24 Hrs  T(C): 36.7 (25 Feb 2021 07:17), Max: 36.9 (24 Feb 2021 22:52)  T(F): 98.1 (25 Feb 2021 07:17), Max: 98.4 (24 Feb 2021 22:52)  HR: 88 (25 Feb 2021 07:17) (73 - 88)  BP: 122/72 (25 Feb 2021 07:17) (102/48 - 133/88)  BP(mean): --  RR: 18 (25 Feb 2021 07:17) (18 - 18)  SpO2: 96% (25 Feb 2021 07:17) (96% - 100%)  ICU Vital Signs Last 24 Hrs  YE LAWSON  I&O's Detail    I&O's Summary    Drug Dosing Weight  YE LAWSON      PHYSICAL EXAM:  General: Appears well developed, well nourished alert and cooperative.  HEENT: Head; normocephalic, atraumatic.  Eyes: Pupils reactive, cornea wnl.  Neck: Supple, no nodes adenopathy, no NVD or carotid bruit or thyromegaly.  CARDIOVASCULAR: Normal S1 and S2, No murmur, rub, gallop or lift.   LUNGS: No rales, rhonchi or wheeze. Normal breath sounds bilaterally.  ABDOMEN: Soft, nontender without mass or organomegaly. bowel sounds normoactive.  EXTREMITIES: No clubbing, cyanosis or edema. Distal pulses wnl.   SKIN: warm and dry with normal turgor.  NEURO: Alert/oriented x 3/normal motor exam. No pathologic reflexes.    PSYCH: normal affect.        LABS:                        8.9    9.28  )-----------( 215      ( 25 Feb 2021 07:02 )             26.9     02-25    140  |  107  |  52.0<H>  ----------------------------<  250<H>  4.5   |  21.0<L>  |  1.61<H>    Ca    8.5<L>      25 Feb 2021 07:02    TPro  6.2<L>  /  Alb  3.5  /  TBili  0.3<L>  /  DBili  x   /  AST  11  /  ALT  7   /  AlkPhos  51  02-24    YE LAWSON      PT/INR - ( 24 Feb 2021 18:05 )   PT: 16.7 sec;   INR: 1.47 ratio         PTT - ( 24 Feb 2021 18:05 )  PTT:32.9 sec      RADIOLOGY & ADDITIONAL STUDIES:    INTERPRETATION OF TELEMETRY (personally reviewed):    Assessment and Plan:  In summary, YE LAWSON is an 75y Female with past medical history significant nonischemic cardiomyopathy with normalization of her LV function, biventricular pacemaker with AICD, moderate CAD in 2007, hypertension, hyperlipidemia, diabetes, peripheral vascular disease status post peripheral intervention s/p thromboendarterectomy of deep femoral artery and Endarterectomy of left common femoral artery for symptomatic PVD and prior cardiac work up PET CT 1/2020 normal perfusion and TTE 7/2020 normal EF without any significant valvular disease who presents with [facility] ER due to abnormal lab. Patient said she has been having shortness of breath on exertion, dizziness and dark stool (which she attributed to iron); her PCP did a stool sample and also blood work and she was found to have severe anemia 4.9 s/p PRBC.  Denies any chest pain orthopnea or PND.  No evidence of ACS  or Heart failure     Patient undergoing a low risk GI procedure in which patient has a moderate CV risk but no active CV contraindication at this time     Restart ASA when ok with GI     
Patient is a 75y old  Female who presents with a chief complaint of Anemia (24 Feb 2021 22:48)      HPI:  76 y/o female with PMH of  aortoiliac occlusive disease s/p thromboendarterectomy of deep femoral artery, endarterectomy of left CFA insertion of SFA stent on Xarelto, Plavix and Aspirin, HTN, HLD, CKD-3, hypothyroidism was sent to the ED by PCP for abnormal lab. Patient said she has been having shortness of breath on exertion, dizziness and dark stool (which she attributed to iron); her PCP did a stool sample and also blood work today. She got a call saying her hb was 5.3, sent to ED for possible transfusion. She has no chest pain, palpitation, nausea, vomiting, abdominal pain, hematuria, sick contact, recent travel.  (24 Feb 2021 22:48). She has a h/o anemia for which she takes oral iron and states that her stools are dark likely secondary to oral iron use. She is s/p a colonoscopy with polypectomy in 2013 with Dr. Ramos and has had no subsequent colonoscopies. No prior EGD's She has an AICD and follows with Dr. Rasmussen from Cardiology. No N/V or hematemesis but she does c/o post prandial dyspeptic symptoms. She has a h/o AICD placement.      REVIEW OF SYSTEMS:  Constitutional: No fever, weight loss or fatigue  ENMT:  No difficulty hearing, tinnitus, vertigo; No sinus or throat pain  Respiratory: No cough, wheezing, chills or hemoptysis  Cardiovascular: No chest pain, palpitations, dizziness or leg swelling  Gastrointestinal: As per HPI. No abdominal or epigastric pain. No nausea, vomiting or hematemesis; No diarrhea or constipation. No melena or hematochezia.  Skin: No itching, burning, rashes or lesions   Musculoskeletal: No joint pain or swelling; No muscle, back or extremity pain  Patient has no cardiopulmonary, peripheral vascular, musculoskeletal, dermatological, neurological, gynecological or psychological symptoms or complaints at this time.    PAST MEDICAL & SURGICAL HISTORY:  Pulmonic regurgitation    HLD (hyperlipidemia)    Lung nodule    Spinal stenosis, lumbar region, with neurogenic claudication    Carotid stenosis, bilateral    Aorto-iliac disease    Psoriasis    Congenital heart failure    History of anemia due to chronic kidney disease    Peripheral neuropathy    Anxiety    CKD (chronic kidney disease) stage 3, GFR 30-59 ml/min    PVD (peripheral vascular disease) with claudication    AICD (automatic cardioverter/defibrillator) present  rep notified senia hoang sci    CAD (coronary artery disease)    Hypertension    Hypothyroid    Diabetes  IDDM    S/P evacuation of hematoma  right groin, then required wound vac    Pessary maintenance    Branch retinal vein occlusion with neovascularization of right eye  treated with laser    H/O vascular surgery  Infrarenal aortic endarterectomy with aortic bi iliac bypass, and superficial femoral artery  angioplasty and stenting, 3/24/16.    History of intravascular stent placement  femoral artery angioplasty and stents, 3/24/16    Acute cataract        FAMILY HISTORY:  Family history of heart attack (Sibling)    Family history of heart attack    Family history of colon cancer in mother        SOCIAL HISTORY:  Smoking Status: [ ] Current, [x ] Former, [ ] Never  Pack Years: > 20. No ETOH or drug abuse history.    MEDICATIONS:  MEDICATIONS  (STANDING):  cyanocobalamin 1000 MICROGram(s) Oral daily  dextrose 40% Gel 15 Gram(s) Oral once  dextrose 5%. 1000 milliLiter(s) (50 mL/Hr) IV Continuous <Continuous>  dextrose 5%. 1000 milliLiter(s) (100 mL/Hr) IV Continuous <Continuous>  dextrose 50% Injectable 25 Gram(s) IV Push once  dextrose 50% Injectable 12.5 Gram(s) IV Push once  dextrose 50% Injectable 25 Gram(s) IV Push once  gabapentin 100 milliGRAM(s) Oral two times a day  glucagon  Injectable 1 milliGRAM(s) IntraMuscular once  insulin glargine Injectable (LANTUS) 25 Unit(s) SubCutaneous at bedtime  insulin lispro (ADMELOG) corrective regimen sliding scale   SubCutaneous three times a day before meals  insulin lispro (ADMELOG) corrective regimen sliding scale   SubCutaneous at bedtime  levothyroxine 112 MICROGram(s) Oral daily  multivitamin 1 Tablet(s) Oral daily  pantoprazole  Injectable 40 milliGRAM(s) IV Push two times a day    MEDICATIONS  (PRN):  LORazepam     Tablet 0.5 milliGRAM(s) Oral three times a day PRN Anxiety      Allergies    latex (Rash)  No Known Drug Allergies    Intolerances        Vital Signs Last 24 Hrs  T(C): 36.7 (25 Feb 2021 07:17), Max: 36.9 (24 Feb 2021 22:52)  T(F): 98.1 (25 Feb 2021 07:17), Max: 98.4 (24 Feb 2021 22:52)  HR: 88 (25 Feb 2021 07:17) (73 - 88)  BP: 122/72 (25 Feb 2021 07:17) (102/48 - 133/88)  BP(mean): --  RR: 18 (25 Feb 2021 07:17) (18 - 18)  SpO2: 96% (25 Feb 2021 07:17) (96% - 100%)        PHYSICAL EXAM:    General: Well developed; well nourished; in no acute distress  HEENT: MMM, conjunctiva pink and sclera anicteric.  Lungs: Clear bilaterally.  Cor: RRR S1, S2 only  Gastrointestinal: Abdomen: Soft, non-tender non-distended; Normal bowel sounds; No rebound or guarding + midline surgical scar, no HSM.  VERÓNICA: Unable to do where pt. located in ED.  Extremities: Normal range of motion, No clubbing, cyanosis or edema  Neurological: Alert and oriented x3  Skin: Warm and dry. No obvious rash      LABS:                        8.9    9.28  )-----------( 215      ( 25 Feb 2021 07:02 )             26.9     02-25    140  |  107  |  52.0<H>  ----------------------------<  250<H>  4.5   |  21.0<L>  |  1.61<H>    Ca    8.5<L>      25 Feb 2021 07:02    TPro  6.2<L>  /  Alb  3.5  /  TBili  0.3<L>  /  DBili  x   /  AST  11  /  ALT  7   /  AlkPhos  51  02-24          RADIOLOGY & ADDITIONAL STUDIES:

## 2021-02-25 NOTE — CONSULT NOTE ADULT - PROBLEM SELECTOR RECOMMENDATION 9
With dark stools and dyspeptic symptoms and no prior EGD's on triple therapy r/o ulcer disease +/or gastritis +/or esophagitis +/or malignancy. Cardiac clearance for EGD tomorrow. NPO after MN tonight. Hold Xarelto and Plavix pending GI w/u. Keep Hb at 8 grams or higher. Repeat labs ordered for the AM.

## 2021-02-26 ENCOUNTER — APPOINTMENT (OUTPATIENT)
Dept: FAMILY MEDICINE | Facility: CLINIC | Age: 76
End: 2021-02-26

## 2021-02-26 ENCOUNTER — RESULT REVIEW (OUTPATIENT)
Age: 76
End: 2021-02-26

## 2021-02-26 LAB
ANION GAP SERPL CALC-SCNC: 12 MMOL/L — SIGNIFICANT CHANGE UP (ref 5–17)
BASOPHILS # BLD AUTO: 0.07 K/UL — SIGNIFICANT CHANGE UP (ref 0–0.2)
BASOPHILS NFR BLD AUTO: 0.8 % — SIGNIFICANT CHANGE UP (ref 0–2)
BUN SERPL-MCNC: 44 MG/DL — HIGH (ref 8–20)
CALCIUM SERPL-MCNC: 8.9 MG/DL — SIGNIFICANT CHANGE UP (ref 8.6–10.2)
CHLORIDE SERPL-SCNC: 106 MMOL/L — SIGNIFICANT CHANGE UP (ref 98–107)
CO2 SERPL-SCNC: 22 MMOL/L — SIGNIFICANT CHANGE UP (ref 22–29)
CREAT SERPL-MCNC: 1.5 MG/DL — HIGH (ref 0.5–1.3)
EOSINOPHIL # BLD AUTO: 0.27 K/UL — SIGNIFICANT CHANGE UP (ref 0–0.5)
EOSINOPHIL NFR BLD AUTO: 3.1 % — SIGNIFICANT CHANGE UP (ref 0–6)
FOLATE SERPL-MCNC: >20 NG/ML — SIGNIFICANT CHANGE UP
GLUCOSE BLDC GLUCOMTR-MCNC: 154 MG/DL — HIGH (ref 70–99)
GLUCOSE BLDC GLUCOMTR-MCNC: 158 MG/DL — HIGH (ref 70–99)
GLUCOSE BLDC GLUCOMTR-MCNC: 189 MG/DL — HIGH (ref 70–99)
GLUCOSE BLDC GLUCOMTR-MCNC: 254 MG/DL — HIGH (ref 70–99)
GLUCOSE BLDC GLUCOMTR-MCNC: 94 MG/DL — SIGNIFICANT CHANGE UP (ref 70–99)
GLUCOSE SERPL-MCNC: 98 MG/DL — SIGNIFICANT CHANGE UP (ref 70–99)
HCT VFR BLD CALC: 28.9 % — LOW (ref 34.5–45)
HCT VFR BLD CALC: 31.9 % — LOW (ref 34.5–45)
HGB BLD-MCNC: 10.4 G/DL — LOW (ref 11.5–15.5)
HGB BLD-MCNC: 9.2 G/DL — LOW (ref 11.5–15.5)
IMM GRANULOCYTES NFR BLD AUTO: 0.7 % — SIGNIFICANT CHANGE UP (ref 0–1.5)
INR BLD: 1.15 RATIO — SIGNIFICANT CHANGE UP (ref 0.88–1.16)
LYMPHOCYTES # BLD AUTO: 1.4 K/UL — SIGNIFICANT CHANGE UP (ref 1–3.3)
LYMPHOCYTES # BLD AUTO: 15.8 % — SIGNIFICANT CHANGE UP (ref 13–44)
MAGNESIUM SERPL-MCNC: 1.7 MG/DL — LOW (ref 1.8–2.6)
MCHC RBC-ENTMCNC: 30.4 PG — SIGNIFICANT CHANGE UP (ref 27–34)
MCHC RBC-ENTMCNC: 31.8 GM/DL — LOW (ref 32–36)
MCV RBC AUTO: 95.4 FL — SIGNIFICANT CHANGE UP (ref 80–100)
MONOCYTES # BLD AUTO: 0.92 K/UL — HIGH (ref 0–0.9)
MONOCYTES NFR BLD AUTO: 10.4 % — SIGNIFICANT CHANGE UP (ref 2–14)
NEUTROPHILS # BLD AUTO: 6.13 K/UL — SIGNIFICANT CHANGE UP (ref 1.8–7.4)
NEUTROPHILS NFR BLD AUTO: 69.2 % — SIGNIFICANT CHANGE UP (ref 43–77)
OB PNL STL: POSITIVE
PHOSPHATE SERPL-MCNC: 4.1 MG/DL — SIGNIFICANT CHANGE UP (ref 2.4–4.7)
PLATELET # BLD AUTO: 234 K/UL — SIGNIFICANT CHANGE UP (ref 150–400)
POTASSIUM SERPL-MCNC: 3.5 MMOL/L — SIGNIFICANT CHANGE UP (ref 3.5–5.3)
POTASSIUM SERPL-SCNC: 3.5 MMOL/L — SIGNIFICANT CHANGE UP (ref 3.5–5.3)
PROTHROM AB SERPL-ACNC: 13.2 SEC — SIGNIFICANT CHANGE UP (ref 10.6–13.6)
RBC # BLD: 3.03 M/UL — LOW (ref 3.8–5.2)
RBC # FLD: 19.3 % — HIGH (ref 10.3–14.5)
SODIUM SERPL-SCNC: 140 MMOL/L — SIGNIFICANT CHANGE UP (ref 135–145)
TSH SERPL-MCNC: 4.56 UIU/ML — HIGH (ref 0.27–4.2)
VIT B12 SERPL-MCNC: 572 PG/ML — SIGNIFICANT CHANGE UP (ref 232–1245)
WBC # BLD: 8.85 K/UL — SIGNIFICANT CHANGE UP (ref 3.8–10.5)
WBC # FLD AUTO: 8.85 K/UL — SIGNIFICANT CHANGE UP (ref 3.8–10.5)

## 2021-02-26 PROCEDURE — 88305 TISSUE EXAM BY PATHOLOGIST: CPT | Mod: 26

## 2021-02-26 PROCEDURE — 99233 SBSQ HOSP IP/OBS HIGH 50: CPT

## 2021-02-26 PROCEDURE — 43239 EGD BIOPSY SINGLE/MULTIPLE: CPT

## 2021-02-26 PROCEDURE — 88342 IMHCHEM/IMCYTCHM 1ST ANTB: CPT | Mod: 26

## 2021-02-26 RX ORDER — AMLODIPINE BESYLATE 2.5 MG/1
5 TABLET ORAL ONCE
Refills: 0 | Status: COMPLETED | OUTPATIENT
Start: 2021-02-26 | End: 2021-02-26

## 2021-02-26 RX ORDER — ATORVASTATIN CALCIUM 80 MG/1
10 TABLET, FILM COATED ORAL AT BEDTIME
Refills: 0 | Status: DISCONTINUED | OUTPATIENT
Start: 2021-02-26 | End: 2021-03-03

## 2021-02-26 RX ADMIN — INSULIN GLARGINE 25 UNIT(S): 100 INJECTION, SOLUTION SUBCUTANEOUS at 21:52

## 2021-02-26 RX ADMIN — PANTOPRAZOLE SODIUM 40 MILLIGRAM(S): 20 TABLET, DELAYED RELEASE ORAL at 05:15

## 2021-02-26 RX ADMIN — Medication 3: at 11:58

## 2021-02-26 RX ADMIN — Medication 1 TABLET(S): at 10:12

## 2021-02-26 RX ADMIN — GABAPENTIN 100 MILLIGRAM(S): 400 CAPSULE ORAL at 05:15

## 2021-02-26 RX ADMIN — ATORVASTATIN CALCIUM 10 MILLIGRAM(S): 80 TABLET, FILM COATED ORAL at 21:46

## 2021-02-26 RX ADMIN — PREGABALIN 1000 MICROGRAM(S): 225 CAPSULE ORAL at 10:12

## 2021-02-26 RX ADMIN — GABAPENTIN 100 MILLIGRAM(S): 400 CAPSULE ORAL at 17:23

## 2021-02-26 RX ADMIN — Medication 112 MICROGRAM(S): at 05:15

## 2021-02-26 RX ADMIN — Medication 1: at 09:07

## 2021-02-26 RX ADMIN — AMLODIPINE BESYLATE 5 MILLIGRAM(S): 2.5 TABLET ORAL at 18:43

## 2021-02-26 RX ADMIN — Medication 1: at 17:24

## 2021-02-26 RX ADMIN — PANTOPRAZOLE SODIUM 40 MILLIGRAM(S): 20 TABLET, DELAYED RELEASE ORAL at 17:24

## 2021-02-26 NOTE — PROGRESS NOTE ADULT - ASSESSMENT
74 y/o female with PMH of aortoiliac occlusive disease s/p thromboendarterectomy of deep femoral artery, endarterectomy of left CFA insertion of SFA stent on Xarelto, Plavix and Aspirin, HTN, HLD, CKD-3, hypothyroidism was sent to the ED by PCP for abnormal lab; Hb: 5.3. In the ED, hb: 4.9 on arrival. Transfused 3 units of PRBCs and Hb remains stable. Occult blood positive. EGD today negative. Tentative plans for colonoscopy on Monday.     Symptomatic anemia   Hb remains stable; 10.4 s/p 3 units of PRBC  Continue to hold Xarelto, Plavix, Aspirin as discussed with GI  Occult blood positive  PPI IV BID  EGD negative  Diet advanced today  Tentative plans for colonoscopy on Monday  GI recommendations appreciated    YUDI on CKD-3   -creatinine 1.3-1.4 in 2019; follows with Dr. Mcallister as outpatient  -likely due to intravascular volume depletion due to blood loss and HCTZ  -creatinine 1.76-1.61  -creatinine improved to 1.5 today  -continue to hold diuretics and ARB  -strict I/Os, daily weights  -avoid nephrotoxic agents and renally dose medications  -repeat labs in AM    HTN   BP elevated; resume norvasc  low sodium diet    Aortoiliac occlusive disease   S/p thromboendarterectomy of deep femoral artery, endarterectomy of left CFA insertion of SFA stent   Continue to hold Xarelto, Plavix and Aspirin as above. Risk vs benefits discussed with patient and daughter at length today.  Follows with Dr. Bean as outpatient    Hypothyroidism   TSH noted; f/u free T3/T4  continue Synthroid      DM-2   HbA1c 5.0  Patient is on Levemir 54mg HS ; however sugars have been labile inpatient  Continue current dose of lantus and increase as needed  Continue Novolog on sliding scale   ADA diet  Follows with Dr. Pierre as outpatient    HLD  -statin    Anxiety  -xanax as needed    Neuropathy  -continue gapapentin    DVT ppx - hold Xarelto, SCDs    Dispo - Hb remains stable. Tentative plans for colonoscopy on Monday 3/1.       Attending Attestation:   Plan discussed with patient, daughter, Dr. Prakash, RN

## 2021-02-26 NOTE — PROGRESS NOTE ADULT - SUBJECTIVE AND OBJECTIVE BOX
[place] [facility] - [facility], THE HEART CENTER                                   [address] [place] [address]                                                      PHONE: [phone]                                                         FAX: [phone]  [email or web address]  ---------------------------------------------------------------------------------------------------------------------------------    Overnight events/patient complaints:  no events     PAST MEDICAL & SURGICAL HISTORY:  Pulmonic regurgitation    HLD (hyperlipidemia)    Lung nodule    Spinal stenosis, lumbar region, with neurogenic claudication    Carotid stenosis, bilateral    Aorto-iliac disease    Psoriasis    Congenital heart failure    History of anemia due to chronic kidney disease    Peripheral neuropathy    Anxiety    CKD (chronic kidney disease) stage 3, GFR 30-59 ml/min    PVD (peripheral vascular disease) with claudication    AICD (automatic cardioverter/defibrillator) present  rep notified senia hoang sci    CAD (coronary artery disease)    Hypertension    Hypothyroid    Diabetes  IDDM    S/P evacuation of hematoma  right groin, then required wound vac    Pessary maintenance    Branch retinal vein occlusion with neovascularization of right eye  treated with laser    H/O vascular surgery  Infrarenal aortic endarterectomy with aortic bi iliac bypass, and superficial femoral artery  angioplasty and stenting, 3/24/16.    History of intravascular stent placement  femoral artery angioplasty and stents, 3/24/16    Acute cataract        latex (Rash)  No Known Drug Allergies    MEDICATIONS  (STANDING):  cyanocobalamin 1000 MICROGram(s) Oral daily  dextrose 40% Gel 15 Gram(s) Oral once  dextrose 5%. 1000 milliLiter(s) (50 mL/Hr) IV Continuous <Continuous>  dextrose 5%. 1000 milliLiter(s) (100 mL/Hr) IV Continuous <Continuous>  dextrose 50% Injectable 25 Gram(s) IV Push once  dextrose 50% Injectable 12.5 Gram(s) IV Push once  dextrose 50% Injectable 25 Gram(s) IV Push once  gabapentin 100 milliGRAM(s) Oral two times a day  glucagon  Injectable 1 milliGRAM(s) IntraMuscular once  insulin glargine Injectable (LANTUS) 25 Unit(s) SubCutaneous at bedtime  insulin lispro (ADMELOG) corrective regimen sliding scale   SubCutaneous three times a day before meals  insulin lispro (ADMELOG) corrective regimen sliding scale   SubCutaneous at bedtime  levothyroxine 112 MICROGram(s) Oral daily  multivitamin 1 Tablet(s) Oral daily  pantoprazole  Injectable 40 milliGRAM(s) IV Push two times a day    MEDICATIONS  (PRN):  LORazepam     Tablet 0.5 milliGRAM(s) Oral three times a day PRN Anxiety      Vital Signs Last 24 Hrs  T(C): 36.6 (26 Feb 2021 05:02), Max: 36.8 (25 Feb 2021 15:16)  T(F): 97.9 (26 Feb 2021 05:02), Max: 98.3 (25 Feb 2021 15:16)  HR: 73 (26 Feb 2021 05:02) (73 - 88)  BP: 129/69 (26 Feb 2021 05:02) (116/60 - 149/63)  BP(mean): --  RR: 18 (26 Feb 2021 05:02) (18 - 20)  SpO2: 98% (26 Feb 2021 05:02) (98% - 98%)  ICU Vital Signs Last 24 Hrs  YE LAWSON  I&O's Detail    25 Feb 2021 07:01  -  26 Feb 2021 07:00  --------------------------------------------------------  IN:    Oral Fluid: 120 mL  Total IN: 120 mL    OUT:  Total OUT: 0 mL    Total NET: 120 mL        I&O's Summary    25 Feb 2021 07:01  -  26 Feb 2021 07:00  --------------------------------------------------------  IN: 120 mL / OUT: 0 mL / NET: 120 mL      Drug Dosing Weight  YE LAWSON      PHYSICAL EXAM:  General: Appears well developed, well nourished alert and cooperative.  HEENT: Head; normocephalic, atraumatic.  Eyes: Pupils reactive, cornea wnl.  Neck: Supple, no nodes adenopathy, no NVD or carotid bruit or thyromegaly.  CARDIOVASCULAR: Normal S1 and S2, No murmur, rub, gallop or lift.   LUNGS: No rales, rhonchi or wheeze. Normal breath sounds bilaterally.  ABDOMEN: Soft, nontender without mass or organomegaly. bowel sounds normoactive.  EXTREMITIES: No clubbing, cyanosis or edema. Distal pulses wnl.   SKIN: warm and dry with normal turgor.  NEURO: Alert/oriented x 3/normal motor exam. No pathologic reflexes.    PSYCH: normal affect.        LABS:                        9.2    8.85  )-----------( 234      ( 26 Feb 2021 06:21 )             28.9     02-26    140  |  106  |  44.0<H>  ----------------------------<  98  3.5   |  22.0  |  1.50<H>    Ca    8.9      26 Feb 2021 06:21  Phos  4.1     02-26  Mg     1.7     02-26    TPro  6.2<L>  /  Alb  3.5  /  TBili  0.3<L>  /  DBili  x   /  AST  11  /  ALT  7   /  AlkPhos  51  02-24    YE LAWSON      PT/INR - ( 26 Feb 2021 06:21 )   PT: 13.2 sec;   INR: 1.15 ratio         PTT - ( 24 Feb 2021 18:05 )  PTT:32.9 sec      RADIOLOGY & ADDITIONAL STUDIES:    INTERPRETATION OF TELEMETRY (personally reviewed):       ASSESSMENT AND PLAN:  In summary  YE LAWSON is an 75y Female with past medical history significant nonischemic cardiomyopathy with normalization of her LV function, biventricular pacemaker with AICD, moderate CAD in 2007, hypertension, hyperlipidemia, diabetes, peripheral vascular disease status post peripheral intervention s/p thromboendarterectomy of deep femoral artery and Endarterectomy of left common femoral artery for symptomatic PVD and prior cardiac work up PET CT 1/2020 normal perfusion and TTE 7/2020 normal EF without any significant valvular disease who presents with [facility] ER due to abnormal lab. Patient said she has been having shortness of breath on exertion, dizziness and dark stool (which she attributed to iron); her PCP did a stool sample and also blood work and she was found to have severe anemia 4.9 s/p PRBC.  Denies any chest pain orthopnea or PND.  No evidence of ACS  or Heart failure     Patient undergoing a low risk GI procedure in which patient has a moderate CV risk but no active CV contraindication at this time   Restart ASA when ok with GI   continue current meds/dosages       Thank you for allowing [facility] to participate in the care of this patient.  Please feel free to call with any questions or concerns.                  [place] [facility]ARDIOV[facility] - [facility], THE HEART CENTER                                   [address] [place] [address]                                                      PHONE: [phone]                                                         FAX: [phone]  [email or web address]  ---------------------------------------------------------------------------------------------------------------------------------    Overnight events/patient complaints:  no events     PAST MEDICAL & SURGICAL HISTORY:  Pulmonic regurgitation    HLD (hyperlipidemia)    Lung nodule    Spinal stenosis, lumbar region, with neurogenic claudication    Carotid stenosis, bilateral    Aorto-iliac disease    Psoriasis    Congenital heart failure    History of anemia due to chronic kidney disease    Peripheral neuropathy    Anxiety    CKD (chronic kidney disease) stage 3, GFR 30-59 ml/min    PVD (peripheral vascular disease) with claudication    AICD (automatic cardioverter/defibrillator) present  rep notified senia hoang sci    CAD (coronary artery disease)    Hypertension    Hypothyroid    Diabetes  IDDM    S/P evacuation of hematoma  right groin, then required wound vac    Pessary maintenance    Branch retinal vein occlusion with neovascularization of right eye  treated with laser    H/O vascular surgery  Infrarenal aortic endarterectomy with aortic bi iliac bypass, and superficial femoral artery  angioplasty and stenting, 3/24/16.    History of intravascular stent placement  femoral artery angioplasty and stents, 3/24/16    Acute cataract        latex (Rash)  No Known Drug Allergies    MEDICATIONS  (STANDING):  cyanocobalamin 1000 MICROGram(s) Oral daily  dextrose 40% Gel 15 Gram(s) Oral once  dextrose 5%. 1000 milliLiter(s) (50 mL/Hr) IV Continuous <Continuous>  dextrose 5%. 1000 milliLiter(s) (100 mL/Hr) IV Continuous <Continuous>  dextrose 50% Injectable 25 Gram(s) IV Push once  dextrose 50% Injectable 12.5 Gram(s) IV Push once  dextrose 50% Injectable 25 Gram(s) IV Push once  gabapentin 100 milliGRAM(s) Oral two times a day  glucagon  Injectable 1 milliGRAM(s) IntraMuscular once  insulin glargine Injectable (LANTUS) 25 Unit(s) SubCutaneous at bedtime  insulin lispro (ADMELOG) corrective regimen sliding scale   SubCutaneous three times a day before meals  insulin lispro (ADMELOG) corrective regimen sliding scale   SubCutaneous at bedtime  levothyroxine 112 MICROGram(s) Oral daily  multivitamin 1 Tablet(s) Oral daily  pantoprazole  Injectable 40 milliGRAM(s) IV Push two times a day    MEDICATIONS  (PRN):  LORazepam     Tablet 0.5 milliGRAM(s) Oral three times a day PRN Anxiety      Vital Signs Last 24 Hrs  T(C): 36.6 (26 Feb 2021 05:02), Max: 36.8 (25 Feb 2021 15:16)  T(F): 97.9 (26 Feb 2021 05:02), Max: 98.3 (25 Feb 2021 15:16)  HR: 73 (26 Feb 2021 05:02) (73 - 88)  BP: 129/69 (26 Feb 2021 05:02) (116/60 - 149/63)  BP(mean): --  RR: 18 (26 Feb 2021 05:02) (18 - 20)  SpO2: 98% (26 Feb 2021 05:02) (98% - 98%)  ICU Vital Signs Last 24 Hrs  YE LAWSON  I&O's Detail    25 Feb 2021 07:01  -  26 Feb 2021 07:00  --------------------------------------------------------  IN:    Oral Fluid: 120 mL  Total IN: 120 mL    OUT:  Total OUT: 0 mL    Total NET: 120 mL        I&O's Summary    25 Feb 2021 07:01  -  26 Feb 2021 07:00  --------------------------------------------------------  IN: 120 mL / OUT: 0 mL / NET: 120 mL      Drug Dosing Weight  YE LAWSON      PHYSICAL EXAM:  General: Appears well developed, well nourished alert and cooperative.  HEENT: Head; normocephalic, atraumatic.  Eyes: Pupils reactive, cornea wnl.  Neck: Supple, no nodes adenopathy, no NVD or carotid bruit or thyromegaly.  CARDIOVASCULAR: Normal S1 and S2, No murmur, rub, gallop or lift.   LUNGS: No rales, rhonchi or wheeze. Normal breath sounds bilaterally.  ABDOMEN: Soft, nontender without mass or organomegaly. bowel sounds normoactive.  EXTREMITIES: No clubbing, cyanosis or edema. Distal pulses wnl.   SKIN: warm and dry with normal turgor.  NEURO: Alert/oriented x 3/normal motor exam. No pathologic reflexes.    PSYCH: normal affect.        LABS:                        9.2    8.85  )-----------( 234      ( 26 Feb 2021 06:21 )             28.9     02-26    140  |  106  |  44.0<H>  ----------------------------<  98  3.5   |  22.0  |  1.50<H>    Ca    8.9      26 Feb 2021 06:21  Phos  4.1     02-26  Mg     1.7     02-26    TPro  6.2<L>  /  Alb  3.5  /  TBili  0.3<L>  /  DBili  x   /  AST  11  /  ALT  7   /  AlkPhos  51  02-24    YE LAWSON      PT/INR - ( 26 Feb 2021 06:21 )   PT: 13.2 sec;   INR: 1.15 ratio         PTT - ( 24 Feb 2021 18:05 )  PTT:32.9 sec      RADIOLOGY & ADDITIONAL STUDIES:    INTERPRETATION OF TELEMETRY (personally reviewed):       ASSESSMENT AND PLAN:  In summary  YE LAWSON is an 75y Female with past medical history significant nonischemic cardiomyopathy with normalization of her LV function, biventricular pacemaker with AICD, moderate CAD in 2007, hypertension, hyperlipidemia, diabetes, peripheral vascular disease status post peripheral intervention s/p thromboendarterectomy of deep femoral artery and Endarterectomy of left common femoral artery for symptomatic PVD and prior cardiac work up PET CT 1/2020 normal perfusion and TTE 7/2020 normal EF without any significant valvular disease who presents with [facility] ER due to abnormal lab. Patient said she has been having shortness of breath on exertion, dizziness and dark stool (which she attributed to iron); her PCP did a stool sample and also blood work and she was found to have severe anemia 4.9 s/p PRBC.  Denies any chest pain orthopnea or PND.  No evidence of ACS  or Heart failure     Pt planned for colonoscopy on monday   Patient undergoing a low risk GI procedure in which patient has a moderate CV risk but no active CV contraindication at this time   Restart ASA when ok with GI   continue current meds/dosages     Thank you for allowing [facility] to participate in the care of this patient.  Please feel free to call with any questions or concerns.

## 2021-02-26 NOTE — PROGRESS NOTE ADULT - SUBJECTIVE AND OBJECTIVE BOX
CHIEF COMPLAINT/INTERVAL HISTORY:    Patient is a 75y old  Female who presents with a chief complaint of Anemia (26 Feb 2021 09:08)    SUBJECTIVE & OBJECTIVE: Pt seen and examined at bedside. No overnight events. Patient denies any acute complaints today. Hb stable. EGD negative.     ROS: No chest pain, palpitations, SOB, light headedness, dizziness, headache, nausea/vomiting, fevers/chills, abdominal pain, dysuria or increased urinary frequency.    ICU Vital Signs Last 24 Hrs  T(C): 36.8 (26 Feb 2021 16:44), Max: 36.8 (26 Feb 2021 16:44)  T(F): 98.3 (26 Feb 2021 16:44), Max: 98.3 (26 Feb 2021 16:44)  HR: 85 (26 Feb 2021 16:44) (58 - 88)  BP: 144/73 (26 Feb 2021 16:44) (129/69 - 149/63)  RR: 20 (26 Feb 2021 16:44) (18 - 20)  SpO2: 99% (26 Feb 2021 16:44) (98% - 99%)      MEDICATIONS  (STANDING):  cyanocobalamin 1000 MICROGram(s) Oral daily  dextrose 40% Gel 15 Gram(s) Oral once  dextrose 5%. 1000 milliLiter(s) (50 mL/Hr) IV Continuous <Continuous>  dextrose 5%. 1000 milliLiter(s) (100 mL/Hr) IV Continuous <Continuous>  dextrose 50% Injectable 25 Gram(s) IV Push once  dextrose 50% Injectable 12.5 Gram(s) IV Push once  dextrose 50% Injectable 25 Gram(s) IV Push once  gabapentin 100 milliGRAM(s) Oral two times a day  glucagon  Injectable 1 milliGRAM(s) IntraMuscular once  insulin glargine Injectable (LANTUS) 25 Unit(s) SubCutaneous at bedtime  insulin lispro (ADMELOG) corrective regimen sliding scale   SubCutaneous three times a day before meals  insulin lispro (ADMELOG) corrective regimen sliding scale   SubCutaneous at bedtime  levothyroxine 112 MICROGram(s) Oral daily  multivitamin 1 Tablet(s) Oral daily  pantoprazole  Injectable 40 milliGRAM(s) IV Push two times a day    MEDICATIONS  (PRN):  LORazepam     Tablet 0.5 milliGRAM(s) Oral three times a day PRN Anxiety      LABS:                        10.4   x     )-----------( x        ( 26 Feb 2021 15:56 )             31.9     02-26    140  |  106  |  44.0<H>  ----------------------------<  98  3.5   |  22.0  |  1.50<H>    Ca    8.9      26 Feb 2021 06:21  Phos  4.1     02-26  Mg     1.7     02-26      PT/INR - ( 26 Feb 2021 06:21 )   PT: 13.2 sec;   INR: 1.15 ratio        CAPILLARY BLOOD GLUCOSE      POCT Blood Glucose.: 158 mg/dL (26 Feb 2021 17:22)  POCT Blood Glucose.: 254 mg/dL (26 Feb 2021 11:56)  POCT Blood Glucose.: 189 mg/dL (26 Feb 2021 09:04)  POCT Blood Glucose.: 94 mg/dL (26 Feb 2021 05:14)  POCT Blood Glucose.: 216 mg/dL (25 Feb 2021 21:18)  POCT Blood Glucose.: 173 mg/dL (25 Feb 2021 20:18)      PHYSICAL EXAM:    GENERAL:  elderly female, laying in bed, NAD  HEAD:  Atraumatic, Normocephalic  EYES: EOMI, PERRLA, conjunctiva and sclera clear  ENMT: Moist mucous membranes  NECK: Supple   NERVOUS SYSTEM:  Alert & Oriented X3, Motor Strength 5/5 B/L upper and lower extremities  CHEST/LUNG: bilateral air entry, coarse breath sounds  HEART: Regular rate and rhythm; + S1/S2  ABDOMEN: Soft, Nontender, Nondistended; Bowel sounds present  EXTREMITIES:  trace pedal edema, LLE incision well healed

## 2021-02-27 LAB
ANION GAP SERPL CALC-SCNC: 13 MMOL/L — SIGNIFICANT CHANGE UP (ref 5–17)
BASOPHILS # BLD AUTO: 0.04 K/UL — SIGNIFICANT CHANGE UP (ref 0–0.2)
BASOPHILS NFR BLD AUTO: 0.4 % — SIGNIFICANT CHANGE UP (ref 0–2)
BUN SERPL-MCNC: 33 MG/DL — HIGH (ref 8–20)
CALCIUM SERPL-MCNC: 9 MG/DL — SIGNIFICANT CHANGE UP (ref 8.6–10.2)
CHLORIDE SERPL-SCNC: 103 MMOL/L — SIGNIFICANT CHANGE UP (ref 98–107)
CO2 SERPL-SCNC: 24 MMOL/L — SIGNIFICANT CHANGE UP (ref 22–29)
CREAT SERPL-MCNC: 1.56 MG/DL — HIGH (ref 0.5–1.3)
EOSINOPHIL # BLD AUTO: 0.16 K/UL — SIGNIFICANT CHANGE UP (ref 0–0.5)
EOSINOPHIL NFR BLD AUTO: 1.8 % — SIGNIFICANT CHANGE UP (ref 0–6)
GLUCOSE BLDC GLUCOMTR-MCNC: 161 MG/DL — HIGH (ref 70–99)
GLUCOSE BLDC GLUCOMTR-MCNC: 177 MG/DL — HIGH (ref 70–99)
GLUCOSE BLDC GLUCOMTR-MCNC: 200 MG/DL — HIGH (ref 70–99)
GLUCOSE BLDC GLUCOMTR-MCNC: 201 MG/DL — HIGH (ref 70–99)
GLUCOSE SERPL-MCNC: 179 MG/DL — HIGH (ref 70–99)
HCT VFR BLD CALC: 29.5 % — LOW (ref 34.5–45)
HGB BLD-MCNC: 9.3 G/DL — LOW (ref 11.5–15.5)
IMM GRANULOCYTES NFR BLD AUTO: 0.3 % — SIGNIFICANT CHANGE UP (ref 0–1.5)
LYMPHOCYTES # BLD AUTO: 1.34 K/UL — SIGNIFICANT CHANGE UP (ref 1–3.3)
LYMPHOCYTES # BLD AUTO: 14.7 % — SIGNIFICANT CHANGE UP (ref 13–44)
MAGNESIUM SERPL-MCNC: 1.6 MG/DL — SIGNIFICANT CHANGE UP (ref 1.6–2.6)
MCHC RBC-ENTMCNC: 30.5 PG — SIGNIFICANT CHANGE UP (ref 27–34)
MCHC RBC-ENTMCNC: 31.5 GM/DL — LOW (ref 32–36)
MCV RBC AUTO: 96.7 FL — SIGNIFICANT CHANGE UP (ref 80–100)
MONOCYTES # BLD AUTO: 0.92 K/UL — HIGH (ref 0–0.9)
MONOCYTES NFR BLD AUTO: 10.1 % — SIGNIFICANT CHANGE UP (ref 2–14)
NEUTROPHILS # BLD AUTO: 6.61 K/UL — SIGNIFICANT CHANGE UP (ref 1.8–7.4)
NEUTROPHILS NFR BLD AUTO: 72.7 % — SIGNIFICANT CHANGE UP (ref 43–77)
PHOSPHATE SERPL-MCNC: 4.1 MG/DL — SIGNIFICANT CHANGE UP (ref 2.4–4.7)
PLATELET # BLD AUTO: 250 K/UL — SIGNIFICANT CHANGE UP (ref 150–400)
POTASSIUM SERPL-MCNC: 4.3 MMOL/L — SIGNIFICANT CHANGE UP (ref 3.5–5.3)
POTASSIUM SERPL-SCNC: 4.3 MMOL/L — SIGNIFICANT CHANGE UP (ref 3.5–5.3)
RBC # BLD: 3.05 M/UL — LOW (ref 3.8–5.2)
RBC # FLD: 18.3 % — HIGH (ref 10.3–14.5)
SARS-COV-2 RNA SPEC QL NAA+PROBE: SIGNIFICANT CHANGE UP
SODIUM SERPL-SCNC: 140 MMOL/L — SIGNIFICANT CHANGE UP (ref 135–145)
T3FREE SERPL-MCNC: 2.7 PG/ML — SIGNIFICANT CHANGE UP (ref 1.8–4.6)
T4 FREE SERPL-MCNC: 1.5 NG/DL — SIGNIFICANT CHANGE UP (ref 0.9–1.8)
WBC # BLD: 9.1 K/UL — SIGNIFICANT CHANGE UP (ref 3.8–10.5)
WBC # FLD AUTO: 9.1 K/UL — SIGNIFICANT CHANGE UP (ref 3.8–10.5)

## 2021-02-27 PROCEDURE — 99233 SBSQ HOSP IP/OBS HIGH 50: CPT

## 2021-02-27 RX ORDER — CARVEDILOL PHOSPHATE 80 MG/1
25 CAPSULE, EXTENDED RELEASE ORAL
Refills: 0 | Status: COMPLETED | OUTPATIENT
Start: 2021-02-27 | End: 2021-02-28

## 2021-02-27 RX ADMIN — PANTOPRAZOLE SODIUM 40 MILLIGRAM(S): 20 TABLET, DELAYED RELEASE ORAL at 16:53

## 2021-02-27 RX ADMIN — GABAPENTIN 100 MILLIGRAM(S): 400 CAPSULE ORAL at 04:55

## 2021-02-27 RX ADMIN — Medication 112 MICROGRAM(S): at 04:55

## 2021-02-27 RX ADMIN — Medication 1: at 16:52

## 2021-02-27 RX ADMIN — Medication 1 TABLET(S): at 09:35

## 2021-02-27 RX ADMIN — CARVEDILOL PHOSPHATE 25 MILLIGRAM(S): 80 CAPSULE, EXTENDED RELEASE ORAL at 12:22

## 2021-02-27 RX ADMIN — CARVEDILOL PHOSPHATE 25 MILLIGRAM(S): 80 CAPSULE, EXTENDED RELEASE ORAL at 20:55

## 2021-02-27 RX ADMIN — GABAPENTIN 100 MILLIGRAM(S): 400 CAPSULE ORAL at 16:53

## 2021-02-27 RX ADMIN — PREGABALIN 1000 MICROGRAM(S): 225 CAPSULE ORAL at 09:35

## 2021-02-27 RX ADMIN — Medication 1: at 11:13

## 2021-02-27 RX ADMIN — INSULIN GLARGINE 25 UNIT(S): 100 INJECTION, SOLUTION SUBCUTANEOUS at 20:55

## 2021-02-27 RX ADMIN — Medication 1: at 08:25

## 2021-02-27 RX ADMIN — ATORVASTATIN CALCIUM 10 MILLIGRAM(S): 80 TABLET, FILM COATED ORAL at 20:55

## 2021-02-27 RX ADMIN — PANTOPRAZOLE SODIUM 40 MILLIGRAM(S): 20 TABLET, DELAYED RELEASE ORAL at 04:55

## 2021-02-27 NOTE — PROGRESS NOTE ADULT - SUBJECTIVE AND OBJECTIVE BOX
Pt seen and examined. Status post a negative EGD yesterday for evaluation of symptomatic anemia. For colonoscopy Monday. No GI complaints offered.     REVIEW OF SYSTEMS:  Constitutional: No fever, weight loss or fatigue  Cardiovascular: No chest pain, palpitations, dizziness or leg swelling  Gastrointestinal: As noted above. No abdominal or epigastric pain. No nausea, vomiting or hematemesis; No diarrhea or constipation. No melena or hematochezia.  Skin: No itching, burning, rashes or lesions       MEDICATIONS:  MEDICATIONS  (STANDING):  atorvastatin 10 milliGRAM(s) Oral at bedtime  cyanocobalamin 1000 MICROGram(s) Oral daily  dextrose 40% Gel 15 Gram(s) Oral once  dextrose 5%. 1000 milliLiter(s) (50 mL/Hr) IV Continuous <Continuous>  dextrose 5%. 1000 milliLiter(s) (100 mL/Hr) IV Continuous <Continuous>  dextrose 50% Injectable 25 Gram(s) IV Push once  dextrose 50% Injectable 12.5 Gram(s) IV Push once  dextrose 50% Injectable 25 Gram(s) IV Push once  gabapentin 100 milliGRAM(s) Oral two times a day  glucagon  Injectable 1 milliGRAM(s) IntraMuscular once  insulin glargine Injectable (LANTUS) 25 Unit(s) SubCutaneous at bedtime  insulin lispro (ADMELOG) corrective regimen sliding scale   SubCutaneous three times a day before meals  insulin lispro (ADMELOG) corrective regimen sliding scale   SubCutaneous at bedtime  levothyroxine 112 MICROGram(s) Oral daily  multivitamin 1 Tablet(s) Oral daily  pantoprazole  Injectable 40 milliGRAM(s) IV Push two times a day    MEDICATIONS  (PRN):  LORazepam     Tablet 0.5 milliGRAM(s) Oral three times a day PRN Anxiety      Allergies    latex (Rash)  No Known Drug Allergies    Intolerances        Vital Signs Last 24 Hrs  T(C): 36.8 (27 Feb 2021 04:40), Max: 36.8 (26 Feb 2021 16:44)  T(F): 98.2 (27 Feb 2021 04:40), Max: 98.3 (26 Feb 2021 16:44)  HR: 71 (27 Feb 2021 04:40) (58 - 85)  BP: 129/82 (27 Feb 2021 04:40) (129/82 - 148/55)  BP(mean): --  RR: 18 (27 Feb 2021 04:40) (18 - 20)  SpO2: 97% (27 Feb 2021 04:40) (97% - 99%)      PHYSICAL EXAM:    General: Well developed; well nourished; in no acute distress  HEENT: MMM, conjunctiva pink and sclera anicteric.  Lungs: clear to auscultation bilaterally.  Cor: RRR S1, S2 only.  Gastrointestinal: Abdomen: Soft non-tender non-distended; Normal bowel sounds; No hepatosplenomegaly.  Extremities: no cyanosis, clubbing or edema.  Skin: Warm and dry. No obvious rash  Neuro: Pt. a + o x 3.    LABS:  CBC Full  -  ( 27 Feb 2021 07:33 )  WBC Count : 9.10 K/uL  RBC Count : 3.05 M/uL  Hemoglobin : 9.3 g/dL  Hematocrit : 29.5 %  Platelet Count - Automated : 250 K/uL  Mean Cell Volume : 96.7 fl  Mean Cell Hemoglobin : 30.5 pg  Mean Cell Hemoglobin Concentration : 31.5 gm/dL  Auto Neutrophil # : 6.61 K/uL  Auto Lymphocyte # : 1.34 K/uL  Auto Monocyte # : 0.92 K/uL  Auto Eosinophil # : 0.16 K/uL  Auto Basophil # : 0.04 K/uL  Auto Neutrophil % : 72.7 %  Auto Lymphocyte % : 14.7 %  Auto Monocyte % : 10.1 %  Auto Eosinophil % : 1.8 %  Auto Basophil % : 0.4 %    02-26    140  |  106  |  44.0<H>  ----------------------------<  98  3.5   |  22.0  |  1.50<H>    Ca    8.9      26 Feb 2021 06:21  Phos  4.1     02-27  Mg     1.6     02-27      PT/INR - ( 26 Feb 2021 06:21 )   PT: 13.2 sec;   INR: 1.15 ratio                           RADIOLOGY & ADDITIONAL STUDIES (The following images were personally reviewed):

## 2021-02-27 NOTE — PROGRESS NOTE ADULT - ASSESSMENT
76 y/o female with PMH of aortoiliac occlusive disease s/p thromboendarterectomy of deep femoral artery, endarterectomy of left CFA insertion of SFA stent on Xarelto, Plavix and Aspirin, HTN, HLD, CKD-3, hypothyroidism was sent to the ED by PCP for abnormal lab; Hb: 5.3. In the ED, hb: 4.9 on arrival. Transfused 3 units of PRBCs and Hb remains stable. Occult blood positive. EGD today negative. Tentative plans for colonoscopy on Monday.     Severe symptomatic anemia   Hb remains stable; 10.4 s/p 3 units of PRBC  Continue to hold Xarelto, Plavix, Aspirin as discussed with GI  Occult blood positive  PPI IV BID  EGD negative  GI plan colonoscopy on Monday    YUDI on CKD-3   - follows with Dr. Mcallister as outpatient  -likely due to intravascular volume depletion due to blood loss and HCTZ  Renal indices is improving   -continue to hold diuretics and ARB  -strict I/Os, daily weights  -avoid nephrotoxic agents and renally dose medications  Trend BMP    HTN   BP elevated; resume norvasc    Aortoiliac occlusive disease   S/p thromboendarterectomy of deep femoral artery, endarterectomy of left CFA insertion of SFA stent   Continue to hold Xarelto, Plavix and Aspirin as above. Risk vs benefits discussed with patient and daughter .  Pending GI coloscopy     Hypothyroidism   continue Synthroid      DM-2   HbA1c 5.0  Patient is on Lantus  25 unit HS   Continue Lispro sliding scale   Follows with Dr. Pierre as outpatient    HLD  -statin    Anxiety  -xanax as needed    Neuropathy  -continue gapapentin    DVT ppx - hold Xarelto, SCDs    Dispo - Hb remains stable. Tentative plans for colonoscopy on Monday 3/1.

## 2021-02-27 NOTE — PROGRESS NOTE ADULT - SUBJECTIVE AND OBJECTIVE BOX
CC: Severe anemia. Aortoiliac occlusive disease.  Received 3 unit prbc.  Awaiting colonoscopy to rule out colonic malignancy or AVM  HPI:  76 y/o female with PMH of  aortoiliac occlusive disease s/p thromboendarterectomy of deep femoral artery, endarterectomy of left CFA insertion of SFA stent on Xarelto, Plavix and Aspirin, HTN, HLD, CKD-3, hypothyroidism was sent to the ED by PCP for abnormal lab. Patient said she has been having shortness of breath on exertion, dizziness and dark stool (which she attributed to iron); her PCP did a stool sample and also blood work today. She got a call saying her hb was 5.3, sent to ED for possible transfusion. She has no chest pain, palpitation, nausea, vomiting, abdominal pain, hematuria, sick contact, recent travel.  (24 Feb 2021 22:48)    REVIEW OF SYSTEMS:    Patient denied fever, chills, abdominal pain, nausea, vomiting, cough, shortness of breath, chest pain or palpitations    Vital Signs Last 24 Hrs  T(C): 36.7 (27 Feb 2021 10:24), Max: 36.8 (27 Feb 2021 04:40)  T(F): 98.1 (27 Feb 2021 10:24), Max: 98.2 (27 Feb 2021 04:40)  HR: 82 (27 Feb 2021 10:24) (71 - 82)  BP: 137/54 (27 Feb 2021 10:24) (129/82 - 137/54)  BP(mean): --  RR: 18 (27 Feb 2021 10:24) (18 - 19)  SpO2: 100% (27 Feb 2021 10:24) (97% - 100%)I&O's Summary    PHYSICAL EXAM:  GENERAL: NAD,   HEENT: PERRL, +EOMI, anicteric, no [place]  NECK: Supple, No JVD   CHEST/LUNG: CTA bilaterally; Normal effort  HEART: S1S2 Normal intensity, no murmurs, gallops or rubs noted  ABDOMEN: Soft, BS Normoactive, NT, ND, no HSM noted  EXTREMITIES:  2+ radial and DP pulses noted, no clubbing, cyanosis, or edema noted, Limited mobility   SKIN: No rashes or lesions noted  NEURO: A&Ox3, no focal deficits noted, CN II-XII intact  PSYCH: Depressed mood and affect; insight/judgement appropriate  LABS:                        9.3    9.10  )-----------( 250      ( 27 Feb 2021 07:33 )             29.5     02-27    140  |  103  |  33.0<H>  ----------------------------<  179<H>  4.3   |  24.0  |  1.56<H>    Ca    9.0      27 Feb 2021 07:33  Phos  4.1     02-27  Mg     1.6     02-27      PT/INR - ( 26 Feb 2021 06:21 )   PT: 13.2 sec;   INR: 1.15 ratio             RADIOLOGY & ADDITIONAL TESTS:    MEDICATIONS:  MEDICATIONS  (STANDING):  atorvastatin 10 milliGRAM(s) Oral at bedtime  carvedilol 25 milliGRAM(s) Oral <User Schedule>  cyanocobalamin 1000 MICROGram(s) Oral daily  dextrose 40% Gel 15 Gram(s) Oral once  dextrose 5%. 1000 milliLiter(s) (50 mL/Hr) IV Continuous <Continuous>  dextrose 5%. 1000 milliLiter(s) (100 mL/Hr) IV Continuous <Continuous>  dextrose 50% Injectable 25 Gram(s) IV Push once  dextrose 50% Injectable 12.5 Gram(s) IV Push once  dextrose 50% Injectable 25 Gram(s) IV Push once  gabapentin 100 milliGRAM(s) Oral two times a day  glucagon  Injectable 1 milliGRAM(s) IntraMuscular once  insulin glargine Injectable (LANTUS) 25 Unit(s) SubCutaneous at bedtime  insulin lispro (ADMELOG) corrective regimen sliding scale   SubCutaneous three times a day before meals  insulin lispro (ADMELOG) corrective regimen sliding scale   SubCutaneous at bedtime  levothyroxine 112 MICROGram(s) Oral daily  multivitamin 1 Tablet(s) Oral daily  pantoprazole  Injectable 40 milliGRAM(s) IV Push two times a day    MEDICATIONS  (PRN):  LORazepam     Tablet 0.5 milliGRAM(s) Oral three times a day PRN Anxiety

## 2021-02-27 NOTE — PROGRESS NOTE ADULT - SUBJECTIVE AND OBJECTIVE BOX
[facility]                                   [address] [place] [address]                                                      PHONE: [phone]                                                         FAX: [phone]  [email or web address]  ---------------------------------------------------------------------------------------------------------------------------------    Overnight events/patient complaints:  no events     PAST MEDICAL & SURGICAL HISTORY:  Pulmonic regurgitation    HLD (hyperlipidemia)    Lung nodule    Spinal stenosis, lumbar region, with neurogenic claudication    Carotid stenosis, bilateral    Aorto-iliac disease    Psoriasis    Congenital heart failure    History of anemia due to chronic kidney disease    Peripheral neuropathy    Anxiety    CKD (chronic kidney disease) stage 3, GFR 30-59 ml/min    PVD (peripheral vascular disease) with claudication    AICD (automatic cardioverter/defibrillator) present  rep notified senia hoang sci    CAD (coronary artery disease)    Hypertension    Hypothyroid    Diabetes  IDDM    S/P evacuation of hematoma  right groin, then required wound vac    Pessary maintenance    Branch retinal vein occlusion with neovascularization of right eye  treated with laser    H/O vascular surgery  Infrarenal aortic endarterectomy with aortic bi iliac bypass, and superficial femoral artery  angioplasty and stenting, 3/24/16.    History of intravascular stent placement  femoral artery angioplasty and stents, 3/24/16    Acute cataract        latex (Rash)  No Known Drug Allergies    MEDICATIONS  (STANDING):  atorvastatin 10 milliGRAM(s) Oral at bedtime  cyanocobalamin 1000 MICROGram(s) Oral daily  dextrose 40% Gel 15 Gram(s) Oral once  dextrose 5%. 1000 milliLiter(s) (50 mL/Hr) IV Continuous <Continuous>  dextrose 5%. 1000 milliLiter(s) (100 mL/Hr) IV Continuous <Continuous>  dextrose 50% Injectable 25 Gram(s) IV Push once  dextrose 50% Injectable 12.5 Gram(s) IV Push once  dextrose 50% Injectable 25 Gram(s) IV Push once  gabapentin 100 milliGRAM(s) Oral two times a day  glucagon  Injectable 1 milliGRAM(s) IntraMuscular once  insulin glargine Injectable (LANTUS) 25 Unit(s) SubCutaneous at bedtime  insulin lispro (ADMELOG) corrective regimen sliding scale   SubCutaneous three times a day before meals  insulin lispro (ADMELOG) corrective regimen sliding scale   SubCutaneous at bedtime  levothyroxine 112 MICROGram(s) Oral daily  multivitamin 1 Tablet(s) Oral daily  pantoprazole  Injectable 40 milliGRAM(s) IV Push two times a day    MEDICATIONS  (PRN):  LORazepam     Tablet 0.5 milliGRAM(s) Oral three times a day PRN Anxiety      Vital Signs Last 24 Hrs  T(C): 36.8 (27 Feb 2021 04:40), Max: 36.8 (26 Feb 2021 16:44)  T(F): 98.2 (27 Feb 2021 04:40), Max: 98.3 (26 Feb 2021 16:44)  HR: 71 (27 Feb 2021 04:40) (58 - 85)  BP: 129/82 (27 Feb 2021 04:40) (129/82 - 148/55)  BP(mean): --  RR: 18 (27 Feb 2021 04:40) (18 - 20)  SpO2: 97% (27 Feb 2021 04:40) (97% - 99%)  ICU Vital Signs Last 24 Hrs  YE LAWSON  I&O's Detail    I&O's Summary    Drug Dosing Weight  YE LAWSON      PHYSICAL EXAM:  General: Appears well developed, well nourished alert and cooperative.  HEENT: Head; normocephalic, atraumatic.  Eyes: Pupils reactive, cornea wnl.  Neck: Supple, no nodes adenopathy, no NVD or carotid bruit or thyromegaly.  CARDIOVASCULAR: Normal S1 and S2, No murmur, rub, gallop or lift.   LUNGS: No rales, rhonchi or wheeze. Normal breath sounds bilaterally.  ABDOMEN: Soft, nontender without mass or organomegaly. bowel sounds normoactive.  EXTREMITIES: No clubbing, cyanosis or edema. Distal pulses wnl.   SKIN: warm and dry with normal turgor.  NEURO: Alert/oriented x 3/normal motor exam. No pathologic reflexes.    PSYCH: normal affect.        LABS:                        9.3    9.10  )-----------( 250      ( 27 Feb 2021 07:33 )             29.5     02-27    140  |  103  |  33.0<H>  ----------------------------<  179<H>  4.3   |  24.0  |  1.56<H>    Ca    9.0      27 Feb 2021 07:33  Phos  4.1     02-27  Mg     1.6     02-27      YE LAWSON      PT/INR - ( 26 Feb 2021 06:21 )   PT: 13.2 sec;   INR: 1.15 ratio             ASSESSMENT AND PLAN:   ,In summary  YE LAWSON is an 75y Female with past medical history significant nonischemic cardiomyopathy with normalization of her LV function, biventricular pacemaker with AICD, moderate CAD in 2007, hypertension, hyperlipidemia, diabetes, peripheral vascular disease status post peripheral intervention s/p thromboendarterectomy of deep femoral artery and Endarterectomy of left common femoral artery for symptomatic PVD and prior cardiac work up PET CT 1/2020 normal perfusion and TTE 7/2020 normal EF without any significant valvular disease who presents with [facility] due to abnormal lab. Patient said she has been having shortness of breath on exertion, dizziness and dark stool (which she attributed to iron); her PCP did a stool sample and also blood work and she was found to have severe anemia 4.9 s/p PRBC.  Denies any chest pain orthopnea or PND.  No evidence of ACS  or Heart failure     Pt planned for colonoscopy  on monday   Patient undergoing a low risk GI procedure in which patient has a moderate CV risk but no active CV contraindication at this time   Restart ASA when ok with GI   continue current meds/dosages   no further cardiac interventions warranted at this time    Thank you for allowing [facility] to participate in the care of this patient.  Please feel free to call with any questions or concerns.

## 2021-02-28 ENCOUNTER — TRANSCRIPTION ENCOUNTER (OUTPATIENT)
Age: 76
End: 2021-02-28

## 2021-02-28 LAB
ALBUMIN SERPL ELPH-MCNC: 3.3 G/DL — SIGNIFICANT CHANGE UP (ref 3.3–5.2)
ALP SERPL-CCNC: 55 U/L — SIGNIFICANT CHANGE UP (ref 40–120)
ALT FLD-CCNC: 7 U/L — SIGNIFICANT CHANGE UP
ANION GAP SERPL CALC-SCNC: 13 MMOL/L — SIGNIFICANT CHANGE UP (ref 5–17)
ANION GAP SERPL CALC-SCNC: 14 MMOL/L — SIGNIFICANT CHANGE UP (ref 5–17)
AST SERPL-CCNC: 10 U/L — SIGNIFICANT CHANGE UP
BASOPHILS # BLD AUTO: 0.04 K/UL — SIGNIFICANT CHANGE UP (ref 0–0.2)
BASOPHILS NFR BLD AUTO: 0.6 % — SIGNIFICANT CHANGE UP (ref 0–2)
BILIRUB SERPL-MCNC: 0.6 MG/DL — SIGNIFICANT CHANGE UP (ref 0.4–2)
BUN SERPL-MCNC: 29 MG/DL — HIGH (ref 8–20)
BUN SERPL-MCNC: 30 MG/DL — HIGH (ref 8–20)
CALCIUM SERPL-MCNC: 8.5 MG/DL — LOW (ref 8.6–10.2)
CALCIUM SERPL-MCNC: 8.6 MG/DL — SIGNIFICANT CHANGE UP (ref 8.6–10.2)
CHLORIDE SERPL-SCNC: 103 MMOL/L — SIGNIFICANT CHANGE UP (ref 98–107)
CHLORIDE SERPL-SCNC: 104 MMOL/L — SIGNIFICANT CHANGE UP (ref 98–107)
CO2 SERPL-SCNC: 21 MMOL/L — LOW (ref 22–29)
CO2 SERPL-SCNC: 22 MMOL/L — SIGNIFICANT CHANGE UP (ref 22–29)
CREAT SERPL-MCNC: 1.38 MG/DL — HIGH (ref 0.5–1.3)
CREAT SERPL-MCNC: 1.41 MG/DL — HIGH (ref 0.5–1.3)
EOSINOPHIL # BLD AUTO: 0.16 K/UL — SIGNIFICANT CHANGE UP (ref 0–0.5)
EOSINOPHIL NFR BLD AUTO: 2.3 % — SIGNIFICANT CHANGE UP (ref 0–6)
GLUCOSE BLDC GLUCOMTR-MCNC: 123 MG/DL — HIGH (ref 70–99)
GLUCOSE BLDC GLUCOMTR-MCNC: 171 MG/DL — HIGH (ref 70–99)
GLUCOSE BLDC GLUCOMTR-MCNC: 180 MG/DL — HIGH (ref 70–99)
GLUCOSE BLDC GLUCOMTR-MCNC: 215 MG/DL — HIGH (ref 70–99)
GLUCOSE SERPL-MCNC: 219 MG/DL — HIGH (ref 70–99)
GLUCOSE SERPL-MCNC: 223 MG/DL — HIGH (ref 70–99)
HCT VFR BLD CALC: 26.2 % — LOW (ref 34.5–45)
HCT VFR BLD CALC: 26.7 % — LOW (ref 34.5–45)
HGB BLD-MCNC: 8.4 G/DL — LOW (ref 11.5–15.5)
HGB BLD-MCNC: 8.5 G/DL — LOW (ref 11.5–15.5)
IMM GRANULOCYTES NFR BLD AUTO: 0.3 % — SIGNIFICANT CHANGE UP (ref 0–1.5)
INR BLD: 1.11 RATIO — SIGNIFICANT CHANGE UP (ref 0.88–1.16)
INR BLD: 1.12 RATIO — SIGNIFICANT CHANGE UP (ref 0.88–1.16)
LYMPHOCYTES # BLD AUTO: 1.19 K/UL — SIGNIFICANT CHANGE UP (ref 1–3.3)
LYMPHOCYTES # BLD AUTO: 17.2 % — SIGNIFICANT CHANGE UP (ref 13–44)
MCHC RBC-ENTMCNC: 30.4 PG — SIGNIFICANT CHANGE UP (ref 27–34)
MCHC RBC-ENTMCNC: 31.1 PG — SIGNIFICANT CHANGE UP (ref 27–34)
MCHC RBC-ENTMCNC: 31.5 GM/DL — LOW (ref 32–36)
MCHC RBC-ENTMCNC: 32.4 GM/DL — SIGNIFICANT CHANGE UP (ref 32–36)
MCV RBC AUTO: 96 FL — SIGNIFICANT CHANGE UP (ref 80–100)
MCV RBC AUTO: 96.7 FL — SIGNIFICANT CHANGE UP (ref 80–100)
MONOCYTES # BLD AUTO: 0.7 K/UL — SIGNIFICANT CHANGE UP (ref 0–0.9)
MONOCYTES NFR BLD AUTO: 10.1 % — SIGNIFICANT CHANGE UP (ref 2–14)
NEUTROPHILS # BLD AUTO: 4.81 K/UL — SIGNIFICANT CHANGE UP (ref 1.8–7.4)
NEUTROPHILS NFR BLD AUTO: 69.5 % — SIGNIFICANT CHANGE UP (ref 43–77)
PLATELET # BLD AUTO: 217 K/UL — SIGNIFICANT CHANGE UP (ref 150–400)
PLATELET # BLD AUTO: 221 K/UL — SIGNIFICANT CHANGE UP (ref 150–400)
POTASSIUM SERPL-MCNC: 3.6 MMOL/L — SIGNIFICANT CHANGE UP (ref 3.5–5.3)
POTASSIUM SERPL-MCNC: 3.6 MMOL/L — SIGNIFICANT CHANGE UP (ref 3.5–5.3)
POTASSIUM SERPL-SCNC: 3.6 MMOL/L — SIGNIFICANT CHANGE UP (ref 3.5–5.3)
POTASSIUM SERPL-SCNC: 3.6 MMOL/L — SIGNIFICANT CHANGE UP (ref 3.5–5.3)
PROT SERPL-MCNC: 5.9 G/DL — LOW (ref 6.6–8.7)
PROTHROM AB SERPL-ACNC: 12.8 SEC — SIGNIFICANT CHANGE UP (ref 10.6–13.6)
PROTHROM AB SERPL-ACNC: 12.9 SEC — SIGNIFICANT CHANGE UP (ref 10.6–13.6)
RBC # BLD: 2.73 M/UL — LOW (ref 3.8–5.2)
RBC # BLD: 2.76 M/UL — LOW (ref 3.8–5.2)
RBC # FLD: 17.8 % — HIGH (ref 10.3–14.5)
RBC # FLD: 17.8 % — HIGH (ref 10.3–14.5)
SODIUM SERPL-SCNC: 138 MMOL/L — SIGNIFICANT CHANGE UP (ref 135–145)
SODIUM SERPL-SCNC: 139 MMOL/L — SIGNIFICANT CHANGE UP (ref 135–145)
WBC # BLD: 6.69 K/UL — SIGNIFICANT CHANGE UP (ref 3.8–10.5)
WBC # BLD: 6.92 K/UL — SIGNIFICANT CHANGE UP (ref 3.8–10.5)
WBC # FLD AUTO: 6.69 K/UL — SIGNIFICANT CHANGE UP (ref 3.8–10.5)
WBC # FLD AUTO: 6.92 K/UL — SIGNIFICANT CHANGE UP (ref 3.8–10.5)

## 2021-02-28 PROCEDURE — 99233 SBSQ HOSP IP/OBS HIGH 50: CPT

## 2021-02-28 RX ORDER — SOD SULF/SODIUM/NAHCO3/KCL/PEG
1000 SOLUTION, RECONSTITUTED, ORAL ORAL EVERY 4 HOURS
Refills: 0 | Status: COMPLETED | OUTPATIENT
Start: 2021-02-28 | End: 2021-02-28

## 2021-02-28 RX ADMIN — INSULIN GLARGINE 25 UNIT(S): 100 INJECTION, SOLUTION SUBCUTANEOUS at 20:34

## 2021-02-28 RX ADMIN — Medication 1000 MILLILITER(S): at 19:45

## 2021-02-28 RX ADMIN — Medication 1000 MILLILITER(S): at 17:18

## 2021-02-28 RX ADMIN — ATORVASTATIN CALCIUM 10 MILLIGRAM(S): 80 TABLET, FILM COATED ORAL at 20:35

## 2021-02-28 RX ADMIN — Medication 2: at 07:42

## 2021-02-28 RX ADMIN — Medication 1 TABLET(S): at 11:55

## 2021-02-28 RX ADMIN — GABAPENTIN 100 MILLIGRAM(S): 400 CAPSULE ORAL at 17:00

## 2021-02-28 RX ADMIN — PANTOPRAZOLE SODIUM 40 MILLIGRAM(S): 20 TABLET, DELAYED RELEASE ORAL at 17:00

## 2021-02-28 RX ADMIN — PANTOPRAZOLE SODIUM 40 MILLIGRAM(S): 20 TABLET, DELAYED RELEASE ORAL at 05:04

## 2021-02-28 RX ADMIN — Medication 112 MICROGRAM(S): at 05:04

## 2021-02-28 RX ADMIN — Medication 1: at 11:55

## 2021-02-28 RX ADMIN — PREGABALIN 1000 MICROGRAM(S): 225 CAPSULE ORAL at 11:55

## 2021-02-28 RX ADMIN — GABAPENTIN 100 MILLIGRAM(S): 400 CAPSULE ORAL at 05:04

## 2021-02-28 RX ADMIN — Medication 1: at 17:00

## 2021-02-28 NOTE — PROGRESS NOTE ADULT - ASSESSMENT
76 y/o female with PMH of aortoiliac occlusive disease s/p thromboendarterectomy of deep femoral artery, endarterectomy of left CFA insertion of SFA stent on Xarelto, Plavix and Aspirin, HTN, HLD, CKD-3, hypothyroidism was sent to the ED by PCP for abnormal lab; Hb: 5.3. In the ED, hb: 4.9 on arrival. Transfused 3 units of PRBCs and Hb remains stable. Occult blood positive. EGD today negative. Tentative plans for colonoscopy on Monday.     Severe symptomatic anemia   Hb remains stable; 10.4 s/p 3 units of PRBC  Continue to hold Xarelto, Plavix, Aspirin as discussed with GI  Occult blood positive  PPI IV BID  EGD negative  GI plan colonoscopy on Monday    YUDI on CKD-3   - follows with Dr. Mcallister as outpatient  -likely due to intravascular volume depletion due to blood loss and HCTZ  Renal indices is improving   -continue to hold diuretics and ARB  -strict I/Os, daily weights  -avoid nephrotoxic agents and renally dose medications  Trend BMP    HTN   BP elevated; resume norvasc    Aortoiliac occlusive disease   S/p thromboendarterectomy of deep femoral artery, endarterectomy of left CFA insertion of SFA stent   Continue to hold Xarelto, Plavix and Aspirin as above. Risk vs benefits discussed with patient and daughter .  Pending GI coloscopy     Hypothyroidism   continue Synthroid      DM-2   HbA1c 5.0  Patient is on Lantus  25 unit HS   Continue Lispro sliding scale   Follows with Dr. Pierre as outpatient    HLD  -statin    Anxiety  -xanax as needed    Neuropathy  -continue gapapentin    DVT ppx - hold Xarelto, SCDs    Dispo - Hb remains stable. Tentative plans for colonoscopy on Monday 3/1.          74 y/o female with PMH of aortoiliac occlusive disease s/p thromboendarterectomy of deep femoral artery, endarterectomy of left CFA insertion of SFA stent on Xarelto, Plavix and Aspirin, HTN, HLD, CKD-3, hypothyroidism was sent to the ED by PCP for abnormal lab; Hb: 5.3. In the ED, hb: 4.9 on arrival. Transfused 3 units of PRBCs and Hb remains stable. Occult blood positive. EGD today negative. Tentative plans for colonoscopy on Monday.     Severe symptomatic anemia   Hb remains stable; 10.4 s/p 3 units of PRBC  Continue to hold Xarelto, Plavix, Aspirin as discussed with GI  Occult blood positive  PPI IV BID  EGD negative  GI plan colonoscopy on Monday 3/1    YUDI on CKD-3   - follows with Dr. Mcallister as outpatient  -likely due to intravascular volume depletion due to blood loss and HCTZ  Renal indices is improving   -continue to hold diuretics and ARB  - I/Os, daily weights  -avoid nephrotoxic agents and renally dose medications  Trend BMP    HTN   BP elevated; resume norvasc    Aortoiliac occlusive disease   S/p thromboendarterectomy of deep femoral artery, endarterectomy of left CFA insertion of SFA stent   Continue to hold Xarelto, Plavix and Aspirin as above for possibility of GI bleed   Pending GI coloscopy     Hypothyroidism   continue Synthroid      DM-2   HbA1c 5.0  Patient is on Lantus  25 unit HS   Continue Lispro sliding scale   Follows with Dr. Pierre as outpatient    HLD  -statin    Anxiety  -xanax as needed    Neuropathy  -continue gapapentin    DVT ppx - hold Xarelto, SCDs    Dispo - Hb remains stable. Tentative plans for colonoscopy on Monday 3/1.

## 2021-02-28 NOTE — PROGRESS NOTE ADULT - SUBJECTIVE AND OBJECTIVE BOX
CC:   HPI:  74 y/o female with PMH of  aortoiliac occlusive disease s/p thromboendarterectomy of deep femoral artery, endarterectomy of left CFA insertion of SFA stent on Xarelto, Plavix and Aspirin, HTN, HLD, CKD-3, hypothyroidism was sent to the ED by PCP for abnormal lab. Patient said she has been having shortness of breath on exertion, dizziness and dark stool (which she attributed to iron); her PCP did a stool sample and also blood work today. She got a call saying her hb was 5.3, sent to ED for possible transfusion. She has no chest pain, palpitation, nausea, vomiting, abdominal pain, hematuria, sick contact, recent travel.  (24 Feb 2021 22:48)    REVIEW OF SYSTEMS:    Patient denied fever, chills, abdominal pain, nausea, vomiting, cough, shortness of breath, chest pain or palpitations    Vital Signs Last 24 Hrs  T(C): 36.6 (28 Feb 2021 12:15), Max: 36.9 (27 Feb 2021 22:30)  T(F): 97.8 (28 Feb 2021 12:15), Max: 98.4 (27 Feb 2021 22:30)  HR: 72 (28 Feb 2021 12:15) (72 - 84)  BP: 117/78 (28 Feb 2021 12:15) (101/52 - 119/54)  BP(mean): --  RR: 18 (28 Feb 2021 12:15) (16 - 18)  SpO2: 92% (28 Feb 2021 12:15) (92% - 99%)I&O's Summary    27 Feb 2021 07:01  -  28 Feb 2021 07:00  --------------------------------------------------------  IN: 480 mL / OUT: 0 mL / NET: 480 mL      PHYSICAL EXAM:  GENERAL: NAD, well-groomed  HEENT: PERRL, +EOMI, anicteric, no [place]  NECK: Supple, No JVD   CHEST/LUNG: CTA bilaterally; Normal effort  HEART: S1S2 Normal intensity, no murmurs, gallops or rubs noted  ABDOMEN: Soft, BS Normoactive, NT, ND, no HSM noted  EXTREMITIES:  2+ radial and DP pulses noted, no clubbing, cyanosis, or edema noted, FROM x 4  SKIN: No rashes or lesions noted  NEURO: A&Ox3, no focal deficits noted, CN II-XII intact  PSYCH: normal mood and affect; insight/judgement appropriate  LABS:                        8.5    6.92  )-----------( 217      ( 28 Feb 2021 07:11 )             26.2     02-28    139  |  104  |  29.0<H>  ----------------------------<  219<H>  3.6   |  21.0<L>  |  1.41<H>    Ca    8.5<L>      28 Feb 2021 07:14  Phos  4.1     02-27  Mg     1.6     02-27    TPro  5.9<L>  /  Alb  3.3  /  TBili  0.6  /  DBili  x   /  AST  10  /  ALT  7   /  AlkPhos  55  02-28    PT/INR - ( 28 Feb 2021 08:45 )   PT: 12.8 sec;   INR: 1.11 ratio             RADIOLOGY & ADDITIONAL TESTS:    MEDICATIONS:  MEDICATIONS  (STANDING):  atorvastatin 10 milliGRAM(s) Oral at bedtime  cyanocobalamin 1000 MICROGram(s) Oral daily  dextrose 40% Gel 15 Gram(s) Oral once  dextrose 5%. 1000 milliLiter(s) (50 mL/Hr) IV Continuous <Continuous>  dextrose 5%. 1000 milliLiter(s) (100 mL/Hr) IV Continuous <Continuous>  dextrose 50% Injectable 25 Gram(s) IV Push once  dextrose 50% Injectable 12.5 Gram(s) IV Push once  dextrose 50% Injectable 25 Gram(s) IV Push once  gabapentin 100 milliGRAM(s) Oral two times a day  glucagon  Injectable 1 milliGRAM(s) IntraMuscular once  insulin glargine Injectable (LANTUS) 25 Unit(s) SubCutaneous at bedtime  insulin lispro (ADMELOG) corrective regimen sliding scale   SubCutaneous three times a day before meals  insulin lispro (ADMELOG) corrective regimen sliding scale   SubCutaneous at bedtime  levothyroxine 112 MICROGram(s) Oral daily  multivitamin 1 Tablet(s) Oral daily  pantoprazole  Injectable 40 milliGRAM(s) IV Push two times a day  polyethylene glycol/electrolyte Solution 1000 milliLiter(s) Oral every 4 hours    MEDICATIONS  (PRN):  LORazepam     Tablet 0.5 milliGRAM(s) Oral three times a day PRN Anxiety   CC: Severe anemia with occult blood positive stool.  Vasculitis and occlusive disease of large vessel.    HPI:  74 y/o female with PMH of  aortoiliac occlusive disease s/p thromboendarterectomy of deep femoral artery, endarterectomy of left CFA insertion of SFA stent on Xarelto, Plavix and Aspirin, HTN, HLD, CKD-3, hypothyroidism was sent to the ED by PCP for abnormal lab. Patient said she has been having shortness of breath on exertion, dizziness and dark stool (which she attributed to iron); her PCP did a stool sample and also blood work today. She got a call saying her hb was 5.3, sent to ED for possible transfusion. She has no chest pain, palpitation, nausea, vomiting, abdominal pain, hematuria, sick contact, recent travel.  (24 Feb 2021 22:48)    REVIEW OF SYSTEMS:    Patient denied fever, chills, abdominal pain, nausea, vomiting, cough, shortness of breath, chest pain or palpitations    Vital Signs Last 24 Hrs  T(C): 36.6 (28 Feb 2021 12:15), Max: 36.9 (27 Feb 2021 22:30)  T(F): 97.8 (28 Feb 2021 12:15), Max: 98.4 (27 Feb 2021 22:30)  HR: 72 (28 Feb 2021 12:15) (72 - 84)  BP: 117/78 (28 Feb 2021 12:15) (101/52 - 119/54)  BP(mean): --  RR: 18 (28 Feb 2021 12:15) (16 - 18)  SpO2: 92% (28 Feb 2021 12:15) (92% - 99%)I&O's Summary    27 Feb 2021 07:01  -  28 Feb 2021 07:00  --------------------------------------------------------  IN: 480 mL / OUT: 0 mL / NET: 480 mL      PHYSICAL EXAM:  GENERAL: NAD,   HEENT: PERRL, +EOMI, anicteric, no [place]  NECK: Supple, No JVD   CHEST/LUNG: CTA bilaterally; Normal effort  HEART: S1S2 Normal intensity, no murmurs, gallops or rubs noted  ABDOMEN: Soft, BS Normoactive, NT, ND, no HSM noted  EXTREMITIES:  2+ radial and DP pulses noted, no clubbing, cyanosis, or edema noted, Limited mobility   SKIN: No rashes or lesions noted  NEURO: A&Ox3, no focal deficits noted, CN II-XII intact  PSYCH: Depressed mood and affect; insight/judgement appropriate  LABS:                        8.5    6.92  )-----------( 217      ( 28 Feb 2021 07:11 )             26.2     02-28    139  |  104  |  29.0<H>  ----------------------------<  219<H>  3.6   |  21.0<L>  |  1.41<H>    Ca    8.5<L>      28 Feb 2021 07:14  Phos  4.1     02-27  Mg     1.6     02-27    TPro  5.9<L>  /  Alb  3.3  /  TBili  0.6  /  DBili  x   /  AST  10  /  ALT  7   /  AlkPhos  55  02-28    PT/INR - ( 28 Feb 2021 08:45 )   PT: 12.8 sec;   INR: 1.11 ratio             RADIOLOGY & ADDITIONAL TESTS:    MEDICATIONS:  MEDICATIONS  (STANDING):  atorvastatin 10 milliGRAM(s) Oral at bedtime  cyanocobalamin 1000 MICROGram(s) Oral daily  dextrose 40% Gel 15 Gram(s) Oral once  dextrose 5%. 1000 milliLiter(s) (50 mL/Hr) IV Continuous <Continuous>  dextrose 5%. 1000 milliLiter(s) (100 mL/Hr) IV Continuous <Continuous>  dextrose 50% Injectable 25 Gram(s) IV Push once  dextrose 50% Injectable 12.5 Gram(s) IV Push once  dextrose 50% Injectable 25 Gram(s) IV Push once  gabapentin 100 milliGRAM(s) Oral two times a day  glucagon  Injectable 1 milliGRAM(s) IntraMuscular once  insulin glargine Injectable (LANTUS) 25 Unit(s) SubCutaneous at bedtime  insulin lispro (ADMELOG) corrective regimen sliding scale   SubCutaneous three times a day before meals  insulin lispro (ADMELOG) corrective regimen sliding scale   SubCutaneous at bedtime  levothyroxine 112 MICROGram(s) Oral daily  multivitamin 1 Tablet(s) Oral daily  pantoprazole  Injectable 40 milliGRAM(s) IV Push two times a day  polyethylene glycol/electrolyte Solution 1000 milliLiter(s) Oral every 4 hours    MEDICATIONS  (PRN):  LORazepam     Tablet 0.5 milliGRAM(s) Oral three times a day PRN Anxiety

## 2021-02-28 NOTE — PROGRESS NOTE ADULT - SUBJECTIVE AND OBJECTIVE BOX
Pt seen and examined. For colonoscopy tomorrow for further evaluation of symptomatic anemia s/p a negative EGD Friday. No GI complaints offered. Hb this AM stable at 8.5 grams.    REVIEW OF SYSTEMS:  Constitutional: No fever, weight loss or fatigue  Cardiovascular: No chest pain, palpitations, dizziness or leg swelling  Gastrointestinal: As noted above. No abdominal or epigastric pain. No nausea, vomiting or hematemesis; No diarrhea or constipation. No melena or hematochezia.  Skin: No itching, burning, rashes or lesions       MEDICATIONS:  MEDICATIONS  (STANDING):  atorvastatin 10 milliGRAM(s) Oral at bedtime  cyanocobalamin 1000 MICROGram(s) Oral daily  dextrose 40% Gel 15 Gram(s) Oral once  dextrose 5%. 1000 milliLiter(s) (50 mL/Hr) IV Continuous <Continuous>  dextrose 5%. 1000 milliLiter(s) (100 mL/Hr) IV Continuous <Continuous>  dextrose 50% Injectable 25 Gram(s) IV Push once  dextrose 50% Injectable 12.5 Gram(s) IV Push once  dextrose 50% Injectable 25 Gram(s) IV Push once  gabapentin 100 milliGRAM(s) Oral two times a day  glucagon  Injectable 1 milliGRAM(s) IntraMuscular once  insulin glargine Injectable (LANTUS) 25 Unit(s) SubCutaneous at bedtime  insulin lispro (ADMELOG) corrective regimen sliding scale   SubCutaneous three times a day before meals  insulin lispro (ADMELOG) corrective regimen sliding scale   SubCutaneous at bedtime  levothyroxine 112 MICROGram(s) Oral daily  multivitamin 1 Tablet(s) Oral daily  pantoprazole  Injectable 40 milliGRAM(s) IV Push two times a day  polyethylene glycol/electrolyte Solution 1000 milliLiter(s) Oral every 4 hours    MEDICATIONS  (PRN):  LORazepam     Tablet 0.5 milliGRAM(s) Oral three times a day PRN Anxiety      Allergies    latex (Rash)  No Known Drug Allergies    Intolerances        Vital Signs Last 24 Hrs  T(C): 36.7 (28 Feb 2021 04:39), Max: 36.9 (27 Feb 2021 22:30)  T(F): 98.1 (28 Feb 2021 04:39), Max: 98.4 (27 Feb 2021 22:30)  HR: 76 (28 Feb 2021 04:39) (76 - 84)  BP: 101/52 (28 Feb 2021 04:39) (101/52 - 137/54)  BP(mean): --  RR: 16 (28 Feb 2021 04:39) (16 - 18)  SpO2: 97% (28 Feb 2021 04:39) (97% - 100%)    02-27 @ 07:01  -  02-28 @ 07:00  --------------------------------------------------------  IN: 480 mL / OUT: 0 mL / NET: 480 mL        PHYSICAL EXAM:    General: Well developed; well nourished; in no acute distress  HEENT: MMM, conjunctiva pink and sclera anicteric.  Lungs: clear to auscultation bilaterally.  Cor: RRR S1, S2 only.  Gastrointestinal: Abdomen: Soft non-tender non-distended; Normal bowel sounds; No hepatosplenomegaly.  Extremities: no cyanosis, clubbing or edema.  Skin: Warm and dry. No obvious rash  Neuro: Pt. a + o x 3.    LABS:  CBC Full  -  ( 28 Feb 2021 07:11 )  WBC Count : 6.92 K/uL  RBC Count : 2.73 M/uL  Hemoglobin : 8.5 g/dL  Hematocrit : 26.2 %  Platelet Count - Automated : 217 K/uL  Mean Cell Volume : 96.0 fl  Mean Cell Hemoglobin : 31.1 pg  Mean Cell Hemoglobin Concentration : 32.4 gm/dL  Auto Neutrophil # : 4.81 K/uL  Auto Lymphocyte # : 1.19 K/uL  Auto Monocyte # : 0.70 K/uL  Auto Eosinophil # : 0.16 K/uL  Auto Basophil # : 0.04 K/uL  Auto Neutrophil % : 69.5 %  Auto Lymphocyte % : 17.2 %  Auto Monocyte % : 10.1 %  Auto Eosinophil % : 2.3 %  Auto Basophil % : 0.6 %    02-28    139  |  104  |  29.0<H>  ----------------------------<  219<H>  3.6   |  21.0<L>  |  1.41<H>    Ca    8.5<L>      28 Feb 2021 07:14  Phos  4.1     02-27  Mg     1.6     02-27    TPro  5.9<L>  /  Alb  3.3  /  TBili  0.6  /  DBili  x   /  AST  10  /  ALT  7   /  AlkPhos  55  02-28    PT/INR - ( 28 Feb 2021 08:45 )   PT: 12.8 sec;   INR: 1.11 ratio                           RADIOLOGY & ADDITIONAL STUDIES (The following images were personally reviewed):

## 2021-03-01 LAB
ANION GAP SERPL CALC-SCNC: 16 MMOL/L — SIGNIFICANT CHANGE UP (ref 5–17)
BASOPHILS # BLD AUTO: 0.05 K/UL — SIGNIFICANT CHANGE UP (ref 0–0.2)
BASOPHILS NFR BLD AUTO: 0.7 % — SIGNIFICANT CHANGE UP (ref 0–2)
BUN SERPL-MCNC: 23 MG/DL — HIGH (ref 8–20)
CALCIUM SERPL-MCNC: 8.9 MG/DL — SIGNIFICANT CHANGE UP (ref 8.6–10.2)
CHLORIDE SERPL-SCNC: 106 MMOL/L — SIGNIFICANT CHANGE UP (ref 98–107)
CO2 SERPL-SCNC: 21 MMOL/L — LOW (ref 22–29)
CREAT SERPL-MCNC: 1.37 MG/DL — HIGH (ref 0.5–1.3)
EOSINOPHIL # BLD AUTO: 0.2 K/UL — SIGNIFICANT CHANGE UP (ref 0–0.5)
EOSINOPHIL NFR BLD AUTO: 2.9 % — SIGNIFICANT CHANGE UP (ref 0–6)
GLUCOSE BLDC GLUCOMTR-MCNC: 109 MG/DL — HIGH (ref 70–99)
GLUCOSE BLDC GLUCOMTR-MCNC: 110 MG/DL — HIGH (ref 70–99)
GLUCOSE BLDC GLUCOMTR-MCNC: 127 MG/DL — HIGH (ref 70–99)
GLUCOSE BLDC GLUCOMTR-MCNC: 145 MG/DL — HIGH (ref 70–99)
GLUCOSE SERPL-MCNC: 101 MG/DL — HIGH (ref 70–99)
HCT VFR BLD CALC: 29.2 % — LOW (ref 34.5–45)
HGB BLD-MCNC: 9.3 G/DL — LOW (ref 11.5–15.5)
IMM GRANULOCYTES NFR BLD AUTO: 0.4 % — SIGNIFICANT CHANGE UP (ref 0–1.5)
LYMPHOCYTES # BLD AUTO: 1.18 K/UL — SIGNIFICANT CHANGE UP (ref 1–3.3)
LYMPHOCYTES # BLD AUTO: 17.3 % — SIGNIFICANT CHANGE UP (ref 13–44)
MAGNESIUM SERPL-MCNC: 1.6 MG/DL — SIGNIFICANT CHANGE UP (ref 1.6–2.6)
MCHC RBC-ENTMCNC: 31.1 PG — SIGNIFICANT CHANGE UP (ref 27–34)
MCHC RBC-ENTMCNC: 31.8 GM/DL — LOW (ref 32–36)
MCV RBC AUTO: 97.7 FL — SIGNIFICANT CHANGE UP (ref 80–100)
MONOCYTES # BLD AUTO: 0.68 K/UL — SIGNIFICANT CHANGE UP (ref 0–0.9)
MONOCYTES NFR BLD AUTO: 10 % — SIGNIFICANT CHANGE UP (ref 2–14)
NEUTROPHILS # BLD AUTO: 4.68 K/UL — SIGNIFICANT CHANGE UP (ref 1.8–7.4)
NEUTROPHILS NFR BLD AUTO: 68.7 % — SIGNIFICANT CHANGE UP (ref 43–77)
PHOSPHATE SERPL-MCNC: 3.5 MG/DL — SIGNIFICANT CHANGE UP (ref 2.4–4.7)
PLATELET # BLD AUTO: 241 K/UL — SIGNIFICANT CHANGE UP (ref 150–400)
POTASSIUM SERPL-MCNC: 3.4 MMOL/L — LOW (ref 3.5–5.3)
POTASSIUM SERPL-SCNC: 3.4 MMOL/L — LOW (ref 3.5–5.3)
RBC # BLD: 2.99 M/UL — LOW (ref 3.8–5.2)
RBC # FLD: 17.3 % — HIGH (ref 10.3–14.5)
SODIUM SERPL-SCNC: 143 MMOL/L — SIGNIFICANT CHANGE UP (ref 135–145)
WBC # BLD: 6.82 K/UL — SIGNIFICANT CHANGE UP (ref 3.8–10.5)
WBC # FLD AUTO: 6.82 K/UL — SIGNIFICANT CHANGE UP (ref 3.8–10.5)

## 2021-03-01 PROCEDURE — 99233 SBSQ HOSP IP/OBS HIGH 50: CPT

## 2021-03-01 PROCEDURE — 45378 DIAGNOSTIC COLONOSCOPY: CPT

## 2021-03-01 RX ORDER — MAGNESIUM SULFATE 500 MG/ML
1 VIAL (ML) INJECTION ONCE
Refills: 0 | Status: COMPLETED | OUTPATIENT
Start: 2021-03-01 | End: 2021-03-01

## 2021-03-01 RX ORDER — POTASSIUM CHLORIDE 20 MEQ
10 PACKET (EA) ORAL
Refills: 0 | Status: COMPLETED | OUTPATIENT
Start: 2021-03-01 | End: 2021-03-01

## 2021-03-01 RX ORDER — AMLODIPINE BESYLATE 2.5 MG/1
5 TABLET ORAL DAILY
Refills: 0 | Status: DISCONTINUED | OUTPATIENT
Start: 2021-03-01 | End: 2021-03-03

## 2021-03-01 RX ADMIN — Medication 100 MILLIEQUIVALENT(S): at 10:36

## 2021-03-01 RX ADMIN — GABAPENTIN 100 MILLIGRAM(S): 400 CAPSULE ORAL at 05:11

## 2021-03-01 RX ADMIN — PANTOPRAZOLE SODIUM 40 MILLIGRAM(S): 20 TABLET, DELAYED RELEASE ORAL at 05:11

## 2021-03-01 RX ADMIN — ATORVASTATIN CALCIUM 10 MILLIGRAM(S): 80 TABLET, FILM COATED ORAL at 22:07

## 2021-03-01 RX ADMIN — Medication 112 MICROGRAM(S): at 05:11

## 2021-03-01 RX ADMIN — INSULIN GLARGINE 25 UNIT(S): 100 INJECTION, SOLUTION SUBCUTANEOUS at 22:06

## 2021-03-01 RX ADMIN — GABAPENTIN 100 MILLIGRAM(S): 400 CAPSULE ORAL at 16:37

## 2021-03-01 RX ADMIN — Medication 100 MILLIEQUIVALENT(S): at 16:37

## 2021-03-01 RX ADMIN — PREGABALIN 1000 MICROGRAM(S): 225 CAPSULE ORAL at 11:36

## 2021-03-01 RX ADMIN — Medication 100 MILLIEQUIVALENT(S): at 15:22

## 2021-03-01 RX ADMIN — PANTOPRAZOLE SODIUM 40 MILLIGRAM(S): 20 TABLET, DELAYED RELEASE ORAL at 16:38

## 2021-03-01 RX ADMIN — Medication 100 GRAM(S): at 11:34

## 2021-03-01 RX ADMIN — Medication 1 TABLET(S): at 11:36

## 2021-03-01 NOTE — PROGRESS NOTE ADULT - SUBJECTIVE AND OBJECTIVE BOX
Patient is a 75y old  Female who presents with a chief complaint of Anemia (28 Feb 2021 16:50)    Patient seen and examined at bedside.     ALLERGIES:  latex (Rash)  No Known Drug Allergies    MEDICATIONS  (STANDING):  amLODIPine   Tablet 5 milliGRAM(s) Oral daily  atorvastatin 10 milliGRAM(s) Oral at bedtime  cyanocobalamin 1000 MICROGram(s) Oral daily  dextrose 40% Gel 15 Gram(s) Oral once  dextrose 5%. 1000 milliLiter(s) (50 mL/Hr) IV Continuous <Continuous>  dextrose 5%. 1000 milliLiter(s) (100 mL/Hr) IV Continuous <Continuous>  dextrose 50% Injectable 25 Gram(s) IV Push once  dextrose 50% Injectable 12.5 Gram(s) IV Push once  dextrose 50% Injectable 25 Gram(s) IV Push once  gabapentin 100 milliGRAM(s) Oral two times a day  glucagon  Injectable 1 milliGRAM(s) IntraMuscular once  insulin glargine Injectable (LANTUS) 25 Unit(s) SubCutaneous at bedtime  insulin lispro (ADMELOG) corrective regimen sliding scale   SubCutaneous three times a day before meals  insulin lispro (ADMELOG) corrective regimen sliding scale   SubCutaneous at bedtime  levothyroxine 112 MICROGram(s) Oral daily  magnesium sulfate  IVPB 1 Gram(s) IV Intermittent once  multivitamin 1 Tablet(s) Oral daily  pantoprazole  Injectable 40 milliGRAM(s) IV Push two times a day  potassium chloride  10 mEq/100 mL IVPB 10 milliEquivalent(s) IV Intermittent every 1 hour    MEDICATIONS  (PRN):  LORazepam     Tablet 0.5 milliGRAM(s) Oral three times a day PRN Anxiety    Vital Signs Last 24 Hrs  T(F): 97.8 (01 Mar 2021 04:18), Max: 97.8 (28 Feb 2021 12:15)  HR: 74 (01 Mar 2021 04:18) (71 - 74)  BP: 154/61 (01 Mar 2021 04:18) (117/78 - 158/66)  RR: 20 (01 Mar 2021 04:18) (18 - 20)  SpO2: 96% (01 Mar 2021 04:18) (92% - 100%)  I&O's Summary    28 Feb 2021 07:01  -  01 Mar 2021 07:00  --------------------------------------------------------  IN: 1240 mL / OUT: 0 mL / NET: 1240 mL      PHYSICAL EXAM:  General: NAD, alert  ENT: MMM, no thrush  Neck: Supple, No JVD  Lungs: good air entry, non-labored breathing  Cardio: +s1/s2  Abdomen: Soft, Nontender, Nondistended; Bowel sounds present  Extremities: No calf tenderness    LABS:                        9.3    6.82  )-----------( 241      ( 01 Mar 2021 06:47 )             29.2     03-01    143  |  106  |  23.0  ----------------------------<  101  3.4   |  21.0  |  1.37    Ca    8.9      01 Mar 2021 06:47  Phos  3.5     03-01  Mg     1.6     03-01    TPro  5.9  /  Alb  3.3  /  TBili  0.6  /  DBili  x   /  AST  10  /  ALT  7   /  AlkPhos  55  02-28    eGFR if Non African American: 37 mL/min/1.73M2 (03-01-21 @ 06:47)  eGFR if : 43 mL/min/1.73M2 (03-01-21 @ 06:47)    PT/INR - ( 28 Feb 2021 08:45 )   PT: 12.8 sec;   INR: 1.11 ratio      Glucose  POCT Blood Glucose.: 109 mg/dL (01 Mar 2021 07:49)  POCT Blood Glucose.: 123 mg/dL (28 Feb 2021 20:33)  POCT Blood Glucose.: 171 mg/dL (28 Feb 2021 16:33)  POCT Blood Glucose.: 180 mg/dL (28 Feb 2021 11:54)    RADIOLOGY & ADDITIONAL TESTS:  < from: Xray Chest 1 View-PORTABLE IMMEDIATE (Xray Chest 1 View-PORTABLE IMMEDIATE .) (02.24.21 @ 19:22) >  IMPRESSION:  Pacemaker.  < end of copied text >

## 2021-03-01 NOTE — BRIEF OPERATIVE NOTE - NSICDXBRIEFPROCEDURE_GEN_ALL_CORE_FT
PROCEDURES:  Total colonoscopy 01-Mar-2021 14:04:11  Joanne Rivers  
PROCEDURES:  EGD with biopsy 26-Feb-2021 07:39:41  Ham Prakash

## 2021-03-01 NOTE — BRIEF OPERATIVE NOTE - NSICDXBRIEFPOSTOP_GEN_ALL_CORE_FT
POST-OP DIAGNOSIS:  Colon, diverticulosis 01-Mar-2021 14:04:46  Joanne Rivers  
POST-OP DIAGNOSIS:  Normal appearance of tissue 26-Feb-2021 07:39:25  Ham Prakash

## 2021-03-01 NOTE — BRIEF OPERATIVE NOTE - COMMENTS
A/P  anemia- colonoscopy showed diverticulosis  will give solid diet. NPO after midnight. CTE In am. Will need capsule endoscopy as out patient
In light of normal EGD will need colonoscopy Monday. Pt can have solid food until Sunday when clear liquid diet will be ordered

## 2021-03-01 NOTE — BRIEF OPERATIVE NOTE - NSICDXBRIEFPREOP_GEN_ALL_CORE_FT
PRE-OP DIAGNOSIS:  Anemia 01-Mar-2021 14:04:33  Joanne Rivers  
PRE-OP DIAGNOSIS:  Dyspepsia 26-Feb-2021 07:39:10  Ham Prakash  Symptomatic anemia 26-Feb-2021 07:39:01  Ham Prakash

## 2021-03-01 NOTE — BRIEF OPERATIVE NOTE - OPERATION/FINDINGS
Normal EGD to D2. Duodenal biopsies were taken to r/o celiac disease and gastric antral and body biopsies were taken to r/o H. Pylori infection. No ulcers or gastropathy noted.
The cecum was normal, ascending colon, transverse, descending normal. Diverticulosis of the sigmoid. Rectum normal.  good prep. Cecal withdrawl time 6 min

## 2021-03-01 NOTE — PROGRESS NOTE ADULT - ASSESSMENT
76 y/o female with PMH of aortoiliac occlusive disease s/p thromboendarterectomy of deep femoral artery, endarterectomy of left CFA insertion of SFA stent on Xarelto, Plavix and Aspirin, HTN, HLD, CKD-3, hypothyroidism was sent to the ED by PCP for abnormal lab- Hb: 5.3. In the ED hemoglobin found to be 4.9. patient transfused prbc and found to be fobt positive. egd negative. pending colonoscopy.     #Severe symptomatic anemia   - monitor h and h   - s/p prbc  - hold aspirin plavix and xarelto for now - can restart when ok with GI  - fobt positive in ER  - protonix   - EGD 2/26- negative  - colonoscopy pending     #CKD-3  - follows with nephro Dr Mcallister outpatient - can follow up on d/c  - avoid nephrotoxic meds  - monitor cr    #HTN- Essential  - monitor blood pressure  - norvasc    #Aortoiliac occlusive disease   - S/p thromboendarterectomy of deep femoral artery, endarterectomy of left CFA insertion of SFA stent   - hold Xarelto, Plavix and Aspirin- given possible GI bleed  - pending coloscopy     #Hypothyroidism   - Synthroid      #DM-2  - a1c on admit 5.0  - lantus and iss   - monitor fingersticks    #hypokalemia  - repleted  - monitor   - check mg and phosp    #HLD  - statin    #Anxiety  - xanax as needed    #Neuropathy  - gapapentin    #DVT Prophylaxis  - venodynes for now given gi bleed     Dispo - pending colonoscopy today    Called and spoke to patient  [phone] all questions answered

## 2021-03-02 LAB
ANION GAP SERPL CALC-SCNC: 14 MMOL/L — SIGNIFICANT CHANGE UP (ref 5–17)
BUN SERPL-MCNC: 15 MG/DL — SIGNIFICANT CHANGE UP (ref 8–20)
CALCIUM SERPL-MCNC: 9.4 MG/DL — SIGNIFICANT CHANGE UP (ref 8.6–10.2)
CHLORIDE SERPL-SCNC: 105 MMOL/L — SIGNIFICANT CHANGE UP (ref 98–107)
CO2 SERPL-SCNC: 22 MMOL/L — SIGNIFICANT CHANGE UP (ref 22–29)
CREAT SERPL-MCNC: 1.25 MG/DL — SIGNIFICANT CHANGE UP (ref 0.5–1.3)
GLUCOSE BLDC GLUCOMTR-MCNC: 110 MG/DL — HIGH (ref 70–99)
GLUCOSE BLDC GLUCOMTR-MCNC: 110 MG/DL — HIGH (ref 70–99)
GLUCOSE BLDC GLUCOMTR-MCNC: 113 MG/DL — HIGH (ref 70–99)
GLUCOSE BLDC GLUCOMTR-MCNC: 124 MG/DL — HIGH (ref 70–99)
GLUCOSE SERPL-MCNC: 108 MG/DL — HIGH (ref 70–99)
HCT VFR BLD CALC: 31.5 % — LOW (ref 34.5–45)
HGB BLD-MCNC: 10 G/DL — LOW (ref 11.5–15.5)
MCHC RBC-ENTMCNC: 31.3 PG — SIGNIFICANT CHANGE UP (ref 27–34)
MCHC RBC-ENTMCNC: 31.7 GM/DL — LOW (ref 32–36)
MCV RBC AUTO: 98.4 FL — SIGNIFICANT CHANGE UP (ref 80–100)
PLATELET # BLD AUTO: 243 K/UL — SIGNIFICANT CHANGE UP (ref 150–400)
POTASSIUM SERPL-MCNC: 4 MMOL/L — SIGNIFICANT CHANGE UP (ref 3.5–5.3)
POTASSIUM SERPL-SCNC: 4 MMOL/L — SIGNIFICANT CHANGE UP (ref 3.5–5.3)
RBC # BLD: 3.2 M/UL — LOW (ref 3.8–5.2)
RBC # FLD: 16.7 % — HIGH (ref 10.3–14.5)
SODIUM SERPL-SCNC: 141 MMOL/L — SIGNIFICANT CHANGE UP (ref 135–145)
SURGICAL PATHOLOGY STUDY: SIGNIFICANT CHANGE UP
WBC # BLD: 6.86 K/UL — SIGNIFICANT CHANGE UP (ref 3.8–10.5)
WBC # FLD AUTO: 6.86 K/UL — SIGNIFICANT CHANGE UP (ref 3.8–10.5)

## 2021-03-02 PROCEDURE — 74177 CT ABD & PELVIS W/CONTRAST: CPT | Mod: 26

## 2021-03-02 PROCEDURE — 99233 SBSQ HOSP IP/OBS HIGH 50: CPT

## 2021-03-02 RX ADMIN — Medication 1 TABLET(S): at 11:19

## 2021-03-02 RX ADMIN — INSULIN GLARGINE 25 UNIT(S): 100 INJECTION, SOLUTION SUBCUTANEOUS at 23:00

## 2021-03-02 RX ADMIN — ATORVASTATIN CALCIUM 10 MILLIGRAM(S): 80 TABLET, FILM COATED ORAL at 23:00

## 2021-03-02 RX ADMIN — Medication 112 MICROGRAM(S): at 05:50

## 2021-03-02 RX ADMIN — GABAPENTIN 100 MILLIGRAM(S): 400 CAPSULE ORAL at 16:32

## 2021-03-02 RX ADMIN — PANTOPRAZOLE SODIUM 40 MILLIGRAM(S): 20 TABLET, DELAYED RELEASE ORAL at 05:49

## 2021-03-02 RX ADMIN — AMLODIPINE BESYLATE 5 MILLIGRAM(S): 2.5 TABLET ORAL at 05:50

## 2021-03-02 RX ADMIN — PANTOPRAZOLE SODIUM 40 MILLIGRAM(S): 20 TABLET, DELAYED RELEASE ORAL at 16:32

## 2021-03-02 RX ADMIN — PREGABALIN 1000 MICROGRAM(S): 225 CAPSULE ORAL at 11:19

## 2021-03-02 RX ADMIN — GABAPENTIN 100 MILLIGRAM(S): 400 CAPSULE ORAL at 05:50

## 2021-03-02 NOTE — PROGRESS NOTE ADULT - SUBJECTIVE AND OBJECTIVE BOX
Pt seen and examined. For CT enterography this AM. Status post a negative EGD and colonoscopy this admission for w/u of symptomatic anemia. No GI complaints offered.    REVIEW OF SYSTEMS:  Constitutional: No fever, weight loss or fatigue  Cardiovascular: No chest pain, palpitations, dizziness or leg swelling  Gastrointestinal:  As noted above.  Skin: No itching, burning, rashes or lesions       MEDICATIONS:  MEDICATIONS  (STANDING):  amLODIPine   Tablet 5 milliGRAM(s) Oral daily  atorvastatin 10 milliGRAM(s) Oral at bedtime  cyanocobalamin 1000 MICROGram(s) Oral daily  dextrose 40% Gel 15 Gram(s) Oral once  dextrose 5%. 1000 milliLiter(s) (50 mL/Hr) IV Continuous <Continuous>  dextrose 5%. 1000 milliLiter(s) (100 mL/Hr) IV Continuous <Continuous>  dextrose 50% Injectable 25 Gram(s) IV Push once  dextrose 50% Injectable 12.5 Gram(s) IV Push once  dextrose 50% Injectable 25 Gram(s) IV Push once  gabapentin 100 milliGRAM(s) Oral two times a day  glucagon  Injectable 1 milliGRAM(s) IntraMuscular once  insulin glargine Injectable (LANTUS) 25 Unit(s) SubCutaneous at bedtime  insulin lispro (ADMELOG) corrective regimen sliding scale   SubCutaneous three times a day before meals  insulin lispro (ADMELOG) corrective regimen sliding scale   SubCutaneous at bedtime  levothyroxine 112 MICROGram(s) Oral daily  multivitamin 1 Tablet(s) Oral daily  pantoprazole  Injectable 40 milliGRAM(s) IV Push two times a day    MEDICATIONS  (PRN):  LORazepam     Tablet 0.5 milliGRAM(s) Oral three times a day PRN Anxiety      Allergies    latex (Rash)  No Known Drug Allergies    Intolerances        Vital Signs Last 24 Hrs  T(C): 36.7 (02 Mar 2021 05:08), Max: 36.7 (02 Mar 2021 05:08)  T(F): 98 (02 Mar 2021 05:08), Max: 98 (02 Mar 2021 05:08)  HR: 73 (02 Mar 2021 05:08) (73 - 76)  BP: 128/56 (02 Mar 2021 05:08) (128/56 - 148/55)  BP(mean): --  RR: 18 (02 Mar 2021 05:08) (18 - 18)  SpO2: 97% (02 Mar 2021 05:08) (97% - 99%)      PHYSICAL EXAM:    General: Well developed; well nourished; in no acute distress  HEENT: MMM, conjunctiva pink and sclera anicteric.  Lungs: clear to auscultation bilaterally.  Cor: RRR S1, S2 only.  Gastrointestinal: Abdomen: Soft non-tender non-distended; Normal bowel sounds; No hepatosplenomegaly.  Extremities: no cyanosis, clubbing or edema.  Skin: Warm and dry. No obvious rash  Neuro: Pt. a + o x 3    LABS:      CBC Full  -  ( 01 Mar 2021 06:47 )  WBC Count : 6.82 K/uL  RBC Count : 2.99 M/uL  Hemoglobin : 9.3 g/dL  Hematocrit : 29.2 %  Platelet Count - Automated : 241 K/uL  Mean Cell Volume : 97.7 fl  Mean Cell Hemoglobin : 31.1 pg  Mean Cell Hemoglobin Concentration : 31.8 gm/dL  Auto Neutrophil # : 4.68 K/uL  Auto Lymphocyte # : 1.18 K/uL  Auto Monocyte # : 0.68 K/uL  Auto Eosinophil # : 0.20 K/uL  Auto Basophil # : 0.05 K/uL  Auto Neutrophil % : 68.7 %  Auto Lymphocyte % : 17.3 %  Auto Monocyte % : 10.0 %  Auto Eosinophil % : 2.9 %  Auto Basophil % : 0.7 %    03-01    143  |  106  |  23.0<H>  ----------------------------<  101<H>  3.4<L>   |  21.0<L>  |  1.37<H>    Ca    8.9      01 Mar 2021 06:47  Phos  3.5     03-01  Mg     1.6     03-01                        RADIOLOGY & ADDITIONAL STUDIES (The following images were personally reviewed):

## 2021-03-02 NOTE — PROGRESS NOTE ADULT - SUBJECTIVE AND OBJECTIVE BOX
[facility]                                   [address] [place] [address]                                                      PHONE: [phone]                                                         FAX: [phone]  [email or web address]  ---------------------------------------------------------------------------------------------------------------------------------    Overnight events/patient complaints:  NAD feeling well today     latex (Rash)  No Known Drug Allergies    MEDICATIONS  (STANDING):  amLODIPine   Tablet 5 milliGRAM(s) Oral daily  atorvastatin 10 milliGRAM(s) Oral at bedtime  cyanocobalamin 1000 MICROGram(s) Oral daily  dextrose 40% Gel 15 Gram(s) Oral once  dextrose 5%. 1000 milliLiter(s) (50 mL/Hr) IV Continuous <Continuous>  dextrose 5%. 1000 milliLiter(s) (100 mL/Hr) IV Continuous <Continuous>  dextrose 50% Injectable 25 Gram(s) IV Push once  dextrose 50% Injectable 12.5 Gram(s) IV Push once  dextrose 50% Injectable 25 Gram(s) IV Push once  gabapentin 100 milliGRAM(s) Oral two times a day  glucagon  Injectable 1 milliGRAM(s) IntraMuscular once  insulin glargine Injectable (LANTUS) 25 Unit(s) SubCutaneous at bedtime  insulin lispro (ADMELOG) corrective regimen sliding scale   SubCutaneous three times a day before meals  insulin lispro (ADMELOG) corrective regimen sliding scale   SubCutaneous at bedtime  levothyroxine 112 MICROGram(s) Oral daily  multivitamin 1 Tablet(s) Oral daily  pantoprazole  Injectable 40 milliGRAM(s) IV Push two times a day    MEDICATIONS  (PRN):  LORazepam     Tablet 0.5 milliGRAM(s) Oral three times a day PRN Anxiety      Vital Signs Last 24 Hrs  T(C): 36.7 (02 Mar 2021 05:08), Max: 36.7 (02 Mar 2021 05:08)  T(F): 98 (02 Mar 2021 05:08), Max: 98 (02 Mar 2021 05:08)  HR: 73 (02 Mar 2021 05:08) (73 - 76)  BP: 128/56 (02 Mar 2021 05:08) (128/56 - 148/55)  BP(mean): --  RR: 18 (02 Mar 2021 05:08) (18 - 18)  SpO2: 97% (02 Mar 2021 05:08) (97% - 99%)  ICU Vital Signs Last 24 Hrs  YE LAWSON  I&O's Detail    I&O's Summary    Drug Dosing Weight  YE RENNY      PHYSICAL EXAM:  General: Appears well developed, well nourished alert and cooperative.  HEENT: Head; normocephalic, atraumatic.  Eyes: Pupils reactive, cornea wnl.  Neck: Supple, no nodes adenopathy, no NVD or carotid bruit or thyromegaly.  CARDIOVASCULAR: Normal S1 and S2, No murmur, rub, gallop or lift.   LUNGS: No rales, rhonchi or wheeze. Normal breath sounds bilaterally.  ABDOMEN: Soft, nontender without mass or organomegaly. bowel sounds normoactive.  EXTREMITIES: No clubbing, cyanosis or edema. Distal pulses wnl.   SKIN: warm and dry with normal turgor.  NEURO: Alert/oriented x 3/normal motor exam. No pathologic reflexes.    PSYCH: normal affect.        LABS:                        9.3    6.82  )-----------( 241      ( 01 Mar 2021 06:47 )             29.2     03-01    143  |  106  |  23.0<H>  ----------------------------<  101<H>  3.4<L>   |  21.0<L>  |  1.37<H>    Ca    8.9      01 Mar 2021 06:47  Phos  3.5     03-01  Mg     1.6     03-01      YE LAWSON            RADIOLOGY & ADDITIONAL STUDIES:    INTERPRETATION OF TELEMETRY (personally reviewed):    75y Female with past medical history significant nonischemic cardiomyopathy with normalization of her LV function, biventricular pacemaker with AICD, moderate CAD in 2007, hypertension, hyperlipidemia, diabetes, peripheral vascular disease status post peripheral intervention s/p thromboendarterectomy of deep femoral artery and Endarterectomy of left common femoral artery for symptomatic PVD and prior cardiac work up PET CT 1/2020 normal perfusion and TTE 7/2020 normal EF without any significant valvular disease who presents with [facility] ER due to abnormal lab. Patient said she has been having shortness of breath on exertion, dizziness and dark stool (which she attributed to iron); her PCP did a stool sample and also blood work and she was found to have severe anemia 4.9 s/p PRBC.  Denies any chest pain orthopnea or PND.  No evidence of ACS  or Heart failure       Restart ASA when ok with GI   Continue current CV medications

## 2021-03-02 NOTE — PHYSICAL THERAPY INITIAL EVALUATION ADULT - PERTINENT HX OF CURRENT PROBLEM, REHAB EVAL
74 y/o female with PMH of aortoiliac occlusive disease s/p thromboendarterectomy of deep femoral artery, endarterectomy of left CFA insertion of SFA stent on Xarelto, Plavix and Aspirin, HTN, HLD, CKD-3, hypothyroidism was sent to the ED by PCP for abnormal lab- Hb: 5.3. In the ED hemoglobin found to be 4.9. patient transfused prbc and found to be fobt positive. egd negative. colonoscopy negative

## 2021-03-02 NOTE — CHART NOTE - NSCHARTNOTEFT_GEN_A_CORE
Pt's EGD biopsies were + for H. Pylori. When discharged she should be Rxed with Amoxicillin 1 gram PO BID and Metronidazole 500 mg. PO BID and Clarithromycin 500 mg. PO BID and Pantoprazole 40 mg. PO BID x two weeks. We will obtain a post treatment H. Pylori Urea Breath test as an OPT at least two weeks after this therapy has been completed. Pt's Hospitalist Attending here Dr. Kwadwo Gallegos was informed of this now. Pt. Will likely be sent home tomorrow as her CT Enterography as not yet been resulted.

## 2021-03-02 NOTE — PHYSICAL THERAPY INITIAL EVALUATION ADULT - ADDITIONAL COMMENTS
Pt reports she lives with her  in a single story house with 3 steps to enter with bilat handrails + 12 steps to basement with handrails. pt reports since bypass in December she required a RW and assistance with ADLs; prior to bypass pt reports independence with all ADLs, no assistive device needed and driving. pt reports  is able and willing to assist as needed. pt owns a RW, cane, commode, shower chair and has grab bars in bathroom.

## 2021-03-02 NOTE — PROGRESS NOTE ADULT - SUBJECTIVE AND OBJECTIVE BOX
Patient is a 75y old  Female who presents with a chief complaint of Anemia (28 Feb 2021 16:50)    Patient seen and examined at bedside.     Allergies    latex (Rash)  No Known Drug Allergies    MEDICATIONS  (STANDING):  amLODIPine   Tablet 5 milliGRAM(s) Oral daily  atorvastatin 10 milliGRAM(s) Oral at bedtime  cyanocobalamin 1000 MICROGram(s) Oral daily  dextrose 40% Gel 15 Gram(s) Oral once  dextrose 5%. 1000 milliLiter(s) (50 mL/Hr) IV Continuous <Continuous>  dextrose 5%. 1000 milliLiter(s) (100 mL/Hr) IV Continuous <Continuous>  dextrose 50% Injectable 25 Gram(s) IV Push once  dextrose 50% Injectable 12.5 Gram(s) IV Push once  dextrose 50% Injectable 25 Gram(s) IV Push once  gabapentin 100 milliGRAM(s) Oral two times a day  glucagon  Injectable 1 milliGRAM(s) IntraMuscular once  insulin glargine Injectable (LANTUS) 25 Unit(s) SubCutaneous at bedtime  insulin lispro (ADMELOG) corrective regimen sliding scale   SubCutaneous three times a day before meals  insulin lispro (ADMELOG) corrective regimen sliding scale   SubCutaneous at bedtime  levothyroxine 112 MICROGram(s) Oral daily  multivitamin 1 Tablet(s) Oral daily  pantoprazole  Injectable 40 milliGRAM(s) IV Push two times a day    MEDICATIONS  (PRN):  LORazepam     Tablet 0.5 milliGRAM(s) Oral three times a day PRN Anxiety    Vital Signs Last 24 Hrs  T(C): 36.7 (02 Mar 2021 05:08), Max: 36.7 (02 Mar 2021 05:08)  T(F): 98 (02 Mar 2021 05:08), Max: 98 (02 Mar 2021 05:08)  HR: 73 (02 Mar 2021 05:08) (73 - 76)  BP: 128/56 (02 Mar 2021 05:08) (128/56 - 148/55)  BP(mean): --  RR: 18 (02 Mar 2021 05:08) (18 - 18)  SpO2: 97% (02 Mar 2021 05:08) (97% - 99%)    PHYSICAL EXAM:  General: NAD, alert  ENT: MMM, no thrush  Neck: Supple, No JVD  Lungs: good air entry, non-labored breathing  Cardio: +s1/s2  Abdomen: Soft, Nontender, Nondistended; Bowel sounds present  Extremities: No calf tenderness    LABS:                        9.3    6.82  )-----------( 241      ( 01 Mar 2021 06:47 )             29.2     03-01    143  |  106  |  23.0  ----------------------------<  101  3.4   |  21.0  |  1.37    Ca    8.9      01 Mar 2021 06:47  Phos  3.5     03-01  Mg     1.6     03-01    TPro  5.9  /  Alb  3.3  /  TBili  0.6  /  DBili  x   /  AST  10  /  ALT  7   /  AlkPhos  55  02-28    eGFR if Non African American: 37 mL/min/1.73M2 (03-01-21 @ 06:47)  eGFR if : 43 mL/min/1.73M2 (03-01-21 @ 06:47)    PT/INR - ( 28 Feb 2021 08:45 )   PT: 12.8 sec;   INR: 1.11 ratio      Glucose  POCT Blood Glucose.: 109 mg/dL (01 Mar 2021 07:49)  POCT Blood Glucose.: 123 mg/dL (28 Feb 2021 20:33)  POCT Blood Glucose.: 171 mg/dL (28 Feb 2021 16:33)  POCT Blood Glucose.: 180 mg/dL (28 Feb 2021 11:54)    RADIOLOGY & ADDITIONAL TESTS:  < from: Xray Chest 1 View-PORTABLE IMMEDIATE (Xray Chest 1 View-PORTABLE IMMEDIATE .) (02.24.21 @ 19:22) >  IMPRESSION:  Pacemaker.  < end of copied text >       Patient is a 75y old  Female who presents with a chief complaint of Anemia (02 Mar 2021 11:32)    Patient seen and examined at bedside.     ALLERGIES:  latex (Rash)  No Known Drug Allergies    MEDICATIONS  (STANDING):  amLODIPine   Tablet 5 milliGRAM(s) Oral daily  atorvastatin 10 milliGRAM(s) Oral at bedtime  cyanocobalamin 1000 MICROGram(s) Oral daily  dextrose 40% Gel 15 Gram(s) Oral once  dextrose 5%. 1000 milliLiter(s) (50 mL/Hr) IV Continuous <Continuous>  dextrose 5%. 1000 milliLiter(s) (100 mL/Hr) IV Continuous <Continuous>  dextrose 50% Injectable 25 Gram(s) IV Push once  dextrose 50% Injectable 12.5 Gram(s) IV Push once  dextrose 50% Injectable 25 Gram(s) IV Push once  gabapentin 100 milliGRAM(s) Oral two times a day  glucagon  Injectable 1 milliGRAM(s) IntraMuscular once  insulin glargine Injectable (LANTUS) 25 Unit(s) SubCutaneous at bedtime  insulin lispro (ADMELOG) corrective regimen sliding scale   SubCutaneous three times a day before meals  insulin lispro (ADMELOG) corrective regimen sliding scale   SubCutaneous at bedtime  levothyroxine 112 MICROGram(s) Oral daily  multivitamin 1 Tablet(s) Oral daily  pantoprazole  Injectable 40 milliGRAM(s) IV Push two times a day    MEDICATIONS  (PRN):  LORazepam     Tablet 0.5 milliGRAM(s) Oral three times a day PRN Anxiety    Vital Signs Last 24 Hrs  T(F): 98 (02 Mar 2021 05:08), Max: 98 (02 Mar 2021 05:08)  HR: 73 (02 Mar 2021 05:08) (73 - 76)  BP: 128/56 (02 Mar 2021 05:08) (128/56 - 148/55)  RR: 18 (02 Mar 2021 05:08) (18 - 18)  SpO2: 97% (02 Mar 2021 05:08) (97% - 99%)  I&O's Summary    PHYSICAL EXAM:  General: NAD, alert  ENT: MMM, no thrush  Neck: Supple, No JVD  Lungs: good air entry, non-labored breathing  Cardio: +s1/s2  Abdomen: Soft, Nontender, Nondistended; Bowel sounds present  Extremities: No calf tenderness      LABS:                        10.0   6.86  )-----------( 243      ( 02 Mar 2021 12:48 )             31.5     03-01    143  |  106  |  23.0  ----------------------------<  101  3.4   |  21.0  |  1.37    Ca    8.9      01 Mar 2021 06:47  Phos  3.5     03-01  Mg     1.6     03-01    TPro  5.9  /  Alb  3.3  /  TBili  0.6  /  DBili  x   /  AST  10  /  ALT  7   /  AlkPhos  55  02-28    eGFR if Non African American: 37 mL/min/1.73M2 (03-01-21 @ 06:47)  eGFR if : 43 mL/min/1.73M2 (03-01-21 @ 06:47)    PT/INR - ( 28 Feb 2021 08:45 )   PT: 12.8 sec;   INR: 1.11 ratio      Glucose  POCT Blood Glucose.: 110 mg/dL (02 Mar 2021 11:18)  POCT Blood Glucose.: 124 mg/dL (02 Mar 2021 05:48)  POCT Blood Glucose.: 145 mg/dL (01 Mar 2021 22:02)  POCT Blood Glucose.: 110 mg/dL (01 Mar 2021 16:35)    RADIOLOGY & ADDITIONAL TESTS:  < from: Xray Chest 1 View-PORTABLE IMMEDIATE (Xray Chest 1 View-PORTABLE IMMEDIATE .) (02.24.21 @ 19:22) >  IMPRESSION:  Pacemaker.  < end of copied text >    Care Discussed with Consultants/Other Providers:   GI

## 2021-03-02 NOTE — PROGRESS NOTE ADULT - ASSESSMENT
76 y/o female with PMH of aortoiliac occlusive disease s/p thromboendarterectomy of deep femoral artery, endarterectomy of left CFA insertion of SFA stent on Xarelto, Plavix and Aspirin, HTN, HLD, CKD-3, hypothyroidism was sent to the ED by PCP for abnormal lab- Hb: 5.3. In the ED hemoglobin found to be 4.9. patient transfused prbc and found to be fobt positive. egd negative. colonoscopy negative, pending CT enterography    #Severe symptomatic anemia   - monitor h and h   - s/p prbc  - hold aspirin plavix and xarelto for now - can restart when ok with GI  - fobt positive in ER  - protonix   - EGD 2/26- negative  - colonoscopy negative    #CKD-3  - follows with nephro Dr Mcallister outpatient - can follow up on d/c  - avoid nephrotoxic meds  - monitor cr    #HTN- Essential  - monitor blood pressure  - norvasc    #Aortoiliac occlusive disease   - S/p thromboendarterectomy of deep femoral artery, endarterectomy of left CFA insertion of SFA stent   - hold Xarelto, Plavix and Aspirin- given possible GI bleed  - pending CT enterography    #Hypothyroidism   - Synthroid      #DM-2  - a1c on admit 5.0  - lantus and iss   - monitor fingersticks    #hypokalemia  - repleted  - monitor   - check mg and phosp    #HLD  - statin    #Anxiety  - xanax as needed    #Neuropathy  - gapapentin    #DVT Prophylaxis  - venodynes for now given gi bleed     Dispo - pending colonoscopy today 76 y/o female with PMH of aortoiliac occlusive disease s/p thromboendarterectomy of deep femoral artery, endarterectomy of left CFA insertion of SFA stent on Xarelto, Plavix and Aspirin, HTN, HLD, CKD-3, hypothyroidism was sent to the ED by PCP for abnormal lab- Hb: 5.3. In the ED hemoglobin found to be 4.9. patient transfused prbc and found to be fobt positive. egd negative. colonoscopy negative, pending CT enterography    #Severe symptomatic anemia   - monitor h and h   - s/p prbc  - hold aspirin plavix and xarelto for now - can restart when ok with GI  - fobt positive in ER  - protonix   - EGD 2/26- negative  - colonoscopy negative    #CKD-3  - follows with nephro Dr Mcallister outpatient - can follow up on d/c  - avoid nephrotoxic meds  - monitor cr    #HTN- Essential  - monitor blood pressure  - norvasc    #Aortoiliac occlusive disease   - S/p thromboendarterectomy of deep femoral artery, endarterectomy of left CFA insertion of SFA stent   - hold Xarelto, Plavix and Aspirin- given possible GI bleed  - pending CT enterography    #Hypothyroidism   - Synthroid      #DM-2  - a1c on admit 5.0  - lantus and iss   - monitor fingersticks    #hypokalemia  - repleted  - monitor   - check mg and phosp    #HLD  - statin    #Anxiety  - xanax as needed    #Neuropathy  - gapapentin    #DVT Prophylaxis  - venodynes for now given gi bleed     Dispo - pending ct enterography d/w Dr Prakash - ok to d/c patient if negative can restart aspirin and follow up outpatient for plavix and xarelto restart

## 2021-03-02 NOTE — PHYSICAL THERAPY INITIAL EVALUATION ADULT - GENERAL OBSERVATIONS, REHAB EVAL
pt received seated EOB + IV lock, A&Ox4, c/o diarrhea post CT enterography and willing to participate with PT.

## 2021-03-03 ENCOUNTER — TRANSCRIPTION ENCOUNTER (OUTPATIENT)
Age: 76
End: 2021-03-03

## 2021-03-03 VITALS
OXYGEN SATURATION: 99 % | TEMPERATURE: 98 F | SYSTOLIC BLOOD PRESSURE: 153 MMHG | DIASTOLIC BLOOD PRESSURE: 68 MMHG | HEART RATE: 71 BPM | RESPIRATION RATE: 18 BRPM

## 2021-03-03 DIAGNOSIS — K27.9 PEPTIC ULCER, SITE UNSPECIFIED, UNSPECIFIED AS ACUTE OR CHRONIC, WITHOUT HEMORRHAGE OR PERFORATION: ICD-10-CM

## 2021-03-03 LAB
GLUCOSE BLDC GLUCOMTR-MCNC: 153 MG/DL — HIGH (ref 70–99)
GLUCOSE BLDC GLUCOMTR-MCNC: 85 MG/DL — SIGNIFICANT CHANGE UP (ref 70–99)

## 2021-03-03 PROCEDURE — 99239 HOSP IP/OBS DSCHRG MGMT >30: CPT

## 2021-03-03 PROCEDURE — 99232 SBSQ HOSP IP/OBS MODERATE 35: CPT

## 2021-03-03 RX ORDER — CLARITHROMYCIN 500 MG
1 TABLET ORAL
Qty: 28 | Refills: 0
Start: 2021-03-03 | End: 2021-03-16

## 2021-03-03 RX ORDER — AMOXICILLIN 250 MG/5ML
2 SUSPENSION, RECONSTITUTED, ORAL (ML) ORAL
Qty: 56 | Refills: 0
Start: 2021-03-03 | End: 2021-03-16

## 2021-03-03 RX ORDER — METRONIDAZOLE 500 MG
1 TABLET ORAL
Qty: 28 | Refills: 0
Start: 2021-03-03 | End: 2021-03-16

## 2021-03-03 RX ORDER — RIVAROXABAN 15 MG-20MG
1 KIT ORAL
Qty: 0 | Refills: 0 | DISCHARGE

## 2021-03-03 RX ORDER — CARVEDILOL PHOSPHATE 80 MG/1
1 CAPSULE, EXTENDED RELEASE ORAL
Qty: 0 | Refills: 0 | DISCHARGE

## 2021-03-03 RX ORDER — JNJ-78436735 50000000000 [PFU]/.5ML
0.5 SUSPENSION INTRAMUSCULAR ONCE
Refills: 0 | Status: COMPLETED | OUTPATIENT
Start: 2021-03-03 | End: 2021-03-03

## 2021-03-03 RX ORDER — INSULIN GLARGINE 100 [IU]/ML
25 INJECTION, SOLUTION SUBCUTANEOUS
Qty: 5 | Refills: 0
Start: 2021-03-03 | End: 2021-04-01

## 2021-03-03 RX ORDER — PANTOPRAZOLE SODIUM 20 MG/1
1 TABLET, DELAYED RELEASE ORAL
Qty: 28 | Refills: 0
Start: 2021-03-03 | End: 2021-03-16

## 2021-03-03 RX ORDER — ACETAMINOPHEN 500 MG
650 TABLET ORAL ONCE
Refills: 0 | Status: DISCONTINUED | OUTPATIENT
Start: 2021-03-03 | End: 2021-03-03

## 2021-03-03 RX ADMIN — JNJ-78436735 0.5 MILLILITER(S): 50000000000 SUSPENSION INTRAMUSCULAR at 14:16

## 2021-03-03 RX ADMIN — PANTOPRAZOLE SODIUM 40 MILLIGRAM(S): 20 TABLET, DELAYED RELEASE ORAL at 05:59

## 2021-03-03 RX ADMIN — Medication 1: at 11:18

## 2021-03-03 RX ADMIN — AMLODIPINE BESYLATE 5 MILLIGRAM(S): 2.5 TABLET ORAL at 05:58

## 2021-03-03 RX ADMIN — Medication 1 TABLET(S): at 11:18

## 2021-03-03 RX ADMIN — PREGABALIN 1000 MICROGRAM(S): 225 CAPSULE ORAL at 11:16

## 2021-03-03 RX ADMIN — Medication 112 MICROGRAM(S): at 05:58

## 2021-03-03 RX ADMIN — GABAPENTIN 100 MILLIGRAM(S): 400 CAPSULE ORAL at 05:58

## 2021-03-03 NOTE — DISCHARGE NOTE PROVIDER - PROVIDER TOKENS
PROVIDER:[TOKEN:[[id number]]],PROVIDER:[TOKEN:[[id number]]],FREE:[LAST:[Primary Care Doctor],PHONE:[(   )    -],FAX:[(   )    -]],FREE:[LAST:[Vascular Surgery],PHONE:[(   )    -],FAX:[(   )    -]],FREE:[LAST:[Endocrinology],PHONE:[(   )    -],FAX:[(   )    -]]

## 2021-03-03 NOTE — DIETITIAN INITIAL EVALUATION ADULT. - PERTINENT MEDS FT
MEDICATIONS  (STANDING):  amLODIPine   Tablet 5 milliGRAM(s) Oral daily  atorvastatin 10 milliGRAM(s) Oral at bedtime  cyanocobalamin 1000 MICROGram(s) Oral daily  dextrose 40% Gel 15 Gram(s) Oral once  dextrose 5%. 1000 milliLiter(s) (50 mL/Hr) IV Continuous <Continuous>  dextrose 5%. 1000 milliLiter(s) (100 mL/Hr) IV Continuous <Continuous>  dextrose 50% Injectable 25 Gram(s) IV Push once  dextrose 50% Injectable 12.5 Gram(s) IV Push once  dextrose 50% Injectable 25 Gram(s) IV Push once  gabapentin 100 milliGRAM(s) Oral two times a day  glucagon  Injectable 1 milliGRAM(s) IntraMuscular once  insulin glargine Injectable (LANTUS) 25 Unit(s) SubCutaneous at bedtime  insulin lispro (ADMELOG) corrective regimen sliding scale   SubCutaneous three times a day before meals  insulin lispro (ADMELOG) corrective regimen sliding scale   SubCutaneous at bedtime  levothyroxine 112 MICROGram(s) Oral daily  multivitamin 1 Tablet(s) Oral daily  pantoprazole  Injectable 40 milliGRAM(s) IV Push two times a day    MEDICATIONS  (PRN):  LORazepam     Tablet 0.5 milliGRAM(s) Oral three times a day PRN Anxiety

## 2021-03-03 NOTE — DISCHARGE NOTE NURSING/CASE MANAGEMENT/SOCIAL WORK - PATIENT PORTAL LINK FT
You can access the FollowMyHealth Patient Portal offered by [facility] by registering at the following website: [email or web address]. By joining [employer]’s FollowMyHealth portal, you will also be able to view your health information using other applications (apps) compatible with our system.

## 2021-03-03 NOTE — DIETITIAN INITIAL EVALUATION ADULT. - OTHER INFO
75 year old female with PMH of aortoiliac occlusive disease s/p thromboendarterectomy of deep femoral artery, endarterectomy of left CFA insertion of SFA stent, HTN, HLD, CKD3, hypothyroidism was sent to the ED by PCP for abnormal lab- Hb: 5.3. Admitted with severe symptomatic anemia. Pt reports good appetite/po intake PTA and remains the same since admission. Breakfast tray observed at bedside, pt consumed 100% per plate waste. Pt denies any weight changes over the last year. States she monitors carbohydrate intake at home and checks blood sugars 4x/day. Pt declined diet education at this time as she states she has a good understanding. RD remains available.

## 2021-03-03 NOTE — PROGRESS NOTE ADULT - SUBJECTIVE AND OBJECTIVE BOX
Patient is a 75y old  Female who presents with a chief complaint of Anemia (03 Mar 2021 09:30)      HPI:  76 y/o female with PMH of  aortoiliac occlusive disease s/p thromboendarterectomy of deep femoral artery, endarterectomy of left CFA insertion of SFA stent on Xarelto, Plavix and Aspirin, HTN, HLD, CKD-3, hypothyroidism . NO black stool or bloody stool. Tolerating solid die t    REVIEW OF SYSTEMS:  Constitutional: No fever, weight loss or fatigue  ENMT:  No difficulty hearing, tinnitus, vertigo; No sinus or throat pain  Respiratory: No cough, wheezing, chills or hemoptysis  Cardiovascular: No chest pain, palpitations, dizziness or leg swelling  Gastrointestinal: No abdominal or epigastric pain. No nausea, vomiting or hematemesis; No diarrhea or constipation. No melena or hematochezia.  Skin: No itching, burning, rashes or lesions   Musculoskeletal: No joint pain or swelling; No muscle, back or extremity pain    PAST MEDICAL & SURGICAL HISTORY:  Pulmonic regurgitation    HLD (hyperlipidemia)    Lung nodule    Spinal stenosis, lumbar region, with neurogenic claudication    Carotid stenosis, bilateral    Aorto-iliac disease    Psoriasis    Congenital heart failure    History of anemia due to chronic kidney disease    Peripheral neuropathy    Anxiety    CKD (chronic kidney disease) stage 3, GFR 30-59 ml/min    PVD (peripheral vascular disease) with claudication    AICD (automatic cardioverter/defibrillator) present  rep notified senia maldonado    CAD (coronary artery disease)    Hypertension    Hypothyroid    Diabetes  IDDM    S/P evacuation of hematoma  right groin, then required wound vac    Pessary maintenance    Branch retinal vein occlusion with neovascularization of right eye  treated with laser    H/O vascular surgery  Infrarenal aortic endarterectomy with aortic bi iliac bypass, and superficial femoral artery  angioplasty and stenting, 3/24/16.    History of intravascular stent placement  femoral artery angioplasty and stents, 3/24/16    Acute cataract        FAMILY HISTORY:  Family history of heart attack (Sibling)    Family history of heart attack    Family history of colon cancer in mother        SOCIAL HISTORY:  Smoking Status: [ ] Current, [ ] Former, [ ] Never  Pack Years:  [  ] EtOH-no  [  ] IVDA    MEDICATIONS:  MEDICATIONS  (STANDING):  amLODIPine   Tablet 5 milliGRAM(s) Oral daily  atorvastatin 10 milliGRAM(s) Oral at bedtime  cyanocobalamin 1000 MICROGram(s) Oral daily  dextrose 40% Gel 15 Gram(s) Oral once  dextrose 5%. 1000 milliLiter(s) (50 mL/Hr) IV Continuous <Continuous>  dextrose 5%. 1000 milliLiter(s) (100 mL/Hr) IV Continuous <Continuous>  dextrose 50% Injectable 25 Gram(s) IV Push once  dextrose 50% Injectable 12.5 Gram(s) IV Push once  dextrose 50% Injectable 25 Gram(s) IV Push once  gabapentin 100 milliGRAM(s) Oral two times a day  glucagon  Injectable 1 milliGRAM(s) IntraMuscular once  insulin glargine Injectable (LANTUS) 25 Unit(s) SubCutaneous at bedtime  insulin lispro (ADMELOG) corrective regimen sliding scale   SubCutaneous three times a day before meals  insulin lispro (ADMELOG) corrective regimen sliding scale   SubCutaneous at bedtime  levothyroxine 112 MICROGram(s) Oral daily  multivitamin 1 Tablet(s) Oral daily  pantoprazole  Injectable 40 milliGRAM(s) IV Push two times a day    MEDICATIONS  (PRN):  LORazepam     Tablet 0.5 milliGRAM(s) Oral three times a day PRN Anxiety      Allergies    latex (Rash)  No Known Drug Allergies    Intolerances        Vital Signs Last 24 Hrs  T(C): 36.9 (03 Mar 2021 05:03), Max: 36.9 (03 Mar 2021 05:03)  T(F): 98.5 (03 Mar 2021 05:03), Max: 98.5 (03 Mar 2021 05:03)  HR: 75 (03 Mar 2021 05:03) (75 - 80)  BP: 143/62 (03 Mar 2021 05:03) (143/62 - 163/66)  BP(mean): --  RR: 18 (03 Mar 2021 05:03) (18 - 18)  SpO2: 97% (03 Mar 2021 05:03) (97% - 100%)        PHYSICAL EXAM:    General: Well developed; well nourished; in no acute distress  HEENT: MMM, conjunctiva and sclera clear  Gastrointestinal: Soft, non-tender non-distended; Normal bowel sounds; No rebound or guarding  Extremities: Normal range of motion, No clubbing, cyanosis or edema  Neurological: Alert and oriented x3  Skin: Warm and dry. No obvious rash      LABS:                        10.0   6.86  )-----------( 243      ( 02 Mar 2021 12:48 )             31.5     02 Mar 2021 12:48    141    |  105    |  15.0   ----------------------------<  108    4.0     |  22.0   |  1.25     Ca    9.4        02 Mar 2021 12:48                RADIOLOGY & ADDITIONAL STUDIES:     < from: CT Enterography w/ Oral Cont and w/ IV Cont (03.02.21 @ 16:19) >  IMPRESSION:  No small bowel lesion identified.  Extensive atherosclerotic disease with partial calcific occlusion distal abdominal aorta and bilateral common iliac arteries. Probable significant stenosis left renal artery with delayed left renal function.    < end of copied text >

## 2021-03-03 NOTE — PROGRESS NOTE ADULT - PROVIDER SPECIALTY LIST ADULT
Cardiology
Hospitalist
Cardiology
Hospitalist
Hospitalist
Cardiology
Hospitalist
Gastroenterology
Hospitalist
Gastroenterology

## 2021-03-03 NOTE — DISCHARGE NOTE PROVIDER - HOSPITAL COURSE
76 y/o female with PMH of  aortoiliac occlusive disease s/p thromboendarterectomy of deep femoral artery, endarterectomy of left CFA insertion of SFA stent on Xarelto, Plavix and Aspirin, HTN, HLD, CKD-3, hypothyroidism was sent to the ED by PCP for abnormal lab found to have low hemoglobin. patient seen by gi and cardiology. patient previously on aspirin plavix and xarelto which were stopped and patient transfused. patient s/p colonoscopy egd and ct enterography which were negative. patient restarted on aspirin. medications for diabetes and htn adjusted while patient admitted. patient now being d/c in stable condition. patient incidentally found to have h pylori and started on treatment at d/c.     Time spent on patients discharge 32 minutes

## 2021-03-03 NOTE — PROGRESS NOTE ADULT - PROBLEM SELECTOR PLAN 1
For colonoscopy tomorrow. Orders written. Report to follow. Repeat COVID swab done yesterday was negative. Repeat labs ordered for the AM.
Rule out colonic neoplasm or AVM's. Colonoscopy Monday. Bowel prep to be ordered for tomorrow. Repeat labs ordered for the AM.
- hemoglobin stable. CTE negative for small bowel pathology  EGD negative, colonoscopy with diverticulosis  F/U in 2 weeks in office for capsule endoscopy with Dr Melendez
Await CTE results. If negative she can be D/C'ed home on ASA alone for now with OPT f/u for VCE next week. Thanks.

## 2021-03-03 NOTE — DISCHARGE NOTE PROVIDER - NSDCFUSCHEDAPPT_GEN_ALL_CORE_FT
YE LAWSON ; 04/01/2021 ; NPP Endocrin [address]  YE LAWSON ; 04/16/2021 ; NPP Nephro [address]   YE LAWSON ; 05/03/2021 ; NPP Urogyn [address] [facility]

## 2021-03-03 NOTE — PROGRESS NOTE ADULT - ATTENDING COMMENTS
I have personally seen and examined patient on the above date.  I discussed the case with NP Richelle Short and I agree with findings and plan as detailed per note above, which I have amended where appropriate.
I have personally seen and examined patient on the above date.  I discussed the case with NP Student Han and I agree with findings and plan as detailed per note above, which I have amended where appropriate.      Called and spoke to patient  [phone] all questions answered

## 2021-03-03 NOTE — PROGRESS NOTE ADULT - SUBJECTIVE AND OBJECTIVE BOX
Patient is a 75y old  Female who presents with a chief complaint of Anemia    Patient seen and examined at bedside.     Allergies    latex (Rash)  No Known Drug Allergies    MEDICATIONS  (STANDING):  amLODIPine   Tablet 5 milliGRAM(s) Oral daily  atorvastatin 10 milliGRAM(s) Oral at bedtime  cyanocobalamin 1000 MICROGram(s) Oral daily  dextrose 40% Gel 15 Gram(s) Oral once  dextrose 5%. 1000 milliLiter(s) (50 mL/Hr) IV Continuous <Continuous>  dextrose 5%. 1000 milliLiter(s) (100 mL/Hr) IV Continuous <Continuous>  dextrose 50% Injectable 25 Gram(s) IV Push once  dextrose 50% Injectable 12.5 Gram(s) IV Push once  dextrose 50% Injectable 25 Gram(s) IV Push once  gabapentin 100 milliGRAM(s) Oral two times a day  glucagon  Injectable 1 milliGRAM(s) IntraMuscular once  insulin glargine Injectable (LANTUS) 25 Unit(s) SubCutaneous at bedtime  insulin lispro (ADMELOG) corrective regimen sliding scale   SubCutaneous three times a day before meals  insulin lispro (ADMELOG) corrective regimen sliding scale   SubCutaneous at bedtime  levothyroxine 112 MICROGram(s) Oral daily  multivitamin 1 Tablet(s) Oral daily  pantoprazole  Injectable 40 milliGRAM(s) IV Push two times a day    MEDICATIONS  (PRN):  LORazepam     Tablet 0.5 milliGRAM(s) Oral three times a day PRN Anxiety    Vital Signs Last 24 Hrs  T(C): 36.9 (03 Mar 2021 05:03), Max: 36.9 (03 Mar 2021 05:03)  T(F): 98.5 (03 Mar 2021 05:03), Max: 98.5 (03 Mar 2021 05:03)  HR: 75 (03 Mar 2021 05:03) (75 - 80)  BP: 143/62 (03 Mar 2021 05:03) (143/62 - 163/66)  BP(mean): --  RR: 18 (03 Mar 2021 05:03) (18 - 18)  SpO2: 97% (03 Mar 2021 05:03) (97% - 100%)    PHYSICAL EXAM:  General: NAD, alert  ENT: MMM, no thrush  Neck: Supple, No JVD  Lungs: good air entry, non-labored breathing  Cardio: +s1/s2  Abdomen: Soft, Nontender, Nondistended; Bowel sounds present  Extremities: No calf tenderness    LABS:                        10.0   6.86  )-----------( 243      ( 02 Mar 2021 12:48 )             31.5     03-01    143  |  106  |  23.0  ----------------------------<  101  3.4   |  21.0  |  1.37    Ca    8.9      01 Mar 2021 06:47  Phos  3.5     03-01  Mg     1.6     03-01    TPro  5.9  /  Alb  3.3  /  TBili  0.6  /  DBili  x   /  AST  10  /  ALT  7   /  AlkPhos  55  02-28    eGFR if Non African American: 37 mL/min/1.73M2 (03-01-21 @ 06:47)  eGFR if : 43 mL/min/1.73M2 (03-01-21 @ 06:47)    PT/INR - ( 28 Feb 2021 08:45 )   PT: 12.8 sec;   INR: 1.11 ratio      CAPILLARY BLOOD GLUCOSE  POCT Blood Glucose.: 85 mg/dL (03 Mar 2021 07:24)  POCT Blood Glucose.: 113 mg/dL (02 Mar 2021 22:57)  POCT Blood Glucose.: 110 mg/dL (02 Mar 2021 16:32)  POCT Blood Glucose.: 110 mg/dL (02 Mar 2021 11:18)    RADIOLOGY & ADDITIONAL TESTS:  < from: Xray Chest 1 View-PORTABLE IMMEDIATE (Xray Chest 1 View-PORTABLE IMMEDIATE .) (02.24.21 @ 19:22) >  IMPRESSION:  Pacemaker.  < end of copied text >   Patient is a 75y old  Female who presents with a chief complaint of Anemia (03 Mar 2021 09:41)    Patient seen and examined at bedside.     ALLERGIES:  latex (Rash)  No Known Drug Allergies    MEDICATIONS  (STANDING):  amLODIPine   Tablet 5 milliGRAM(s) Oral daily  atorvastatin 10 milliGRAM(s) Oral at bedtime  cyanocobalamin 1000 MICROGram(s) Oral daily  dextrose 40% Gel 15 Gram(s) Oral once  dextrose 5%. 1000 milliLiter(s) (50 mL/Hr) IV Continuous <Continuous>  dextrose 5%. 1000 milliLiter(s) (100 mL/Hr) IV Continuous <Continuous>  dextrose 50% Injectable 25 Gram(s) IV Push once  dextrose 50% Injectable 12.5 Gram(s) IV Push once  dextrose 50% Injectable 25 Gram(s) IV Push once  gabapentin 100 milliGRAM(s) Oral two times a day  glucagon  Injectable 1 milliGRAM(s) IntraMuscular once  insulin glargine Injectable (LANTUS) 25 Unit(s) SubCutaneous at bedtime  insulin lispro (ADMELOG) corrective regimen sliding scale   SubCutaneous three times a day before meals  insulin lispro (ADMELOG) corrective regimen sliding scale   SubCutaneous at bedtime  levothyroxine 112 MICROGram(s) Oral daily  multivitamin 1 Tablet(s) Oral daily  pantoprazole  Injectable 40 milliGRAM(s) IV Push two times a day    MEDICATIONS  (PRN):  LORazepam     Tablet 0.5 milliGRAM(s) Oral three times a day PRN Anxiety    Vital Signs Last 24 Hrs  T(F): 98.2 (03 Mar 2021 11:14), Max: 98.5 (03 Mar 2021 05:03)  HR: 71 (03 Mar 2021 11:14) (71 - 80)  BP: 153/68 (03 Mar 2021 11:14) (143/62 - 163/66)  RR: 18 (03 Mar 2021 11:14) (18 - 18)  SpO2: 99% (03 Mar 2021 11:14) (97% - 100%)  I&O's Summary    PHYSICAL EXAM:  General: NAD, alert  ENT: MMM, no thrush  Neck: Supple, No JVD  Lungs: good air entry, non-labored breathing  Cardio: +s1/s2  Abdomen: Soft, Nontender, Nondistended; Bowel sounds present  Extremities: No calf tenderness    LABS:                        10.0   6.86  )-----------( 243      ( 02 Mar 2021 12:48 )             31.5     03-02    141  |  105  |  15.0  ----------------------------<  108  4.0   |  22.0  |  1.25    Ca    9.4      02 Mar 2021 12:48  Phos  3.5     03-01  Mg     1.6     03-01    eGFR if Non African American: 42 mL/min/1.73M2 (03-02-21 @ 12:48)  eGFR if : 48 mL/min/1.73M2 (03-02-21 @ 12:48)    Glucose  POCT Blood Glucose.: 153 mg/dL (03 Mar 2021 11:12)  POCT Blood Glucose.: 85 mg/dL (03 Mar 2021 07:24)  POCT Blood Glucose.: 113 mg/dL (02 Mar 2021 22:57)  POCT Blood Glucose.: 110 mg/dL (02 Mar 2021 16:32)  POCT Blood Glucose.: 110 mg/dL (02 Mar 2021 11:18)    RADIOLOGY & ADDITIONAL TESTS:  < from: CT Enterography w/ Oral Cont and w/ IV Cont (03.02.21 @ 16:19) >  IMPRESSION:  No small bowel lesion identified.  Extensive atherosclerotic disease with partial calcific occlusion distal abdominal aorta and bilateral common iliac arteries. Probable significant stenosis left renal artery with delayed left renal function.  < end of copied text >    < from: Xray Chest 1 View-PORTABLE IMMEDIATE (Xray Chest 1 View-PORTABLE IMMEDIATE .) (02.24.21 @ 19:22) >  IMPRESSION:  Pacemaker.  < end of copied text >    Care Discussed with Consultants/Other Providers:   Cardiology

## 2021-03-03 NOTE — DISCHARGE NOTE PROVIDER - CARE PROVIDER_API CALL
Jim Patel)  Cardiology; Internal Medicine  [address] [place] [address]  Phone: [phone]  Fax: [phone]  Follow Up Time:     Ham Prakash)  Gastroenterology; Internal Medicine  [address] [facility], [address]  [place] [address]  Phone: [phone]  Fax: [phone]  Follow Up Time:     Primary Care Doctor,   Phone: (   )    -  Fax: (   )    -  Follow Up Time:     Vascular Surgery,   Phone: (   )    -  Fax: (   )    -  Follow Up Time:     Endocrinology,   Phone: (   )    -  Fax: (   )    -  Follow Up Time:

## 2021-03-03 NOTE — PROGRESS NOTE ADULT - ASSESSMENT
74 y/o female with PMH of aortoiliac occlusive disease s/p thromboendarterectomy of deep femoral artery, endarterectomy of left CFA insertion of SFA stent on Xarelto, Plavix and Aspirin, HTN, HLD, CKD-3, hypothyroidism was sent to the ED by PCP for abnormal lab- Hb: 5.3. In the ED hemoglobin found to be 4.9. patient transfused prbc and found to be fobt positive. egd negative. colonoscopy negative, CT enterography negative.    #Severe symptomatic anemia   - monitor h and h   - s/p prbc  - hold aspirin plavix and xarelto for now - likely restart after outpt. caosule study  - fobt positive in ER  - protonix   - EGD 2/26- negative  - colonoscopy negative  - CT enterography negative    #CKD-3  - follows with nephro Dr Mcallister outpatient - can follow up on d/c  - avoid nephrotoxic meds  - monitor cr    #HTN- Essential  - monitor blood pressure  - norvasc    #Aortoiliac occlusive disease   - S/p thromboendarterectomy of deep femoral artery, endarterectomy of left CFA insertion of SFA stent   - hold Xarelto, Plavix and Aspirin- given possible GI bleed  - re-start ASA on D/C, GI follow up to restart plavix and xarelto outpatient after further eval    #Hypothyroidism   - Synthroid      #DM-2  - a1c on admit 5.0  - lantus and iss   - monitor fingersticks    #hypokalemia  - repleted  - monitor   - check mg and phosp    #HLD  - statin    #Anxiety  - xanax as needed    #Neuropathy  - gapapentin    #DVT Prophylaxis  - venodynes for now given gi bleed     Dispo - pending ct enterography d/w Dr Prakash - ok to d/c patient if negative can restart aspirin and follow up outpatient for plavix and xarelto restart   74 y/o female with PMH of aortoiliac occlusive disease s/p thromboendarterectomy of deep femoral artery, endarterectomy of left CFA insertion of SFA stent on Xarelto, Plavix and Aspirin, HTN, HLD, CKD-3, hypothyroidism was sent to the ED by PCP for abnormal lab- Hb: 5.3. In the ED hemoglobin found to be 4.9. patient transfused prbc and found to be fobt positive. egd negative. colonoscopy negative, CT enterography negative.    #Severe symptomatic anemia   - monitor h and h   - s/p prbc  - hold aspirin plavix and xarelto for now - likely restart after outpt. capsule study  - fobt positive in ER  - protonix   - EGD 2/26- negative  - colonoscopy negative  - CT enterography negative    #CKD-3  - follows with nephro Dr Mcallister outpatient - can follow up on d/c  - avoid nephrotoxic meds  - monitor cr    #+hpylori  - Amoxicillin 1 gram PO BID and Metronidazole 500 mg. PO BID and Clarithromycin 500 mg. PO BID and Pantoprazole 40 mg. PO BID x two weeks per GI    #HTN- Essential  - monitor blood pressure  - norvasc    #Aortoiliac occlusive disease   - S/p thromboendarterectomy of deep femoral artery, endarterectomy of left CFA insertion of SFA stent   - hold Xarelto, Plavix and Aspirin- given possible GI bleed  - re-start ASA on D/C, GI follow up to restart plavix and xarelto outpatient after further eval    #Hypothyroidism   - Synthroid      #DM-2  - a1c on admit 5.0  - lantus and iss   - monitor fingersticks  - hold sulfonylurea on d/c     #HLD  - statin    #Anxiety  - xanax as needed    #Neuropathy  - gapapentin    #DVT Prophylaxis  - venodynes    Dispo- discharge home today ; d/c d/w daughter Katheryn [phone]- hospital course reviewed. all questions answered

## 2021-03-03 NOTE — DISCHARGE NOTE NURSING/CASE MANAGEMENT/SOCIAL WORK - NSDCVIVACCINE_GEN_ALL_CORE_FT
Severe acute respiratory syndrome coronavirus 2 (SARS-CoV-2) (Coronavirus disease [COVID-19]) vaccine , 2021/3/3 14:16 , Jessica Shelton (RN)

## 2021-03-03 NOTE — DIETITIAN INITIAL EVALUATION ADULT. - PERTINENT LABORATORY DATA
03-02 Na141 mmol/L Glu 108 mg/dL<H> K+ 4.0 mmol/L Cr  1.25 mg/dL BUN 15.0 mg/dL Phos n/a   Alb n/a   PAB n/a HgA1c 5.0%

## 2021-03-03 NOTE — DISCHARGE NOTE PROVIDER - CARE PROVIDERS DIRECT ADDRESSES
[email or web address],DirectAddress_Unknown,DirectAddress_Unknown,DirectAddress_Unknown

## 2021-03-03 NOTE — DISCHARGE NOTE PROVIDER - NSDCCPCAREPLAN_GEN_ALL_CORE_FT
PRINCIPAL DISCHARGE DIAGNOSIS  Diagnosis: Symptomatic anemia  Assessment and Plan of Treatment: - 2/2 probable gi bleed  - ct enterography, egd, and colonoscopy negative  - follow up with gi outpatient for capsule study        SECONDARY DISCHARGE DIAGNOSES  Diagnosis: Afib  Assessment and Plan of Treatment: - take medications as rx  - follow up wtih cardiology    Diagnosis: Type 2 diabetes mellitus  Assessment and Plan of Treatment: - take medications as rx   - follow up with endocrinology    Diagnosis: Peripheral artery disease  Assessment and Plan of Treatment: - take medications as rx   - follow up with vascular surgery    Diagnosis: H pylori ulcer  Assessment and Plan of Treatment: - patient with no ulcer  - take medications as rx  - follow up with gi

## 2021-03-03 NOTE — DISCHARGE NOTE PROVIDER - NSDCMRMEDTOKEN_GEN_ALL_CORE_FT
amLODIPine 5 mg oral tablet: 1 tab(s) orally once a day  amoxicillin 500 mg oral tablet: 2 tab(s) orally 2 times a day   aspirin 81 mg oral delayed release tablet: 1 tab(s) orally once a day  clarithromycin 500 mg oral tablet: 1 tab(s) orally every 12 hours   gabapentin 100 mg oral capsule: 1 cap(s) orally 2 times a day  Insulin Pen Needles, 4mm: 1 application subcutaneously once a day (at bedtime) . ** Use with insulin pen **   Lantus Solostar Pen 100 units/mL subcutaneous solution: 25 unit(s) subcutaneous once a day (at bedtime)   levothyroxine 112 mcg (0.112 mg) oral tablet: 1 tab(s) orally once a day  LORazepam 0.5 mg oral tablet: 1 tab(s) orally 3 times a day, As Needed  lovastatin 40 mg oral tablet: 1 tab(s) orally once a day  metroNIDAZOLE 500 mg oral tablet: 1 tab(s) orally 2 times a day   Multiple Vitamins oral tablet: 1 tab(s) orally once a day  pantoprazole 40 mg oral delayed release tablet: 1 tab(s) orally every 12 hours   Vitamin B12 1000 mcg oral tablet: 1 tab(s) orally once a day

## 2021-03-03 NOTE — PROGRESS NOTE ADULT - PROBLEM SELECTOR PLAN 2
- no ulcer, random biopsies + for HP  please see chart note on 3/2 regarding treatement  D/C home and  F/U with Dr Harris in 2 weeks

## 2021-03-04 RX ORDER — INSULIN ASPART 100 [IU]/ML
100 INJECTION, SOLUTION INTRAVENOUS; SUBCUTANEOUS
Qty: 6 | Refills: 0 | Status: DISCONTINUED | COMMUNITY
Start: 2018-12-27 | End: 2021-03-04

## 2021-03-10 ENCOUNTER — APPOINTMENT (OUTPATIENT)
Dept: GASTROENTEROLOGY | Facility: CLINIC | Age: 76
End: 2021-03-10
Payer: MEDICARE

## 2021-03-10 VITALS
HEART RATE: 81 BPM | OXYGEN SATURATION: 98 % | HEIGHT: 62 IN | DIASTOLIC BLOOD PRESSURE: 70 MMHG | BODY MASS INDEX: 30.36 KG/M2 | TEMPERATURE: 97.6 F | WEIGHT: 165 LBS | SYSTOLIC BLOOD PRESSURE: 140 MMHG | RESPIRATION RATE: 14 BRPM

## 2021-03-10 DIAGNOSIS — K57.30 DIVERTICULOSIS OF LARGE INTESTINE W/OUT PERFORATION OR ABSCESS W/OUT BLEEDING: ICD-10-CM

## 2021-03-10 PROCEDURE — 99214 OFFICE O/P EST MOD 30 MIN: CPT

## 2021-03-10 NOTE — PHYSICAL EXAM
[General Appearance - Alert] : alert [General Appearance - In No Acute Distress] : in no acute distress [General Appearance - Well Nourished] : well nourished [General Appearance - Well Developed] : well developed [General Appearance - Well-Appearing] : healthy appearing [Sclera] : the sclera and conjunctiva were normal [PERRL With Normal Accommodation] : pupils were equal in size, round, and reactive to light [Extraocular Movements] : extraocular movements were intact [Outer Ear] : the ears and nose were normal in appearance [Examination Of The Oral Cavity] : the lips and gums were normal [Oropharynx] : the oropharynx was normal [Neck Appearance] : the appearance of the neck was normal [Neck Cervical Mass (___cm)] : no neck mass was observed [Jugular Venous Distention Increased] : there was no jugular-venous distention [Thyroid Diffuse Enlargement] : the thyroid was not enlarged [Thyroid Nodule] : there were no palpable thyroid nodules [Auscultation Breath Sounds / Voice Sounds] : lungs were clear to auscultation bilaterally [Heart Rate And Rhythm] : heart rate was normal and rhythm regular [Heart Sounds] : normal S1 and S2 [Heart Sounds Gallop] : no gallops [Murmurs] : no murmurs [Heart Sounds Pericardial Friction Rub] : no pericardial rub [Bowel Sounds] : normal bowel sounds [Abdomen Soft] : soft [Abdomen Tenderness] : non-tender [] : no hepato-splenomegaly [Abdomen Mass (___ Cm)] : no abdominal mass palpated [No CVA Tenderness] : no ~M costovertebral angle tenderness [Abnormal Walk] : normal gait [Musculoskeletal - Swelling] : no joint swelling seen [Skin Color & Pigmentation] : normal skin color and pigmentation [Skin Turgor] : normal skin turgor [Oriented To Time, Place, And Person] : oriented to person, place, and time [FreeTextEntry1] : Done in the hospital and was negative

## 2021-03-10 NOTE — HISTORY OF PRESENT ILLNESS
[None] : had no significant interval events [Heartburn] : denies heartburn [Nausea] : denies nausea [Vomiting] : denies vomiting [Diarrhea] : denies diarrhea [Constipation] : denies constipation [Yellow Skin Or Eyes (Jaundice)] : denies jaundice [Abdominal Pain] : denies abdominal pain [Abdominal Swelling] : denies abdominal swelling [Rectal Pain] : denies rectal pain [Wt Gain ___ Lbs] : no recent weight gain [Wt Loss ___ Lbs] : no recent weight loss [GERD] : no gastroesophageal reflux disease [Hiatus Hernia] : no hiatus hernia [Peptic Ulcer Disease] : no peptic ulcer disease [Pancreatitis] : no pancreatitis [Cholelithiasis] : no cholelithiasis [Kidney Stone] : no kidney stone [Inflammatory Bowel Disease] : no inflammatory bowel disease [Irritable Bowel Syndrome] : no irritable bowel syndrome [Diverticulitis] : no diverticulitis [Alcohol Abuse] : no alcohol abuse [Malignancy] : no malignancy [Abdominal Surgery] : no abdominal surgery [Appendectomy] : no appendectomy [Cholecystectomy] : no cholecystectomy [de-identified] : March 2, 2021 [de-identified] : GI consultation with upper endoscopy with biopsy and colonoscopy and CT enterography for symptomatic anemia. [de-identified] : Patient presents for follow-up evaluation of symptomatic anemia requiring recent hospitalization at [facility] last week end of February where she was transfused 2 to 3 units of packed cells and underwent upper endoscopy with biopsy which essentially was normal with the exception of positive gastric biopsies for H. pylori with no visible abnormalities with negative duodenal biopsies for celiac disease.  This procedure was done on 2/28/2021 and was followed by a colonoscopy which was negative except for sigmoid diverticulosis done during that  same hospitalization on March 1, 2021.  She subsequently had a negative CT enterography for small bowel pathology and was sent home on amoxicillin and clarithromycin metronidazole and pantoprazole oral twice daily for 2 weeks for H. pylori eradication.  She was told to return here for video capsule endoscopy and returns here having almost completed the above H. pylori eradication regimen.  She states that she is feeling better and is no longer on Plavix.

## 2021-03-10 NOTE — ASSESSMENT
[FreeTextEntry1] : Impression: Iron deficiency anemia with negative work-up as outlined above rule out possible subtle small bowel pathology such as AVMs and/or Crohn's disease.  H. pylori infection in the process of being treated not likely responsible for her iron deficiency anemia.\par \par Recommendations: Patient was instructed to go for H. pylori urea breath test 2 weeks after the completion of the above antibiotic regimen to assess for successful H. pylori eradication.  She is to return here to the office for video capsule endoscopy to complete the GI work-up for iron deficiency anemia.  The risk versus benefits of video capsule endoscopy were explained to both the patient and her daughter who accompanied the patient today both of whom appeared to understand all of the above and were agreeable to proceeding with video capsule endoscopy.  Report to follow.

## 2021-03-11 ENCOUNTER — TRANSCRIPTION ENCOUNTER (OUTPATIENT)
Age: 76
End: 2021-03-11

## 2021-03-15 ENCOUNTER — LABORATORY RESULT (OUTPATIENT)
Age: 76
End: 2021-03-15

## 2021-03-15 ENCOUNTER — NON-APPOINTMENT (OUTPATIENT)
Age: 76
End: 2021-03-15

## 2021-03-15 ENCOUNTER — APPOINTMENT (OUTPATIENT)
Dept: FAMILY MEDICINE | Facility: CLINIC | Age: 76
End: 2021-03-15
Payer: MEDICARE

## 2021-03-15 VITALS
WEIGHT: 170.25 LBS | DIASTOLIC BLOOD PRESSURE: 68 MMHG | OXYGEN SATURATION: 98 % | TEMPERATURE: 97.8 F | BODY MASS INDEX: 31.33 KG/M2 | HEART RATE: 94 BPM | SYSTOLIC BLOOD PRESSURE: 144 MMHG | HEIGHT: 62 IN

## 2021-03-15 DIAGNOSIS — I35.1 NONRHEUMATIC AORTIC (VALVE) INSUFFICIENCY: ICD-10-CM

## 2021-03-15 DIAGNOSIS — D64.9 ANEMIA, UNSPECIFIED: ICD-10-CM

## 2021-03-15 DIAGNOSIS — E11.649 TYPE 2 DIABETES MELLITUS WITH HYPOGLYCEMIA W/OUT COMA: ICD-10-CM

## 2021-03-15 DIAGNOSIS — E11.3293 TYPE 2 DIABETES MELLITUS WITH MILD NONPROLIFERATIVE DIABETIC RETINOPATHY WITHOUT MACULAR EDEMA, BILATERAL: ICD-10-CM

## 2021-03-15 DIAGNOSIS — S93.402A SPRAIN OF UNSPECIFIED LIGAMENT OF LEFT ANKLE, INITIAL ENCOUNTER: ICD-10-CM

## 2021-03-15 DIAGNOSIS — D50.0 IRON DEFICIENCY ANEMIA SECONDARY TO BLOOD LOSS (CHRONIC): ICD-10-CM

## 2021-03-15 DIAGNOSIS — E78.00 PURE HYPERCHOLESTEROLEMIA, UNSPECIFIED: ICD-10-CM

## 2021-03-15 DIAGNOSIS — Z95.810 PRESENCE OF AUTOMATIC (IMPLANTABLE) CARDIAC DEFIBRILLATOR: ICD-10-CM

## 2021-03-15 DIAGNOSIS — D50.9 IRON DEFICIENCY ANEMIA, UNSPECIFIED: ICD-10-CM

## 2021-03-15 DIAGNOSIS — N89.8 OTHER SPECIFIED NONINFLAMMATORY DISORDERS OF VAGINA: ICD-10-CM

## 2021-03-15 DIAGNOSIS — Z79.4 TYPE 2 DIABETES MELLITUS WITH HYPOGLYCEMIA W/OUT COMA: ICD-10-CM

## 2021-03-15 LAB
ANION GAP SERPL CALC-SCNC: 12 MMOL/L
BUN SERPL-MCNC: 25 MG/DL
CALCIUM SERPL-MCNC: 8.9 MG/DL
CHLORIDE SERPL-SCNC: 102 MMOL/L
CO2 SERPL-SCNC: 25 MMOL/L
CREAT SERPL-MCNC: 1.35 MG/DL
GLUCOSE SERPL-MCNC: 209 MG/DL
POTASSIUM SERPL-SCNC: 4.4 MMOL/L
SODIUM SERPL-SCNC: 138 MMOL/L

## 2021-03-15 PROCEDURE — 93000 ELECTROCARDIOGRAM COMPLETE: CPT

## 2021-03-15 PROCEDURE — G2212 PROLONG OUTPT/OFFICE VIS: CPT

## 2021-03-15 PROCEDURE — 99215 OFFICE O/P EST HI 40 MIN: CPT | Mod: 25

## 2021-03-15 PROCEDURE — 36415 COLL VENOUS BLD VENIPUNCTURE: CPT

## 2021-03-15 RX ORDER — HYDROCHLOROTHIAZIDE 25 MG/1
25 TABLET ORAL
Qty: 180 | Refills: 0 | Status: DISCONTINUED | COMMUNITY
Start: 2020-02-14 | End: 2021-03-15

## 2021-03-15 RX ORDER — RIVAROXABAN 2.5 MG/1
2.5 TABLET, FILM COATED ORAL
Qty: 60 | Refills: 0 | Status: DISCONTINUED | COMMUNITY
Start: 2020-12-29 | End: 2021-03-15

## 2021-03-15 RX ORDER — CLOPIDOGREL 75 MG/1
75 TABLET, FILM COATED ORAL
Refills: 0 | Status: DISCONTINUED | COMMUNITY
End: 2021-03-15

## 2021-03-15 RX ORDER — CANDESARTAN CILEXETIL 16 MG/1
16 TABLET ORAL
Qty: 90 | Refills: 1 | Status: DISCONTINUED | COMMUNITY
Start: 2017-01-31 | End: 2021-03-15

## 2021-03-15 RX ORDER — SERTRALINE HYDROCHLORIDE 25 MG/1
25 TABLET, FILM COATED ORAL
Refills: 0 | Status: DISCONTINUED | COMMUNITY
Start: 2021-01-22 | End: 2021-03-15

## 2021-03-15 RX ORDER — HYDROCHLOROTHIAZIDE 12.5 MG/1
12.5 TABLET ORAL
Qty: 180 | Refills: 0 | Status: DISCONTINUED | COMMUNITY
Start: 2020-11-03 | End: 2021-03-15

## 2021-03-15 NOTE — PHYSICAL EXAM
[Normal Sclera/Conjunctiva] : normal sclera/conjunctiva [Normal Oropharynx] : the oropharynx was normal [No Lymphadenopathy] : no lymphadenopathy [No Respiratory Distress] : no respiratory distress  [Clear to Auscultation] : lungs were clear to auscultation bilaterally [Normal Rate] : normal [Rhythm Regular] : regular [Normal S1] : normal S1 [Normal S2] : normal S2 [II] : a grade 2 [2+] : left 2+ [Right Carotid Bruit] : right carotid bruit heard [Left Carotid Bruit] : left carotid bruit heard [Grossly Normal Strength/Tone] : grossly normal strength/tone [No Rash] : no rash [No Focal Deficits] : no focal deficits [Normal] : affect was normal and insight and judgment were intact [No Acute Distress] : no acute distress [PERRL] : pupils equal round and reactive to light [EOMI] : extraocular movements intact [Supple] : supple [___ +] : bilateral [unfilled]U+ pretibial pitting edema [1+] : left 1+ [Normal Mood] : the mood was normal

## 2021-03-16 ENCOUNTER — RX RENEWAL (OUTPATIENT)
Age: 76
End: 2021-03-16

## 2021-03-16 PROCEDURE — U0003: CPT

## 2021-03-16 PROCEDURE — 36415 COLL VENOUS BLD VENIPUNCTURE: CPT

## 2021-03-16 PROCEDURE — P9040: CPT

## 2021-03-16 PROCEDURE — 85610 PROTHROMBIN TIME: CPT

## 2021-03-16 PROCEDURE — 83036 HEMOGLOBIN GLYCOSYLATED A1C: CPT

## 2021-03-16 PROCEDURE — 0225U NFCT DS DNA&RNA 21 SARSCOV2: CPT

## 2021-03-16 PROCEDURE — 85018 HEMOGLOBIN: CPT

## 2021-03-16 PROCEDURE — 85025 COMPLETE CBC W/AUTO DIFF WBC: CPT

## 2021-03-16 PROCEDURE — 84481 FREE ASSAY (FT-3): CPT

## 2021-03-16 PROCEDURE — 84100 ASSAY OF PHOSPHORUS: CPT

## 2021-03-16 PROCEDURE — 85027 COMPLETE CBC AUTOMATED: CPT

## 2021-03-16 PROCEDURE — 86923 COMPATIBILITY TEST ELECTRIC: CPT

## 2021-03-16 PROCEDURE — 88305 TISSUE EXAM BY PATHOLOGIST: CPT

## 2021-03-16 PROCEDURE — 74177 CT ABD & PELVIS W/CONTRAST: CPT

## 2021-03-16 PROCEDURE — 86901 BLOOD TYPING SEROLOGIC RH(D): CPT

## 2021-03-16 PROCEDURE — 71045 X-RAY EXAM CHEST 1 VIEW: CPT

## 2021-03-16 PROCEDURE — 97163 PT EVAL HIGH COMPLEX 45 MIN: CPT

## 2021-03-16 PROCEDURE — 86769 SARS-COV-2 COVID-19 ANTIBODY: CPT

## 2021-03-16 PROCEDURE — 36430 TRANSFUSION BLD/BLD COMPNT: CPT

## 2021-03-16 PROCEDURE — 83735 ASSAY OF MAGNESIUM: CPT

## 2021-03-16 PROCEDURE — 99285 EMERGENCY DEPT VISIT HI MDM: CPT | Mod: 25

## 2021-03-16 PROCEDURE — 82272 OCCULT BLD FECES 1-3 TESTS: CPT

## 2021-03-16 PROCEDURE — 86900 BLOOD TYPING SEROLOGIC ABO: CPT

## 2021-03-16 PROCEDURE — 93005 ELECTROCARDIOGRAM TRACING: CPT

## 2021-03-16 PROCEDURE — 82962 GLUCOSE BLOOD TEST: CPT

## 2021-03-16 PROCEDURE — 86850 RBC ANTIBODY SCREEN: CPT

## 2021-03-16 PROCEDURE — 85014 HEMATOCRIT: CPT

## 2021-03-16 PROCEDURE — 80048 BASIC METABOLIC PNL TOTAL CA: CPT

## 2021-03-16 PROCEDURE — 82607 VITAMIN B-12: CPT

## 2021-03-16 PROCEDURE — 80053 COMPREHEN METABOLIC PANEL: CPT

## 2021-03-16 PROCEDURE — 0031A: CPT

## 2021-03-16 PROCEDURE — 82746 ASSAY OF FOLIC ACID SERUM: CPT

## 2021-03-16 PROCEDURE — 85730 THROMBOPLASTIN TIME PARTIAL: CPT

## 2021-03-16 PROCEDURE — 84439 ASSAY OF FREE THYROXINE: CPT

## 2021-03-16 PROCEDURE — 88342 IMHCHEM/IMCYTCHM 1ST ANTB: CPT

## 2021-03-16 PROCEDURE — P9016: CPT

## 2021-03-16 PROCEDURE — U0005: CPT

## 2021-03-16 PROCEDURE — 84443 ASSAY THYROID STIM HORMONE: CPT

## 2021-03-19 ENCOUNTER — NON-APPOINTMENT (OUTPATIENT)
Age: 76
End: 2021-03-19

## 2021-03-21 ENCOUNTER — RX RENEWAL (OUTPATIENT)
Age: 76
End: 2021-03-21

## 2021-03-22 ENCOUNTER — APPOINTMENT (OUTPATIENT)
Dept: FAMILY MEDICINE | Facility: CLINIC | Age: 76
End: 2021-03-22
Payer: MEDICARE

## 2021-03-22 VITALS
TEMPERATURE: 97.5 F | DIASTOLIC BLOOD PRESSURE: 62 MMHG | SYSTOLIC BLOOD PRESSURE: 150 MMHG | HEART RATE: 88 BPM | BODY MASS INDEX: 31.7 KG/M2 | WEIGHT: 172.25 LBS | HEIGHT: 62 IN | OXYGEN SATURATION: 98 %

## 2021-03-22 DIAGNOSIS — Z01.818 ENCOUNTER FOR OTHER PREPROCEDURAL EXAMINATION: ICD-10-CM

## 2021-03-22 DIAGNOSIS — Z23 ENCOUNTER FOR IMMUNIZATION: ICD-10-CM

## 2021-03-22 DIAGNOSIS — A04.8 OTHER SPECIFIED BACTERIAL INTESTINAL INFECTIONS: ICD-10-CM

## 2021-03-22 DIAGNOSIS — Z86.39 PERSONAL HISTORY OF OTHER ENDOCRINE, NUTRITIONAL AND METABOLIC DISEASE: ICD-10-CM

## 2021-03-22 PROCEDURE — 99214 OFFICE O/P EST MOD 30 MIN: CPT

## 2021-03-22 RX ORDER — AMOXICILLIN 500 MG/1
500 TABLET, FILM COATED ORAL
Refills: 0 | Status: DISCONTINUED | COMMUNITY
Start: 2021-03-15 | End: 2021-03-22

## 2021-03-22 RX ORDER — CLARITHROMYCIN 500 MG/1
500 TABLET, FILM COATED ORAL
Refills: 0 | Status: DISCONTINUED | COMMUNITY
Start: 2021-03-15 | End: 2021-03-22

## 2021-03-22 NOTE — PLAN
[FreeTextEntry1] : Compression knee highs b/l.\par Elevate legs when sitting.\par Low sodium diet.\par Decrease amlodipine from 5 mg bid to 5 mg once daily.\par Add Furosemide 20 mg/d and Klor Con 20 mEq once daily.\par Pt's daughter to forward lab results from today, done at [facility].\par Recheck bp and lytes in 1 wk, sooner if symptoms worsen.\par To ER if breathing becomes labored.

## 2021-03-22 NOTE — REVIEW OF SYSTEMS
[Dyspnea on Exertion] : dyspnea on exertion [Back Pain] : back pain [Negative] : Psychiatric [Fever] : no fever [Chest Pain] : no chest pain [Palpitations] : no palpitations

## 2021-03-22 NOTE — PHYSICAL EXAM
[No Acute Distress] : no acute distress [Normal Sclera/Conjunctiva] : normal sclera/conjunctiva [No Respiratory Distress] : no respiratory distress  [Normal Rhythm/Effort] : normal respiratory rhythm and effort [Rales / Crackles Bilateral] : no rales or crackles were heard [Wheezing Bilaterally] : no wheezing was heard [Rhonchi Bilateral] : no rhonchi were heard [Decreased Breath Sounds Bilaterally Bases] : breath sounds were diminished over both bases [___ +] : bilateral [unfilled]U+ pretibial pitting edema [1+] : left 1+ [Normal] : affect was normal and insight and judgment were intact [de-identified] : Left anterior tibia with pinpoint area of weeping clear fluid.

## 2021-03-22 NOTE — HISTORY OF PRESENT ILLNESS
[FreeTextEntry8] : Min c/o SOB this morning while walking to her car after having lab work performed.\par Patient is s/p Defibrilator Replacement sx on 3/17/21. She has been feeling fatigued since her discharge.\par She denies any chest pain, or palpitations.  She has been noting increasing lower extremity edema bilaterally.\par Patient had blood loss anemia with hemoglobin of 5.3 one month ago and her HCT Z 12.5 mg was stopped. She was subsequently placed on amlodipine for bp control.  During hospital admission, her endoscopy revealed H. pylori, which she was txed for with 10 d course of triple therapy.  Colonoscopy and CT enterography failed to show any sign of bleeding.  Patient is due to have video capsule endoscopy within the next week.  Hospital course was complicated by exacerbation of chronic kidney disease. She had bw drawn this am, ordered by her endocrinologist.

## 2021-03-23 ENCOUNTER — RX RENEWAL (OUTPATIENT)
Age: 76
End: 2021-03-23

## 2021-03-25 ENCOUNTER — APPOINTMENT (OUTPATIENT)
Dept: GASTROENTEROLOGY | Facility: CLINIC | Age: 76
End: 2021-03-25
Payer: MEDICARE

## 2021-03-25 VITALS
SYSTOLIC BLOOD PRESSURE: 150 MMHG | RESPIRATION RATE: 12 BRPM | HEIGHT: 62 IN | TEMPERATURE: 97.5 F | BODY MASS INDEX: 31.65 KG/M2 | WEIGHT: 172 LBS | DIASTOLIC BLOOD PRESSURE: 70 MMHG | HEART RATE: 90 BPM

## 2021-03-25 PROCEDURE — 99213 OFFICE O/P EST LOW 20 MIN: CPT | Mod: 25

## 2021-03-25 PROCEDURE — 91110 GI TRC IMG INTRAL ESOPH-ILE: CPT

## 2021-03-26 ENCOUNTER — NON-APPOINTMENT (OUTPATIENT)
Age: 76
End: 2021-03-26

## 2021-03-29 ENCOUNTER — APPOINTMENT (OUTPATIENT)
Dept: FAMILY MEDICINE | Facility: CLINIC | Age: 76
End: 2021-03-29
Payer: MEDICARE

## 2021-03-29 VITALS
TEMPERATURE: 97.6 F | HEART RATE: 79 BPM | HEIGHT: 62 IN | WEIGHT: 164 LBS | SYSTOLIC BLOOD PRESSURE: 140 MMHG | OXYGEN SATURATION: 98 % | BODY MASS INDEX: 30.18 KG/M2 | DIASTOLIC BLOOD PRESSURE: 60 MMHG

## 2021-03-29 PROCEDURE — 99214 OFFICE O/P EST MOD 30 MIN: CPT | Mod: 25

## 2021-03-29 PROCEDURE — 36415 COLL VENOUS BLD VENIPUNCTURE: CPT

## 2021-03-29 NOTE — PLAN
[FreeTextEntry1] : Pt still with moderate edema, although improved with decrease in amlodipine.\par Take Furosemide 20 mg 2 tabs once daily every other day alternating with Furosemide 20 mg/d x 1 week only.\par Take Klor-con 20 mEq 2 tabs once daily every other day alternating with Klor-con 20 mEq / d x 1 week only.\par F/U 1 month.\par To see Nephrology in 2 weeks.\par Labs drawn in office.\par

## 2021-03-29 NOTE — PHYSICAL EXAM
[No Acute Distress] : no acute distress [Normal Sclera/Conjunctiva] : normal sclera/conjunctiva [No Respiratory Distress] : no respiratory distress  [Normal Rhythm/Effort] : normal respiratory rhythm and effort [Rales / Crackles Bilateral] : no rales or crackles were heard [Wheezing Bilaterally] : no wheezing was heard [Rhonchi Bilateral] : no rhonchi were heard [Decreased Breath Sounds] : breath sounds were not diminished [Normal Rate] : normal [Rhythm Regular] : regular [Normal S1] : normal S1 [Normal S2] : normal S2 [II] : a grade 2 [___ +] : bilateral [unfilled]U+ pretibial pitting edema [1+] : left 1+ [Normal Mood] : the mood was normal [Normal] : affect was normal and insight and judgment were intact [de-identified] : No weeping of the legs

## 2021-03-29 NOTE — REVIEW OF SYSTEMS
[Chest Pain] : no chest pain [Palpitations] : no palpitations [Lower Ext Edema] : lower extremity edema [Negative] : Psychiatric [de-identified] : skin less tight in the legs

## 2021-03-29 NOTE — HISTORY OF PRESENT ILLNESS
[FreeTextEntry1] : Patient is following up on SOB and LE edema.\par She has been feeling much improved. As of 4 days ago, she was able to walk up and down the driveway without incident.\par Last week, she decreased her amlodpine from 10 mg/d to 5 mg/d, as ordered. She was also started on Furosemide 20 mg/d and Klor-con 20 mEq 1 tab/d. She has noted a decrease in LE edema with the change in medication.\par She had video capsule endoscopy which did not show any source of GI bleeding. She is due  to f/u with hematology to address chronic iron deficiency anemia with consideration for iron infusion.\par She is s/p H. pylori infection 10 day triple therapy. Denies GI abd pain/discomfort. [FreeTextEntry8] : 3/22/21:\giovanna Copeland c/o SOB this morning while walking to her car after having lab work performed.\par Patient is s/p Defibrilator Replacement sx on 3/17/21. She has been feeling fatigued since her discharge.\par She denies any chest pain, or palpitations.  She has been noting increasing lower extremity edema bilaterally.\par Patient had blood loss anemia with hemoglobin of 5.3 one month ago and her HCT Z 12.5 mg was stopped. She was subsequently placed on amlodipine for bp control.  During hospital admission, her endoscopy revealed H. pylori, which she was txed for with 10 d course of triple therapy.  Colonoscopy and CT enterography failed to show any sign of bleeding.  Patient is due to have video capsule endoscopy within the next week.  Hospital course was complicated by exacerbation of chronic kidney disease. She had bw drawn this am, ordered by her endocrinologist.

## 2021-03-30 LAB
ANION GAP SERPL CALC-SCNC: 13 MMOL/L
BASOPHILS # BLD AUTO: 0.07 K/UL
BASOPHILS NFR BLD AUTO: 1.1 %
BUN SERPL-MCNC: 23 MG/DL
CALCIUM SERPL-MCNC: 10.2 MG/DL
CHLORIDE SERPL-SCNC: 101 MMOL/L
CO2 SERPL-SCNC: 25 MMOL/L
CREAT SERPL-MCNC: 1.23 MG/DL
EOSINOPHIL # BLD AUTO: 0.21 K/UL
EOSINOPHIL NFR BLD AUTO: 3.3 %
FERRITIN SERPL-MCNC: 31 NG/ML
GLUCOSE SERPL-MCNC: 160 MG/DL
HCT VFR BLD CALC: 33.7 %
HGB BLD-MCNC: 10.2 G/DL
IMM GRANULOCYTES NFR BLD AUTO: 0.2 %
IRON SATN MFR SERPL: 8 %
IRON SERPL-MCNC: 29 UG/DL
LYMPHOCYTES # BLD AUTO: 1.33 K/UL
LYMPHOCYTES NFR BLD AUTO: 20.9 %
MAN DIFF?: NORMAL
MCHC RBC-ENTMCNC: 28.2 PG
MCHC RBC-ENTMCNC: 30.3 GM/DL
MCV RBC AUTO: 93.1 FL
MONOCYTES # BLD AUTO: 0.74 K/UL
MONOCYTES NFR BLD AUTO: 11.7 %
NEUTROPHILS # BLD AUTO: 3.99 K/UL
NEUTROPHILS NFR BLD AUTO: 62.8 %
PLATELET # BLD AUTO: 322 K/UL
POTASSIUM SERPL-SCNC: 4.7 MMOL/L
RBC # BLD: 3.62 M/UL
RBC # FLD: 14.9 %
SODIUM SERPL-SCNC: 139 MMOL/L
TIBC SERPL-MCNC: 351 UG/DL
TRANSFERRIN SERPL-MCNC: 312 MG/DL
UIBC SERPL-MCNC: 322 UG/DL
VIT B12 SERPL-MCNC: 582 PG/ML
WBC # FLD AUTO: 6.35 K/UL

## 2021-04-01 ENCOUNTER — APPOINTMENT (OUTPATIENT)
Dept: ENDOCRINOLOGY | Facility: CLINIC | Age: 76
End: 2021-04-01
Payer: MEDICARE

## 2021-04-01 VITALS
OXYGEN SATURATION: 97 % | WEIGHT: 158 LBS | HEIGHT: 62 IN | DIASTOLIC BLOOD PRESSURE: 80 MMHG | SYSTOLIC BLOOD PRESSURE: 160 MMHG | HEART RATE: 81 BPM | BODY MASS INDEX: 29.08 KG/M2

## 2021-04-01 LAB
GLUCOSE BLDC GLUCOMTR-MCNC: 288
HBA1C MFR BLD HPLC: 6.3
MICROALBUMIN/CREAT 24H UR-RTO: 1200

## 2021-04-01 PROCEDURE — 82962 GLUCOSE BLOOD TEST: CPT

## 2021-04-01 PROCEDURE — 99214 OFFICE O/P EST MOD 30 MIN: CPT | Mod: 25

## 2021-04-01 RX ORDER — INSULIN ASPART 100 [IU]/ML
100 INJECTION, SOLUTION INTRAVENOUS; SUBCUTANEOUS
Qty: 90 | Refills: 1 | Status: DISCONTINUED | COMMUNITY
Start: 2020-12-06 | End: 2021-04-01

## 2021-04-01 NOTE — ASSESSMENT
[FreeTextEntry1] : 1. DM type 2, insulin treated, complicated by renal insufficiency. Improving hyperglycemia in the past few days; will only raise basal insulin by 4 units. Switch to novolog due to formulary change\par 2. Hypothyroid, clinically euthyroid on replacement.. CHeck thyroid function with next lab test.\par

## 2021-04-01 NOTE — ASSESSMENT
[Modify medications prior to procedure] : Modify medications prior to procedure [As per surgery] : as per surgery [Patient Optimized for Surgery] : Patient optimized for surgery [FreeTextEntry4] : Patient requires stabilization of her potassium.  Will check level stat today.\par Hypokalemia likely related to antibiotic induced diarrhea.  Patient advised to discontinue her triple antibiotic therapy for H. pylori after day 10 (3/16/21) of treatment.\par Blood pressure mildly elevated with lower extremity edema. Restart Candesartan at 8 mg once daily.\par \par ADDENDUM:\par STAT Potassium 3/15/21: 4.4.\par \par There is no medical contraindication to the proposed procedure. The patient is medically cleared to proceed with the planned surgery.\par \par  [FreeTextEntry7] : Mrs. Henriquez is to take half of her usual Levemir dose the evening of the procedure at 12 units SC. She will also take her Carvedilol, and Candesartan in the am of the procedure with a small sip of water.

## 2021-04-01 NOTE — RESULTS/DATA
[] : results reviewed [de-identified] : Labs 3/12/2021: H&H 9.2 / 29.2 WBC 9.9 platelets 358 [de-identified] : WNL [de-identified] : Glucose 53, BUN 27, creatinine 1.4, sodium 138, POTASSIUM 2.8, chloride 103, CO2 26. [de-identified] : 3/12/21: Paced, VR 81 bpm.   3/15/21: Paced, VR 81 bpm. [de-identified] : SARS [place] 2 molecular, nasopharyngeal swab 3/12/2021: Not detected.\par Blood Type:  A\par Rh Type:  Negative\par Antibody Screen:  Negative

## 2021-04-01 NOTE — REVIEW OF SYSTEMS
[Lower Ext Edema] : lower extremity edema [Negative] : Musculoskeletal [Chest Pain] : no chest pain [Shortness Of Breath] : no shortness of breath [FreeTextEntry5] : She has been off of her HCTZ and her Candesartan since d/c from the hospital 2 wks ago. [de-identified] : Tingling left foot

## 2021-04-01 NOTE — HISTORY OF PRESENT ILLNESS
[FreeTextEntry1] : Quality:  Type 2.   \par Severity:  Moderate\par Duration:  23 years\par Associated Symptoms:  Retinopathy. Hypoglycemia. Nephropathy\par Modifying Factors:  Better with diet.   \par Notes:  Current regimen:\par 	metformin 1000 bid - stopped due to renal insufficiency\par 	glimepiride 2 bid - off \par 	januvia, precose ineffective. On avandia in the past.\par Levemir 30 units\par humalog before meals:\par 		2-8 units starting at 150\par \par Routine cardiac evaluation-fluid around the heart. Eventual w/u included PET scan and sonography revealing subcentimeric thyroid nodules, pulmonary abnormality, and fatty liver. SHASHA 1:160, speckled, no diagnosis made by rheumatology.\par \par \par \par \par \par Diet:  weight watchers\par \par Weight History:  \par losing weight\par \par Blood Glucose Testing Information - pt. reports variable glucose levels, and some lows after eating\par \par Patient is using the  meter and is testing 4 times per day.\par \par Past Medical History\par Notes:  ASHD, pacemaker, defibrillator\par PVD, s/p stents. \par renal cysts\par thyroid nodules, subcentimeric\par vitamin D deficiency\par

## 2021-04-01 NOTE — END OF VISIT
[FreeTextEntry3] : Pt encounter incorporated clinical review of the medical record including consultation from specialists, review of hospital lab and diagnostic testing with interpretation and discussion of results with pt, coordination of care for stat laboratory testing for re-evaluation of critical potassium level in preparation for planned next day surgery, including discussions with pt's gastroenterologist and cardiologist, dietary and general pt counseling, as well as documentation update within the electronic medical record.\par \par Time spent: 80 mins.\par

## 2021-04-01 NOTE — HISTORY OF PRESENT ILLNESS
[No Adverse Anesthesia Reaction] : no adverse anesthesia reaction in self or family member [(Patient denies any chest pain, claudication, dyspnea on exertion, orthopnea, palpitations or syncope)] : Patient denies any chest pain, claudication, dyspnea on exertion, orthopnea, palpitations or syncope [FreeTextEntry1] : Defibrillator Replacement [FreeTextEntry2] : 3/17/2021 [FreeTextEntry3] : Dr. Birmingham [FreeTextEntry4] : Patient is a 74-year-old female with a past medical history of peripheral arterial disease s/p peripheral artery stenting, nonischemic cardiomyopathy,  biventricular pacemaker with AICD, coronary artery disease, hypertension, hyperlipidemia, insulin-dependent diabetes, lumbar spinal stenosis and s/p recent hospitalization for blood loss anemia who presents for preoperative clearance for defibrillator replacement.\par Pt had blood transfusion of 3-4 units of PRBCs  during her hospital admission that began on 2/24/21. She had an upper endoscopy, colonoscopy and CT enterography which were all negative. Her upper endoscopy biopsy was positive for H. pylori and she has completed a course of triple therapy for eradication. She has been having some loose stool with  the triple antibiotic therapy.\par  [FreeTextEntry7] : PET/CT 1/20/2020: Extensive coronary calcifications noted in all 3 vascular distributions.  Normal myocardial perfusion study.  No evidence for significant ischemia or scar is noted.\par Echo 7/22/2020: Normal LVEF, trace pericardial effusion, and moderate AI, from prior study.

## 2021-04-09 LAB — UREA BREATH TEST QL: NEGATIVE

## 2021-04-16 ENCOUNTER — APPOINTMENT (OUTPATIENT)
Dept: NEPHROLOGY | Facility: CLINIC | Age: 76
End: 2021-04-16
Payer: MEDICARE

## 2021-04-16 VITALS
HEART RATE: 80 BPM | TEMPERATURE: 97.6 F | WEIGHT: 161 LBS | DIASTOLIC BLOOD PRESSURE: 64 MMHG | BODY MASS INDEX: 29.63 KG/M2 | HEIGHT: 62 IN | SYSTOLIC BLOOD PRESSURE: 144 MMHG

## 2021-04-16 DIAGNOSIS — N18.9 CHRONIC KIDNEY DISEASE, UNSPECIFIED: ICD-10-CM

## 2021-04-16 DIAGNOSIS — Z78.9 OTHER SPECIFIED HEALTH STATUS: ICD-10-CM

## 2021-04-16 DIAGNOSIS — Z82.49 FAMILY HISTORY OF ISCHEMIC HEART DISEASE AND OTHER DISEASES OF THE CIRCULATORY SYSTEM: ICD-10-CM

## 2021-04-16 DIAGNOSIS — E11.22 TYPE 2 DIABETES MELLITUS WITH DIABETIC CHRONIC KIDNEY DISEASE: ICD-10-CM

## 2021-04-16 DIAGNOSIS — Z79.4 TYPE 2 DIABETES MELLITUS WITH DIABETIC CHRONIC KIDNEY DISEASE: ICD-10-CM

## 2021-04-16 DIAGNOSIS — D63.1 CHRONIC KIDNEY DISEASE, UNSPECIFIED: ICD-10-CM

## 2021-04-16 DIAGNOSIS — N18.30 TYPE 2 DIABETES MELLITUS WITH DIABETIC CHRONIC KIDNEY DISEASE: ICD-10-CM

## 2021-04-16 PROCEDURE — 36415 COLL VENOUS BLD VENIPUNCTURE: CPT

## 2021-04-16 PROCEDURE — 99214 OFFICE O/P EST MOD 30 MIN: CPT | Mod: 25

## 2021-04-16 RX ORDER — MECLIZINE HYDROCHLORIDE 12.5 MG/1
12.5 TABLET ORAL EVERY 4 HOURS
Qty: 30 | Refills: 1 | Status: DISCONTINUED | COMMUNITY
Start: 2020-07-20 | End: 2021-04-16

## 2021-04-16 NOTE — PHYSICAL EXAM
[General Appearance - Alert] : alert [General Appearance - In No Acute Distress] : in no acute distress [General Appearance - Well Nourished] : well nourished [General Appearance - Well Developed] : well developed [General Appearance - Well-Appearing] : healthy appearing [Sclera] : the sclera and conjunctiva were normal [Outer Ear] : the ears and nose were normal in appearance [Hearing Threshold Finger Rub Not [place]] : hearing was normal [Examination Of The Oral Cavity] : the lips and gums were normal [Neck Appearance] : the appearance of the neck was normal [Neck Cervical Mass (___cm)] : no neck mass was observed [Jugular Venous Distention Increased] : there was no jugular-venous distention [Respiration, Rhythm And Depth] : normal respiratory rhythm and effort [Exaggerated Use Of Accessory Muscles For Inspiration] : no accessory muscle use [Auscultation Breath Sounds / Voice Sounds] : lungs were clear to auscultation bilaterally [Apical Impulse] : the apical impulse was normal [Heart Rate And Rhythm] : heart rate was normal and rhythm regular [Heart Sounds] : normal S1 and S2 [Heart Sounds Gallop] : no gallops [Murmurs] : no murmurs [Heart Sounds Pericardial Friction Rub] : no pericardial rub [Arterial Pulses Carotid] : carotid pulses were normal with no bruits [Abdominal Aorta] : the abdominal aorta was normal [Pitting Edema] : pitting edema present [___ +] : bilateral [unfilled]+ pretibial pitting edema [Bowel Sounds] : normal bowel sounds [Abdomen Soft] : soft [Abdomen Tenderness] : non-tender [Abdomen Mass (___ Cm)] : no abdominal mass palpated [Abdomen Hernia] : no hernia was discovered [Cervical Lymph Nodes Enlarged Posterior Bilaterally] : posterior cervical [Cervical Lymph Nodes Enlarged Anterior Bilaterally] : anterior cervical [No CVA Tenderness] : no ~M costovertebral angle tenderness [Involuntary Movements] : no involuntary movements were seen [Skin Turgor] : normal skin turgor [] : no rash [No Focal Deficits] : no focal deficits [Oriented To Time, Place, And Person] : oriented to person, place, and time [Impaired Insight] : insight and judgment were intact [Affect] : the affect was normal [Mood] : the mood was normal [FreeTextEntry1] : +AICD

## 2021-04-16 NOTE — HISTORY OF PRESENT ILLNESS
[FreeTextEntry1] : HX: CKD 3, CAD, CHF, kidney cyst, DM 2, HLD, HTN, LVEF 50%, hypothyroidism.\par \par Surgery 11/26/ 2019 (Dr. Bean) - Heavily calcified plaque left femoral artery, Recurrent left SFA stenosis\par \par S/P  thromboendarterectomy of deep femoral artery, Left CFA &  insertion of SFA stent. \par \par 4/16/2021\par Left leg bypass-Dec 2020\par February- low Hgb 4 units PRBCs-taken off all blood thinners\par GI workup in progress\par cardiology-Dr. Richard recently replaced PPM\par \par

## 2021-04-16 NOTE — ASSESSMENT
[FreeTextEntry1] : Labs/medications reviewed,\par \par DX : CKD - 3, DN, PAD ( S/P Revascularization - LLE ) \par anemia of CKD/blood loss?  Needs to see heme\par \par CKD 3\par +1 BLLE edema -Lasix 40mg PO x 5 days-continue 20mg daily\par DASH \par \par Anemia\par Pt planning to see Heme- Dr. De La Cruz\par Pt talking Oral Iron -consider IV iron \par \par HTN\par BP not at goal\par Candesartan increased to 16mg PO daily \par Pt has appt to see Dr. Caceres in 2 weeks -recheck BP\par \par RTC 3 months\par \par \par

## 2021-04-18 LAB
25(OH)D3 SERPL-MCNC: 31.9 NG/ML
ALBUMIN SERPL ELPH-MCNC: 4.2 G/DL
ANION GAP SERPL CALC-SCNC: 15 MMOL/L
APPEARANCE: ABNORMAL
BACTERIA: ABNORMAL
BASOPHILS # BLD AUTO: 0.08 K/UL
BASOPHILS NFR BLD AUTO: 0.9 %
BILIRUBIN URINE: NEGATIVE
BLOOD URINE: NEGATIVE
BUN SERPL-MCNC: 36 MG/DL
CALCIUM SERPL-MCNC: 9.4 MG/DL
CALCIUM SERPL-MCNC: 9.4 MG/DL
CHLORIDE SERPL-SCNC: 101 MMOL/L
CO2 SERPL-SCNC: 23 MMOL/L
COLOR: YELLOW
CREAT SERPL-MCNC: 1.38 MG/DL
CREAT SPEC-SCNC: 141 MG/DL
CREAT/PROT UR: 0.5 RATIO
EOSINOPHIL # BLD AUTO: 0.25 K/UL
EOSINOPHIL NFR BLD AUTO: 2.8 %
GLUCOSE QUALITATIVE U: NEGATIVE
GLUCOSE SERPL-MCNC: 204 MG/DL
HCT VFR BLD CALC: 38.1 %
HGB BLD-MCNC: 11.3 G/DL
HYALINE CASTS: 2 /LPF
IMM GRANULOCYTES NFR BLD AUTO: 0.3 %
KETONES URINE: NEGATIVE
LEUKOCYTE ESTERASE URINE: ABNORMAL
LYMPHOCYTES # BLD AUTO: 1.32 K/UL
LYMPHOCYTES NFR BLD AUTO: 14.9 %
MAN DIFF?: NORMAL
MCHC RBC-ENTMCNC: 28 PG
MCHC RBC-ENTMCNC: 29.7 GM/DL
MCV RBC AUTO: 94.3 FL
MICROSCOPIC-UA: NORMAL
MONOCYTES # BLD AUTO: 0.74 K/UL
MONOCYTES NFR BLD AUTO: 8.3 %
NEUTROPHILS # BLD AUTO: 6.45 K/UL
NEUTROPHILS NFR BLD AUTO: 72.8 %
NITRITE URINE: POSITIVE
PARATHYROID HORMONE INTACT: 34 PG/ML
PH URINE: 5.5
PHOSPHATE SERPL-MCNC: 4.5 MG/DL
PLATELET # BLD AUTO: 262 K/UL
POTASSIUM SERPL-SCNC: 5.1 MMOL/L
PROT UR-MCNC: 64 MG/DL
PROTEIN URINE: ABNORMAL
RBC # BLD: 4.04 M/UL
RBC # FLD: 16.7 %
RED BLOOD CELLS URINE: 1 /HPF
SODIUM SERPL-SCNC: 139 MMOL/L
SPECIFIC GRAVITY URINE: 1.02
SQUAMOUS EPITHELIAL CELLS: 2 /HPF
URINE COMMENTS: NORMAL
UROBILINOGEN URINE: NORMAL
WBC # FLD AUTO: 8.87 K/UL
WHITE BLOOD CELLS URINE: 150 /HPF

## 2021-04-30 ENCOUNTER — APPOINTMENT (OUTPATIENT)
Dept: FAMILY MEDICINE | Facility: CLINIC | Age: 76
End: 2021-04-30
Payer: MEDICARE

## 2021-04-30 VITALS
HEIGHT: 62 IN | SYSTOLIC BLOOD PRESSURE: 142 MMHG | BODY MASS INDEX: 30.36 KG/M2 | DIASTOLIC BLOOD PRESSURE: 60 MMHG | WEIGHT: 165 LBS | OXYGEN SATURATION: 96 % | HEART RATE: 82 BPM

## 2021-04-30 VITALS — SYSTOLIC BLOOD PRESSURE: 146 MMHG | DIASTOLIC BLOOD PRESSURE: 58 MMHG

## 2021-04-30 DIAGNOSIS — Z09 ENCOUNTER FOR FOLLOW-UP EXAMINATION AFTER COMPLETED TREATMENT FOR CONDITIONS OTHER THAN MALIGNANT NEOPLASM: ICD-10-CM

## 2021-04-30 DIAGNOSIS — Z86.79 PERSONAL HISTORY OF OTHER DISEASES OF THE CIRCULATORY SYSTEM: ICD-10-CM

## 2021-04-30 PROCEDURE — 99214 OFFICE O/P EST MOD 30 MIN: CPT

## 2021-04-30 RX ORDER — METRONIDAZOLE 500 MG/1
500 TABLET ORAL
Refills: 0 | Status: DISCONTINUED | COMMUNITY
Start: 2021-03-15 | End: 2021-04-30

## 2021-04-30 RX ORDER — PANTOPRAZOLE 40 MG/1
40 TABLET, DELAYED RELEASE ORAL
Refills: 0 | Status: DISCONTINUED | COMMUNITY
Start: 2021-03-15 | End: 2021-04-30

## 2021-04-30 RX ORDER — ERYTHROPOIETIN 40000 [IU]/ML
40000 INJECTION, SOLUTION INTRAVENOUS; SUBCUTANEOUS
Qty: 3 | Refills: 3 | Status: DISCONTINUED | COMMUNITY
Start: 2020-11-13 | End: 2021-04-30

## 2021-04-30 NOTE — REVIEW OF SYSTEMS
[Lower Ext Edema] : lower extremity edema [Back Pain] : back pain [Itching] : Itching [Skin Rash] : no skin rash [Negative] : Psychiatric [de-identified] : LLE near ankle

## 2021-04-30 NOTE — ASSESSMENT
[FreeTextEntry1] : Pt is clinically improved.\par No sob and decreased LE edema.\par Renal fxn stable. Blood sugars better.\par Cont current medications.\par F/U 2 months.

## 2021-04-30 NOTE — PHYSICAL EXAM
[No Acute Distress] : no acute distress [Normal Sclera/Conjunctiva] : normal sclera/conjunctiva [No JVD] : no jugular venous distention [Supple] : supple [No Respiratory Distress] : no respiratory distress  [Normal Rhythm/Effort] : normal respiratory rhythm and effort [Rales / Crackles Bilateral] : no rales or crackles were heard [Wheezing Bilaterally] : no wheezing was heard [Rhonchi Bilateral] : no rhonchi were heard [Decreased Breath Sounds] : breath sounds were not diminished [Normal Rate] : normal [Rhythm Regular] : regular [Normal S1] : normal S1 [Normal S2] : normal S2 [II] : a grade 2 [1+] : left 1+ [No Focal Deficits] : no focal deficits [Normal Mood] : the mood was normal [Normal] : affect was normal and insight and judgment were intact [de-identified] : No weeping of the legs

## 2021-04-30 NOTE — HISTORY OF PRESENT ILLNESS
[FreeTextEntry1] : Patient is status post blood loss anemia requiring hospitalization and transfusion.  She also has diastolic congestive heart failure and has had increasing lower extremity edema associated with shortness of breath.  She has had adjustments in her diuretic and blood pressure medications over the past month and has had improvement in her lower extremity edema.  She is currently on furosemide 20 mg once daily.  She denies any shortness of breath or chest pain.  Patient was seen by her nephrologist 2 weeks ago and candesartan was increased from 8 mg/day to 16 mg/day.\par Patient saw her cardiologist 1 week ago and carvedilol was increased to 25 mg 1 tab 3 times per day.\par Blood sugars are improved with addition of NovoLog by patient's endocrinologist. 120s-140s avg.\par \par

## 2021-05-03 ENCOUNTER — APPOINTMENT (OUTPATIENT)
Dept: UROGYNECOLOGY | Facility: CLINIC | Age: 76
End: 2021-05-03
Payer: MEDICARE

## 2021-05-03 VITALS — DIASTOLIC BLOOD PRESSURE: 54 MMHG | SYSTOLIC BLOOD PRESSURE: 159 MMHG

## 2021-05-03 PROCEDURE — 99213 OFFICE O/P EST LOW 20 MIN: CPT

## 2021-05-18 ENCOUNTER — RX RENEWAL (OUTPATIENT)
Age: 76
End: 2021-05-18

## 2021-06-02 ENCOUNTER — RX RENEWAL (OUTPATIENT)
Age: 76
End: 2021-06-02

## 2021-06-07 ENCOUNTER — RX RENEWAL (OUTPATIENT)
Age: 76
End: 2021-06-07

## 2021-06-10 ENCOUNTER — OUTPATIENT (OUTPATIENT)
Dept: OUTPATIENT SERVICES | Facility: HOSPITAL | Age: 76
LOS: 1 days | Discharge: HOME | End: 2021-06-10

## 2021-06-10 ENCOUNTER — RX RENEWAL (OUTPATIENT)
Age: 76
End: 2021-06-10

## 2021-06-10 DIAGNOSIS — Z98.890 OTHER SPECIFIED POSTPROCEDURAL STATES: Chronic | ICD-10-CM

## 2021-06-10 DIAGNOSIS — H34.8311 TRIBUTARY (BRANCH) RETINAL VEIN OCCLUSION, RIGHT EYE, WITH RETINAL NEOVASCULARIZATION: Chronic | ICD-10-CM

## 2021-06-10 DIAGNOSIS — Z46.89 ENCOUNTER FOR FITTING AND ADJUSTMENT OF OTHER SPECIFIED DEVICES: Chronic | ICD-10-CM

## 2021-06-10 DIAGNOSIS — H26.9 UNSPECIFIED CATARACT: Chronic | ICD-10-CM

## 2021-06-10 DIAGNOSIS — D64.9 ANEMIA, UNSPECIFIED: ICD-10-CM

## 2021-06-10 DIAGNOSIS — Z98.89 OTHER SPECIFIED POSTPROCEDURAL STATES: Chronic | ICD-10-CM

## 2021-06-14 ENCOUNTER — RESULT REVIEW (OUTPATIENT)
Age: 76
End: 2021-06-14

## 2021-06-14 ENCOUNTER — APPOINTMENT (OUTPATIENT)
Dept: HEMATOLOGY ONCOLOGY | Facility: CLINIC | Age: 76
End: 2021-06-14
Payer: MEDICARE

## 2021-06-14 VITALS
TEMPERATURE: 97.8 F | HEIGHT: 62 IN | SYSTOLIC BLOOD PRESSURE: 147 MMHG | OXYGEN SATURATION: 98 % | HEART RATE: 62 BPM | DIASTOLIC BLOOD PRESSURE: 61 MMHG | WEIGHT: 162 LBS | BODY MASS INDEX: 29.81 KG/M2

## 2021-06-14 LAB
BASOPHILS # BLD AUTO: 0.1 K/UL — SIGNIFICANT CHANGE UP (ref 0–0.2)
BASOPHILS NFR BLD AUTO: 0.6 % — SIGNIFICANT CHANGE UP (ref 0–2)
EOSINOPHIL # BLD AUTO: 0.4 K/UL — SIGNIFICANT CHANGE UP (ref 0–0.5)
EOSINOPHIL NFR BLD AUTO: 3.2 % — SIGNIFICANT CHANGE UP (ref 0–6)
HCT VFR BLD CALC: 36.8 % — SIGNIFICANT CHANGE UP (ref 34.5–45)
HGB BLD-MCNC: 11.9 G/DL — SIGNIFICANT CHANGE UP (ref 11.5–15.5)
LYMPHOCYTES # BLD AUTO: 1.4 K/UL — SIGNIFICANT CHANGE UP (ref 1–3.3)
LYMPHOCYTES # BLD AUTO: 11.1 % — LOW (ref 13–44)
MCHC RBC-ENTMCNC: 29.4 PG — SIGNIFICANT CHANGE UP (ref 27–34)
MCHC RBC-ENTMCNC: 32.5 G/DL — SIGNIFICANT CHANGE UP (ref 32–36)
MCV RBC AUTO: 90.5 FL — SIGNIFICANT CHANGE UP (ref 80–100)
MONOCYTES # BLD AUTO: 1 K/UL — HIGH (ref 0–0.9)
MONOCYTES NFR BLD AUTO: 7.8 % — SIGNIFICANT CHANGE UP (ref 2–14)
NEUTROPHILS # BLD AUTO: 9.9 K/UL — HIGH (ref 1.8–7.4)
NEUTROPHILS NFR BLD AUTO: 77.3 % — HIGH (ref 43–77)
PLATELET # BLD AUTO: 200 K/UL — SIGNIFICANT CHANGE UP (ref 150–400)
RBC # BLD: 4.06 M/UL — SIGNIFICANT CHANGE UP (ref 3.8–5.2)
RBC # FLD: 17 % — HIGH (ref 10.3–14.5)
WBC # BLD: 12.8 K/UL — HIGH (ref 3.8–10.5)
WBC # FLD AUTO: 12.8 K/UL — HIGH (ref 3.8–10.5)

## 2021-06-14 PROCEDURE — 99203 OFFICE O/P NEW LOW 30 MIN: CPT

## 2021-06-14 NOTE — ASSESSMENT
[FreeTextEntry1] : 75-year-old woman who developed blood loss anemia while on Xarelto/Plavix/aspirin for advanced peripheral vascular disease following thromboendarterectomy.\par No GI bleeding source was identified, with the patient has been switched to aspirin 81 mg as sole anticoagulation, and on ferrous sulfate 325 mg daily her hemoglobin has normalized.

## 2021-06-14 NOTE — HISTORY OF PRESENT ILLNESS
[de-identified] : This 75-year-old woman with a history of chronic kidney disease, congestive heart failure, diabetes, aortic valve replacement is referred for anemia.  In the latter part of 2020 she was running hemoglobin between 8 and 9 g but on [place] [address] her hemoglobin fell to 5.3.  She had been on a combination of Xarelto Plavix and aspirin following thromboendarterectomy of the deep left femoral artery.  No bleeding site was discovered including extensive GI endoscopy.  Xarelto and Plavix were discontinued and she has been maintained now on aspirin 81 mg.  She was transfused in the hospital.\par Ferrous sulfate 325 mg daily was started, and today the hemoglobin is 11.9, hematocrit 36.8, MCV 90.  The white count is 12.8 with a normal differential and the platelet count 200,000. [de-identified] : Recent renal panel shows a BUN of 36 and creatinine 1.38.\par Patient is back to baseline performance status.\par There is been no signs of bleeding on aspirin 81 mg.

## 2021-06-14 NOTE — REVIEW OF SYSTEMS
[Fatigue] : fatigue [SOB on Exertion] : shortness of breath during exertion [Negative] : Heme/Lymph [FreeTextEntry5] : Lower extremity edema improved with recent adjustment of medication.

## 2021-06-25 ENCOUNTER — APPOINTMENT (OUTPATIENT)
Dept: FAMILY MEDICINE | Facility: CLINIC | Age: 76
End: 2021-06-25
Payer: MEDICARE

## 2021-06-25 VITALS
HEIGHT: 62 IN | BODY MASS INDEX: 29.81 KG/M2 | TEMPERATURE: 97.5 F | WEIGHT: 162 LBS | DIASTOLIC BLOOD PRESSURE: 62 MMHG | OXYGEN SATURATION: 98 % | SYSTOLIC BLOOD PRESSURE: 140 MMHG | HEART RATE: 82 BPM

## 2021-06-25 PROCEDURE — 99213 OFFICE O/P EST LOW 20 MIN: CPT

## 2021-06-26 ENCOUNTER — RX RENEWAL (OUTPATIENT)
Age: 76
End: 2021-06-26

## 2021-06-26 NOTE — DATA REVIEWED
[FreeTextEntry1] : Vascular consult 5/24/2021: Reviewed.\par GULSHAN 5/24/2021: Within normal limits.  The left bypass is patent.\par Venous duplex left lower extremity 5/24/2021: No evidence of deep vein thrombosis nor deep venous insufficiency in left lower extremity.

## 2021-06-26 NOTE — PLAN
[FreeTextEntry1] : Pt encounter incorporated clinical review of the medical record including consultation from specialists, review of lab and diagnostic testing with interpretation and discussion of results with patient, general pt counseling, and coordination of care, as well as documentation update within the electronic medical record.\par \par Time spent: 30 mins.\par

## 2021-06-26 NOTE — HISTORY OF PRESENT ILLNESS
[FreeTextEntry1] : Pt is following up on her htn.\par She is requesting med refills.\par LLE with mild edema, not cold.\par She saw her vascular surgeon recently.\par OTC compression socks were recommended by vascular. [de-identified] : 4/30/21:\par Patient is status post blood loss anemia requiring hospitalization and transfusion.  She also has diastolic congestive heart failure and has had increasing lower extremity edema associated with shortness of breath.  She has had adjustments in her diuretic and blood pressure medications over the past month and has had improvement in her lower extremity edema.  She is currently on furosemide 20 mg once daily.  She denies any shortness of breath or chest pain.  Patient was seen by her nephrologist 2 weeks ago and candesartan was increased from 8 mg/day to 16 mg/day.\par Patient saw her cardiologist 1 week ago and carvedilol was increased to 25 mg 1 tab 3 times per day.\par Blood sugars are improved with addition of NovoLog by patient's endocrinologist. 120s-140s avg.\par \par

## 2021-06-26 NOTE — ASSESSMENT
[FreeTextEntry1] : Mild increase in bp today.\par Recommend home bp monitoring.\par LIpid levels, and renal panel to be done within next few wks.\par Agree with light compression socks.\par \par F/U 6 wks.

## 2021-06-26 NOTE — PHYSICAL EXAM
[No Acute Distress] : no acute distress [Normal Sclera/Conjunctiva] : normal sclera/conjunctiva [No Lymphadenopathy] : no lymphadenopathy [Normal Rate] : normal [Irregularly Irregular] : irregularly irregular [Normal S1] : normal S1 [Normal S2] : normal S2 [___ +] : [unfilled]U+ pitting edema to the left knee [2+] : left 2+ [Normal] : affect was normal and insight and judgment were intact

## 2021-06-27 ENCOUNTER — RX RENEWAL (OUTPATIENT)
Age: 76
End: 2021-06-27

## 2021-06-29 DIAGNOSIS — D31.32 BENIGN NEOPLASM OF LEFT CHOROID: ICD-10-CM

## 2021-06-29 DIAGNOSIS — H35.371 PUCKERING OF MACULA, RIGHT EYE: ICD-10-CM

## 2021-07-22 ENCOUNTER — RX RENEWAL (OUTPATIENT)
Age: 76
End: 2021-07-22

## 2021-07-26 ENCOUNTER — LABORATORY RESULT (OUTPATIENT)
Age: 76
End: 2021-07-26

## 2021-08-02 ENCOUNTER — APPOINTMENT (OUTPATIENT)
Dept: ENDOCRINOLOGY | Facility: CLINIC | Age: 76
End: 2021-08-02
Payer: MEDICARE

## 2021-08-02 VITALS
HEART RATE: 69 BPM | HEIGHT: 62 IN | DIASTOLIC BLOOD PRESSURE: 62 MMHG | BODY MASS INDEX: 30 KG/M2 | OXYGEN SATURATION: 99 % | WEIGHT: 163 LBS | SYSTOLIC BLOOD PRESSURE: 124 MMHG

## 2021-08-02 LAB — GLUCOSE BLDC GLUCOMTR-MCNC: 203

## 2021-08-02 PROCEDURE — 36415 COLL VENOUS BLD VENIPUNCTURE: CPT

## 2021-08-02 PROCEDURE — 99214 OFFICE O/P EST MOD 30 MIN: CPT | Mod: 25

## 2021-08-02 PROCEDURE — 82962 GLUCOSE BLOOD TEST: CPT

## 2021-08-02 NOTE — PHYSICAL EXAM
[No Thyroid Nodules] : no palpable thyroid nodules [Clear to Auscultation] : lungs were clear to auscultation bilaterally [de-identified] : occasional extrasystoles

## 2021-08-02 NOTE — HISTORY OF PRESENT ILLNESS
[FreeTextEntry1] : Quality:  Type 2.   \par Severity:  Moderate\par Duration:  23 years\par Associated Symptoms:  Retinopathy. Hypoglycemia. Nephropathy\par Modifying Factors:  Better with diet.   \par Notes:  Current regimen:\par 	metformin 1000 bid - stopped due to renal insufficiency\par 	glimepiride 2 bid - off \par 	januvia, precose ineffective. On avandia in the past.\par Levemir 30 units\par novolog before meals:\par 		2-8 units starting at 150\par \par Routine cardiac evaluation-fluid around the heart. Eventual w/u included PET scan and sonography revealing subcentimeric thyroid nodules, pulmonary abnormality, and fatty liver. SHASHA 1:160, speckled, no diagnosis made by rheumatology.\par \par \par \par \par \par Diet:  weight watchers\par \par Weight History:  \par losing weight\par \par Blood Glucose Testing Information - pt. reports variable glucose levels, and some lows after eating\par \par Patient is using the  meter and is testing 4 times per day.\par \par Past Medical History\par Notes:  ASHD, pacemaker, defibrillator\par PVD, s/p stents. \par renal cysts\par thyroid nodules, subcentimeric\par vitamin D deficiency\par

## 2021-08-02 NOTE — ASSESSMENT
[FreeTextEntry1] : 1. DM type 2, insulin treated, complicated by renal insufficiency. Needs more basal insulin; increase to 36 units of levemir.\par Consider adding a sglt-2, after updated renal function obtained and after consultation with nephrology (apptJuliano [email or web address]).\par 2. Hypothyroid,  euthyroid on replacement.. \par

## 2021-08-03 LAB
ALBUMIN SERPL ELPH-MCNC: 4.3 G/DL
ALP BLD-CCNC: 76 U/L
ALT SERPL-CCNC: 17 U/L
ANION GAP SERPL CALC-SCNC: 15 MMOL/L
AST SERPL-CCNC: 21 U/L
BASOPHILS # BLD AUTO: 0.08 K/UL
BASOPHILS NFR BLD AUTO: 0.9 %
BILIRUB SERPL-MCNC: 0.3 MG/DL
BUN SERPL-MCNC: 39 MG/DL
CALCIUM SERPL-MCNC: 10 MG/DL
CHLORIDE SERPL-SCNC: 104 MMOL/L
CHOLEST SERPL-MCNC: 131 MG/DL
CO2 SERPL-SCNC: 24 MMOL/L
CREAT SERPL-MCNC: 1.45 MG/DL
CREAT SPEC-SCNC: 86 MG/DL
EOSINOPHIL # BLD AUTO: 0.31 K/UL
EOSINOPHIL NFR BLD AUTO: 3.5 %
ESTIMATED AVERAGE GLUCOSE: 203 MG/DL
GLUCOSE SERPL-MCNC: 215 MG/DL
HBA1C MFR BLD HPLC: 8.7 %
HCT VFR BLD CALC: 36.4 %
HDLC SERPL-MCNC: 42 MG/DL
HGB BLD-MCNC: 11.3 G/DL
IMM GRANULOCYTES NFR BLD AUTO: 0.2 %
LDLC SERPL CALC-MCNC: 65 MG/DL
LYMPHOCYTES # BLD AUTO: 1.52 K/UL
LYMPHOCYTES NFR BLD AUTO: 17.3 %
MAN DIFF?: NORMAL
MCHC RBC-ENTMCNC: 30.7 PG
MCHC RBC-ENTMCNC: 31 GM/DL
MCV RBC AUTO: 98.9 FL
MICROALBUMIN 24H UR DL<=1MG/L-MCNC: 8.9 MG/DL
MICROALBUMIN/CREAT 24H UR-RTO: 103 MG/G
MONOCYTES # BLD AUTO: 0.83 K/UL
MONOCYTES NFR BLD AUTO: 9.5 %
NEUTROPHILS # BLD AUTO: 6.01 K/UL
NEUTROPHILS NFR BLD AUTO: 68.6 %
NONHDLC SERPL-MCNC: 88 MG/DL
PLATELET # BLD AUTO: 192 K/UL
POTASSIUM SERPL-SCNC: 5.2 MMOL/L
PROT SERPL-MCNC: 7.2 G/DL
RBC # BLD: 3.68 M/UL
RBC # FLD: 16.3 %
SODIUM SERPL-SCNC: 143 MMOL/L
TRIGL SERPL-MCNC: 117 MG/DL
WBC # FLD AUTO: 8.77 K/UL

## 2021-08-09 ENCOUNTER — APPOINTMENT (OUTPATIENT)
Dept: UROGYNECOLOGY | Facility: CLINIC | Age: 76
End: 2021-08-09
Payer: MEDICARE

## 2021-08-09 PROCEDURE — 99213 OFFICE O/P EST LOW 20 MIN: CPT

## 2021-08-30 ENCOUNTER — RX RENEWAL (OUTPATIENT)
Age: 76
End: 2021-08-30

## 2021-09-17 ENCOUNTER — APPOINTMENT (OUTPATIENT)
Dept: NEPHROLOGY | Facility: CLINIC | Age: 76
End: 2021-09-17
Payer: MEDICARE

## 2021-09-17 VITALS
SYSTOLIC BLOOD PRESSURE: 140 MMHG | DIASTOLIC BLOOD PRESSURE: 64 MMHG | OXYGEN SATURATION: 98 % | BODY MASS INDEX: 30 KG/M2 | HEART RATE: 80 BPM | WEIGHT: 163 LBS | HEIGHT: 62 IN

## 2021-09-17 DIAGNOSIS — D64.9 ANEMIA, UNSPECIFIED: ICD-10-CM

## 2021-09-17 DIAGNOSIS — Z87.898 PERSONAL HISTORY OF OTHER SPECIFIED CONDITIONS: ICD-10-CM

## 2021-09-17 DIAGNOSIS — M79.605 PAIN IN LEFT LEG: ICD-10-CM

## 2021-09-17 DIAGNOSIS — R60.0 LOCALIZED EDEMA: ICD-10-CM

## 2021-09-17 DIAGNOSIS — N95.2 POSTMENOPAUSAL ATROPHIC VAGINITIS: ICD-10-CM

## 2021-09-17 DIAGNOSIS — H26.9 UNSPECIFIED CATARACT: ICD-10-CM

## 2021-09-17 DIAGNOSIS — D50.0 IRON DEFICIENCY ANEMIA SECONDARY TO BLOOD LOSS (CHRONIC): ICD-10-CM

## 2021-09-17 PROCEDURE — 99214 OFFICE O/P EST MOD 30 MIN: CPT | Mod: 25

## 2021-09-17 PROCEDURE — 36415 COLL VENOUS BLD VENIPUNCTURE: CPT

## 2021-09-17 RX ORDER — GABAPENTIN 100 MG/1
100 CAPSULE ORAL
Refills: 0 | Status: DISCONTINUED | COMMUNITY
Start: 2021-01-04 | End: 2021-09-17

## 2021-09-17 NOTE — HISTORY OF PRESENT ILLNESS
[FreeTextEntry1] : HX: CKD 3, CAD, CHF, kidney cyst, DM 2, HLD, HTN, LVEF 50%, hypothyroidism.\par \par Surgery 11/26/ 2019 (Dr. Bean) - Heavily calcified plaque left femoral artery, Recurrent left SFA stenosis\par \par S/P  thromboendarterectomy of deep femoral artery, Left CFA &  insertion of SFA stent. \par \par 4/16/2021\par Left leg bypass-Dec 2020\par February- low Hgb,  4 units PRBCs-taken off all  AC, \par \par cardiology-Dr. Richard recently replaced PPM\par \par

## 2021-09-17 NOTE — PHYSICAL EXAM
[General Appearance - Alert] : alert [General Appearance - In No Acute Distress] : in no acute distress [General Appearance - Well Nourished] : well nourished [General Appearance - Well Developed] : well developed [General Appearance - Well-Appearing] : healthy appearing [Sclera] : the sclera and conjunctiva were normal [Outer Ear] : the ears and nose were normal in appearance [Hearing Threshold Finger Rub Not [place]] : hearing was normal [Examination Of The Oral Cavity] : the lips and gums were normal [Neck Appearance] : the appearance of the neck was normal [Neck Cervical Mass (___cm)] : no neck mass was observed [Jugular Venous Distention Increased] : there was no jugular-venous distention [Respiration, Rhythm And Depth] : normal respiratory rhythm and effort [Exaggerated Use Of Accessory Muscles For Inspiration] : no accessory muscle use [Auscultation Breath Sounds / Voice Sounds] : lungs were clear to auscultation bilaterally [Apical Impulse] : the apical impulse was normal [Heart Rate And Rhythm] : heart rate was normal and rhythm regular [Heart Sounds] : normal S1 and S2 [Heart Sounds Gallop] : no gallops [Murmurs] : no murmurs [Heart Sounds Pericardial Friction Rub] : no pericardial rub [Arterial Pulses Carotid] : carotid pulses were normal with no bruits [Abdominal Aorta] : the abdominal aorta was normal [Pitting Edema] : pitting edema present [___ +] : bilateral [unfilled]+ pretibial pitting edema [Bowel Sounds] : normal bowel sounds [Abdomen Soft] : soft [Abdomen Tenderness] : non-tender [Abdomen Mass (___ Cm)] : no abdominal mass palpated [Abdomen Hernia] : no hernia was discovered [Cervical Lymph Nodes Enlarged Posterior Bilaterally] : posterior cervical [Cervical Lymph Nodes Enlarged Anterior Bilaterally] : anterior cervical [No CVA Tenderness] : no ~M costovertebral angle tenderness [Involuntary Movements] : no involuntary movements were seen [Skin Turgor] : normal skin turgor [] : no rash [No Focal Deficits] : no focal deficits [Oriented To Time, Place, And Person] : oriented to person, place, and time [Impaired Insight] : insight and judgment were intact [Affect] : the affect was normal [Mood] : the mood was normal [FreeTextEntry1] : +AICD

## 2021-09-17 NOTE — ASSESSMENT
[FreeTextEntry1] : Labs/medications reviewed,\par \par DX : CKD - 3, DN, PAD ( S/P Revascularization - LLE ) - Legs warm, \par \par CKD 3\par \par Anemia  - Resolved, F.U Dr. De La Cruz PRN, \par \par HTN\par \par Meds & Labs reviewed,  PPM Revised, \par \par RTC 6 months\par \par \par

## 2021-09-20 LAB
25(OH)D3 SERPL-MCNC: 30.7 NG/ML
ALBUMIN SERPL ELPH-MCNC: 4.2 G/DL
ANION GAP SERPL CALC-SCNC: 19 MMOL/L
APPEARANCE: ABNORMAL
BACTERIA: ABNORMAL
BASOPHILS # BLD AUTO: 0.07 K/UL
BASOPHILS NFR BLD AUTO: 0.9 %
BILIRUBIN URINE: NEGATIVE
BLOOD URINE: NEGATIVE
BUN SERPL-MCNC: 37 MG/DL
CALCIUM SERPL-MCNC: 9.8 MG/DL
CALCIUM SERPL-MCNC: 9.8 MG/DL
CHLORIDE SERPL-SCNC: 97 MMOL/L
CO2 SERPL-SCNC: 21 MMOL/L
COLOR: YELLOW
CREAT SERPL-MCNC: 1.48 MG/DL
CREAT SPEC-SCNC: 92 MG/DL
CREAT/PROT UR: 0.3 RATIO
EOSINOPHIL # BLD AUTO: 0.29 K/UL
EOSINOPHIL NFR BLD AUTO: 3.6 %
GLUCOSE QUALITATIVE U: NEGATIVE
GLUCOSE SERPL-MCNC: 291 MG/DL
HCT VFR BLD CALC: 37.2 %
HGB BLD-MCNC: 11.6 G/DL
HYALINE CASTS: 3 /LPF
IMM GRANULOCYTES NFR BLD AUTO: 0.4 %
KETONES URINE: NEGATIVE
LEUKOCYTE ESTERASE URINE: ABNORMAL
LYMPHOCYTES # BLD AUTO: 1.46 K/UL
LYMPHOCYTES NFR BLD AUTO: 18.1 %
MAN DIFF?: NORMAL
MCHC RBC-ENTMCNC: 31.2 GM/DL
MCHC RBC-ENTMCNC: 31.4 PG
MCV RBC AUTO: 100.8 FL
MICROSCOPIC-UA: NORMAL
MONOCYTES # BLD AUTO: 0.64 K/UL
MONOCYTES NFR BLD AUTO: 8 %
NEUTROPHILS # BLD AUTO: 5.56 K/UL
NEUTROPHILS NFR BLD AUTO: 69 %
NITRITE URINE: NEGATIVE
PARATHYROID HORMONE INTACT: 28 PG/ML
PH URINE: 6
PHOSPHATE SERPL-MCNC: 4.8 MG/DL
PLATELET # BLD AUTO: 302 K/UL
POTASSIUM SERPL-SCNC: 5.7 MMOL/L
PROT UR-MCNC: 31 MG/DL
PROTEIN URINE: NORMAL
RBC # BLD: 3.69 M/UL
RBC # FLD: 14.3 %
RED BLOOD CELLS URINE: 0 /HPF
SODIUM SERPL-SCNC: 137 MMOL/L
SPECIFIC GRAVITY URINE: 1.01
SQUAMOUS EPITHELIAL CELLS: 1 /HPF
UROBILINOGEN URINE: NORMAL
WBC # FLD AUTO: 8.05 K/UL
WHITE BLOOD CELLS URINE: 40 /HPF

## 2021-09-21 RX ORDER — PEN NEEDLE, DIABETIC 32GX 5/32"
32G X 4 MM NEEDLE, DISPOSABLE MISCELLANEOUS
Qty: 500 | Refills: 1 | Status: DISCONTINUED | COMMUNITY
Start: 2021-01-22 | End: 2021-09-21

## 2021-09-22 ENCOUNTER — RX RENEWAL (OUTPATIENT)
Age: 76
End: 2021-09-22

## 2021-09-22 RX ORDER — POTASSIUM CHLORIDE 1500 MG/1
20 TABLET, EXTENDED RELEASE ORAL
Qty: 90 | Refills: 0 | Status: DISCONTINUED | COMMUNITY
Start: 2021-03-22 | End: 2021-09-22

## 2021-09-22 RX ORDER — FUROSEMIDE 20 MG/1
20 TABLET ORAL DAILY
Qty: 90 | Refills: 0 | Status: DISCONTINUED | COMMUNITY
Start: 2021-03-22 | End: 2021-09-22

## 2021-09-24 ENCOUNTER — APPOINTMENT (OUTPATIENT)
Dept: FAMILY MEDICINE | Facility: CLINIC | Age: 76
End: 2021-09-24
Payer: MEDICARE

## 2021-09-24 VITALS
OXYGEN SATURATION: 98 % | TEMPERATURE: 97.2 F | BODY MASS INDEX: 29.81 KG/M2 | RESPIRATION RATE: 14 BRPM | DIASTOLIC BLOOD PRESSURE: 68 MMHG | SYSTOLIC BLOOD PRESSURE: 124 MMHG | HEART RATE: 84 BPM | WEIGHT: 163 LBS

## 2021-09-24 PROCEDURE — 90662 IIV NO PRSV INCREASED AG IM: CPT

## 2021-09-24 PROCEDURE — G0008: CPT

## 2021-09-24 PROCEDURE — 99214 OFFICE O/P EST MOD 30 MIN: CPT | Mod: 25

## 2021-09-24 NOTE — HISTORY OF PRESENT ILLNESS
[FreeTextEntry1] : Patient is here to f/u on htn\par c/o chronic low back pain, worse x 1 month.\par Pain is sharp and radiates down back of both legs, worse after lifting.\par She has not been wearing her compression socks. Her lower legs become painful after a few hours of wearing her compression socks..\par She has noted some persistent swelling of the lower extremities.\par She has followed up with her endocrinologist recently in regards to her diabetes.\par  [de-identified] : 6/25/21:\par Pt is following up on her htn.\par She is requesting med refills.\par LLE with mild edema, not cold.\par She saw her vascular surgeon recently.\par OTC compression socks were recommended by vascular.\par \par 4/30/21:\par Patient is status post blood loss anemia requiring hospitalization and transfusion.  She also has diastolic congestive heart failure and has had increasing lower extremity edema associated with shortness of breath.  She has had adjustments in her diuretic and blood pressure medications over the past month and has had improvement in her lower extremity edema.  She is currently on furosemide 20 mg once daily.  She denies any shortness of breath or chest pain.  Patient was seen by her nephrologist 2 weeks ago and candesartan was increased from 8 mg/day to 16 mg/day.\par Patient saw her cardiologist 1 week ago and carvedilol was increased to 25 mg 1 tab 3 times per day.\par Blood sugars are improved with addition of NovoLog by patient's endocrinologist. 120s-140s avg.\par \par

## 2021-09-24 NOTE — ASSESSMENT
[FreeTextEntry1] : The pt was counseled on the risks and benefits of influenza vaccination. Side effects were reviewed with the patient, including risk for redness or soreness at the site of the injection, fever, aches, itching, headaches and fatigue.\par Recommend light compression knee-high's. Patient to do trial of 8 to 15mmHg for compression. She may tolerate this better and possibly have improvement on her venous pooling.\par Follow-up with specialist for evaluation of her chronic low back pain which has been worse. She has an appointment with spine specialist.

## 2021-09-24 NOTE — PHYSICAL EXAM
[No Acute Distress] : no acute distress [Normal Sclera/Conjunctiva] : normal sclera/conjunctiva [No Lymphadenopathy] : no lymphadenopathy [Supple] : supple [Normal Rate] : normal [Irregularly Irregular] : irregularly irregular [Normal S1] : normal S1 [Normal S2] : normal S2 [___ +] : [unfilled]U+ pitting edema to the left knee [Right Carotid Bruit] : right carotid bruit heard [2+] : right 2+ [Normal] : affect was normal and insight and judgment were intact

## 2021-09-27 ENCOUNTER — APPOINTMENT (OUTPATIENT)
Dept: DERMATOLOGY | Facility: CLINIC | Age: 76
End: 2021-09-27
Payer: MEDICARE

## 2021-09-27 PROCEDURE — 99213 OFFICE O/P EST LOW 20 MIN: CPT

## 2021-10-06 ENCOUNTER — RX RENEWAL (OUTPATIENT)
Age: 76
End: 2021-10-06

## 2021-10-07 ENCOUNTER — RX RENEWAL (OUTPATIENT)
Age: 76
End: 2021-10-07

## 2021-11-17 ENCOUNTER — APPOINTMENT (OUTPATIENT)
Dept: UROGYNECOLOGY | Facility: CLINIC | Age: 76
End: 2021-11-17
Payer: MEDICARE

## 2021-11-17 PROCEDURE — 99213 OFFICE O/P EST LOW 20 MIN: CPT

## 2021-11-17 PROCEDURE — A4562: CPT

## 2021-12-13 ENCOUNTER — RX RENEWAL (OUTPATIENT)
Age: 76
End: 2021-12-13

## 2022-01-03 ENCOUNTER — RX RENEWAL (OUTPATIENT)
Age: 77
End: 2022-01-03

## 2022-01-26 ENCOUNTER — LABORATORY RESULT (OUTPATIENT)
Age: 77
End: 2022-01-26

## 2022-02-02 ENCOUNTER — APPOINTMENT (OUTPATIENT)
Dept: ENDOCRINOLOGY | Facility: CLINIC | Age: 77
End: 2022-02-02
Payer: MEDICARE

## 2022-02-02 VITALS
BODY MASS INDEX: 30 KG/M2 | OXYGEN SATURATION: 98 % | DIASTOLIC BLOOD PRESSURE: 64 MMHG | SYSTOLIC BLOOD PRESSURE: 138 MMHG | WEIGHT: 163 LBS | HEIGHT: 62 IN | HEART RATE: 78 BPM

## 2022-02-02 LAB — GLUCOSE BLDC GLUCOMTR-MCNC: 176

## 2022-02-02 PROCEDURE — 99214 OFFICE O/P EST MOD 30 MIN: CPT | Mod: 25

## 2022-02-02 PROCEDURE — 82962 GLUCOSE BLOOD TEST: CPT

## 2022-02-02 NOTE — HISTORY OF PRESENT ILLNESS
[FreeTextEntry1] : Quality:  Type 2.   \par Severity:  Moderate\par Duration:  24 years\par Associated Symptoms:  Retinopathy. Hypoglycemia. Nephropathy\par Modifying Factors:  Better with diet.   \par Notes:  Current regimen:\par 	metformin 1000 bid - stopped due to renal insufficiency\par 	glimepiride 2 bid - off \par 	januvia, precose ineffective. On avandia in the past.\par Levemir 30 units up to 36\par novolog before meals:\par 		150-199 2\par                                  200-249 4\par                                  250-299 6\par                                  300+       8\par jardiance 10\par \par Routine cardiac evaluation-fluid around the heart. Eventual w/u included PET scan and sonography revealing subcentimeric thyroid nodules, pulmonary abnormality, and fatty liver. SHASHA 1:160, speckled, no diagnosis made by rheumatology.\par \par \par \par \par \par Diet:  weight watchers\par \par Weight History:  \par losing weight\par \par Blood Glucose Testing Information - pt. reports variable glucose levels, and some lows after eating\par \par Patient is using the  meter and is testing 4 times per day.\par \par Past Medical History\par Notes:  ASHD, pacemaker, defibrillator\par PVD, s/p stents. \par renal cysts\par thyroid nodules, subcentimeric\par vitamin D deficiency\par

## 2022-02-02 NOTE — ASSESSMENT
[FreeTextEntry1] : 1. DM type 2, insulin treated, complicated by renal insufficiency. Needs more basal insulin; increase to 36 units of levemir.\par \par 2. Hypothyroid,  euthyroid on replacement.. \par

## 2022-02-07 ENCOUNTER — RX RENEWAL (OUTPATIENT)
Age: 77
End: 2022-02-07

## 2022-02-07 ENCOUNTER — APPOINTMENT (OUTPATIENT)
Dept: FAMILY MEDICINE | Facility: CLINIC | Age: 77
End: 2022-02-07
Payer: MEDICARE

## 2022-02-07 VITALS
SYSTOLIC BLOOD PRESSURE: 156 MMHG | WEIGHT: 165 LBS | HEART RATE: 70 BPM | DIASTOLIC BLOOD PRESSURE: 70 MMHG | BODY MASS INDEX: 30.18 KG/M2 | OXYGEN SATURATION: 95 % | RESPIRATION RATE: 16 BRPM

## 2022-02-07 VITALS — DIASTOLIC BLOOD PRESSURE: 54 MMHG | SYSTOLIC BLOOD PRESSURE: 138 MMHG

## 2022-02-07 DIAGNOSIS — H25.9 UNSPECIFIED AGE-RELATED CATARACT: ICD-10-CM

## 2022-02-07 DIAGNOSIS — H34.8312 TRIBUTARY (BRANCH) RETINAL VEIN OCCLUSION, RIGHT EYE, STABLE: ICD-10-CM

## 2022-02-07 PROCEDURE — 99214 OFFICE O/P EST MOD 30 MIN: CPT

## 2022-02-07 NOTE — HISTORY OF PRESENT ILLNESS
[FreeTextEntry1] : Patient is following up on hypertension. \par Pt c/o continuous low back pain, has PT script but has not gone yet due to  being ill.\par Had recent bw by endocrine.\par Denies leg pain. Has mild LE edema but improved with knee high compression socks.

## 2022-02-07 NOTE — ASSESSMENT
[FreeTextEntry1] : LDL mildly suboptimal at 76 (goal < 70).\par Dietary modification reviewed.\par Repeat in 3 months.

## 2022-02-07 NOTE — PHYSICAL EXAM
[No Acute Distress] : no acute distress [Normal Sclera/Conjunctiva] : normal sclera/conjunctiva [No Lymphadenopathy] : no lymphadenopathy [Supple] : supple [Normal Rate] : normal [Irregularly Irregular] : irregularly irregular [Normal S1] : normal S1 [Normal S2] : normal S2 [2+] : left 2+ [Right Carotid Bruit] : right carotid bruit heard [Normal] : affect was normal and insight and judgment were intact [de-identified] : small round scabbed lesion left forearm with marginal erythema.

## 2022-02-13 NOTE — PHYSICAL EXAM
[General Appearance - Alert] : alert [General Appearance - In No Acute Distress] : in no acute distress [General Appearance - Well Nourished] : well nourished [General Appearance - Well Developed] : well developed [General Appearance - Well-Appearing] : healthy appearing [Sclera] : the sclera and conjunctiva were normal [PERRL With Normal Accommodation] : pupils were equal in size, round, and reactive to light [Extraocular Movements] : extraocular movements were intact [Strabismus] : no strabismus was seen [Retinal Vessels - Neovascularization] : neovascularization noted [Outer Ear] : the ears and nose were normal in appearance [Hearing Threshold Finger Rub Not [place]] : hearing was normal [Examination Of The Oral Cavity] : the lips and gums were normal [Both Tympanic Membranes Were Examined] : both tympanic membranes were normal [Nasal Cavity] : the nasal mucosa and septum were normal [Oropharynx] : the oropharynx was normal [Neck Appearance] : the appearance of the neck was normal [Neck Cervical Mass (___cm)] : no neck mass was observed [Jugular Venous Distention Increased] : there was no jugular-venous distention No [Thyroid Diffuse Enlargement] : the thyroid was not enlarged [Thyroid Nodule] : there were no palpable thyroid nodules [Neck Circumference: ___] : neck circumference is [unfilled] [Respiration, Rhythm And Depth] : normal respiratory rhythm and effort [Exaggerated Use Of Accessory Muscles For Inspiration] : no accessory muscle use [Auscultation Breath Sounds / Voice Sounds] : lungs were clear to auscultation bilaterally [Chest Palpation] : palpation of the chest revealed no abnormalities [Lungs Percussion] : the lungs were normal to percussion [Apical Impulse] : the apical impulse was normal [Heart Rate And Rhythm] : heart rate was normal and rhythm regular [Heart Sounds] : normal S1 and S2 [Heart Sounds Gallop] : no gallops [Heart Sounds Pericardial Friction Rub] : no pericardial rub [Systolic grade ___/6] : A grade [unfilled]/6 systolic murmur was heard. [Arterial Pulses Carotid] : carotid pulses were normal with no bruits [Arterial Pulses Femoral] : femoral pulses were normal without bruits [Full Pulse] : the pedal pulses are present [Edema] : there was no peripheral edema [Veins - Varicosity Changes] : there were no varicosital changes [Breast Appearance] : normal in appearance [Breast Palpation Mass] : no palpable masses [Breast Abnormal Lactation (Galactorrhea)] : no nipple discharge [Bowel Sounds] : normal bowel sounds [Abdomen Soft] : soft [Abdomen Tenderness] : non-tender [Abdomen Mass (___ Cm)] : no abdominal mass palpated [Abdomen Hernia] : no hernia was discovered [Normal Sphincter Tone] : normal sphincter tone [No Rectal Mass] : no rectal mass [Urinary Bladder Findings] : the bladder was normal on palpation [Cervical Lymph Nodes Enlarged Posterior Bilaterally] : posterior cervical [Cervical Lymph Nodes Enlarged Anterior Bilaterally] : anterior cervical [Supraclavicular Lymph Nodes Enlarged Bilaterally] : supraclavicular [Axillary Lymph Nodes Enlarged Bilaterally] : axillary [Femoral Lymph Nodes Enlarged Bilaterally] : femoral [Inguinal Lymph Nodes Enlarged Bilaterally] : inguinal [No CVA Tenderness] : no ~M costovertebral angle tenderness [No Spinal Tenderness] : no spinal tenderness [Abnormal Walk] : normal gait [Nail Clubbing] : no clubbing  or cyanosis of the fingernails [Involuntary Movements] : no involuntary movements were seen [Musculoskeletal - Swelling] : no joint swelling seen [Motor Tone] : muscle strength and tone were normal [Skin Color & Pigmentation] : normal skin color and pigmentation [Skin Turgor] : normal skin turgor [] : no rash [Cranial Nerves] : cranial nerves 2-12 were intact [Deep Tendon Reflexes (DTR)] : deep tendon reflexes were 2+ and symmetric [Sensation] : the sensory exam was normal to light touch and pinprick [Motor Exam] : the motor exam was normal [No Focal Deficits] : no focal deficits [Oriented To Time, Place, And Person] : oriented to person, place, and time [Impaired Insight] : insight and judgment were intact [Affect] : the affect was normal [Mood] : the mood was normal [Memory Recent] : recent memory was not impaired [Memory Remote] : remote memory was not impaired [FreeTextEntry1] : Pale, [Occult Blood Positive] : stool was negative for occult blood

## 2022-02-23 ENCOUNTER — RX RENEWAL (OUTPATIENT)
Age: 77
End: 2022-02-23

## 2022-02-25 ENCOUNTER — RX RENEWAL (OUTPATIENT)
Age: 77
End: 2022-02-25

## 2022-03-02 ENCOUNTER — RX RENEWAL (OUTPATIENT)
Age: 77
End: 2022-03-02

## 2022-03-07 ENCOUNTER — APPOINTMENT (OUTPATIENT)
Dept: UROGYNECOLOGY | Facility: CLINIC | Age: 77
End: 2022-03-07
Payer: MEDICARE

## 2022-03-07 PROCEDURE — 99213 OFFICE O/P EST LOW 20 MIN: CPT

## 2022-03-11 ENCOUNTER — RX RENEWAL (OUTPATIENT)
Age: 77
End: 2022-03-11

## 2022-03-14 ENCOUNTER — RX RENEWAL (OUTPATIENT)
Age: 77
End: 2022-03-14

## 2022-03-18 ENCOUNTER — APPOINTMENT (OUTPATIENT)
Dept: NEPHROLOGY | Facility: CLINIC | Age: 77
End: 2022-03-18
Payer: MEDICARE

## 2022-03-18 VITALS
HEIGHT: 62 IN | DIASTOLIC BLOOD PRESSURE: 60 MMHG | BODY MASS INDEX: 30.91 KG/M2 | WEIGHT: 168 LBS | SYSTOLIC BLOOD PRESSURE: 140 MMHG | OXYGEN SATURATION: 97 % | HEART RATE: 58 BPM

## 2022-03-18 PROCEDURE — 99214 OFFICE O/P EST MOD 30 MIN: CPT | Mod: 25

## 2022-03-18 PROCEDURE — 36415 COLL VENOUS BLD VENIPUNCTURE: CPT

## 2022-03-18 NOTE — PHYSICAL EXAM
[General Appearance - Alert] : alert [General Appearance - In No Acute Distress] : in no acute distress [General Appearance - Well Nourished] : well nourished [General Appearance - Well Developed] : well developed [General Appearance - Well-Appearing] : healthy appearing [Sclera] : the sclera and conjunctiva were normal [Outer Ear] : the ears and nose were normal in appearance [Hearing Threshold Finger Rub Not [place]] : hearing was normal [Examination Of The Oral Cavity] : the lips and gums were normal [Neck Appearance] : the appearance of the neck was normal [Neck Cervical Mass (___cm)] : no neck mass was observed [Jugular Venous Distention Increased] : there was no jugular-venous distention [Respiration, Rhythm And Depth] : normal respiratory rhythm and effort [Exaggerated Use Of Accessory Muscles For Inspiration] : no accessory muscle use [Auscultation Breath Sounds / Voice Sounds] : lungs were clear to auscultation bilaterally [Apical Impulse] : the apical impulse was normal [Heart Rate And Rhythm] : heart rate was normal and rhythm regular [Heart Sounds] : normal S1 and S2 [Heart Sounds Gallop] : no gallops [Murmurs] : no murmurs [Heart Sounds Pericardial Friction Rub] : no pericardial rub [Arterial Pulses Carotid] : carotid pulses were normal with no bruits [Abdominal Aorta] : the abdominal aorta was normal [Pitting Edema] : pitting edema present [___ +] : bilateral [unfilled]+ pretibial pitting edema [Bowel Sounds] : normal bowel sounds [Abdomen Soft] : soft [Abdomen Tenderness] : non-tender [Abdomen Mass (___ Cm)] : no abdominal mass palpated [Abdomen Hernia] : no hernia was discovered [Cervical Lymph Nodes Enlarged Posterior Bilaterally] : posterior cervical [Cervical Lymph Nodes Enlarged Anterior Bilaterally] : anterior cervical [No CVA Tenderness] : no ~M costovertebral angle tenderness [Involuntary Movements] : no involuntary movements were seen [Skin Turgor] : normal skin turgor [] : no rash [No Focal Deficits] : no focal deficits [Oriented To Time, Place, And Person] : oriented to person, place, and time [Impaired Insight] : insight and judgment were intact [Affect] : the affect was normal [Mood] : the mood was normal [FreeTextEntry1] : +AICD

## 2022-03-18 NOTE — ASSESSMENT
[FreeTextEntry1] : Labs/medications reviewed,\par \par DX : CKD - 3, DN, PAD ( S/P Revascularization - LLE ) - Legs warm, \par \par Anemia  - Resolved, F.U Dr. De La Cruz PRN, \par \par HTN\par \par Meds & Labs reviewed,  PPM Revised, \par \par + Compression Stockings - LLE, \par \par RTC 6 months\par \par \par

## 2022-03-24 LAB
25(OH)D3 SERPL-MCNC: 27.2 NG/ML
ALBUMIN SERPL ELPH-MCNC: 4.3 G/DL
ANION GAP SERPL CALC-SCNC: 13 MMOL/L
APPEARANCE: ABNORMAL
BACTERIA: ABNORMAL
BASOPHILS # BLD AUTO: 0.08 K/UL
BASOPHILS NFR BLD AUTO: 1 %
BILIRUBIN URINE: NEGATIVE
BLOOD URINE: NEGATIVE
BUN SERPL-MCNC: 37 MG/DL
CALCIUM SERPL-MCNC: 9.6 MG/DL
CALCIUM SERPL-MCNC: 9.6 MG/DL
CHLORIDE SERPL-SCNC: 103 MMOL/L
CHOLEST SERPL-MCNC: 125 MG/DL
CO2 SERPL-SCNC: 22 MMOL/L
COLOR: YELLOW
CREAT SERPL-MCNC: 1.36 MG/DL
CREAT SPEC-SCNC: 75 MG/DL
CREAT/PROT UR: 0.4 RATIO
EGFR: 40 ML/MIN/1.73M2
EOSINOPHIL # BLD AUTO: 0.23 K/UL
EOSINOPHIL NFR BLD AUTO: 2.9 %
ESTIMATED AVERAGE GLUCOSE: 177 MG/DL
GLUCOSE QUALITATIVE U: ABNORMAL
GLUCOSE SERPL-MCNC: 164 MG/DL
HBA1C MFR BLD HPLC: 7.8 %
HCT VFR BLD CALC: 38.9 %
HDLC SERPL-MCNC: 41 MG/DL
HGB BLD-MCNC: 11.8 G/DL
HYALINE CASTS: 4 /LPF
IMM GRANULOCYTES NFR BLD AUTO: 0.4 %
KETONES URINE: NEGATIVE
LDLC SERPL CALC-MCNC: 61 MG/DL
LEUKOCYTE ESTERASE URINE: ABNORMAL
LYMPHOCYTES # BLD AUTO: 1.64 K/UL
LYMPHOCYTES NFR BLD AUTO: 20.8 %
MAN DIFF?: NORMAL
MCHC RBC-ENTMCNC: 30.3 GM/DL
MCHC RBC-ENTMCNC: 31.1 PG
MCV RBC AUTO: 102.4 FL
MICROSCOPIC-UA: NORMAL
MONOCYTES # BLD AUTO: 0.79 K/UL
MONOCYTES NFR BLD AUTO: 10 %
NEUTROPHILS # BLD AUTO: 5.13 K/UL
NEUTROPHILS NFR BLD AUTO: 64.9 %
NITRITE URINE: POSITIVE
NONHDLC SERPL-MCNC: 84 MG/DL
PARATHYROID HORMONE INTACT: 38 PG/ML
PH URINE: 5
PHOSPHATE SERPL-MCNC: 4.4 MG/DL
PLATELET # BLD AUTO: 218 K/UL
POTASSIUM SERPL-SCNC: 5.2 MMOL/L
PROT UR-MCNC: 27 MG/DL
PROTEIN URINE: NORMAL
RBC # BLD: 3.8 M/UL
RBC # FLD: 15 %
RED BLOOD CELLS URINE: 2 /HPF
SODIUM SERPL-SCNC: 138 MMOL/L
SPECIFIC GRAVITY URINE: 1.01
SQUAMOUS EPITHELIAL CELLS: 1 /HPF
TRIGL SERPL-MCNC: 117 MG/DL
URATE SERPL-MCNC: 6 MG/DL
UROBILINOGEN URINE: NORMAL
WBC # FLD AUTO: 7.9 K/UL
WHITE BLOOD CELLS URINE: 54 /HPF

## 2022-04-02 ENCOUNTER — RX RENEWAL (OUTPATIENT)
Age: 77
End: 2022-04-02

## 2022-05-03 NOTE — ASU PREOP CHECKLIST - SITE MARKED BY SURGEON
"  History     Chief Complaint   Patient presents with     Hyperglycemia     HPI  Isabell Matthews is a 62 year old female with PMH notable for DM 1, recent ischemic CVA on 4/5/2022 who presents to the ED with altered mental status. Last known well 3 hours prior to arrival.  Initial history from EMS due to altered mental status.  They report that patient's daughter last saw the patient well 3 hours prior to their arrival.  Patient then was then checked on and was unable to verbalize.  Usually she is alert and oriented, converses normally.  Unclear what her baseline pattern of weakness is from her recent stroke.  EMS noted glucose that read \"high\".     Further history later from the patient's daughter.  She reported that she and the patient had felt unwell the last few days.  They took a home COVID test which were negative.  Patient has eaten very little the past couple days so they did not give her insulin.  She noted high glucose just before calling for EMS, gave 10 units Lantus and 5 units of short acting insulin.      Past Medical and Surgical History, Medications, Allergies, and Social History were reviewed in the electronic medical record. Review with the patient was attempted but limited due to altered mental status.      Review of Systems  A complete review of systems was attempted but limited due to altered mental status.    Physical Exam   BP: (!) 143/71  Pulse: 107  Resp: (!) 43  SpO2: 99 %    Physical Exam  General: alert but unable to verbalize, ill appearing. Appears stated age.   HENT: dry MM, no oropharyngeal lesions  Eyes: PERRL, normal sclerae  Neck: non-tender, supple  Cardio: mildly tachycardic rate. Regular rhythm. Extremities well perfused  Resp: Normal work of breathing, normal respiratory rate.  Chest/Back: no visual signs of trauma, no apparent CVA tenderness  Abdomen: no apparent tenderness, non-distended, no rebound, no guarding  Neuro: alert but unable to verbalize. Left facial droop present. " Left arm 4/5 flexion, right arm 5/5 flexion, legs 3/5 bilaterally for hip flexion.   MSK: no deformities. Grossly normal ROM in extremities.   Integumentary/Skin: no rash visualized, normal color      ED Course      Procedures       Critical Care Addendum  My initial assessment, based on my review of prehospital provider report, focused history and physical exam, established that Isabell Matthews has high suspicion of DKA and suspicion of CVA, which requires immediate intervention, and therefore she is critically ill.     After the initial assessment, the care team initiated multiple lab tests, initiated IV fluid administration, initiated medication therapy with IV insulin and IV fluids and consulted with stroke neurology via Tier 2 stroke code to provide stabilization care. Due to the critical nature of this patient, I reassessed nursing observations, vital signs, review of cardiac rhythm monitor, 12 lead ECG analysis, interpretation of imaging and laboratory studies, mental status and neurologic status multiple times prior to her disposition.     Time also spent performing documentation, reviewing test results, discussion with consultants and coordination of care.     Critical care time (excluding teaching time and procedures): 50 minutes.          EKG Interpretation:    Interpreted by Abdi Luu MD  Time reviewed:0235   Symptoms at time of EKG: altered mental status    Rhythm: Normal sinus  and Sinus tachycardia  Rate: 100-110  Axis: Normal  Ectopy: None  Conduction: Normal  ST Segments/ T Waves: No ST-T wave changes and No acute ischemic changes. QTc 495   Q Waves: inferior   Comparison to prior: increased rate with mildly more prolonged QTc compared to 4/10/2022    Clinical Impression: sinus tachycardia without evidence of acute ischemia     The patient has stroke symptoms:         ED Stroke specific documentation           NIHSS PDF   Patient last known well time: 2330  ED Provider first to bedside at:  0220  CT Results received at: 0310 for non-contrast, 0330 for CTA     Thrombolytics:   Not given due to:   - outside time window, likely mimic    If treating with thrombolytics: Ensure SBP<180 and DBP<105 prior to treatment with thrombolytics.  Administering thrombolytics after treatment with IV labetalol, hydralazine, or nicardipine is reasonable once BP control is established.    Endovascular Retrieval:  Not initiated due to absence of proximal vessel occlusion    [employer] Stroke Scale (Baseline)  See neuro consult note by Dr. Kolb.     Stroke Mimics were considered (including migraine headache, seizure disorder, hypoglycemia (or hyperglycemia), head or spinal trauma, CNS infection, Toxin ingestion and shock state (e.g. sepsis) .           Labs Ordered and Resulted from Time of ED Arrival to Time of ED Departure   GLUCOSE BY METER - Abnormal       Result Value    GLUCOSE BY METER POCT 579 (*)    HEMOGLOBIN A1C - Abnormal    Hemoglobin A1C 8.9 (*)    BASIC METABOLIC PANEL - Abnormal    Sodium 136      Potassium 5.5 (*)     Chloride 102      Carbon Dioxide (CO2) 11 (*)     Anion Gap 23 (*)     Urea Nitrogen 49 (*)     Creatinine 1.84 (*)     Calcium 9.5      Glucose 686 (*)     GFR Estimate 30 (*)    MAGNESIUM - Abnormal    Magnesium 2.4 (*)    KETONE BETA-HYDROXYBUTYRATE QUANTITATIVE, RAPID - Abnormal    Ketone (Beta-Hydroxybutyrate) Quantitative 5.4 (*)    ROUTINE UA WITH MICROSCOPIC REFLEX TO CULTURE - Abnormal    Color Urine Yellow      Appearance Urine Cloudy (*)     Glucose Urine 1000  (*)     Bilirubin Urine Negative      Ketones Urine 100  (*)     Specific Gravity Urine 1.025      Blood Urine Moderate (*)     pH Urine 7.0      Protein Albumin Urine 50  (*)     Urobilinogen Urine 2.0      Nitrite Urine Negative      Leukocyte Esterase Urine Small (*)     Bacteria Urine Many (*)     RBC Urine 6 (*)     WBC Urine 15 (*)     Squamous Epithelials Urine <1     ISTAT INR POCT - Abnormal    INR  POCT 1.3 (*)    ISTAT GASES LACTATE VENOUS POCT - Abnormal    Lactic Acid POCT 3.2 (*)     Bicarbonate Venous POCT 11 (*)     O2 Sat, Venous POCT 43 (*)     pCO2V Venous POCT 32 (*)     pH Venous POCT 7.14 (*)     pO2 Venous POCT 31     ISTAT GASES ELECTROLYTES ICA GLUCOSE VENOUS POCT - Abnormal    CPB Applied No      Hematocrit POCT 41      Calcium, Ionized Whole Blood POCT 5.3 (*)     Glucose Whole Blood POCT 624 (*)     Bicarbonate Venous POCT 11 (*)     Hemoglobin POCT 13.9      Potassium POCT 5.6 (*)     Sodium POCT 137      pCO2 Venous POCT 32 (*)     pO2 Venous POCT 32      pH Venous POCT 7.13 (*)     O2 Sat, Venous POCT 46 (*)    ISTAT CREATININE POCT - Abnormal    Creatinine POCT 2.0 (*)     GFR, ESTIMATED POCT 27 (*)    CBC WITH PLATELETS AND DIFFERENTIAL - Abnormal    WBC Count 8.3      RBC Count 4.54      Hemoglobin 12.7      Hematocrit 43.2      MCV 95      MCH 28.0      MCHC 29.4 (*)     RDW 13.5      Platelet Count 239      % Neutrophils 87      % Lymphocytes 10      % Monocytes 2      % Eosinophils 0      % Basophils 0      % Immature Granulocytes 1      NRBCs per 100 WBC 0      Absolute Neutrophils 7.3      Absolute Lymphocytes 0.8      Absolute Monocytes 0.1      Absolute Eosinophils 0.0      Absolute Basophils 0.0      Absolute Immature Granulocytes 0.1      Absolute NRBCs 0.0     GLUCOSE BY METER - Abnormal    GLUCOSE BY METER POCT 482 (*)    GLUCOSE BY METER - Abnormal    GLUCOSE BY METER POCT 428 (*)    PHOSPHORUS - Abnormal    Phosphorus 6.8 (*)    MAGNESIUM - Abnormal    Magnesium 2.5 (*)    HEPATIC FUNCTION PANEL - Abnormal    Bilirubin Total 0.7      Bilirubin Direct <0.1      Protein Total 7.7      Albumin 3.4      Alkaline Phosphatase 221 (*)     AST 28      ALT 23     GLUCOSE BY METER - Abnormal    GLUCOSE BY METER POCT 373 (*)    GLUCOSE BY METER - Abnormal    GLUCOSE BY METER POCT 367 (*)    INFLUENZA A/B & SARS-COV2 PCR MULTIPLEX - Normal    Influenza A PCR Negative      Influenza B  PCR Negative      RSV PCR Negative      SARS CoV2 PCR Negative     GLUCOSE MONITOR NURSING POCT   GLUCOSE MONITOR NURSING POCT   BASIC METABOLIC PANEL   BLOOD GAS ARTERIAL WITH OXYHEMOGLOBIN   LACTIC ACID WHOLE BLOOD   PHOSPHORUS   BLOOD CULTURE   URINE CULTURE   RESPIRATORY PANEL PCR - NP SWAB     XR Chest Port 1 View   Final Result   IMPRESSION: No acute abnormality.      CTA Head Neck with Contrast   Final Result    IMPRESSION:    HEAD CTA:    No intracranial arterial large vessel occlusion.      Multiple severe stenoses and occlusions described above not significantly changed compared to CTA head and neck 04/10/2022.      NECK CTA:   No significant interval change compared to CTA head and neck 04/10/2022.      Atherosclerotic plaque results in less than 50% stenosis in the right carotid bulb and proximal cervical ICA.        Left carotid bifurcation moderate stenosis.       Right vertebral artery origin and proximal V1 segment severe stenosis redemonstrated.             TAM Lim was notified by Dr Reynaldo Hernandez at  3:30 AM 05/03/2022.         CT Head w/o Contrast   Final Result   IMPRESSION:   1.  No acute intracranial hemorrhage.   2.  No CT evidence acute infarct. Aspect score 10.   3.  Age-related changes described above.   4.  Small chronic infarcts described above.      TAM Lim was notified by Dr Reynaldo Hernandez at  3:10 AM 05/03/2022.              Assessments & Plan (with Medical Decision Making)   Patient presenting with altered mental status, focal deficits, high glucose. Vitals in the ED notable for tachycardia. Nursing notes reviewed. Initial differential diagnosis includes but not limited to DKA, CVA, intracranial hemorrhage, sepsis.     Tier 2 stroke code called immediately upon patient arrival.  Neuro stroke resident promptly arrived and also evaluated the patient.  Noncontrast head CT without evidence of hemorrhage, CTA showed multiple episodes of stenosis without acute  occlusion.  tPA not indicated.    I-STAT labs notable for pH 7.13 with bicarb 11, lactate 3.2.  Glucose on arrival 686.  Anion gap 23.  Ketones present.  Clinical picture consistent with DKA.  IV insulin started via DKA protocol.  IV fluids bolus.  Given report of patient feeling unwell recently and having elevated lactate, there is also concern for sepsis.  Blood cultures drawn, urine with culture drawn, broad-spectrum antibiotics with vancomycin and Zosyn given.  Patient given greater than 30 mg/kg IV crystalloid.    The complete clinical picture is most consistent with DKA, severe sepsis, altered mental status. After counseling on the diagnosis, work-up, and treatment plan, the patient was admitted to medicine, intermediate care unit.      Final diagnoses:   Diabetic ketoacidosis without coma associated with type 1 diabetes mellitus (H)   Altered mental status, unspecified altered mental status type     New Prescriptions    No medications on file       --  Abdi Luu MD   Emergency Medicine   [facility] EMERGENCY DEPARTMENT  5/3/2022     Abdi Luu MD  05/03/22 0630       Abdi Luu MD  05/03/22 0654     n/a

## 2022-05-09 ENCOUNTER — APPOINTMENT (OUTPATIENT)
Dept: FAMILY MEDICINE | Facility: CLINIC | Age: 77
End: 2022-05-09
Payer: MEDICARE

## 2022-05-09 VITALS
OXYGEN SATURATION: 99 % | WEIGHT: 164 LBS | HEART RATE: 68 BPM | HEIGHT: 62 IN | SYSTOLIC BLOOD PRESSURE: 126 MMHG | TEMPERATURE: 97.7 F | DIASTOLIC BLOOD PRESSURE: 58 MMHG | BODY MASS INDEX: 30.18 KG/M2

## 2022-05-09 DIAGNOSIS — E11.319 TYPE 2 DIABETES MELLITUS WITH UNSPECIFIED DIABETIC RETINOPATHY W/OUT MACULAR EDEMA: ICD-10-CM

## 2022-05-09 DIAGNOSIS — I65.23 OCCLUSION AND STENOSIS OF BILATERAL CAROTID ARTERIES: ICD-10-CM

## 2022-05-09 DIAGNOSIS — R19.5 OTHER FECAL ABNORMALITIES: ICD-10-CM

## 2022-05-09 PROCEDURE — 99214 OFFICE O/P EST MOD 30 MIN: CPT

## 2022-05-09 NOTE — HEALTH RISK ASSESSMENT
[Never] : Never [Yes] : Yes [Monthly or less (1 pt)] : Monthly or less (1 point) [1 or 2 (0 pts)] : 1 or 2 (0 points) [Never (0 pts)] : Never (0 points) [No] : In the past 12 months have you used drugs other than those required for medical reasons? No [No falls in past year] : Patient reported no falls in the past year [Audit-CScore] : 1

## 2022-05-09 NOTE — PHYSICAL EXAM
[No Acute Distress] : no acute distress [Normal Sclera/Conjunctiva] : normal sclera/conjunctiva [No Lymphadenopathy] : no lymphadenopathy [Supple] : supple [Normal Rate] : normal [Irregularly Irregular] : irregularly irregular [Normal S1] : normal S1 [Normal S2] : normal S2 [2+] : left 2+ [Right Carotid Bruit] : right carotid bruit heard [1+] : left 1+ [Normal] : affect was normal and insight and judgment were intact

## 2022-05-09 NOTE — ASSESSMENT
[FreeTextEntry1] : Pt is stable.\par Overall doing well.\par F/U with endocrine for continued diabetes management.\par Pt declines breast exam. She prefers to follow with gyn.\par Covid booster #2 recommended. Risks/benefits discussed.\par F/U 3 months.

## 2022-05-09 NOTE — HISTORY OF PRESENT ILLNESS
[FreeTextEntry1] : Patient is following up on hypertension, chronic kidney disease, peripheral vascular disease and diabetes. \par Patient saw her nephrologist 2 months ago and renal function is stable.\par Patient also follows with endocrinology for diabetes management and is due to have blood work next month during her visit.\par She notes that her morning blood sugars have been running high in the 200s, but the latter part of the day the blood sugars are better controlled. She is due to see her endocrinologist in a few weeks.  \par Patient has peripheral arterial disease status post left femoral artery stenting. She  denies leg pain, foot pain, leg swelling, or cold sensation to the feet.\par She is due for annual mammography.  Her longtime GYN is no longer practicing.  She is looking to establish with a new GYN.\par Patient is due for DEXA scan.\par Patient with history of diverticulosis.  Her last colonoscopy was 2021 after rectal bleeding. No recurrence.

## 2022-05-27 ENCOUNTER — RX RENEWAL (OUTPATIENT)
Age: 77
End: 2022-05-27

## 2022-06-02 ENCOUNTER — RX RENEWAL (OUTPATIENT)
Age: 77
End: 2022-06-02

## 2022-06-06 ENCOUNTER — RX RENEWAL (OUTPATIENT)
Age: 77
End: 2022-06-06

## 2022-06-20 ENCOUNTER — APPOINTMENT (OUTPATIENT)
Dept: UROGYNECOLOGY | Facility: CLINIC | Age: 77
End: 2022-06-20
Payer: MEDICARE

## 2022-06-20 ENCOUNTER — APPOINTMENT (OUTPATIENT)
Dept: ENDOCRINOLOGY | Facility: CLINIC | Age: 77
End: 2022-06-20
Payer: MEDICARE

## 2022-06-20 VITALS
DIASTOLIC BLOOD PRESSURE: 64 MMHG | SYSTOLIC BLOOD PRESSURE: 122 MMHG | BODY MASS INDEX: 30.91 KG/M2 | HEIGHT: 62 IN | WEIGHT: 168 LBS | HEART RATE: 70 BPM

## 2022-06-20 LAB — GLUCOSE BLDC GLUCOMTR-MCNC: 169

## 2022-06-20 PROCEDURE — 99214 OFFICE O/P EST MOD 30 MIN: CPT | Mod: 25

## 2022-06-20 PROCEDURE — 99213 OFFICE O/P EST LOW 20 MIN: CPT

## 2022-06-20 PROCEDURE — 82962 GLUCOSE BLOOD TEST: CPT

## 2022-06-20 NOTE — ASSESSMENT
[FreeTextEntry1] : 1. DM type 2, insulin treated, complicated by renal insufficiency. not a candidate for tight control due to risk of hypoglycemia and co-morbidities\par 2. Hypothyroid,  euthyroid on replacement.. \par

## 2022-06-20 NOTE — HISTORY OF PRESENT ILLNESS
[FreeTextEntry1] : Quality:  Type 2.   \par Severity:  Moderate\par Duration:  24 years\par Associated Symptoms:  Retinopathy. Hypoglycemia. Nephropathy\par Modifying Factors:  Better with diet.   \par Notes:  Current regimen:\par 	metformin 1000 bid - stopped due to renal insufficiency\par 	glimepiride 2 bid - off \par 	januvia, precose ineffective. On avandia in the past.\par Levemir  36\par novolog before meals:\par 		150-199 2\par                                  200-249 4\par                                  250-299 6\par                                  300+       8\par jardiance 10\par \par Routine cardiac evaluation-fluid around the heart. Eventual w/u included PET scan and sonography revealing subcentimeric thyroid nodules, pulmonary abnormality, and fatty liver. SHASHA 1:160, speckled, no diagnosis made by rheumatology.\par \par \par \par \par \par Diet:  weight watchers\par \par Weight History:  \par losing weight\par \par Blood Glucose Testing Information - pt. reports variable glucose levels, and some lows after eating\par \par Patient is using the  meter and is testing 4 times per day.\par \par Past Medical History\par Notes:  ASHD, pacemaker, defibrillator. Needs to have lead replaced\par PVD, s/p stents. \par renal cysts\par thyroid nodules, subcentimeric\par vitamin D deficiency\par

## 2022-07-01 ENCOUNTER — RX RENEWAL (OUTPATIENT)
Age: 77
End: 2022-07-01

## 2022-07-22 ENCOUNTER — APPOINTMENT (OUTPATIENT)
Dept: NEPHROLOGY | Facility: CLINIC | Age: 77
End: 2022-07-22

## 2022-07-22 ENCOUNTER — NON-APPOINTMENT (OUTPATIENT)
Age: 77
End: 2022-07-22

## 2022-07-22 VITALS
DIASTOLIC BLOOD PRESSURE: 56 MMHG | HEART RATE: 68 BPM | BODY MASS INDEX: 29.26 KG/M2 | SYSTOLIC BLOOD PRESSURE: 128 MMHG | OXYGEN SATURATION: 97 % | HEIGHT: 62 IN | WEIGHT: 159 LBS

## 2022-07-22 PROCEDURE — 36415 COLL VENOUS BLD VENIPUNCTURE: CPT

## 2022-07-22 PROCEDURE — 99214 OFFICE O/P EST MOD 30 MIN: CPT | Mod: 25

## 2022-07-22 RX ORDER — PEN NEEDLE, DIABETIC 32GX 5/32"
32G X 4 MM NEEDLE, DISPOSABLE MISCELLANEOUS
Qty: 4 | Refills: 1 | Status: DISCONTINUED | COMMUNITY
Start: 2022-02-02 | End: 2022-07-22

## 2022-07-22 RX ORDER — ESTRADIOL 0.1 MG/G
0.1 CREAM VAGINAL
Qty: 1 | Refills: 3 | Status: DISCONTINUED | COMMUNITY
Start: 2021-11-17 | End: 2022-07-22

## 2022-07-22 RX ORDER — MUPIROCIN 2 G/100G
2 CREAM TOPICAL TWICE DAILY
Qty: 1 | Refills: 0 | Status: DISCONTINUED | COMMUNITY
Start: 2022-02-07 | End: 2022-07-22

## 2022-07-22 NOTE — PHYSICAL EXAM
[General Appearance - Alert] : alert [General Appearance - In No Acute Distress] : in no acute distress [General Appearance - Well Nourished] : well nourished [General Appearance - Well Developed] : well developed [General Appearance - Well-Appearing] : healthy appearing [Sclera] : the sclera and conjunctiva were normal [Outer Ear] : the ears and nose were normal in appearance [Hearing Threshold Finger Rub Not Sebastian] : hearing was normal [Examination Of The Oral Cavity] : the lips and gums were normal [Neck Appearance] : the appearance of the neck was normal [Neck Cervical Mass (___cm)] : no neck mass was observed [Jugular Venous Distention Increased] : there was no jugular-venous distention [Respiration, Rhythm And Depth] : normal respiratory rhythm and effort [Exaggerated Use Of Accessory Muscles For Inspiration] : no accessory muscle use [Auscultation Breath Sounds / Voice Sounds] : lungs were clear to auscultation bilaterally [Apical Impulse] : the apical impulse was normal [Heart Rate And Rhythm] : heart rate was normal and rhythm regular [Heart Sounds] : normal S1 and S2 [Heart Sounds Gallop] : no gallops [Murmurs] : no murmurs [Heart Sounds Pericardial Friction Rub] : no pericardial rub [Arterial Pulses Carotid] : carotid pulses were normal with no bruits [Abdominal Aorta] : the abdominal aorta was normal [Pitting Edema] : pitting edema present [___ +] : bilateral [unfilled]+ pretibial pitting edema [Bowel Sounds] : normal bowel sounds [Abdomen Soft] : soft [Abdomen Tenderness] : non-tender [Abdomen Mass (___ Cm)] : no abdominal mass palpated [Abdomen Hernia] : no hernia was discovered [Cervical Lymph Nodes Enlarged Posterior Bilaterally] : posterior cervical [Cervical Lymph Nodes Enlarged Anterior Bilaterally] : anterior cervical [No CVA Tenderness] : no ~M costovertebral angle tenderness [Involuntary Movements] : no involuntary movements were seen [Skin Turgor] : normal skin turgor [] : no rash [No Focal Deficits] : no focal deficits [Oriented To Time, Place, And Person] : oriented to person, place, and time [Impaired Insight] : insight and judgment were intact [Affect] : the affect was normal [Mood] : the mood was normal [FreeTextEntry1] : +AICD

## 2022-07-22 NOTE — ASSESSMENT
[FreeTextEntry1] : Labs/medications reviewed,\par \par DX : CKD - 3, DN, PAD ( S/P Revascularization - LLE ) - Legs warm, \par \par Anemia  - Resolved, F.U Dr. De La Cruz PRN, \par \par HTN\par \par Meds & Labs reviewed,  PPM Revised, \par \par + Compression Stockings - LLE, \par \par Recently Hospitalized, AICD Lead Revised,  BG Erratic since, slowly Improving, \par \par RTC 6 months\par \par \par

## 2022-07-25 LAB
25(OH)D3 SERPL-MCNC: 35 NG/ML
ALBUMIN SERPL ELPH-MCNC: 4.4 G/DL
ANION GAP SERPL CALC-SCNC: 14 MMOL/L
APPEARANCE: CLEAR
BACTERIA: NEGATIVE
BASOPHILS # BLD AUTO: 0.06 K/UL
BASOPHILS NFR BLD AUTO: 0.9 %
BILIRUBIN URINE: NEGATIVE
BLOOD URINE: NEGATIVE
BUN SERPL-MCNC: 48 MG/DL
CALCIUM SERPL-MCNC: 10 MG/DL
CALCIUM SERPL-MCNC: 10 MG/DL
CHLORIDE SERPL-SCNC: 104 MMOL/L
CHOLEST SERPL-MCNC: 130 MG/DL
CO2 SERPL-SCNC: 22 MMOL/L
COLOR: YELLOW
CREAT SERPL-MCNC: 1.65 MG/DL
CREAT SPEC-SCNC: 95 MG/DL
CREAT/PROT UR: 0.2 RATIO
EGFR: 32 ML/MIN/1.73M2
EOSINOPHIL # BLD AUTO: 0.2 K/UL
EOSINOPHIL NFR BLD AUTO: 2.9 %
ESTIMATED AVERAGE GLUCOSE: 200 MG/DL
GLUCOSE QUALITATIVE U: ABNORMAL
GLUCOSE SERPL-MCNC: 163 MG/DL
HBA1C MFR BLD HPLC: 8.6 %
HCT VFR BLD CALC: 38.5 %
HDLC SERPL-MCNC: 35 MG/DL
HGB BLD-MCNC: 11.4 G/DL
HYALINE CASTS: 1 /LPF
IMM GRANULOCYTES NFR BLD AUTO: 0.4 %
KETONES URINE: NEGATIVE
LDLC SERPL CALC-MCNC: 70 MG/DL
LEUKOCYTE ESTERASE URINE: ABNORMAL
LYMPHOCYTES # BLD AUTO: 1.27 K/UL
LYMPHOCYTES NFR BLD AUTO: 18.6 %
MAN DIFF?: NORMAL
MCHC RBC-ENTMCNC: 29.6 GM/DL
MCHC RBC-ENTMCNC: 30.2 PG
MCV RBC AUTO: 101.9 FL
MICROSCOPIC-UA: NORMAL
MONOCYTES # BLD AUTO: 0.63 K/UL
MONOCYTES NFR BLD AUTO: 9.2 %
NEUTROPHILS # BLD AUTO: 4.64 K/UL
NEUTROPHILS NFR BLD AUTO: 68 %
NITRITE URINE: POSITIVE
NONHDLC SERPL-MCNC: 94 MG/DL
PARATHYROID HORMONE INTACT: 42 PG/ML
PH URINE: 6
PHOSPHATE SERPL-MCNC: 4.5 MG/DL
PLATELET # BLD AUTO: 302 K/UL
POTASSIUM SERPL-SCNC: 5.2 MMOL/L
PROT UR-MCNC: 16 MG/DL
PROTEIN URINE: NORMAL
RBC # BLD: 3.78 M/UL
RBC # FLD: 15.5 %
RED BLOOD CELLS URINE: 0 /HPF
SODIUM SERPL-SCNC: 140 MMOL/L
SPECIFIC GRAVITY URINE: 1.02
SQUAMOUS EPITHELIAL CELLS: 1 /HPF
TRIGL SERPL-MCNC: 120 MG/DL
URATE SERPL-MCNC: 8.9 MG/DL
UROBILINOGEN URINE: NORMAL
WBC # FLD AUTO: 6.83 K/UL
WHITE BLOOD CELLS URINE: 106 /HPF

## 2022-07-26 RX ORDER — CARVEDILOL 12.5 MG/1
12.5 TABLET, FILM COATED ORAL
Refills: 0 | Status: DISCONTINUED | COMMUNITY
Start: 2020-02-14 | End: 2022-07-26

## 2022-07-26 RX ORDER — POTASSIUM CHLORIDE 1500 MG/1
20 TABLET, EXTENDED RELEASE ORAL
Qty: 90 | Refills: 1 | Status: DISCONTINUED | COMMUNITY
Start: 2021-06-26 | End: 2022-07-26

## 2022-08-10 ENCOUNTER — APPOINTMENT (OUTPATIENT)
Dept: FAMILY MEDICINE | Facility: CLINIC | Age: 77
End: 2022-08-10

## 2022-08-10 VITALS
TEMPERATURE: 97.9 F | HEART RATE: 78 BPM | HEIGHT: 62 IN | OXYGEN SATURATION: 98 % | DIASTOLIC BLOOD PRESSURE: 58 MMHG | SYSTOLIC BLOOD PRESSURE: 118 MMHG | WEIGHT: 158.5 LBS | BODY MASS INDEX: 29.17 KG/M2

## 2022-08-10 PROCEDURE — 99214 OFFICE O/P EST MOD 30 MIN: CPT

## 2022-08-10 NOTE — PHYSICAL EXAM
[No Acute Distress] : no acute distress [Normal Sclera/Conjunctiva] : normal sclera/conjunctiva [No Lymphadenopathy] : no lymphadenopathy [Supple] : supple [Normal Rate] : normal [Rhythm Regular] : regular [Normal S1] : normal S1 [Normal S2] : normal S2 [2+] : left 2+ [Right Carotid Bruit] : right carotid bruit heard [1+] : left 1+ [No Focal Deficits] : no focal deficits [Normal] : affect was normal and insight and judgment were intact

## 2022-08-10 NOTE — ASSESSMENT
[FreeTextEntry1] : Increase hydration.\par Start allopurinol 100 mg/d.\par Diet modification to decrease uric acid discussed.\par Has f/u with vascular surgeon next month.\par Adherence to low carb, no concentrated sweet diet rich in whole plant based foods encouraged.\par Covid vaccine recommended.\par F/U 3 months.\par

## 2022-08-10 NOTE — HISTORY OF PRESENT ILLNESS
[FreeTextEntry1] : Patient is following up on lumbar stenosis and hypertension.\par She has a history of nonischemic cardiomyopathy and had her defibrillator replaced a month ago on 7/6/2022.\par She complains of some generalized fatigue since her procedure.\par She continues her follow-up with her endocrinologist and her nephrologist.\par She has had 1 COVID booster and declines second COVID booster at this time.  She prefers to wait until her energy picks up.\par

## 2022-09-05 ENCOUNTER — RX RENEWAL (OUTPATIENT)
Age: 77
End: 2022-09-05

## 2022-09-20 ENCOUNTER — APPOINTMENT (OUTPATIENT)
Dept: DERMATOLOGY | Facility: CLINIC | Age: 77
End: 2022-09-20

## 2022-09-20 PROCEDURE — 99213 OFFICE O/P EST LOW 20 MIN: CPT

## 2022-09-21 ENCOUNTER — APPOINTMENT (OUTPATIENT)
Dept: UROGYNECOLOGY | Facility: CLINIC | Age: 77
End: 2022-09-21

## 2022-09-21 PROCEDURE — 99213 OFFICE O/P EST LOW 20 MIN: CPT

## 2022-10-02 ENCOUNTER — RX RENEWAL (OUTPATIENT)
Age: 77
End: 2022-10-02

## 2022-10-11 ENCOUNTER — APPOINTMENT (OUTPATIENT)
Dept: FAMILY MEDICINE | Facility: CLINIC | Age: 77
End: 2022-10-11

## 2022-10-11 ENCOUNTER — MED ADMIN CHARGE (OUTPATIENT)
Age: 77
End: 2022-10-11

## 2022-10-11 PROCEDURE — G0008: CPT

## 2022-10-11 PROCEDURE — 90662 IIV NO PRSV INCREASED AG IM: CPT

## 2022-11-01 ENCOUNTER — RX RENEWAL (OUTPATIENT)
Age: 77
End: 2022-11-01

## 2022-11-06 LAB
ALBUMIN SERPL ELPH-MCNC: 4.3 G/DL
ALP BLD-CCNC: 64 U/L
ALT SERPL-CCNC: 26 U/L
ANION GAP SERPL CALC-SCNC: 17 MMOL/L
AST SERPL-CCNC: 28 U/L
BASOPHILS # BLD AUTO: 0.07 K/UL
BASOPHILS NFR BLD AUTO: 1 %
BILIRUB SERPL-MCNC: 0.4 MG/DL
BUN SERPL-MCNC: 50 MG/DL
C PEPTIDE SERPL-MCNC: 1.3 NG/ML
CALCIUM SERPL-MCNC: 10 MG/DL
CHLORIDE SERPL-SCNC: 99 MMOL/L
CHOLEST SERPL-MCNC: 152 MG/DL
CO2 SERPL-SCNC: 21 MMOL/L
CREAT SERPL-MCNC: 1.5 MG/DL
CREAT SPEC-SCNC: 67 MG/DL
EGFR: 35 ML/MIN/1.73M2
EOSINOPHIL # BLD AUTO: 0.23 K/UL
EOSINOPHIL NFR BLD AUTO: 3.1 %
ESTIMATED AVERAGE GLUCOSE: 189 MG/DL
GLUCOSE SERPL-MCNC: 248 MG/DL
HBA1C MFR BLD HPLC: 8.2 %
HCT VFR BLD CALC: 38.3 %
HDLC SERPL-MCNC: 39 MG/DL
HGB BLD-MCNC: 12.2 G/DL
IMM GRANULOCYTES NFR BLD AUTO: 0.4 %
LDLC SERPL CALC-MCNC: 75 MG/DL
LYMPHOCYTES # BLD AUTO: 1.24 K/UL
LYMPHOCYTES NFR BLD AUTO: 16.8 %
MAN DIFF?: NORMAL
MCHC RBC-ENTMCNC: 31.9 GM/DL
MCHC RBC-ENTMCNC: 32.3 PG
MCV RBC AUTO: 101.3 FL
MICROALBUMIN 24H UR DL<=1MG/L-MCNC: 3.3 MG/DL
MICROALBUMIN/CREAT 24H UR-RTO: 50 MG/G
MONOCYTES # BLD AUTO: 0.6 K/UL
MONOCYTES NFR BLD AUTO: 8.2 %
NEUTROPHILS # BLD AUTO: 5.19 K/UL
NEUTROPHILS NFR BLD AUTO: 70.5 %
NONHDLC SERPL-MCNC: 113 MG/DL
PLATELET # BLD AUTO: 227 K/UL
POTASSIUM SERPL-SCNC: 5.3 MMOL/L
PROT SERPL-MCNC: 8 G/DL
RBC # BLD: 3.78 M/UL
RBC # FLD: 14.9 %
SODIUM SERPL-SCNC: 136 MMOL/L
TRIGL SERPL-MCNC: 191 MG/DL
TSH SERPL-ACNC: 1.63 UIU/ML
WBC # FLD AUTO: 7.36 K/UL

## 2022-11-07 ENCOUNTER — RX RENEWAL (OUTPATIENT)
Age: 77
End: 2022-11-07

## 2022-11-09 ENCOUNTER — APPOINTMENT (OUTPATIENT)
Dept: ENDOCRINOLOGY | Facility: CLINIC | Age: 77
End: 2022-11-09

## 2022-11-09 VITALS
WEIGHT: 164 LBS | SYSTOLIC BLOOD PRESSURE: 128 MMHG | HEART RATE: 75 BPM | DIASTOLIC BLOOD PRESSURE: 70 MMHG | HEIGHT: 62 IN | BODY MASS INDEX: 30.18 KG/M2

## 2022-11-09 LAB — GLUCOSE BLDC GLUCOMTR-MCNC: 185

## 2022-11-09 PROCEDURE — 99214 OFFICE O/P EST MOD 30 MIN: CPT | Mod: 25

## 2022-11-09 PROCEDURE — 82962 GLUCOSE BLOOD TEST: CPT

## 2022-11-09 NOTE — ASSESSMENT
[FreeTextEntry1] : 1. DM type 2, insulin treated, complicated by renal insufficiency. not a candidate for tight control due to risk of hypoglycemia and co-morbidities,so would not push short acting insulin. DUe to am hyperglycemia can increase levemir to 40 units\par 2. Hypothyroid,  euthyroid on replacement.. \par

## 2022-11-18 ENCOUNTER — APPOINTMENT (OUTPATIENT)
Dept: FAMILY MEDICINE | Facility: CLINIC | Age: 77
End: 2022-11-18

## 2022-11-18 VITALS
BODY MASS INDEX: 30.18 KG/M2 | HEART RATE: 71 BPM | TEMPERATURE: 97.8 F | WEIGHT: 164 LBS | DIASTOLIC BLOOD PRESSURE: 60 MMHG | SYSTOLIC BLOOD PRESSURE: 136 MMHG | OXYGEN SATURATION: 97 % | HEIGHT: 62 IN

## 2022-11-18 VITALS — SYSTOLIC BLOOD PRESSURE: 128 MMHG | DIASTOLIC BLOOD PRESSURE: 56 MMHG

## 2022-11-18 DIAGNOSIS — M85.80 OTHER SPECIFIED DISORDERS OF BONE DENSITY AND STRUCTURE, UNSPECIFIED SITE: ICD-10-CM

## 2022-11-18 PROCEDURE — 99214 OFFICE O/P EST MOD 30 MIN: CPT

## 2022-11-19 NOTE — ASSESSMENT
[FreeTextEntry1] : Renal fxn stable.\par Increase fluid, watch for leg swelling.\par LDL mildly suboptimal. Continued adherence to low chol diet.\par F/U 3 months.

## 2022-11-19 NOTE — PHYSICAL EXAM
[No Acute Distress] : no acute distress [Normal Sclera/Conjunctiva] : normal sclera/conjunctiva [No Lymphadenopathy] : no lymphadenopathy [Supple] : supple [Normal Rate] : normal [Rhythm Regular] : regular [Normal S1] : normal S1 [Normal S2] : normal S2 [No Edema] : there was no peripheral edema [2+] : left 2+ [Right Carotid Bruit] : right carotid bruit heard [1+] : left 1+ [No Focal Deficits] : no focal deficits [Normal] : affect was normal and insight and judgment were intact

## 2022-11-19 NOTE — HISTORY OF PRESENT ILLNESS
[FreeTextEntry1] : Pt following up on hypertension, CHF, chronic kidney disease, and diabetes.\par Fasting blood sugars have been 150s to 200. Following closely with endocrinologist due to labile blood sugars.\par She is off of Metformin due to renal insufficiency.\par She denies any LE edema.\par She is due to f/u with her vascular surgeon soon for re-evaluation of her peripheral arterial disease.\par She has not had the bivalent Covid booster. She is worried about side effects.\par  \par

## 2022-12-09 ENCOUNTER — RX RENEWAL (OUTPATIENT)
Age: 77
End: 2022-12-09

## 2023-01-01 ENCOUNTER — RX RENEWAL (OUTPATIENT)
Age: 78
End: 2023-01-01

## 2023-01-18 ENCOUNTER — APPOINTMENT (OUTPATIENT)
Dept: UROGYNECOLOGY | Facility: CLINIC | Age: 78
End: 2023-01-18
Payer: MEDICARE

## 2023-01-18 PROCEDURE — 99213 OFFICE O/P EST LOW 20 MIN: CPT

## 2023-01-31 ENCOUNTER — APPOINTMENT (OUTPATIENT)
Dept: UROGYNECOLOGY | Facility: CLINIC | Age: 78
End: 2023-01-31
Payer: MEDICARE

## 2023-01-31 PROCEDURE — 99212 OFFICE O/P EST SF 10 MIN: CPT

## 2023-01-31 NOTE — PHYSICAL EXAM
[No Acute Distress] : in no acute distress [Well developed] : well developed [Well Nourished] : ~L well nourished [Good Hygeine] : demonstrates good hygeine [Oriented x3] : oriented to person, place, and time [Normal Memory] : ~T memory was ~L unimpaired [Normal Mood/Affect] : mood and affect are normal [Soft, Nontender] : the abdomen was soft and nontender [Warm and Dry] : was warm and dry to touch [Normal Gait] : gait was normal [Labia Majora] : were normal [Labia Minora] : were normal [Normal] : was normal [Normal Appearance] : general appearance was normal [Atrophy] : atrophy [No Bleeding] : there was no active vaginal bleeding [FreeTextEntry4] : mild erythema but no bleeding.  Exam significantly improved.

## 2023-01-31 NOTE — HISTORY OF PRESENT ILLNESS
[FreeTextEntry1] : Pt with hx cystocele, rectocele managed with RS #5 which was left out at last visit 1/18/23 d/t bleeding.  She reports bleeding/spotting x 2 days after pessary was removed but no further bleeding.  She is using vaginal estrogen nightly.  Reports that she has been very comfortable without the pessary.  No bothersome bulge, no subjective feelings of incomplete emptying.  She wanted to come in today to make sure she was healed, but she does not want to replace the pessary at this time.

## 2023-01-31 NOTE — DISCUSSION/SUMMARY
[FreeTextEntry1] : Pessary left out.  Pt wanted to f/u in 3 months.  We discussed that if she has bothersome bulge, she can be seen sooner for reinsertion.  Continue vaginal estrogen BIW.  Instructed to call with any questions or concerns and she verbalizes understanding.

## 2023-02-03 ENCOUNTER — APPOINTMENT (OUTPATIENT)
Dept: NEPHROLOGY | Facility: CLINIC | Age: 78
End: 2023-02-03
Payer: MEDICARE

## 2023-02-03 VITALS
SYSTOLIC BLOOD PRESSURE: 126 MMHG | OXYGEN SATURATION: 99 % | DIASTOLIC BLOOD PRESSURE: 72 MMHG | WEIGHT: 164 LBS | HEIGHT: 62 IN | HEART RATE: 73 BPM | BODY MASS INDEX: 30.18 KG/M2 | RESPIRATION RATE: 14 BRPM

## 2023-02-03 DIAGNOSIS — E11.319 TYPE 2 DIABETES MELLITUS WITH UNSPECIFIED DIABETIC RETINOPATHY W/OUT MACULAR EDEMA: ICD-10-CM

## 2023-02-03 DIAGNOSIS — E87.2 ACIDOSIS: ICD-10-CM

## 2023-02-03 DIAGNOSIS — I77.9 DISORDER OF ARTERIES AND ARTERIOLES, UNSPECIFIED: ICD-10-CM

## 2023-02-03 PROCEDURE — 36415 COLL VENOUS BLD VENIPUNCTURE: CPT

## 2023-02-03 PROCEDURE — 99214 OFFICE O/P EST MOD 30 MIN: CPT | Mod: 25

## 2023-02-03 NOTE — PHYSICAL EXAM
[General Appearance - Alert] : alert [General Appearance - In No Acute Distress] : in no acute distress [General Appearance - Well Nourished] : well nourished [General Appearance - Well Developed] : well developed [General Appearance - Well-Appearing] : healthy appearing [Sclera] : the sclera and conjunctiva were normal [Outer Ear] : the ears and nose were normal in appearance [Hearing Threshold Finger Rub Not [place]] : hearing was normal [Examination Of The Oral Cavity] : the lips and gums were normal [Neck Appearance] : the appearance of the neck was normal [Neck Cervical Mass (___cm)] : no neck mass was observed [Jugular Venous Distention Increased] : there was no jugular-venous distention [Respiration, Rhythm And Depth] : normal respiratory rhythm and effort [Exaggerated Use Of Accessory Muscles For Inspiration] : no accessory muscle use [Auscultation Breath Sounds / Voice Sounds] : lungs were clear to auscultation bilaterally [Apical Impulse] : the apical impulse was normal [Heart Rate And Rhythm] : heart rate was normal and rhythm regular [Heart Sounds] : normal S1 and S2 [Heart Sounds Gallop] : no gallops [Murmurs] : no murmurs [Heart Sounds Pericardial Friction Rub] : no pericardial rub [Arterial Pulses Carotid] : carotid pulses were normal with no bruits [Abdominal Aorta] : the abdominal aorta was normal [Pitting Edema] : pitting edema present [___ +] : bilateral [unfilled]+ pretibial pitting edema [Bowel Sounds] : normal bowel sounds [Abdomen Soft] : soft [Abdomen Tenderness] : non-tender [Abdomen Mass (___ Cm)] : no abdominal mass palpated [Abdomen Hernia] : no hernia was discovered [Cervical Lymph Nodes Enlarged Posterior Bilaterally] : posterior cervical [Cervical Lymph Nodes Enlarged Anterior Bilaterally] : anterior cervical [No CVA Tenderness] : no ~M costovertebral angle tenderness [Involuntary Movements] : no involuntary movements were seen [Skin Turgor] : normal skin turgor [] : no rash [No Focal Deficits] : no focal deficits [Oriented To Time, Place, And Person] : oriented to person, place, and time [Impaired Insight] : insight and judgment were intact [Affect] : the affect was normal [Mood] : the mood was normal [FreeTextEntry1] : +AICD

## 2023-02-03 NOTE — HISTORY OF PRESENT ILLNESS
[FreeTextEntry1] : HX: CKD 3, CAD, CHF, kidney cyst, DM 2, HLD, HTN, LVEF 50%, hypothyroidism.\par \par Surgery 11/26/ 2019 (Dr. Bean) - Heavily calcified plaque left femoral artery, Recurrent left SFA stenosis\par \par S/P  thromboendarterectomy of deep femoral artery, Left CFA &  insertion of SFA stent. \par \par 4/16/2021\par Left leg bypass-Dec 2020\par February- low Hgb,  4 units PRBCs - taken off all  AC, \par \par Cardiology-Dr. Richard recently replaced PPM\par \par Meds Reviewed, \par \par

## 2023-02-04 DIAGNOSIS — L08.9 LOCAL INFECTION OF THE SKIN AND SUBCUTANEOUS TISSUE, UNSPECIFIED: ICD-10-CM

## 2023-02-04 DIAGNOSIS — Z87.42 PERSONAL HISTORY OF OTHER DISEASES OF THE FEMALE GENITAL TRACT: ICD-10-CM

## 2023-02-04 DIAGNOSIS — Z86.79 PERSONAL HISTORY OF OTHER DISEASES OF THE CIRCULATORY SYSTEM: ICD-10-CM

## 2023-02-04 DIAGNOSIS — Z46.89 ENCOUNTER FOR FITTING AND ADJUSTMENT OF OTHER SPECIFIED DEVICES: ICD-10-CM

## 2023-02-04 DIAGNOSIS — N89.8 OTHER SPECIFIED NONINFLAMMATORY DISORDERS OF VAGINA: ICD-10-CM

## 2023-02-04 DIAGNOSIS — E87.5 HYPERKALEMIA: ICD-10-CM

## 2023-02-04 DIAGNOSIS — Z92.29 PERSONAL HISTORY OF OTHER DRUG THERAPY: ICD-10-CM

## 2023-02-04 DIAGNOSIS — N39.41 URGE INCONTINENCE: ICD-10-CM

## 2023-02-04 DIAGNOSIS — Z86.39 PERSONAL HISTORY OF OTHER ENDOCRINE, NUTRITIONAL AND METABOLIC DISEASE: ICD-10-CM

## 2023-02-04 DIAGNOSIS — H34.8311 TRIBUTARY (BRANCH) RETINAL VEIN OCCLUSION, RIGHT EYE, WITH RETINAL NEOVASCULARIZATION: ICD-10-CM

## 2023-02-04 DIAGNOSIS — Z86.19 PERSONAL HISTORY OF OTHER INFECTIOUS AND PARASITIC DISEASES: ICD-10-CM

## 2023-02-06 ENCOUNTER — RX RENEWAL (OUTPATIENT)
Age: 78
End: 2023-02-06

## 2023-02-06 LAB
ALBUMIN SERPL ELPH-MCNC: 4.5 G/DL
ANION GAP SERPL CALC-SCNC: 14 MMOL/L
APPEARANCE: CLEAR
BACTERIA: NEGATIVE
BASOPHILS # BLD AUTO: 0.06 K/UL
BASOPHILS NFR BLD AUTO: 0.8 %
BILIRUBIN URINE: NEGATIVE
BLOOD URINE: NEGATIVE
BUN SERPL-MCNC: 38 MG/DL
CALCIUM SERPL-MCNC: 9.8 MG/DL
CALCIUM SERPL-MCNC: 9.8 MG/DL
CHLORIDE SERPL-SCNC: 100 MMOL/L
CHOLEST SERPL-MCNC: 129 MG/DL
CO2 SERPL-SCNC: 24 MMOL/L
COLOR: NORMAL
CREAT SERPL-MCNC: 1.38 MG/DL
CREAT SPEC-SCNC: 28 MG/DL
CREAT SPEC-SCNC: 28 MG/DL
CREAT/PROT UR: 0.3 RATIO
EGFR: 39 ML/MIN/1.73M2
EOSINOPHIL # BLD AUTO: 0.24 K/UL
EOSINOPHIL NFR BLD AUTO: 3.1 %
ESTIMATED AVERAGE GLUCOSE: 183 MG/DL
GLUCOSE QUALITATIVE U: ABNORMAL
GLUCOSE SERPL-MCNC: 194 MG/DL
HBA1C MFR BLD HPLC: 8 %
HCT VFR BLD CALC: 36.4 %
HDLC SERPL-MCNC: 41 MG/DL
HGB BLD-MCNC: 11.4 G/DL
HYALINE CASTS: 0 /LPF
IMM GRANULOCYTES NFR BLD AUTO: 0.5 %
KETONES URINE: NEGATIVE
LDLC SERPL CALC-MCNC: 60 MG/DL
LEUKOCYTE ESTERASE URINE: ABNORMAL
LYMPHOCYTES # BLD AUTO: 1.18 K/UL
LYMPHOCYTES NFR BLD AUTO: 15.1 %
MAN DIFF?: NORMAL
MCHC RBC-ENTMCNC: 31.3 GM/DL
MCHC RBC-ENTMCNC: 31.8 PG
MCV RBC AUTO: 101.7 FL
MICROALBUMIN 24H UR DL<=1MG/L-MCNC: 2.8 MG/DL
MICROALBUMIN/CREAT 24H UR-RTO: 101 MG/G
MICROSCOPIC-UA: NORMAL
MONOCYTES # BLD AUTO: 0.63 K/UL
MONOCYTES NFR BLD AUTO: 8.1 %
NEUTROPHILS # BLD AUTO: 5.64 K/UL
NEUTROPHILS NFR BLD AUTO: 72.4 %
NITRITE URINE: NEGATIVE
NONHDLC SERPL-MCNC: 88 MG/DL
PARATHYROID HORMONE INTACT: 45 PG/ML
PH URINE: 6
PHOSPHATE SERPL-MCNC: 4.6 MG/DL
PLATELET # BLD AUTO: 233 K/UL
POTASSIUM SERPL-SCNC: 5 MMOL/L
PROT UR-MCNC: 8 MG/DL
PROTEIN URINE: NEGATIVE
RBC # BLD: 3.58 M/UL
RBC # FLD: 15.9 %
RED BLOOD CELLS URINE: 1 /HPF
SODIUM SERPL-SCNC: 138 MMOL/L
SPECIFIC GRAVITY URINE: 1.01
SQUAMOUS EPITHELIAL CELLS: 1 /HPF
TRIGL SERPL-MCNC: 138 MG/DL
URATE SERPL-MCNC: 6.1 MG/DL
UROBILINOGEN URINE: NORMAL
WBC # FLD AUTO: 7.79 K/UL
WHITE BLOOD CELLS URINE: 28 /HPF

## 2023-02-07 ENCOUNTER — NON-APPOINTMENT (OUTPATIENT)
Age: 78
End: 2023-02-07

## 2023-03-01 ENCOUNTER — APPOINTMENT (OUTPATIENT)
Dept: FAMILY MEDICINE | Facility: CLINIC | Age: 78
End: 2023-03-01
Payer: MEDICARE

## 2023-03-01 VITALS
DIASTOLIC BLOOD PRESSURE: 82 MMHG | WEIGHT: 164 LBS | OXYGEN SATURATION: 99 % | RESPIRATION RATE: 16 BRPM | HEART RATE: 84 BPM | BODY MASS INDEX: 30.18 KG/M2 | HEIGHT: 62 IN | TEMPERATURE: 97.5 F | SYSTOLIC BLOOD PRESSURE: 122 MMHG

## 2023-03-01 DIAGNOSIS — E11.3293 TYPE 2 DIABETES MELLITUS WITH MILD NONPROLIFERATIVE DIABETIC RETINOPATHY WITHOUT MACULAR EDEMA, BILATERAL: ICD-10-CM

## 2023-03-01 DIAGNOSIS — I25.10 ATHEROSCLEROTIC HEART DISEASE OF NATIVE CORONARY ARTERY W/OUT ANGINA PECTORIS: ICD-10-CM

## 2023-03-01 PROCEDURE — 99214 OFFICE O/P EST MOD 30 MIN: CPT

## 2023-03-01 NOTE — ASSESSMENT
[FreeTextEntry1] : Pt is doing well.\par F/U 3 months.\par Recommend bivalent Covid booster.\par F/U with cardio.\par F/U 3 months.

## 2023-03-01 NOTE — HISTORY OF PRESENT ILLNESS
[FreeTextEntry1] : Patient is following up on chronic kidney disease, cardiomyopathy, and diabetes.\par She had blood tests done recently and would like to review results.\par She denies any chest pain, leg pains or palpitations.\par

## 2023-03-05 ENCOUNTER — RX RENEWAL (OUTPATIENT)
Age: 78
End: 2023-03-05

## 2023-03-06 ENCOUNTER — RX RENEWAL (OUTPATIENT)
Age: 78
End: 2023-03-06

## 2023-03-09 DIAGNOSIS — Z23 ENCOUNTER FOR IMMUNIZATION: ICD-10-CM

## 2023-03-24 ENCOUNTER — RX RENEWAL (OUTPATIENT)
Age: 78
End: 2023-03-24

## 2023-03-28 ENCOUNTER — APPOINTMENT (OUTPATIENT)
Dept: FAMILY MEDICINE | Facility: CLINIC | Age: 78
End: 2023-03-28
Payer: MEDICARE

## 2023-03-28 PROCEDURE — 0001A: CPT

## 2023-04-03 ENCOUNTER — APPOINTMENT (OUTPATIENT)
Dept: UROGYNECOLOGY | Facility: CLINIC | Age: 78
End: 2023-04-03
Payer: MEDICARE

## 2023-04-03 VITALS
HEIGHT: 62 IN | SYSTOLIC BLOOD PRESSURE: 181 MMHG | DIASTOLIC BLOOD PRESSURE: 46 MMHG | WEIGHT: 160 LBS | BODY MASS INDEX: 29.44 KG/M2

## 2023-04-03 DIAGNOSIS — N95.2 POSTMENOPAUSAL ATROPHIC VAGINITIS: ICD-10-CM

## 2023-04-03 PROCEDURE — 99213 OFFICE O/P EST LOW 20 MIN: CPT

## 2023-04-03 NOTE — PHYSICAL EXAM
[No Acute Distress] : in no acute distress [Well developed] : well developed [Well Nourished] : ~L well nourished [Good Hygeine] : demonstrates good hygeine [Oriented x3] : oriented to person, place, and time [Normal Memory] : ~T memory was ~L unimpaired [Normal Mood/Affect] : mood and affect are normal [Soft, Nontender] : the abdomen was soft and nontender [Warm and Dry] : was warm and dry to touch [Normal Gait] : gait was normal [Labia Majora] : were normal [Labia Minora] : were normal [Normal] : was normal [Normal Appearance] : general appearance was normal [Cystocele] : a cystocele [No Bleeding] : there was no active vaginal bleeding [FreeTextEntry4] : mild cystocele -1, no erythema or bleeding

## 2023-04-03 NOTE — DISCUSSION/SUMMARY
[FreeTextEntry1] : Assured exam is normal.  Pt does not want pessary back.  We discussed that she can f/u as needed.  We discussed risks/benefits of low dose vaginal estrogen and discussed that she does not need to continue with vaginal estrogen as she no longer has vaginal irritations from the pessary.  She will call if she desires reinsertion or if she feels she has a UTI.  \par \par SBP elevated.  Asymptomatic.  Pt states she did not take her BP medication this morning.  Advised to take when she gets home and check  BP.  If it remains elevated, f/u with PMD.

## 2023-04-03 NOTE — HISTORY OF PRESENT ILLNESS
[FreeTextEntry1] : Pt with hx cystocele, rectocele which was managed with RS #5.  Pessary has been our since 1/18/2023.  At last visit, pt was comfortable without the pessary and did not want it reinserted.  Today she reports she "just wants to make sure everything is ok".  Pessary remains out and she remains comfortable.  Denies any bothersome vaginal bulge, denies any subjective feelings of incomplete emptying.  Denies any UTI symptoms today and was not treated for any UTI since last visit.  Overall doing well.  She has been using vaginal estrogen BIW.

## 2023-05-08 ENCOUNTER — RX RENEWAL (OUTPATIENT)
Age: 78
End: 2023-05-08

## 2023-05-12 ENCOUNTER — LABORATORY RESULT (OUTPATIENT)
Age: 78
End: 2023-05-12

## 2023-05-15 ENCOUNTER — RESULT CHARGE (OUTPATIENT)
Age: 78
End: 2023-05-15

## 2023-05-15 ENCOUNTER — APPOINTMENT (OUTPATIENT)
Dept: ENDOCRINOLOGY | Facility: CLINIC | Age: 78
End: 2023-05-15
Payer: MEDICARE

## 2023-05-15 VITALS
DIASTOLIC BLOOD PRESSURE: 48 MMHG | SYSTOLIC BLOOD PRESSURE: 116 MMHG | HEIGHT: 62 IN | HEART RATE: 71 BPM | OXYGEN SATURATION: 98 % | BODY MASS INDEX: 30.18 KG/M2 | WEIGHT: 164 LBS

## 2023-05-15 LAB — GLUCOSE BLDC GLUCOMTR-MCNC: 181

## 2023-05-15 PROCEDURE — 82962 GLUCOSE BLOOD TEST: CPT

## 2023-05-15 PROCEDURE — 99214 OFFICE O/P EST MOD 30 MIN: CPT | Mod: 25

## 2023-05-15 NOTE — HISTORY OF PRESENT ILLNESS
[FreeTextEntry1] : Quality:  Type 2.   \par Severity:  Moderate\par Duration:  25 years\par Associated Symptoms:  Retinopathy. Hypoglycemia. Nephropathy\par Modifying Factors:  Better with diet.   \par Notes:  Current regimen:\par 	metformin 1000 bid - stopped due to renal insufficiency\par 	glimepiride 2 bid - off \par 	januvia, precose ineffective. On avandia in the past.\par Levemir  40\par novolog before meals:\par 		150-199 2\par                                  200-249 4\par                                  250-299 6\par                                  300+       8\par jardiance 10\par \par Routine cardiac evaluation-fluid around the heart. Eventual w/u included PET scan and sonography revealing subcentimeric thyroid nodules, pulmonary abnormality, and fatty liver. SHASHA 1:160, speckled, no diagnosis made by rheumatology.\par \par \par \par \par \par Diet:  weight watchers\par \par Weight History:  \par losing weight\par \par Blood Glucose Testing Information - pt. reports variable glucose levels, and some lows after eating\par \par Patient is using the  meter and is testing 4 times per day.\par \par Past Medical History\par Notes:  ASHD, pacemaker, defibrillator. Needs to have lead replaced\par PVD, s/p stents. \par renal cysts\par thyroid nodules, subcentimeric\par vitamin D deficiency\par \par c/o fatigue

## 2023-05-15 NOTE — ASSESSMENT
[FreeTextEntry1] : 1. DM type 2, insulin treated, complicated by renal insufficiency. not a candidate for tight control due to risk of hypoglycemia and co-morbidities,so would not push short acting insulin.\par 2. Hypothyroid,  clinically euthyroid on replacement.. \par 3. Fatigue likely multifactorial, but a significant factor could be anemia (current H/H 9.5/29.5, lower than usual). WIll try to add on folate, B12 and iron studies. f/u advised with pcp\par

## 2023-05-17 ENCOUNTER — TRANSCRIPTION ENCOUNTER (OUTPATIENT)
Age: 78
End: 2023-05-17

## 2023-05-17 ENCOUNTER — APPOINTMENT (OUTPATIENT)
Dept: FAMILY MEDICINE | Facility: CLINIC | Age: 78
End: 2023-05-17
Payer: MEDICARE

## 2023-05-17 ENCOUNTER — INPATIENT (INPATIENT)
Facility: HOSPITAL | Age: 78
LOS: 1 days | Discharge: HOME | DRG: 378 | End: 2023-05-19
Attending: INTERNAL MEDICINE | Admitting: HOSPITALIST
Payer: MEDICARE

## 2023-05-17 VITALS
TEMPERATURE: 97.5 F | SYSTOLIC BLOOD PRESSURE: 128 MMHG | HEART RATE: 84 BPM | DIASTOLIC BLOOD PRESSURE: 68 MMHG | HEIGHT: 62 IN | BODY MASS INDEX: 30.18 KG/M2 | OXYGEN SATURATION: 99 % | WEIGHT: 164 LBS

## 2023-05-17 VITALS
HEART RATE: 69 BPM | WEIGHT: 160.06 LBS | SYSTOLIC BLOOD PRESSURE: 126 MMHG | HEIGHT: 62 IN | DIASTOLIC BLOOD PRESSURE: 46 MMHG | RESPIRATION RATE: 20 BRPM | OXYGEN SATURATION: 98 % | TEMPERATURE: 98 F

## 2023-05-17 VITALS — DIASTOLIC BLOOD PRESSURE: 46 MMHG | SYSTOLIC BLOOD PRESSURE: 126 MMHG

## 2023-05-17 DIAGNOSIS — N17.9 ACUTE KIDNEY FAILURE, UNSPECIFIED: ICD-10-CM

## 2023-05-17 DIAGNOSIS — K92.1 MELENA: ICD-10-CM

## 2023-05-17 DIAGNOSIS — K92.2 GASTROINTESTINAL HEMORRHAGE, UNSPECIFIED: ICD-10-CM

## 2023-05-17 DIAGNOSIS — I25.10 ATHEROSCLEROTIC HEART DISEASE OF NATIVE CORONARY ARTERY WITHOUT ANGINA PECTORIS: ICD-10-CM

## 2023-05-17 DIAGNOSIS — D62 ACUTE POSTHEMORRHAGIC ANEMIA: ICD-10-CM

## 2023-05-17 DIAGNOSIS — Z98.89 OTHER SPECIFIED POSTPROCEDURAL STATES: Chronic | ICD-10-CM

## 2023-05-17 DIAGNOSIS — E11.65 TYPE 2 DIABETES MELLITUS WITH HYPERGLYCEMIA: ICD-10-CM

## 2023-05-17 DIAGNOSIS — R42 DIZZINESS AND GIDDINESS: ICD-10-CM

## 2023-05-17 DIAGNOSIS — K57.90 DIVERTICULOSIS OF INTESTINE, PART UNSPECIFIED, W/OUT PERFORATION OR ABSCESS W/OUT BLEEDING: ICD-10-CM

## 2023-05-17 DIAGNOSIS — Z98.890 OTHER SPECIFIED POSTPROCEDURAL STATES: Chronic | ICD-10-CM

## 2023-05-17 DIAGNOSIS — H26.9 UNSPECIFIED CATARACT: Chronic | ICD-10-CM

## 2023-05-17 DIAGNOSIS — E03.9 HYPOTHYROIDISM, UNSPECIFIED: ICD-10-CM

## 2023-05-17 DIAGNOSIS — H34.8311 TRIBUTARY (BRANCH) RETINAL VEIN OCCLUSION, RIGHT EYE, WITH RETINAL NEOVASCULARIZATION: Chronic | ICD-10-CM

## 2023-05-17 DIAGNOSIS — I42.8 OTHER CARDIOMYOPATHIES: ICD-10-CM

## 2023-05-17 DIAGNOSIS — D64.9 ANEMIA, UNSPECIFIED: ICD-10-CM

## 2023-05-17 DIAGNOSIS — I73.9 PERIPHERAL VASCULAR DISEASE, UNSPECIFIED: ICD-10-CM

## 2023-05-17 DIAGNOSIS — Z46.89 ENCOUNTER FOR FITTING AND ADJUSTMENT OF OTHER SPECIFIED DEVICES: Chronic | ICD-10-CM

## 2023-05-17 LAB
ALBUMIN SERPL ELPH-MCNC: 3.8 G/DL — SIGNIFICANT CHANGE UP (ref 3.3–5.2)
ALP SERPL-CCNC: 50 U/L — SIGNIFICANT CHANGE UP (ref 40–120)
ALT FLD-CCNC: 18 U/L — SIGNIFICANT CHANGE UP
ANION GAP SERPL CALC-SCNC: 18 MMOL/L — HIGH (ref 5–17)
APTT BLD: 25.6 SEC — LOW (ref 27.5–35.5)
AST SERPL-CCNC: 22 U/L — SIGNIFICANT CHANGE UP
BASE EXCESS BLDV CALC-SCNC: -6.9 MMOL/L — LOW (ref -2–3)
BASOPHILS # BLD AUTO: 0.06 K/UL — SIGNIFICANT CHANGE UP (ref 0–0.2)
BASOPHILS NFR BLD AUTO: 0.5 % — SIGNIFICANT CHANGE UP (ref 0–2)
BILIRUB SERPL-MCNC: 0.4 MG/DL — SIGNIFICANT CHANGE UP (ref 0.4–2)
BUN SERPL-MCNC: 63.5 MG/DL — HIGH (ref 8–20)
CALCIUM SERPL-MCNC: 9 MG/DL — SIGNIFICANT CHANGE UP (ref 8.4–10.5)
CHLORIDE SERPL-SCNC: 104 MMOL/L — SIGNIFICANT CHANGE UP (ref 96–108)
CO2 SERPL-SCNC: 16 MMOL/L — LOW (ref 22–29)
CREAT SERPL-MCNC: 1.49 MG/DL — HIGH (ref 0.5–1.3)
EGFR: 36 ML/MIN/1.73M2 — LOW
EOSINOPHIL # BLD AUTO: 0.07 K/UL — SIGNIFICANT CHANGE UP (ref 0–0.5)
EOSINOPHIL NFR BLD AUTO: 0.6 % — SIGNIFICANT CHANGE UP (ref 0–6)
GLUCOSE BLDC GLUCOMTR-MCNC: 173 MG/DL — HIGH (ref 70–99)
GLUCOSE BLDC GLUCOMTR-MCNC: 267 MG/DL — HIGH (ref 70–99)
GLUCOSE SERPL-MCNC: 316 MG/DL — HIGH (ref 70–99)
HCO3 BLDV-SCNC: 19 MMOL/L — LOW (ref 22–29)
HCT VFR BLD CALC: 20.5 % — CRITICAL LOW (ref 34.5–45)
HGB BLD-MCNC: 6.6 G/DL — CRITICAL LOW (ref 11.5–15.5)
IMM GRANULOCYTES NFR BLD AUTO: 1.2 % — HIGH (ref 0–0.9)
INR BLD: 1.14 RATIO — SIGNIFICANT CHANGE UP (ref 0.88–1.16)
LYMPHOCYTES # BLD AUTO: 1.45 K/UL — SIGNIFICANT CHANGE UP (ref 1–3.3)
LYMPHOCYTES # BLD AUTO: 12.5 % — LOW (ref 13–44)
MCHC RBC-ENTMCNC: 32.2 GM/DL — SIGNIFICANT CHANGE UP (ref 32–36)
MCHC RBC-ENTMCNC: 32.2 PG — SIGNIFICANT CHANGE UP (ref 27–34)
MCV RBC AUTO: 100 FL — SIGNIFICANT CHANGE UP (ref 80–100)
MONOCYTES # BLD AUTO: 0.59 K/UL — SIGNIFICANT CHANGE UP (ref 0–0.9)
MONOCYTES NFR BLD AUTO: 5.1 % — SIGNIFICANT CHANGE UP (ref 2–14)
NEUTROPHILS # BLD AUTO: 9.3 K/UL — HIGH (ref 1.8–7.4)
NEUTROPHILS NFR BLD AUTO: 80.1 % — HIGH (ref 43–77)
PCO2 BLDV: 35 MMHG — LOW (ref 39–42)
PH BLDV: 7.34 — SIGNIFICANT CHANGE UP (ref 7.32–7.43)
PLATELET # BLD AUTO: 254 K/UL — SIGNIFICANT CHANGE UP (ref 150–400)
PO2 BLDV: 178 MMHG — HIGH (ref 25–45)
POTASSIUM SERPL-MCNC: 4.3 MMOL/L — SIGNIFICANT CHANGE UP (ref 3.5–5.3)
POTASSIUM SERPL-SCNC: 4.3 MMOL/L — SIGNIFICANT CHANGE UP (ref 3.5–5.3)
PROT SERPL-MCNC: 6.5 G/DL — LOW (ref 6.6–8.7)
PROTHROM AB SERPL-ACNC: 13.2 SEC — SIGNIFICANT CHANGE UP (ref 10.5–13.4)
RBC # BLD: 2.05 M/UL — LOW (ref 3.8–5.2)
RBC # FLD: 17.6 % — HIGH (ref 10.3–14.5)
SAO2 % BLDV: 98.4 % — SIGNIFICANT CHANGE UP
SODIUM SERPL-SCNC: 138 MMOL/L — SIGNIFICANT CHANGE UP (ref 135–145)
WBC # BLD: 11.61 K/UL — HIGH (ref 3.8–10.5)
WBC # FLD AUTO: 11.61 K/UL — HIGH (ref 3.8–10.5)

## 2023-05-17 PROCEDURE — 99223 1ST HOSP IP/OBS HIGH 75: CPT

## 2023-05-17 PROCEDURE — 99285 EMERGENCY DEPT VISIT HI MDM: CPT

## 2023-05-17 PROCEDURE — 99215 OFFICE O/P EST HI 40 MIN: CPT

## 2023-05-17 RX ORDER — GLUCAGON INJECTION, SOLUTION 0.5 MG/.1ML
1 INJECTION, SOLUTION SUBCUTANEOUS ONCE
Refills: 0 | Status: DISCONTINUED | OUTPATIENT
Start: 2023-05-17 | End: 2023-05-19

## 2023-05-17 RX ORDER — GABAPENTIN 400 MG/1
100 CAPSULE ORAL
Refills: 0 | Status: DISCONTINUED | OUTPATIENT
Start: 2023-05-17 | End: 2023-05-19

## 2023-05-17 RX ORDER — PANTOPRAZOLE SODIUM 20 MG/1
80 TABLET, DELAYED RELEASE ORAL ONCE
Refills: 0 | Status: COMPLETED | OUTPATIENT
Start: 2023-05-17 | End: 2023-05-17

## 2023-05-17 RX ORDER — DEXTROSE 50 % IN WATER 50 %
25 SYRINGE (ML) INTRAVENOUS ONCE
Refills: 0 | Status: DISCONTINUED | OUTPATIENT
Start: 2023-05-17 | End: 2023-05-19

## 2023-05-17 RX ORDER — PANTOPRAZOLE SODIUM 20 MG/1
8 TABLET, DELAYED RELEASE ORAL
Qty: 80 | Refills: 0 | Status: DISCONTINUED | OUTPATIENT
Start: 2023-05-17 | End: 2023-05-19

## 2023-05-17 RX ORDER — PREGABALIN 225 MG/1
1000 CAPSULE ORAL DAILY
Refills: 0 | Status: DISCONTINUED | OUTPATIENT
Start: 2023-05-17 | End: 2023-05-19

## 2023-05-17 RX ORDER — AMLODIPINE BESYLATE 2.5 MG/1
1 TABLET ORAL
Refills: 0 | DISCHARGE

## 2023-05-17 RX ORDER — LOVASTATIN 20 MG
1 TABLET ORAL
Refills: 0 | DISCHARGE

## 2023-05-17 RX ORDER — FERROUS SULFATE 325(65) MG
1 TABLET ORAL
Refills: 0 | DISCHARGE

## 2023-05-17 RX ORDER — INSULIN GLARGINE 100 [IU]/ML
20 INJECTION, SOLUTION SUBCUTANEOUS EVERY MORNING
Refills: 0 | Status: DISCONTINUED | OUTPATIENT
Start: 2023-05-18 | End: 2023-05-19

## 2023-05-17 RX ORDER — FUROSEMIDE 40 MG
1 TABLET ORAL
Refills: 0 | DISCHARGE

## 2023-05-17 RX ORDER — CARVEDILOL PHOSPHATE 80 MG/1
12.5 CAPSULE, EXTENDED RELEASE ORAL EVERY 12 HOURS
Refills: 0 | Status: DISCONTINUED | OUTPATIENT
Start: 2023-05-17 | End: 2023-05-19

## 2023-05-17 RX ORDER — INSULIN LISPRO 100/ML
VIAL (ML) SUBCUTANEOUS
Refills: 0 | Status: DISCONTINUED | OUTPATIENT
Start: 2023-05-17 | End: 2023-05-19

## 2023-05-17 RX ORDER — ALLOPURINOL 300 MG
1 TABLET ORAL
Refills: 0 | DISCHARGE

## 2023-05-17 RX ORDER — AMLODIPINE BESYLATE 2.5 MG/1
1 TABLET ORAL
Qty: 0 | Refills: 0 | DISCHARGE

## 2023-05-17 RX ORDER — CARVEDILOL PHOSPHATE 80 MG/1
1 CAPSULE, EXTENDED RELEASE ORAL
Refills: 0 | DISCHARGE

## 2023-05-17 RX ORDER — INSULIN GLARGINE 100 [IU]/ML
20 INJECTION, SOLUTION SUBCUTANEOUS ONCE
Refills: 0 | Status: COMPLETED | OUTPATIENT
Start: 2023-05-17 | End: 2023-05-17

## 2023-05-17 RX ORDER — SODIUM CHLORIDE 9 MG/ML
1000 INJECTION, SOLUTION INTRAVENOUS
Refills: 0 | Status: DISCONTINUED | OUTPATIENT
Start: 2023-05-17 | End: 2023-05-19

## 2023-05-17 RX ORDER — INSULIN LISPRO 100/ML
VIAL (ML) SUBCUTANEOUS AT BEDTIME
Refills: 0 | Status: DISCONTINUED | OUTPATIENT
Start: 2023-05-17 | End: 2023-05-19

## 2023-05-17 RX ORDER — DEXTROSE 50 % IN WATER 50 %
15 SYRINGE (ML) INTRAVENOUS ONCE
Refills: 0 | Status: DISCONTINUED | OUTPATIENT
Start: 2023-05-17 | End: 2023-05-19

## 2023-05-17 RX ORDER — SODIUM CHLORIDE 9 MG/ML
1000 INJECTION INTRAMUSCULAR; INTRAVENOUS; SUBCUTANEOUS ONCE
Refills: 0 | Status: COMPLETED | OUTPATIENT
Start: 2023-05-17 | End: 2023-05-17

## 2023-05-17 RX ORDER — LEVOTHYROXINE SODIUM 125 MCG
112 TABLET ORAL DAILY
Refills: 0 | Status: DISCONTINUED | OUTPATIENT
Start: 2023-05-17 | End: 2023-05-19

## 2023-05-17 RX ORDER — INSULIN DETEMIR 100/ML (3)
40 INSULIN PEN (ML) SUBCUTANEOUS
Refills: 0 | DISCHARGE

## 2023-05-17 RX ORDER — DEXTROSE 50 % IN WATER 50 %
12.5 SYRINGE (ML) INTRAVENOUS ONCE
Refills: 0 | Status: DISCONTINUED | OUTPATIENT
Start: 2023-05-17 | End: 2023-05-19

## 2023-05-17 RX ADMIN — Medication 6: at 17:43

## 2023-05-17 RX ADMIN — SODIUM CHLORIDE 500 MILLILITER(S): 9 INJECTION INTRAMUSCULAR; INTRAVENOUS; SUBCUTANEOUS at 17:19

## 2023-05-17 RX ADMIN — GABAPENTIN 100 MILLIGRAM(S): 400 CAPSULE ORAL at 17:43

## 2023-05-17 RX ADMIN — PANTOPRAZOLE SODIUM 80 MILLIGRAM(S): 20 TABLET, DELAYED RELEASE ORAL at 16:36

## 2023-05-17 RX ADMIN — INSULIN GLARGINE 20 UNIT(S): 100 INJECTION, SOLUTION SUBCUTANEOUS at 21:57

## 2023-05-17 RX ADMIN — PANTOPRAZOLE SODIUM 10 MG/HR: 20 TABLET, DELAYED RELEASE ORAL at 17:59

## 2023-05-17 RX ADMIN — CARVEDILOL PHOSPHATE 12.5 MILLIGRAM(S): 80 CAPSULE, EXTENDED RELEASE ORAL at 17:43

## 2023-05-17 NOTE — HEALTH RISK ASSESSMENT
[Yes] : Yes [Monthly or less (1 pt)] : Monthly or less (1 point) [1 or 2 (0 pts)] : 1 or 2 (0 points) [Never (0 pts)] : Never (0 points) [No] : In the past 12 months have you used drugs other than those required for medical reasons? No [No falls in past year] : Patient reported no falls in the past year [0] : 2) Feeling down, depressed, or hopeless: Not at all (0) [PHQ-2 Negative - No further assessment needed] : PHQ-2 Negative - No further assessment needed [Former] : Former [< 15 Years] : < 15 Years [Audit-CScore] : 1 [LYN8Dstbm] : 0 [de-identified] : quit 1990

## 2023-05-17 NOTE — CONSULT NOTE ADULT - SUBJECTIVE AND OBJECTIVE BOX
[facility], THE HEART CENTER                                   [address] [place] [address]                                                      PHONE: [phone]                                                         FAX: [phone]  [email or web address]  ---------------------------------------------------------------------------------------------------------------------------------    Reason for Consult: GIB on xarelto    HPI:  YE LAWSON is an 77y Female with DM NICM with augmented EF on GDMT, most recent EF 40%, s/p BiV ICD, PAD aortoiliac disease, s/p remote  LE thrombectomy on low dose xarelto 2.5 mg BID as directed by vascular surgery who  was sent to ER by Dr Caceres for weakness and black stools giuiac postivie in office.    PAST MEDICAL & SURGICAL HISTORY:  Diabetes  IDDM      Hypothyroid      Hypertension      CAD (coronary artery disease)      AICD (automatic cardioverter/defibrillator) present  rep notified [facility]      PVD (peripheral vascular disease) with claudication      CKD (chronic kidney disease) stage 3, GFR 30-59 ml/min      Anxiety      Peripheral neuropathy      History of anemia due to chronic kidney disease      Congenital heart failure      Psoriasis      Aorto-iliac disease      Carotid stenosis, bilateral      Spinal stenosis, lumbar region, with neurogenic claudication      Lung nodule      HLD (hyperlipidemia)      Pulmonic regurgitation      Acute cataract      History of intravascular stent placement  femoral artery angioplasty and stents, 3/24/16      H/O vascular surgery  Infrarenal aortic endarterectomy with aortic bi iliac bypass, and superficial femoral artery  angioplasty and stenting, 3/24/16.      Branch retinal vein occlusion with neovascularization of right eye  treated with laser      Pessary maintenance      S/P evacuation of hematoma  right groin, then required wound vac          latex (Rash)  No Known Drug Allergies      MEDICATIONS  (STANDING):  pantoprazole  Injectable 80 milliGRAM(s) IV Push Once  pantoprazole Infusion 8 mG/Hr (10 mL/Hr) IV Continuous <Continuous>    MEDICATIONS  (PRN):      Social History:  Cigarettes:             neg       Alchohol:        neg         Illicit Drug Abuse:  neg  neg FH     ROS: Negative other than as mentioned in HPI.    Vital Signs Last 24 Hrs  T(C): 36.7 (17 May 2023 15:52), Max: 36.7 (17 May 2023 13:08)  T(F): 98 (17 May 2023 15:52), Max: 98 (17 May 2023 13:08)  HR: 87 (17 May 2023 15:52) (69 - 87)  BP: 143/65 (17 May 2023 15:52) (126/46 - 143/65)  BP(mean): --  RR: 18 (17 May 2023 15:52) (18 - 20)  SpO2: 100% (17 May 2023 15:52) (98% - 100%)    Parameters below as of 17 May 2023 15:52  Patient On (Oxygen Delivery Method): room air      ICU Vital Signs Last 24 Hrs  YE LAWSON  I&O's Detail    I&O's Summary    Drug Dosing Weight  YE LAWSON      PHYSICAL EXAM:  General: Appears alert and cooperative.  HEENT: Head; normocephalic, atraumatic.  Eyes: Pupils reactive, cornea wnl.  Neck: Supple, no nodes adenopathy, no NVD or carotid bruit or thyromegaly.  CARDIOVASCULAR: Normal S1 and S2, No murmur, rub, gallop or lift.   LUNGS: No rales, rhonchi or wheeze. Normal breath sounds bilaterally.  ABDOMEN: Soft, nontender without mass or organomegaly. bowel sounds normoactive.  EXTREMITIES: No clubbing, cyanosis or edema. Distal pulses wnl.   SKIN: warm and dry with normal turgor.  NEURO: Alert/oriented x 3/normal motor exam. No pathologic reflexes.    PSYCH: normal affect.        LABS:                        6.6    11.61 )-----------( 254      ( 17 May 2023 14:21 )             20.5     05-17    138  |  104  |  63.5<H>  ----------------------------<  316<H>  4.3   |  16.0<L>  |  1.49<H>    Ca    9.0      17 May 2023 14:21    TPro  6.5<L>  /  Alb  3.8  /  TBili  0.4  /  DBili  x   /  AST  22  /  ALT  18  /  AlkPhos  50  05-17    YE LAWSON      PT/INR - ( 17 May 2023 14:21 )   PT: 13.2 sec;   INR: 1.14 ratio         PTT - ( 17 May 2023 14:21 )  PTT:25.6 sec      RADIOLOGY & ADDITIONAL STUDIES:    INTERPRETATION OF TELEMETRY (personally reviewed):    ECG: pending    ECHO: office 6/2022 : LVEF40% mod AI mild MR    STRESS TEST: PET 2020 normal perfusion     CARDIAC CATHETERIZATION: 2007 nonobst CAD                         [facility], [facility]                                   [address] [place] [address]                                                      PHONE: [phone]                                                         FAX: [phone]  [email or web address]  ---------------------------------------------------------------------------------------------------------------------------------    Reason for Consult: GIB on xarelto    HPI:  YE LAWSON is an 77y Female with DM NICM with augmented EF on GDMT, most recent EF 40%, s/p BiV ICD, PAD aortoiliac disease, s/p remote  LE thrombectomy on low dose xarelto 2.5 mg BID as directed by vascular surgery who  was sent to ER by Dr Caceres for weakness and black stools giuiac postive in office.  Pt has had prior GIB in past.  Pt states black stools for several weeks which she thought was due to black jelly beans, no assoc chest pain palps or dyspnea.  Main complaint of excessive fatigue. No evidence of PND orthopnea or edema, no ICD discharge.    PAST MEDICAL & SURGICAL HISTORY:  Diabetes  IDDM      Hypothyroid      Hypertension      CAD (coronary artery disease)      AICD (automatic cardioverter/defibrillator) present  rep notified [facility]      PVD (peripheral vascular disease) with claudication      CKD (chronic kidney disease) stage 3, GFR 30-59 ml/min      Anxiety      Peripheral neuropathy      History of anemia due to chronic kidney disease      Congenital heart failure      Psoriasis      Aorto-iliac disease      Carotid stenosis, bilateral      Spinal stenosis, lumbar region, with neurogenic claudication      Lung nodule      HLD (hyperlipidemia)      Pulmonic regurgitation      Acute cataract      History of intravascular stent placement  femoral artery angioplasty and stents, 3/24/16      H/O vascular surgery  Infrarenal aortic endarterectomy with aortic bi iliac bypass, and superficial femoral artery  angioplasty and stenting, 3/24/16.      Branch retinal vein occlusion with neovascularization of right eye  treated with laser      Pessary maintenance      S/P evacuation of hematoma  right groin, then required wound vac          latex (Rash)  No Known Drug Allergies      MEDICATIONS  (STANDING):  pantoprazole  Injectable 80 milliGRAM(s) IV Push Once  pantoprazole Infusion 8 mG/Hr (10 mL/Hr) IV Continuous <Continuous>    MEDICATIONS  (PRN):      Social History:  Cigarettes:             neg       Alchohol:        neg         Illicit Drug Abuse:  neg  neg FH     ROS: Negative other than as mentioned in HPI.    Vital Signs Last 24 Hrs  T(C): 36.7 (17 May 2023 15:52), Max: 36.7 (17 May 2023 13:08)  T(F): 98 (17 May 2023 15:52), Max: 98 (17 May 2023 13:08)  HR: 87 (17 May 2023 15:52) (69 - 87)  BP: 143/65 (17 May 2023 15:52) (126/46 - 143/65)  BP(mean): --  RR: 18 (17 May 2023 15:52) (18 - 20)  SpO2: 100% (17 May 2023 15:52) (98% - 100%)    Parameters below as of 17 May 2023 15:52  Patient On (Oxygen Delivery Method): room air      ICU Vital Signs Last 24 Hrs  YE LAWSON  I&O's Detail    I&O's Summary    Drug Dosing Weight  YE LAWSON      PHYSICAL EXAM:  General: Appears alert and cooperative.  HEENT: Head; normocephalic, atraumatic.  Eyes: Pupils reactive, cornea wnl.  Neck: Supple, no nodes adenopathy, no NVD or carotid bruit or thyromegaly.  CARDIOVASCULAR: Normal S1 and S2, No murmur, rub, gallop or lift.   LUNGS: No rales, rhonchi or wheeze. Normal breath sounds bilaterally.  ABDOMEN: Soft, nontender without mass or organomegaly. bowel sounds normoactive.  EXTREMITIES: No clubbing, cyanosis or edema. Distal pulses wnl.   SKIN: warm and dry with normal turgor.  NEURO: Alert/oriented x 3/normal motor exam. No pathologic reflexes.    PSYCH: normal affect.        LABS:                        6.6    11.61 )-----------( 254      ( 17 May 2023 14:21 )             20.5     05-17    138  |  104  |  63.5<H>  ----------------------------<  316<H>  4.3   |  16.0<L>  |  1.49<H>    Ca    9.0      17 May 2023 14:21    TPro  6.5<L>  /  Alb  3.8  /  TBili  0.4  /  DBili  x   /  AST  22  /  ALT  18  /  AlkPhos  50  05-17    YE LAWSON      PT/INR - ( 17 May 2023 14:21 )   PT: 13.2 sec;   INR: 1.14 ratio         PTT - ( 17 May 2023 14:21 )  PTT:25.6 sec      RADIOLOGY & ADDITIONAL STUDIES:    INTERPRETATION OF TELEMETRY (personally reviewed):    ECG: pending (baseline ECG SR LBBB)    ECHO: office 6/2022 : LVEF40% mod AI mild MR    STRESS TEST: PET 2020 normal perfusion     CARDIAC CATHETERIZATION: 2007 nonobst CAD

## 2023-05-17 NOTE — PLAN
[FreeTextEntry1] : Pt encounter incorporated clinical review of the medical record including consultation from specialists, review of lab and diagnostic testing with interpretation and discussion of results with patient, general pt counseling, and coordination of care, as well as documentation update within the electronic medical record.\par \par Time spent: 60 mins.\par

## 2023-05-17 NOTE — CONSULT NOTE ADULT - PROVIDER SPECIALTY LIST ADULT
Cardiology
Gastroenterology
Same Histology In Subsequent Stages Text: The pattern and morphology of the tumor is as described in the first stage.

## 2023-05-17 NOTE — ED PROVIDER NOTE - OBJECTIVE STATEMENT
Pt is a 78 yo F co anemia and black stools. PMHx significant for hypothyroid, IDDM, htn.  pt states that for the past week she has had significant fatigue.  pt states that she has also noticed that she has been having a few weeks of black stools which she had attributed to eating black jelly beans since easter. Pt states that she had labs last week and followed up with dr. boyle (endocrine) this week and was told to see her pcp as she had a drop in her hgb/hct since february.  pt saw her PCP dr. waldron today who did a guaiac in the office and it was positive and sent her to the office.  no abd pain. no cp. no sob. Pt does take iron supplements but has been on them for years and the black stools only recently started.

## 2023-05-17 NOTE — ED PROVIDER NOTE - CLINICAL SUMMARY MEDICAL DECISION MAKING FREE TEXT BOX
labs reviewed.  Pt with upper gi bleed.  protonix started.  Pt ordered 2 units PRBC. [place] consulted. gi consulted. case dw dr. meyers for admission.

## 2023-05-17 NOTE — ASSESSMENT
[FreeTextEntry1] : 77-year-old female with a past medical history of nonischemic cardiomyopathy, biventricular pacemaker with AICD, hypertension, hyperlipidemia, insulin-dependent diabetes, chronic kidney disease, anemia of chronic disease and peripheral vascular disease status post intervention with peripheral bypass and graft on long-term anticoagulant therapy and aspirin who presents with worsening anemia, black stools, and symptoms of lightheadedness.\par Fecal occult blood testing is positive on exam.  Blood pressure is low normal systolic while off of amlodipine.  Recommend patient go to emergency room now for evaluation and treatment of GI bleeding.  Discussed with patient and family that her condition warrants inpatient management and it would not be safe for her to be managed as an outpatient right now.

## 2023-05-17 NOTE — H&P ADULT - HISTORY OF PRESENT ILLNESS
77 year old female with PMH HTN, T2DM, Hypothyroidism, CKD II, CAD s/p PCI, PAD s/p PTA, Stents on ASA and Xarelto came in with low blood counts. As per patient she had regular blood work on 5/12 and was told by PMD to go to Hospital as low (Hgb 9.5). Prior to then it was 11.4 in Feb.  She does relate feeling fatigued since 1 week and felt dizzy today. Relates black stool x >1 week which she was attributing to black jelly beans which she has been having since Easter. Denies any nausea, vomiting, abdominal pain or bleeding for any other orifice. Hgb 6.6 in ER.  She has been admitted in past whilst she was on triple therapy with DAPT and Xarelto and relates EGD and Colonoscopy yielded diverticulosis only, Her Plavix was then discontinued

## 2023-05-17 NOTE — PHYSICAL EXAM
[No Acute Distress] : no acute distress [PERRL] : pupils equal round and reactive to light [EOMI] : extraocular movements intact [Normal Oropharynx] : the oropharynx was normal [No JVD] : no jugular venous distention [No Lymphadenopathy] : no lymphadenopathy [Normal Rate] : normal [Rhythm Regular] : regular [Normal S1] : normal S1 [Normal S2] : normal S2 [II] : a grade 2 [Rounded] : rounded [Soft, Nontender] : the abdomen was soft and nontender [No HSM] : no hepatosplenomegaly noted [None] : no CVA tenderness [Normal Sphincter Tone] : normal sphincter tone [No Mass] : no mass [Stool Occult Blood] : stool positive for occult blood [No Focal Deficits] : no focal deficits [Normal Mood] : the mood was normal [Normal] : affect was normal and insight and judgment were intact [de-identified] : Pallor [de-identified] : Conjunctival pallor [de-identified] : Trace edema LEs. B/L carotid bruits.

## 2023-05-17 NOTE — H&P ADULT - TIME BILLING
review of current data, review HIE, patient interview and examination, medication review and reconciliation, discussion od assessment and plan with patient and family, advance directives, order placement  and documentation of H&P in EMR

## 2023-05-17 NOTE — HISTORY OF PRESENT ILLNESS
[Spouse] : spouse [Family Member] : family member [FreeTextEntry8] : Patient c/o worsening anemia.\par Pt noted to have Hgb 9.5 on routine bw done 5 days ago and sent by endocrine for evaluation.\par Pt also feeling lightheaded x 2-3 days. She did not take her bp meds today and bp at home 120/39. \par Pt notes passing black stool for about 3 weeks now, but attributed it to eating black jelly beans.\par She denies any abdominal pain or diarrhea. Pt also has CKD and creatinine mildly increased at 1.66 (baseline 1.38).\par Last colonoscopy 3/2021 in hospital due to blood loss anemia, found diverticulosis. Had upper endoscopy, found H. pylori and treated. CT enterography at that hospitalization was negative. Outpt video capsule endoscopy 3/2021 neg.  Prior colonoscopy 10/2017, diverticulosis and polyp x 2. She is on Xarelto and ASA 81 mg/d for aorto-iliac occlusive disease.

## 2023-05-17 NOTE — H&P ADULT - NSICDXPASTMEDICALHX_GEN_ALL_CORE_FT
PAST MEDICAL HISTORY:  AICD (automatic cardioverter/defibrillator) present rep notified [facility]    Anxiety     Aorto-iliac disease     CAD (coronary artery disease)     Carotid stenosis, bilateral     CKD (chronic kidney disease) stage 3, GFR 30-59 ml/min     Diabetes IDDM    History of anemia due to chronic kidney disease     HLD (hyperlipidemia)     Hypertension     Hypothyroid     Lung nodule     NICM (nonischemic cardiomyopathy)     Peripheral neuropathy     Psoriasis     Pulmonic regurgitation     PVD (peripheral vascular disease) with claudication     Spinal stenosis, lumbar region, with neurogenic claudication

## 2023-05-17 NOTE — H&P ADULT - ASSESSMENT
77 year old female with PMH HTN, T2DM, Hypothyroidism, CKD II, CAD s/p PCI, PAD s/p PTA, Stents on ASA and Xarelto came in with low blood counts. As per patient she had regular blood work on 5/12 and was told by PMD to go to Hospital as low (Hgb 9.5). Prior to then it was 11.4 in Feb.  She does relate feeling fatigued since 1 week and felt dizzy today. Relates black stool x >1 week which she was attributing to black jelly beans which she has been having since Easter. Denies any nausea, vomiting, abdominal pain or bleeding for any other orifice. Hgb 6.6 in ER      Anemia, Acute GI blood Loss  Melena, Prior Diverticular  - Admit to telemetry  - Transfuse PRBC x 2, maintain Hgb above 8 given CAD  - Repeat Hgb   - Hold Rivaroxaban, continue ASA for now given multiple vascular stents  - Pantoprazole infusion started in ER  - GI Consult for EGD/Colonoscopy    T2DM with hyperglycemia  Acidosis ?CKD vs DKA  - IVF  - Glargine 20U x 1 now  - BGM with SSI  - Blood Gas  - Gabapentin for neuropathy    YUDI on CKD II vs CKD II  - Avoid Nephrotoxins  - monitor renal function    NICM  Currently doesn't appear to be volume overloaded  - Daily weight, monitor fluid balance  - Currently getting IVF for possibly early CKD; Furosemide on hold    CAD. PAD  - Continue ASA, Statin  - lower dose Carvedilol    Hypothyroidism  - levothyroxine    HTN  - Low dose Carvedilol  - Candesartan and Amlodipine on hold    VTE Prophylaxis - VCD  Ambulate with assistance  Clear liquid diet for now in anticipation of endoscopy    Guarded Prognosis  Full Code - wouldn't want resuscitation or intubation if not expected to improved or benefit

## 2023-05-17 NOTE — ED ADULT TRIAGE NOTE - CHIEF COMPLAINT QUOTE
Pt was sent from Dr. Caceres for low hemoglobin and positive  occult test today . Pt on Xarelto . C/o generalized weakness

## 2023-05-17 NOTE — CONSULT NOTE ADULT - ASSESSMENT
Assessment  UGIB - likely slow bleed over several weeks on low dose xarelto for PAD  NICM mod LV dysfunction, well compensated w/o evidence of volume overload or decompensation.  BiV ICD in place  Nonobst CAD on remote cath, asymptomatic at present  HTN  DM  CRI  Hypothyroidism      Rec  Admit tele  DC xarelto  hold asa for now  Continue coreg/atacand/statin/jardiance  obtain ECG  transfuse Hgb > 8  repeat echo to reassess EF - consider change ARB to entresto  will follow

## 2023-05-17 NOTE — CHART NOTE - NSCHARTNOTEFT_GEN_A_CORE
Called by RN to obtain consent for 1 unit PRBC blood transfusion. Pt is alert and oriented x3, lying in bed comfortably. Discussed the reasoning and the risks, benefits and alternatives of having a blood transfusion. Patient has previously received blood transfusion without any adverse reaction. Pt states understanding of the risks of acute reactions including but not limited to fever, shortness of breath, dyspnea, tachycardia and death. Explained that Pt will be continuously monitored before, during and after transfusion and to ask for assistance if onset of any new symptoms. Questions answered, concerns addressed. Emotional support given. Consent signed, date and timed. RN signed as witness. Consent placed in Pt's chart. Type and screen complete. Pt is hemodynamically stable. No active or acute signs or symptoms of hemorrhage or bleed. RN to continue to monitor, call Provider for worsening condition.

## 2023-05-18 ENCOUNTER — RESULT REVIEW (OUTPATIENT)
Age: 78
End: 2023-05-18

## 2023-05-18 LAB
A1C WITH ESTIMATED AVERAGE GLUCOSE RESULT: 7.2 % — HIGH (ref 4–5.6)
ALBUMIN SERPL ELPH-MCNC: 3.6 G/DL — SIGNIFICANT CHANGE UP (ref 3.3–5.2)
ALP SERPL-CCNC: 48 U/L — SIGNIFICANT CHANGE UP (ref 40–120)
ALT FLD-CCNC: 15 U/L — SIGNIFICANT CHANGE UP
ANION GAP SERPL CALC-SCNC: 14 MMOL/L — SIGNIFICANT CHANGE UP (ref 5–17)
AST SERPL-CCNC: 15 U/L — SIGNIFICANT CHANGE UP
BILIRUB SERPL-MCNC: 0.6 MG/DL — SIGNIFICANT CHANGE UP (ref 0.4–2)
BUN SERPL-MCNC: 44.5 MG/DL — HIGH (ref 8–20)
CALCIUM SERPL-MCNC: 8.6 MG/DL — SIGNIFICANT CHANGE UP (ref 8.4–10.5)
CHLORIDE SERPL-SCNC: 110 MMOL/L — HIGH (ref 96–108)
CO2 SERPL-SCNC: 18 MMOL/L — LOW (ref 22–29)
CREAT SERPL-MCNC: 1.34 MG/DL — HIGH (ref 0.5–1.3)
EGFR: 41 ML/MIN/1.73M2 — LOW
ESTIMATED AVERAGE GLUCOSE: 160 MG/DL — HIGH (ref 68–114)
GLUCOSE BLDC GLUCOMTR-MCNC: 139 MG/DL — HIGH (ref 70–99)
GLUCOSE BLDC GLUCOMTR-MCNC: 154 MG/DL — HIGH (ref 70–99)
GLUCOSE BLDC GLUCOMTR-MCNC: 169 MG/DL — HIGH (ref 70–99)
GLUCOSE BLDC GLUCOMTR-MCNC: 175 MG/DL — HIGH (ref 70–99)
GLUCOSE BLDC GLUCOMTR-MCNC: 185 MG/DL — HIGH (ref 70–99)
GLUCOSE BLDC GLUCOMTR-MCNC: 242 MG/DL — HIGH (ref 70–99)
GLUCOSE SERPL-MCNC: 209 MG/DL — HIGH (ref 70–99)
HCT VFR BLD CALC: 27.2 % — LOW (ref 34.5–45)
HGB BLD-MCNC: 9 G/DL — LOW (ref 11.5–15.5)
MAGNESIUM SERPL-MCNC: 2.2 MG/DL — SIGNIFICANT CHANGE UP (ref 1.6–2.6)
MCHC RBC-ENTMCNC: 31.4 PG — SIGNIFICANT CHANGE UP (ref 27–34)
MCHC RBC-ENTMCNC: 33.1 GM/DL — SIGNIFICANT CHANGE UP (ref 32–36)
MCV RBC AUTO: 94.8 FL — SIGNIFICANT CHANGE UP (ref 80–100)
PHOSPHATE SERPL-MCNC: 3.3 MG/DL — SIGNIFICANT CHANGE UP (ref 2.4–4.7)
PLATELET # BLD AUTO: 229 K/UL — SIGNIFICANT CHANGE UP (ref 150–400)
POTASSIUM SERPL-MCNC: 3.6 MMOL/L — SIGNIFICANT CHANGE UP (ref 3.5–5.3)
POTASSIUM SERPL-SCNC: 3.6 MMOL/L — SIGNIFICANT CHANGE UP (ref 3.5–5.3)
PROT SERPL-MCNC: 6.3 G/DL — LOW (ref 6.6–8.7)
RBC # BLD: 2.87 M/UL — LOW (ref 3.8–5.2)
RBC # FLD: 18.9 % — HIGH (ref 10.3–14.5)
SODIUM SERPL-SCNC: 142 MMOL/L — SIGNIFICANT CHANGE UP (ref 135–145)
WBC # BLD: 9.15 K/UL — SIGNIFICANT CHANGE UP (ref 3.8–10.5)
WBC # FLD AUTO: 9.15 K/UL — SIGNIFICANT CHANGE UP (ref 3.8–10.5)

## 2023-05-18 PROCEDURE — 93010 ELECTROCARDIOGRAM REPORT: CPT

## 2023-05-18 PROCEDURE — 88305 TISSUE EXAM BY PATHOLOGIST: CPT | Mod: 26

## 2023-05-18 PROCEDURE — 93306 TTE W/DOPPLER COMPLETE: CPT | Mod: 26

## 2023-05-18 PROCEDURE — 99233 SBSQ HOSP IP/OBS HIGH 50: CPT

## 2023-05-18 PROCEDURE — 88342 IMHCHEM/IMCYTCHM 1ST ANTB: CPT | Mod: 26

## 2023-05-18 PROCEDURE — 43239 EGD BIOPSY SINGLE/MULTIPLE: CPT

## 2023-05-18 RX ORDER — SOD SULF/SODIUM/NAHCO3/KCL/PEG
4000 SOLUTION, RECONSTITUTED, ORAL ORAL ONCE
Refills: 0 | Status: COMPLETED | OUTPATIENT
Start: 2023-05-18 | End: 2023-05-18

## 2023-05-18 RX ADMIN — Medication 1 TABLET(S): at 11:36

## 2023-05-18 RX ADMIN — CARVEDILOL PHOSPHATE 12.5 MILLIGRAM(S): 80 CAPSULE, EXTENDED RELEASE ORAL at 17:37

## 2023-05-18 RX ADMIN — GABAPENTIN 100 MILLIGRAM(S): 400 CAPSULE ORAL at 05:40

## 2023-05-18 RX ADMIN — Medication 2: at 12:57

## 2023-05-18 RX ADMIN — PANTOPRAZOLE SODIUM 10 MG/HR: 20 TABLET, DELAYED RELEASE ORAL at 20:10

## 2023-05-18 RX ADMIN — Medication 4000 MILLILITER(S): at 17:46

## 2023-05-18 RX ADMIN — PANTOPRAZOLE SODIUM 10 MG/HR: 20 TABLET, DELAYED RELEASE ORAL at 02:29

## 2023-05-18 RX ADMIN — PREGABALIN 1000 MICROGRAM(S): 225 CAPSULE ORAL at 11:35

## 2023-05-18 RX ADMIN — Medication 112 MICROGRAM(S): at 05:40

## 2023-05-18 RX ADMIN — GABAPENTIN 100 MILLIGRAM(S): 400 CAPSULE ORAL at 17:38

## 2023-05-18 RX ADMIN — Medication 4: at 09:32

## 2023-05-18 RX ADMIN — INSULIN GLARGINE 20 UNIT(S): 100 INJECTION, SOLUTION SUBCUTANEOUS at 11:40

## 2023-05-18 RX ADMIN — CARVEDILOL PHOSPHATE 12.5 MILLIGRAM(S): 80 CAPSULE, EXTENDED RELEASE ORAL at 05:40

## 2023-05-18 NOTE — PATIENT PROFILE ADULT - FALL HARM RISK - HARM RISK INTERVENTIONS

## 2023-05-18 NOTE — CHART NOTE - NSCHARTNOTEFT_GEN_A_CORE
PREOPERATIVE DIAGNOSIS:  Left parapneumonic pleural effusion.      POSTOPERATIVE DIAGNOSIS:  Left parapneumonic pleural effusion.      PROCEDURES:     1.  Left video-assisted thoracoscopy.   2.  Removal of intrapleural fibrin debris.   3.  Drainage of pleural effusion.      SURGEON:  Du Ramesh MD       FIRST ASSISTANT:  Tonya Becker PA-C.  Her role was essential in light of the complexity of the procedure, and she provided assistance in handling the scope, exposure and retraction.      SECOND ASSISTANT:  Mary Gilbert PA-C      ANESTHESIA:  General with double-lumen endotracheal tube.      ESTIMATED BLOOD LOSS:  100 cc.      INDICATIONS:  The patient Carlie Myers is a 59-year-old woman with pneumonia.  She has developed a multiloculated pleural effusion on the left side.  Thoracocentesis revealed only a small amount of fluid.  The fluid was an exudate, and to date the cultures have been negative.  The CT scan done after the thoracocentesis shows multiple loculations of fluid suspicious for parapneumonic effusion and possible empyema.  Based on the findings a video-assisted thoracoscopy is indicated for treatment.      DESCRIPTION OF PROCEDURE:  The patient was brought to the operating room on 08/14/2017 and was placed in the supine position on the operating room table.  Under general anesthesia with a double-lumen endotracheal tube in place the patient was placed in the right lateral decubitus position.  The left chest was prepared and draped in the usual sterile fashion using ChloraPrep.  Ventilation of the left lung was discontinued.        A thoracoscopic incision was made at the level of the seventh intercostal space.  The pleural space was entered.  There were multiple loculations and debris in the pleural space.  Most of the loculations were broken down.  Two additional thoracoscopic incisions were made in the usual fashion.        All the fluid was drained.  The lung was freed up down to  RN called patient had trigeminy x 30 seconds. Pt asymptomatic, VSS. EKG and labs ordered. Will continue to monitor labs, VS and sx. the hilum, and the fissure was entered.  The surface of the diaphragm was cleaned up.  All the fibrin debris contained in the pleural cavity was removed.  The entire surface of the lung was cleaned up.        At the completion of the procedure there was excellent reexpansion of both lobes, and the pleural space was completely drained.  All the intrapleural fibrin debris had been removed.  The pleural cavity was irrigated with normal saline which was aspirated.  Through a separate stab wound two 28 straight chest tubes were placed anteriorly and posteriorly, and a 28 right-angle chest tube was placed over the surface of the diaphragm.  Then 30 cc Marcaine 0.5% without epinephrine was injected as an intercostal block.  The 3 thoracoscopic incisions were closed in the usual fashion.  Needle and sponge counts correct.         STEVE BARTLETT MD             D: 08/15/2017 07:14   T: 08/15/2017 11:07   MT: [id number]      Name:     MARTIN BONDS   MRN:      [id number]        Account:        [id number]   :      1957           Procedure Date: 2017      Document: [id number]       cc: Tej Loyola MD

## 2023-05-19 ENCOUNTER — TRANSCRIPTION ENCOUNTER (OUTPATIENT)
Age: 78
End: 2023-05-19

## 2023-05-19 VITALS
TEMPERATURE: 98 F | RESPIRATION RATE: 18 BRPM | DIASTOLIC BLOOD PRESSURE: 54 MMHG | HEART RATE: 76 BPM | SYSTOLIC BLOOD PRESSURE: 142 MMHG | OXYGEN SATURATION: 98 %

## 2023-05-19 LAB
ANION GAP SERPL CALC-SCNC: 12 MMOL/L — SIGNIFICANT CHANGE UP (ref 5–17)
BASOPHILS # BLD AUTO: 0.05 K/UL — SIGNIFICANT CHANGE UP (ref 0–0.2)
BASOPHILS NFR BLD AUTO: 0.5 % — SIGNIFICANT CHANGE UP (ref 0–2)
BUN SERPL-MCNC: 24.7 MG/DL — HIGH (ref 8–20)
CALCIUM SERPL-MCNC: 8.8 MG/DL — SIGNIFICANT CHANGE UP (ref 8.4–10.5)
CHLORIDE SERPL-SCNC: 110 MMOL/L — HIGH (ref 96–108)
CO2 SERPL-SCNC: 22 MMOL/L — SIGNIFICANT CHANGE UP (ref 22–29)
CREAT SERPL-MCNC: 1.15 MG/DL — SIGNIFICANT CHANGE UP (ref 0.5–1.3)
EGFR: 49 ML/MIN/1.73M2 — LOW
EOSINOPHIL # BLD AUTO: 0.25 K/UL — SIGNIFICANT CHANGE UP (ref 0–0.5)
EOSINOPHIL NFR BLD AUTO: 2.5 % — SIGNIFICANT CHANGE UP (ref 0–6)
GLUCOSE BLDC GLUCOMTR-MCNC: 141 MG/DL — HIGH (ref 70–99)
GLUCOSE BLDC GLUCOMTR-MCNC: 159 MG/DL — HIGH (ref 70–99)
GLUCOSE BLDC GLUCOMTR-MCNC: 186 MG/DL — HIGH (ref 70–99)
GLUCOSE SERPL-MCNC: 132 MG/DL — HIGH (ref 70–99)
HCT VFR BLD CALC: 28 % — LOW (ref 34.5–45)
HGB BLD-MCNC: 9 G/DL — LOW (ref 11.5–15.5)
IMM GRANULOCYTES NFR BLD AUTO: 0.5 % — SIGNIFICANT CHANGE UP (ref 0–0.9)
INR BLD: 1.14 RATIO — SIGNIFICANT CHANGE UP (ref 0.88–1.16)
LYMPHOCYTES # BLD AUTO: 1.39 K/UL — SIGNIFICANT CHANGE UP (ref 1–3.3)
LYMPHOCYTES # BLD AUTO: 14.1 % — SIGNIFICANT CHANGE UP (ref 13–44)
MCHC RBC-ENTMCNC: 30.9 PG — SIGNIFICANT CHANGE UP (ref 27–34)
MCHC RBC-ENTMCNC: 32.1 GM/DL — SIGNIFICANT CHANGE UP (ref 32–36)
MCV RBC AUTO: 96.2 FL — SIGNIFICANT CHANGE UP (ref 80–100)
MONOCYTES # BLD AUTO: 0.8 K/UL — SIGNIFICANT CHANGE UP (ref 0–0.9)
MONOCYTES NFR BLD AUTO: 8.1 % — SIGNIFICANT CHANGE UP (ref 2–14)
NEUTROPHILS # BLD AUTO: 7.34 K/UL — SIGNIFICANT CHANGE UP (ref 1.8–7.4)
NEUTROPHILS NFR BLD AUTO: 74.3 % — SIGNIFICANT CHANGE UP (ref 43–77)
PLATELET # BLD AUTO: 229 K/UL — SIGNIFICANT CHANGE UP (ref 150–400)
POTASSIUM SERPL-MCNC: 3.4 MMOL/L — LOW (ref 3.5–5.3)
POTASSIUM SERPL-SCNC: 3.4 MMOL/L — LOW (ref 3.5–5.3)
PROTHROM AB SERPL-ACNC: 13.3 SEC — SIGNIFICANT CHANGE UP (ref 10.5–13.4)
RBC # BLD: 2.91 M/UL — LOW (ref 3.8–5.2)
RBC # FLD: 19.6 % — HIGH (ref 10.3–14.5)
SODIUM SERPL-SCNC: 144 MMOL/L — SIGNIFICANT CHANGE UP (ref 135–145)
WBC # BLD: 9.88 K/UL — SIGNIFICANT CHANGE UP (ref 3.8–10.5)
WBC # FLD AUTO: 9.88 K/UL — SIGNIFICANT CHANGE UP (ref 3.8–10.5)

## 2023-05-19 PROCEDURE — 82803 BLOOD GASES ANY COMBINATION: CPT

## 2023-05-19 PROCEDURE — 99285 EMERGENCY DEPT VISIT HI MDM: CPT | Mod: 25

## 2023-05-19 PROCEDURE — 36430 TRANSFUSION BLD/BLD COMPNT: CPT

## 2023-05-19 PROCEDURE — 88305 TISSUE EXAM BY PATHOLOGIST: CPT

## 2023-05-19 PROCEDURE — 83735 ASSAY OF MAGNESIUM: CPT

## 2023-05-19 PROCEDURE — 80053 COMPREHEN METABOLIC PANEL: CPT

## 2023-05-19 PROCEDURE — C8929: CPT

## 2023-05-19 PROCEDURE — 93005 ELECTROCARDIOGRAM TRACING: CPT

## 2023-05-19 PROCEDURE — 85027 COMPLETE CBC AUTOMATED: CPT

## 2023-05-19 PROCEDURE — 86850 RBC ANTIBODY SCREEN: CPT

## 2023-05-19 PROCEDURE — 84100 ASSAY OF PHOSPHORUS: CPT

## 2023-05-19 PROCEDURE — 88342 IMHCHEM/IMCYTCHM 1ST ANTB: CPT

## 2023-05-19 PROCEDURE — 83036 HEMOGLOBIN GLYCOSYLATED A1C: CPT

## 2023-05-19 PROCEDURE — 45378 DIAGNOSTIC COLONOSCOPY: CPT

## 2023-05-19 PROCEDURE — 86900 BLOOD TYPING SEROLOGIC ABO: CPT

## 2023-05-19 PROCEDURE — 86901 BLOOD TYPING SEROLOGIC RH(D): CPT

## 2023-05-19 PROCEDURE — 85730 THROMBOPLASTIN TIME PARTIAL: CPT

## 2023-05-19 PROCEDURE — 82962 GLUCOSE BLOOD TEST: CPT

## 2023-05-19 PROCEDURE — 99239 HOSP IP/OBS DSCHRG MGMT >30: CPT

## 2023-05-19 PROCEDURE — P9016: CPT

## 2023-05-19 PROCEDURE — 85025 COMPLETE CBC W/AUTO DIFF WBC: CPT

## 2023-05-19 PROCEDURE — 80048 BASIC METABOLIC PNL TOTAL CA: CPT

## 2023-05-19 PROCEDURE — 85610 PROTHROMBIN TIME: CPT

## 2023-05-19 PROCEDURE — 36415 COLL VENOUS BLD VENIPUNCTURE: CPT

## 2023-05-19 PROCEDURE — 86923 COMPATIBILITY TEST ELECTRIC: CPT

## 2023-05-19 DEVICE — NAIL OSTEO 1.5X16MM STRL: Type: IMPLANTABLE DEVICE | Status: FUNCTIONAL

## 2023-05-19 RX ORDER — PANTOPRAZOLE SODIUM 20 MG/1
40 TABLET, DELAYED RELEASE ORAL
Refills: 0 | Status: DISCONTINUED | OUTPATIENT
Start: 2023-05-19 | End: 2023-05-19

## 2023-05-19 RX ORDER — PANTOPRAZOLE SODIUM 20 MG/1
1 TABLET, DELAYED RELEASE ORAL
Qty: 0 | Refills: 0 | DISCHARGE
Start: 2023-05-19

## 2023-05-19 RX ORDER — PANTOPRAZOLE SODIUM 20 MG/1
1 TABLET, DELAYED RELEASE ORAL
Qty: 30 | Refills: 0
Start: 2023-05-19 | End: 2023-06-17

## 2023-05-19 RX ADMIN — PREGABALIN 1000 MICROGRAM(S): 225 CAPSULE ORAL at 11:47

## 2023-05-19 RX ADMIN — GABAPENTIN 100 MILLIGRAM(S): 400 CAPSULE ORAL at 17:09

## 2023-05-19 RX ADMIN — Medication 112 MICROGRAM(S): at 05:40

## 2023-05-19 RX ADMIN — CARVEDILOL PHOSPHATE 12.5 MILLIGRAM(S): 80 CAPSULE, EXTENDED RELEASE ORAL at 05:40

## 2023-05-19 RX ADMIN — CARVEDILOL PHOSPHATE 12.5 MILLIGRAM(S): 80 CAPSULE, EXTENDED RELEASE ORAL at 17:09

## 2023-05-19 RX ADMIN — Medication 2: at 11:45

## 2023-05-19 RX ADMIN — PANTOPRAZOLE SODIUM 10 MG/HR: 20 TABLET, DELAYED RELEASE ORAL at 07:49

## 2023-05-19 RX ADMIN — Medication 1 TABLET(S): at 11:47

## 2023-05-19 RX ADMIN — Medication 2: at 16:19

## 2023-05-19 RX ADMIN — GABAPENTIN 100 MILLIGRAM(S): 400 CAPSULE ORAL at 05:40

## 2023-05-19 NOTE — DISCHARGE NOTE NURSING/CASE MANAGEMENT/SOCIAL WORK - PATIENT PORTAL LINK FT
You can access the FollowMyHealth Patient Portal offered by [facility] by registering at the following website: [email or web address]. By joining [facility]’s FollowMyHealth portal, you will also be able to view your health information using other applications (apps) compatible with our system.

## 2023-05-19 NOTE — DISCHARGE NOTE NURSING/CASE MANAGEMENT/SOCIAL WORK - NSDCPEFALRISK_GEN_ALL_CORE
For information on Fall & Injury Prevention, visit: [email or web address] OR  [email or web address] OR  https://www.cdc.gov/steadi/patient.html

## 2023-05-19 NOTE — DISCHARGE NOTE NURSING/CASE MANAGEMENT/SOCIAL WORK - NSDCVIVACCINE_GEN_ALL_CORE_FT
COVID-19 vaccine, vector-nr, rS-Ad26, PF, 0.5 mL (Rodriguez); 03-Mar-2021 14:16; Jessica Shelton (TREVOR); Rodriguez; [id number] (Exp. Date: 03-Mar-2021); IntraMuscular; Deltoid Left.; 0.5 milliLiter(s);

## 2023-05-19 NOTE — DISCHARGE NOTE PROVIDER - CARE PROVIDER_API CALL
Juan Ramon Bean)  Surgery  [address] [place] [address]  Phone: [phone]  Fax: [phone]  Follow Up Time:     Ham Prakash)  Gastroenterology; Internal Medicine  [address], [address]  [place] [address]  Phone: [phone]  Fax: [phone]  Follow Up Time:

## 2023-05-19 NOTE — DISCHARGE NOTE PROVIDER - NSDCFUSCHEDAPPT_GEN_ALL_CORE_FT
Joanne Salas  [facility]  FAMILYMED [address]  Scheduled Appointment: 06/05/2023    Juanita Sun  [facility]  NEPHRO [address]  Scheduled Appointment: 06/09/2023

## 2023-05-19 NOTE — DISCHARGE NOTE PROVIDER - HOSPITAL COURSE
77 year old female with PMH HTN, T2DM, Hypothyroidism, CKD II, CAD s/p PCI, PAD s/p PTA, Stents on ASA and Xarelto came in with low blood counts. As per patient she had regular blood work on 5/12 and was told by PMD to go to Hospital as low (Hgb 9.5). Prior to then it was 11.4 in Feb.    She does relate feeling fatigued since 1 week and felt dizzy today. Relates black stool x >1 week which she was attributing to black jelly beans which she has been having since Easter. Denies any nausea, vomiting, abdominal pain or bleeding for any other orifice. Hgb 6.6 in ER.    She has been admitted in past whilst she was on triple therapy with DAPT and Xarelto and relates EGD and Colonoscopy yielded diverticulosis only, Her Plavix was then discontinued and was kept on aspirin alone.    Recently she was started back on very low dose xarelto via her vascular surgeon and anemia started again. Patient s/p endoscopy/colonoscopy here without a clear cause of anemia, she did have non-bleeding hemorrhoids . At this time will restart aspirin but hold AC. will have her follow up with her vascular surgeon and GI on discharge.

## 2023-05-19 NOTE — PROGRESS NOTE ADULT - ASSESSMENT
77 year old female with PMH HTN, T2DM, Hypothyroidism, CKD II, CAD s/p PCI, PAD s/p PTA, Stents on ASA and Xarelto came in with low blood counts. As per patient she had regular blood work on 5/12 and was told by PMD to go to Hospital as low (Hgb 9.5). Prior to then it was 11.4 in Feb.  She does relate feeling fatigued since 1 week and felt dizzy today. Relates black stool x >1 week which she was attributing to black jelly beans which she has been having since Easter. Denies any nausea, vomiting, abdominal pain or bleeding for any other orifice. Hgb 6.6 in ER    Anemia, Acute GI blood Loss  Melena, Prior Diverticular  - Telemetry monitoring  - s/p 2 units PRBC  - Hold Rivaroxaban  - Aspirin held  - Continue Protonix drip  - Pantoprazole infusion started in ER  - GI recs appreciated  - Cardiology recs appreciated    T2DM with hyperglycemia  Acidosis ?CKD vs DKA  - FS with ISS  - Lantus 20 units nightly    YUDI on CKD II vs CKD II  - Avoid Nephrotoxins  - monitor renal function    NICM  Currently doesn't appear to be volume overloaded  - Daily weight, monitor fluid balance  - Currently getting IVF for possibly early CKD; Furosemide on hold    CAD. PAD  - Aspirin 81mg for GI bleed  - Continue Statin  - lower dose Carvedilol    Hypothyroidism  - Synthroid 112mcg daily    HTN  - Coreg 12.5mg q12h  - Candesartan and Amlodipine on hold    DVT ppx  - SCD  
77 year old female with PMH HTN, T2DM, Hypothyroidism, CKD II, CAD s/p PCI, PAD s/p PTA, Stents on ASA and Xarelto came in with low blood counts. As per patient she had regular blood work on 5/12 and was told by PMD to go to Hospital as low (Hgb 9.5). Prior to then it was 11.4 in Feb.  She does relate feeling fatigued since 1 week and felt dizzy today. Relates black stool x >1 week which she was attributing to black jelly beans which she has been having since Easter. Denies any nausea, vomiting, abdominal pain or bleeding for any other orifice. Hgb 6.6 in ER    Anemia, Acute GI blood Loss  Melena, Prior Diverticular  - Telemetry monitoring  - s/p 2 units PRBC - hgb stable  - Hold Rivaroxaban  - Aspirin held  - protonix drip  - GI recs appreciated  - Cardiology recs appreciated  - s/p EGD and colonoscopy  - advance diet  - likely discharge later    T2DM with hyperglycemia  Acidosis ?CKD vs DKA  - FS with ISS  - Lantus 20 units nightly    YUDI on CKD II vs CKD II  - Avoid Nephrotoxins  - monitor renal function    NICM  Currently doesn't appear to be volume overloaded  - Daily weight, monitor fluid balance  - Currently getting IVF for possibly early CKD; Furosemide on hold    CAD. PAD  - Aspirin 81mg for GI bleed  - Continue Statin  - lower dose Carvedilol    Hypothyroidism  - Synthroid 112mcg daily    HTN  - Coreg 12.5mg q12h  - Candesartan and Amlodipine on hold    DVT ppx  - SCD

## 2023-05-19 NOTE — DISCHARGE NOTE PROVIDER - ATTENDING DISCHARGE PHYSICAL EXAMINATION:
Vital Signs Last 24 Hrs  T(C): 36.5 (19 May 2023 11:45), Max: 36.8 (18 May 2023 20:00)  T(F): 97.7 (19 May 2023 11:45), Max: 98.2 (18 May 2023 20:00)  HR: 70 (19 May 2023 11:45) (69 - 76)  BP: 129/51 (19 May 2023 11:45) (119/53 - 156/53)  BP(mean): --  RR: 18 (19 May 2023 11:45) (18 - 18)  SpO2: 99% (19 May 2023 11:45) (97% - 100%)    Parameters below as of 19 May 2023 11:45  Patient On (Oxygen Delivery Method): room air

## 2023-05-19 NOTE — CHART NOTE - NSCHARTNOTEFT_GEN_A_CORE
Colonoscopy: Internal hemorrhoids o/w normal colonoscopy to the cecum. BUN and creatinine too high for CT Enterography. OK to restart Xarelto and refeed pt. with OPT Video Capsule Endoscopy with me in 2 to 3 weeks. Diet advanced to DASH / TLC. No plans or need for continued inpatient GI f/u. Signing off. Reconsult in house as needed. Thank you.

## 2023-05-19 NOTE — DISCHARGE NOTE PROVIDER - NSDCCPCAREPLAN_GEN_ALL_CORE_FT
PRINCIPAL DISCHARGE DIAGNOSIS  Diagnosis: Melena  Assessment and Plan of Treatment: without a clear cause at this time  follow up with GI on discharge  can continue aspirin  hold xarelto  return if worsening symptoms      SECONDARY DISCHARGE DIAGNOSES  Diagnosis: PAD (peripheral artery disease)  Assessment and Plan of Treatment: follow up with vascular surgery  hold xarelto  continue aspirin

## 2023-05-19 NOTE — PROGRESS NOTE ADULT - SUBJECTIVE AND OBJECTIVE BOX
[facility] - [facility], [facility]                                   [address] [place] [address]                                                      PHONE: [phone]                                                         FAX: [phone]  [email or web address]  ---------------------------------------------------------------------------------------------------------------------------------    Overnight events/patient complaints:      NAD feeling better today after two units PRBC     latex (Rash)  No Known Drug Allergies    MEDICATIONS  (STANDING):  carvedilol 12.5 milliGRAM(s) Oral every 12 hours  cyanocobalamin 1000 MICROGram(s) Oral daily  dextrose 5%. 1000 milliLiter(s) (100 mL/Hr) IV Continuous <Continuous>  dextrose 5%. 1000 milliLiter(s) (50 mL/Hr) IV Continuous <Continuous>  dextrose 50% Injectable 12.5 Gram(s) IV Push once  dextrose 50% Injectable 25 Gram(s) IV Push once  dextrose 50% Injectable 25 Gram(s) IV Push once  gabapentin 100 milliGRAM(s) Oral two times a day  glucagon  Injectable 1 milliGRAM(s) IntraMuscular once  insulin glargine Injectable (LANTUS) 20 Unit(s) SubCutaneous every morning  insulin lispro (ADMELOG) corrective regimen sliding scale   SubCutaneous three times a day before meals  insulin lispro (ADMELOG) corrective regimen sliding scale   SubCutaneous at bedtime  levothyroxine 112 MICROGram(s) Oral daily  multivitamin 1 Tablet(s) Oral daily  pantoprazole Infusion 8 mG/Hr (10 mL/Hr) IV Continuous <Continuous>    MEDICATIONS  (PRN):  dextrose Oral Gel 15 Gram(s) Oral once PRN Blood Glucose LESS THAN 70 milliGRAM(s)/deciliter  LORazepam     Tablet 0.5 milliGRAM(s) Oral three times a day PRN Anxiety      Vital Signs Last 24 Hrs  T(C): 36.8 (18 May 2023 07:49), Max: 37.1 (18 May 2023 04:53)  T(F): 98.3 (18 May 2023 07:49), Max: 98.7 (18 May 2023 04:53)  HR: 78 (18 May 2023 07:49) (69 - 87)  BP: 133/56 (18 May 2023 09:00) (115/53 - 143/65)  BP(mean): 81 (17 May 2023 23:02) (81 - 87)  RR: 18 (18 May 2023 07:49) (16 - 20)  SpO2: 97% (18 May 2023 07:49) (96% - 100%)    Parameters below as of 18 May 2023 07:49  Patient On (Oxygen Delivery Method): room air      ICU Vital Signs Last 24 Hrs  YE RENNY  I&O's Detail    I&O's Summary    Drug Dosing Weight  YE LAWSON      PHYSICAL EXAM:  General: Appears well developed, alert and cooperative.  HEENT: Head; normocephalic, atraumatic.  Eyes: Pupils reactive, cornea wnl.  Neck: Supple, no nodes adenopathy, no NVD or carotid bruit or thyromegaly.  CARDIOVASCULAR: Normal S1 and S2, No murmur, rub, gallop or lift.   LUNGS: No rales, rhonchi or wheeze. Normal breath sounds bilaterally.  ABDOMEN: Soft, nontender without mass or organomegaly. bowel sounds normoactive.  EXTREMITIES: No clubbing, cyanosis or edema. Distal pulses wnl.   SKIN: warm and dry with normal turgor.  NEURO: Alert/oriented x 3/normal motor exam. No pathologic reflexes.    PSYCH: normal affect.        LABS:                        9.0    9.15  )-----------( 229      ( 18 May 2023 06:49 )             27.2     05-18    142  |  110<H>  |  44.5<H>  ----------------------------<  209<H>  3.6   |  18.0<L>  |  1.34<H>    Ca    8.6      18 May 2023 06:49  Phos  3.3     05-18  Mg     2.2     05-18    TPro  6.3<L>  /  Alb  3.6  /  TBili  0.6  /  DBili  x   /  AST  15  /  ALT  15  /  AlkPhos  48  05-18    YE LAWSON      PT/INR - ( 17 May 2023 14:21 )   PT: 13.2 sec;   INR: 1.14 ratio         PTT - ( 17 May 2023 14:21 )  PTT:25.6 sec      RADIOLOGY & ADDITIONAL STUDIES:    INTERPRETATION OF TELEMETRY (personally reviewed):           ASSESSMENT AND PLAN:  In summary, YE LAWSON is an 77y Female with past medical history significant for PVC otherwise stable PVD on Xarelto NICM none obstructive CAD HFrEF s/p BiV AICD CKD DM now with anemia s/p two PRBC     Patient undergoing a low risk GI procedure in which patient has a moderate CV risk but no active CV contraindication at this time     ok to HOLD Xarelto at this time   
CC: Blood count was low (18 May 2023 11:18)    INTERVAL HPI/OVERNIGHT EVENTS:  no acute events overnight    Vital Signs Last 24 Hrs  T(C): 36.5 (19 May 2023 11:45), Max: 36.8 (18 May 2023 20:00)  T(F): 97.7 (19 May 2023 11:45), Max: 98.2 (18 May 2023 20:00)  HR: 70 (19 May 2023 11:45) (69 - 76)  BP: 129/51 (19 May 2023 11:45) (119/53 - 156/53)  BP(mean): --  RR: 18 (19 May 2023 11:45) (18 - 18)  SpO2: 99% (19 May 2023 11:45) (97% - 100%)    Parameters below as of 19 May 2023 11:45  Patient On (Oxygen Delivery Method): room air    PHYSICAL EXAM:  General: in no acute distress  Eyes: PERRLA, EOMI; conjunctiva and sclera clear  Head: Normocephalic; atraumatic  ENMT: No nasal discharge; airway clear  Neck: Supple; non tender; no masses  Respiratory: No wheezes, rales or rhonchi  Cardiovascular: Regular rate and rhythm. S1 and S2 Normal; No murmurs, gallops or rubs  Gastrointestinal: Soft non-tender non-distended; Normal bowel sounds  Genitourinary: No costovertebral angle tenderness  Extremities: Normal range of motion, No clubbing, cyanosis or edema  Vascular: Peripheral pulses palpable 2+ bilaterally  Neurological: Alert and oriented x4  Skin: Warm and dry. No acute rash  Psychiatric: Cooperative and appropriate    I&O's Detail                        9.0    9.88  )-----------( 229      ( 19 May 2023 05:49 )             28.0     19 May 2023 05:49    144    |  110    |  24.7   ----------------------------<  132    3.4     |  22.0   |  1.15     Ca    8.8        19 May 2023 05:49  Phos  3.3       18 May 2023 06:49  Mg     2.2       18 May 2023 06:49    TPro  6.3    /  Alb  3.6    /  TBili  0.6    /  DBili  x      /  AST  15     /  ALT  15     /  AlkPhos  48     18 May 2023 06:49    PT/INR - ( 19 May 2023 05:49 )   PT: 13.3 sec;   INR: 1.14 ratio      PTT - ( 17 May 2023 14:21 )  PTT:25.6 sec    CAPILLARY BLOOD GLUCOSE  POCT Blood Glucose.: 186 mg/dL (19 May 2023 11:23)  POCT Blood Glucose.: 141 mg/dL (19 May 2023 08:07)  POCT Blood Glucose.: 154 mg/dL (18 May 2023 21:15)  POCT Blood Glucose.: 139 mg/dL (18 May 2023 15:22)  POCT Blood Glucose.: 175 mg/dL (18 May 2023 12:48)    LIVER FUNCTIONS - ( 18 May 2023 06:49 )  Alb: 3.6 g/dL / Pro: 6.3 g/dL / ALK PHOS: 48 U/L / ALT: 15 U/L / AST: 15 U/L / GGT: x           MEDICATIONS  (STANDING):  carvedilol 12.5 milliGRAM(s) Oral every 12 hours  cyanocobalamin 1000 MICROGram(s) Oral daily  dextrose 5%. 1000 milliLiter(s) (50 mL/Hr) IV Continuous <Continuous>  dextrose 5%. 1000 milliLiter(s) (100 mL/Hr) IV Continuous <Continuous>  dextrose 50% Injectable 12.5 Gram(s) IV Push once  dextrose 50% Injectable 25 Gram(s) IV Push once  dextrose 50% Injectable 25 Gram(s) IV Push once  gabapentin 100 milliGRAM(s) Oral two times a day  glucagon  Injectable 1 milliGRAM(s) IntraMuscular once  insulin glargine Injectable (LANTUS) 20 Unit(s) SubCutaneous every morning  insulin lispro (ADMELOG) corrective regimen sliding scale   SubCutaneous three times a day before meals  insulin lispro (ADMELOG) corrective regimen sliding scale   SubCutaneous at bedtime  levothyroxine 112 MICROGram(s) Oral daily  multivitamin 1 Tablet(s) Oral daily  pantoprazole    Tablet 40 milliGRAM(s) Oral before breakfast    MEDICATIONS  (PRN):  dextrose Oral Gel 15 Gram(s) Oral once PRN Blood Glucose LESS THAN 70 milliGRAM(s)/deciliter  LORazepam     Tablet 0.5 milliGRAM(s) Oral three times a day PRN Anxiety      RADIOLOGY & ADDITIONAL TESTS:
Chief complaint: Anemia    Patient seen and examined at bedside. No acute overnight events reported. No fever, chills, cough, nausea or vomiting.     Vital Signs Last 24 Hrs  T(F): 98.3 (18 May 2023 07:49), Max: 98.7 (18 May 2023 04:53)  HR: 78 (18 May 2023 07:49) (69 - 87)  BP: 133/56 (18 May 2023 09:00) (115/53 - 143/65)  RR: 18 (18 May 2023 07:49) (16 - 20)  SpO2: 97% (18 May 2023 07:49) (96% - 100%)    Physical Exam:  Constitutional: alert and oriented, in no acute distress   Neck: Soft and supple  Respiratory: Good air entry b/l  Cardiovascular: Regular rate and rhythm  Gastrointestinal: Soft, non-tender to palpation, +bs  Vascular: 2+ peripheral pulses  Neurological: A/O x 3  Musculoskeletal: 5/5 strength b/l upper and lower extremities, no lower extremity edema bilaterally    Labs:                        9.0    9.15  )-----------( 229      ( 18 May 2023 06:49 )             27.2   05-18    142  |  110<H>  |  44.5<H>  ----------------------------<  209<H>  3.6   |  18.0<L>  |  1.34<H>    Ca    8.6      18 May 2023 06:49  Phos  3.3     05-18  Mg     2.2     05-18    TPro  6.3<L>  /  Alb  3.6  /  TBili  0.6  /  DBili  x   /  AST  15  /  ALT  15  /  AlkPhos  48  05-18

## 2023-05-19 NOTE — DISCHARGE NOTE PROVIDER - NSDCMRMEDTOKEN_GEN_ALL_CORE_FT
allopurinol 100 mg oral tablet: 1 tab(s) orally once a day  amLODIPine 5 mg oral tablet: 1 tab(s) orally once a day  aspirin 81 mg oral delayed release tablet: 1 tab(s) orally once a day  candesartan 16 mg oral tablet: 1 tab(s) orally once a day  Coreg 25 mg oral tablet: orally 3 times a day  empagliflozin 10 mg oral tablet: 1 tab(s) orally once a day  ferrous sulfate 325 mg (65 mg elemental iron) oral tablet: 1 orally once a day  furosemide 20 mg oral tablet: 1 orally once a day  gabapentin 100 mg oral capsule: 1 cap(s) orally 2 times a day  Levemir 100 units/mL subcutaneous solution: 40 unit(s) subcutaneous once a day  levothyroxine 112 mcg (0.112 mg) oral tablet: 1 tab(s) orally once a day  LORazepam 0.5 mg oral tablet: 1 tab(s) orally 3 times a day, As Needed  lovastatin 40 mg oral tablet: 1 tab(s) orally once a day  Multiple Vitamins oral tablet: 1 tab(s) orally once a day  pantoprazole 40 mg oral delayed release tablet: 1 tab(s) orally once a day (before a meal)  Vitamin B12 1000 mcg oral tablet: 1 tab(s) orally once a day

## 2023-05-22 ENCOUNTER — NON-APPOINTMENT (OUTPATIENT)
Age: 78
End: 2023-05-22

## 2023-05-24 LAB — SURGICAL PATHOLOGY STUDY: SIGNIFICANT CHANGE UP

## 2023-05-25 NOTE — CONSULT NOTE ADULT - CONSULT REASON
"Late Entry 5.25.23:   5.12.23--  Per EMR, final DC dxs:  Unstable angina with elevated troponin.  Spoke w patient today.  Doing well, no questions.  Interested in working with Registered Dietician (RD) so will task accordingly.  Will schedule PCP follow up visit independently; will let me know if any difficulties in obtaining appointment. Will enroll in [employer] for additional monitoring and support.   5.24.23:  Pt left voicemail requesting no further outreach calls (had attempted contact w pt following [employer] high risk alert response generated on 5.20--PCP was notified of response).    5.25.23:  Clarified with SYLVIA Oliveira that she plans to remain active with patient for care; reporting patient agreeable to plan.  Revised enrollment status from \"Declined\" to \"Enrolled.\"  Additionally, unenrolled patient from current [employer] program chats.  Removing self from Case Team.    " Chest pain, HTN

## 2023-05-30 PROBLEM — I42.8 OTHER CARDIOMYOPATHIES: Chronic | Status: ACTIVE | Noted: 2023-05-17

## 2023-05-31 ENCOUNTER — APPOINTMENT (OUTPATIENT)
Dept: FAMILY MEDICINE | Facility: CLINIC | Age: 78
End: 2023-05-31
Payer: MEDICARE

## 2023-05-31 VITALS
WEIGHT: 163 LBS | HEART RATE: 74 BPM | BODY MASS INDEX: 30 KG/M2 | OXYGEN SATURATION: 99 % | SYSTOLIC BLOOD PRESSURE: 148 MMHG | TEMPERATURE: 97.2 F | HEIGHT: 62 IN | DIASTOLIC BLOOD PRESSURE: 52 MMHG

## 2023-05-31 PROCEDURE — 99496 TRANSJ CARE MGMT HIGH F2F 7D: CPT | Mod: 25

## 2023-05-31 PROCEDURE — 36415 COLL VENOUS BLD VENIPUNCTURE: CPT

## 2023-05-31 NOTE — HISTORY OF PRESENT ILLNESS
[Hospitalized at ___] : at [unfilled] [Date of Admission: ___] : date of admission: [unfilled] [Date of Discharge: ___] : date of discharge: [unfilled] [Home] : to home [Spouse] : spouse [Fatigue] : no fatigue [Shortness of Breath] : no shortness of breath [Abdominal Pain] : no abdominal pain [Loose Stools] : no loose stools [de-identified] : GI Bleed. Blood Loss Anemia. [de-identified] : Xarelto d/carley. Pt on ASA 81 mg/d. [de-identified] : Melena resolved. [FreeTextEntry1] : Patient admitted from the office after complaints of worsening anemia and episodes of melena.  In ER hemoglobin was 6.6.\par She received blood transfusion and is status post endoscopy and colonoscopy.  No clear cause of anemia noted from endoscopy/colonoscopy.  She did have nonbleeding hemorrhoids.  She also has a history of diverticulosis.  Patient is due to follow-up with GI for video capsule endoscopy.  She states she does not feel as tired and blood pressure has been in the 110s to 130s systolic. [FreeTextEntry8] : \par 5/17/23:\par Patient c/o worsening anemia.\par Pt noted to have Hgb 9.5 on routine bw done 5 days ago and sent by endocrine for evaluation.\par Pt also feeling lightheaded x 2-3 days. She did not take her bp meds today and bp at home 120/39. \par Pt notes passing black stool for about 3 weeks now, but attributed it to eating black jelly beans.\par She denies any abdominal pain or diarrhea. Pt also has CKD and creatinine mildly increased at 1.66 (baseline 1.38).\par Last colonoscopy 3/2021 in hospital due to blood loss anemia, found diverticulosis. Had upper endoscopy, found H. pylori and treated. CT enterography at that hospitalization was negative. Outpt video capsule endoscopy 3/2021 neg.  Prior colonoscopy 10/2017, diverticulosis and polyp x 2. She is on Xarelto and ASA 81 mg/d for aorto-iliac occlusive disease.

## 2023-05-31 NOTE — PHYSICAL EXAM
[No Acute Distress] : no acute distress [PERRL] : pupils equal round and reactive to light [EOMI] : extraocular movements intact [Normal Oropharynx] : the oropharynx was normal [No JVD] : no jugular venous distention [No Lymphadenopathy] : no lymphadenopathy [Normal Rate] : normal [Rhythm Regular] : regular [Normal S1] : normal S1 [Normal S2] : normal S2 [II] : a grade 2 [Rounded] : rounded [Soft, Nontender] : the abdomen was soft and nontender [No HSM] : no hepatosplenomegaly noted [None] : no CVA tenderness [Normal Sphincter Tone] : normal sphincter tone [No Mass] : no mass [No Focal Deficits] : no focal deficits [Normal Mood] : the mood was normal [Normal] : affect was normal and insight and judgment were intact [de-identified] : Trace edema LEs. B/L carotid bruits.

## 2023-05-31 NOTE — ASSESSMENT
[FreeTextEntry1] : Patient is status post GI bleed with blood loss anemia status posttransfusion and colonoscopy/upper endoscopy.  \par No obvious source of bleeding.\par Patient to go for video capsule endoscopy with GI.\par Labs drawn in office today for surveillance of H&H for stability.\par Patient to continue with home blood pressure monitoring and to seek immediate medical attention if blood pressure is running low.  Continue with adequate hydration.\par Labs drawn in office.\par Follow-up in 2 weeks.\par

## 2023-05-31 NOTE — PLAN
[FreeTextEntry1] : Pt encounter incorporated clinical review of the medical record including consultation from [facility], review of lab and diagnostic testing with interpretation and discussion of results with patient, general pt counseling, and coordination of care, as well as documentation update within the electronic medical record.\par \par Time spent: 38 mins.\par

## 2023-06-02 NOTE — PATIENT PROFILE ADULT - PRIMARY ROLES/RESPONSIBILITIES
----- Message from Carol Stroud sent at 6/1/2023 10:12 PM CDT -----  Regarding: Appointment Request  Contact: [phone]  Appointment Request From: Carol Stroud    With Provider: Aide Levine CNM [[facility]]    Preferred Date Range: 6/5/2023 – 6/9/2023    Preferred Times: Any Time    Reason for visit: GYN Problem    Comments:  Refill on phentermine and VB   retired

## 2023-06-08 ENCOUNTER — RX RENEWAL (OUTPATIENT)
Age: 78
End: 2023-06-08

## 2023-06-08 LAB
ANION GAP SERPL CALC-SCNC: 16 MMOL/L
BUN SERPL-MCNC: 33 MG/DL
CALCIUM SERPL-MCNC: 9.4 MG/DL
CHLORIDE SERPL-SCNC: 103 MMOL/L
CO2 SERPL-SCNC: 23 MMOL/L
CREAT SERPL-MCNC: 1.32 MG/DL
EGFR: 41 ML/MIN/1.73M2
FERRITIN SERPL-MCNC: 85 NG/ML
GLUCOSE SERPL-MCNC: 158 MG/DL
IRON SATN MFR SERPL: 14 %
IRON SERPL-MCNC: 42 UG/DL
POTASSIUM SERPL-SCNC: 4.6 MMOL/L
SODIUM SERPL-SCNC: 142 MMOL/L
TIBC SERPL-MCNC: 294 UG/DL
TRANSFERRIN SERPL-MCNC: 243 MG/DL
UIBC SERPL-MCNC: 252 UG/DL

## 2023-06-09 ENCOUNTER — APPOINTMENT (OUTPATIENT)
Dept: NEPHROLOGY | Facility: CLINIC | Age: 78
End: 2023-06-09
Payer: MEDICARE

## 2023-06-09 VITALS
SYSTOLIC BLOOD PRESSURE: 158 MMHG | DIASTOLIC BLOOD PRESSURE: 60 MMHG | HEART RATE: 66 BPM | WEIGHT: 164 LBS | BODY MASS INDEX: 30.18 KG/M2 | OXYGEN SATURATION: 98 % | HEIGHT: 62 IN

## 2023-06-09 PROCEDURE — 99214 OFFICE O/P EST MOD 30 MIN: CPT | Mod: 25

## 2023-06-09 PROCEDURE — 36415 COLL VENOUS BLD VENIPUNCTURE: CPT

## 2023-06-09 RX ORDER — CHLORHEXIDINE GLUCONATE 4 %
325 (65 FE) LIQUID (ML) TOPICAL DAILY
Refills: 0 | Status: ACTIVE | COMMUNITY

## 2023-06-09 NOTE — ASSESSMENT
[FreeTextEntry1] : Labs/medications reviewed,\par \par DX : CKD - 3, DN, PAD ( S/P Revascularization - LLE ) - Legs warm, \par \par Anemia  - Resolved, F.U Dr. De La Cruz PRN, \par \par HTN\par \par Meds & Labs reviewed,  PPM Revised, \par \par + Compression Stockings - LLE, \par \par Recently Hospitalized, AICD Lead Revised,  BG Erratic since, slowly Improving, \par \par Meds , Labs Reviewed,\par \par RTC 6 months\par \par \par

## 2023-06-09 NOTE — PHYSICAL EXAM
[General Appearance - Alert] : alert [General Appearance - In No Acute Distress] : in no acute distress [General Appearance - Well Nourished] : well nourished [General Appearance - Well Developed] : well developed [General Appearance - Well-Appearing] : healthy appearing [Sclera] : the sclera and conjunctiva were normal [Outer Ear] : the ears and nose were normal in appearance [Hearing Threshold Finger Rub Not [place]] : hearing was normal [Examination Of The Oral Cavity] : the lips and gums were normal [Neck Appearance] : the appearance of the neck was normal [Neck Cervical Mass (___cm)] : no neck mass was observed [Jugular Venous Distention Increased] : there was no jugular-venous distention [Respiration, Rhythm And Depth] : normal respiratory rhythm and effort [Exaggerated Use Of Accessory Muscles For Inspiration] : no accessory muscle use [Auscultation Breath Sounds / Voice Sounds] : lungs were clear to auscultation bilaterally [Apical Impulse] : the apical impulse was normal [Heart Rate And Rhythm] : heart rate was normal and rhythm regular [Heart Sounds] : normal S1 and S2 [Heart Sounds Gallop] : no gallops [Murmurs] : no murmurs [Heart Sounds Pericardial Friction Rub] : no pericardial rub [Arterial Pulses Carotid] : carotid pulses were normal with no bruits [Abdominal Aorta] : the abdominal aorta was normal [Pitting Edema] : pitting edema present [___ +] : bilateral [unfilled]+ pretibial pitting edema [Bowel Sounds] : normal bowel sounds [Abdomen Soft] : soft [Abdomen Tenderness] : non-tender [Abdomen Mass (___ Cm)] : no abdominal mass palpated [Abdomen Hernia] : no hernia was discovered [Cervical Lymph Nodes Enlarged Posterior Bilaterally] : posterior cervical [Cervical Lymph Nodes Enlarged Anterior Bilaterally] : anterior cervical [No CVA Tenderness] : no ~M costovertebral angle tenderness [Involuntary Movements] : no involuntary movements were seen [Skin Turgor] : normal skin turgor [] : no rash [No Focal Deficits] : no focal deficits [Oriented To Time, Place, And Person] : oriented to person, place, and time [Impaired Insight] : insight and judgment were intact [Affect] : the affect was normal [Mood] : the mood was normal [FreeTextEntry1] : +AICD

## 2023-06-09 NOTE — HISTORY OF PRESENT ILLNESS
[FreeTextEntry1] : HX: CKD 3, CAD, CHF, kidney cyst, DM 2, HLD, HTN, LVEF 50%, hypothyroidism.\par \par Surgery 11/26/ 2019 (Dr. Bean) - Heavily calcified plaque left femoral artery, Recurrent left SFA stenosis\par \par S/P  thromboendarterectomy of deep femoral artery, Left CFA &  insertion of SFA stent. \par \par 4/16/2021\par Left leg bypass-Dec 2020\par February- low Hgb,  4 units PRBCs - taken off all  AC, \par \par Cardiology-Dr. Richard recently replaced PPM\par \par Meds Reviewed, \par \par Discharge summary reviewed by healthcare provider. Call back completed. \par 48 Hour Callback: \par Patient was admitted to [facility] on 5/17. \par Patient was discharged to home on 5/19. \par Discharge diagnosis: Anemia. \par Spoke with patient. \par Hospital Course: Called and spoke with patient, she states she is feeling better, she got 2 units of blood while in hospital, her blood pressure increased.\par she stated that she reached out to both GI and Dr. Bean for appointments and has not heard back.\par \par

## 2023-06-12 ENCOUNTER — NON-APPOINTMENT (OUTPATIENT)
Age: 78
End: 2023-06-12

## 2023-06-13 ENCOUNTER — NON-APPOINTMENT (OUTPATIENT)
Age: 78
End: 2023-06-13

## 2023-06-13 LAB
ALBUMIN SERPL ELPH-MCNC: 4.6 G/DL
ANION GAP SERPL CALC-SCNC: 17 MMOL/L
APPEARANCE: ABNORMAL
BACTERIA: ABNORMAL /HPF
BILIRUBIN URINE: NEGATIVE
BLOOD URINE: ABNORMAL
BUN SERPL-MCNC: 35 MG/DL
CALCIUM SERPL-MCNC: 10 MG/DL
CALCIUM SERPL-MCNC: 10 MG/DL
CHLORIDE SERPL-SCNC: 102 MMOL/L
CHOLEST SERPL-MCNC: 141 MG/DL
CO2 SERPL-SCNC: 24 MMOL/L
COLOR: NORMAL
CREAT SERPL-MCNC: 1.51 MG/DL
CREAT SPEC-SCNC: 56 MG/DL
CREAT SPEC-SCNC: 56 MG/DL
CREAT/PROT UR: 0.3 RATIO
EGFR: 35 ML/MIN/1.73M2
ESTIMATED AVERAGE GLUCOSE: 157 MG/DL
GLUCOSE QUALITATIVE U: ABNORMAL
GLUCOSE SERPL-MCNC: 165 MG/DL
HBA1C MFR BLD HPLC: 7.1 %
HDLC SERPL-MCNC: 41 MG/DL
HYALINE CASTS: NORMAL
KETONES URINE: NEGATIVE
LDLC SERPL CALC-MCNC: 76 MG/DL
LEUKOCYTE ESTERASE URINE: ABNORMAL
MICROALBUMIN 24H UR DL<=1MG/L-MCNC: 6.2 MG/DL
MICROALBUMIN/CREAT 24H UR-RTO: 110 MG/G
MICROSCOPIC-UA: NORMAL
NITRITE URINE: NEGATIVE
NONHDLC SERPL-MCNC: 100 MG/DL
PARATHYROID HORMONE INTACT: 41 PG/ML
PH URINE: 6
PHOSPHATE SERPL-MCNC: 5 MG/DL
POTASSIUM SERPL-SCNC: 5.4 MMOL/L
PROT UR-MCNC: 15 MG/DL
PROTEIN URINE: ABNORMAL
RED BLOOD CELLS URINE: 5 /HPF
SODIUM SERPL-SCNC: 142 MMOL/L
SPECIFIC GRAVITY URINE: 1.02
SQUAMOUS EPITHELIAL CELLS: 2
TRIGL SERPL-MCNC: 122 MG/DL
URATE SERPL-MCNC: 6 MG/DL
UROBILINOGEN URINE: NORMAL
WHITE BLOOD CELLS URINE: 150 /HPF

## 2023-06-15 ENCOUNTER — APPOINTMENT (OUTPATIENT)
Dept: FAMILY MEDICINE | Facility: CLINIC | Age: 78
End: 2023-06-15
Payer: MEDICARE

## 2023-06-15 VITALS
HEART RATE: 83 BPM | WEIGHT: 162 LBS | RESPIRATION RATE: 16 BRPM | OXYGEN SATURATION: 98 % | TEMPERATURE: 97.6 F | HEIGHT: 62 IN | DIASTOLIC BLOOD PRESSURE: 48 MMHG | SYSTOLIC BLOOD PRESSURE: 120 MMHG | BODY MASS INDEX: 29.81 KG/M2

## 2023-06-15 VITALS — SYSTOLIC BLOOD PRESSURE: 122 MMHG | DIASTOLIC BLOOD PRESSURE: 50 MMHG

## 2023-06-15 PROCEDURE — 99214 OFFICE O/P EST MOD 30 MIN: CPT

## 2023-06-15 NOTE — HISTORY OF PRESENT ILLNESS
[Spouse] : spouse [FreeTextEntry1] : Patient is following up s/p GI bleed and YUDI.\par Pt seen by nephrology and renal fxn back to baseline.\par No recurrence of melena.\par Denies lightheadedness or palpitations.\par Feels well.\par Had recent blood work with nephrology and potassium mildly elevated but patient had been eating increased amount of greens and potassium rich foods.\par  [de-identified] : 5/31/23:\par Patient admitted from the office after complaints of worsening anemia and episodes of melena.  In ER hemoglobin was 6.6.\par She received blood transfusion and is status post endoscopy and colonoscopy.  No clear cause of anemia noted from endoscopy/colonoscopy.  She did have nonbleeding hemorrhoids.  She also has a history of diverticulosis.  Patient is due to follow-up with GI for video capsule endoscopy.  She states she does not feel as tired and blood pressure has been in the 110s to 130s systolic. [Hospitalized at ___] : at [unfilled] [Date of Admission: ___] : date of admission: [unfilled] [Date of Discharge: ___] : date of discharge: [unfilled] [Home] : to home [Fatigue] : no fatigue [Shortness of Breath] : no shortness of breath [Abdominal Pain] : no abdominal pain [Loose Stools] : no loose stools [de-identified] : GI Bleed. Blood Loss Anemia. [de-identified] : Xarelto d/carley. Pt on ASA 81 mg/d. [de-identified] : Melena resolved. [FreeTextEntry8] : \par 5/17/23:\par Patient c/o worsening anemia.\par Pt noted to have Hgb 9.5 on routine bw done 5 days ago and sent by endocrine for evaluation.\par Pt also feeling lightheaded x 2-3 days. She did not take her bp meds today and bp at home 120/39. \par Pt notes passing black stool for about 3 weeks now, but attributed it to eating black jelly beans.\par She denies any abdominal pain or diarrhea. Pt also has CKD and creatinine mildly increased at 1.66 (baseline 1.38).\par Last colonoscopy 3/2021 in hospital due to blood loss anemia, found diverticulosis. Had upper endoscopy, found H. pylori and treated. CT enterography at that hospitalization was negative. Outpt video capsule endoscopy 3/2021 neg.  Prior colonoscopy 10/2017, diverticulosis and polyp x 2. She is on Xarelto and ASA 81 mg/d for aorto-iliac occlusive disease.

## 2023-06-15 NOTE — ASSESSMENT
[FreeTextEntry1] : H&H almost normal.  Significant improvement compared to prior testing 2 weeks ago.\par Patient currently asymptomatic.\par Follow-up for video capsule endoscopy with GI. She has appt for same.\par Follow-up in 1 month, sooner if melena, lightheadedness or any new symptoms arise.

## 2023-06-15 NOTE — PHYSICAL EXAM
[No Acute Distress] : no acute distress [No JVD] : no jugular venous distention [No Lymphadenopathy] : no lymphadenopathy [Normal Rate] : normal [Rhythm Regular] : regular [Normal S1] : normal S1 [Normal S2] : normal S2 [II] : a grade 2 [Rounded] : rounded [Soft, Nontender] : the abdomen was soft and nontender [No HSM] : no hepatosplenomegaly noted [None] : no CVA tenderness [Normal Sphincter Tone] : normal sphincter tone [No Mass] : no mass [No Focal Deficits] : no focal deficits [Normal Mood] : the mood was normal [Normal] : affect was normal and insight and judgment were intact [de-identified] : Trace edema LEs. B/L carotid bruits

## 2023-06-19 ENCOUNTER — APPOINTMENT (OUTPATIENT)
Dept: GASTROENTEROLOGY | Facility: CLINIC | Age: 78
End: 2023-06-19
Payer: MEDICARE

## 2023-06-19 VITALS
SYSTOLIC BLOOD PRESSURE: 154 MMHG | OXYGEN SATURATION: 98 % | RESPIRATION RATE: 14 BRPM | HEIGHT: 62 IN | WEIGHT: 162 LBS | TEMPERATURE: 97.4 F | BODY MASS INDEX: 29.81 KG/M2 | HEART RATE: 75 BPM | DIASTOLIC BLOOD PRESSURE: 54 MMHG

## 2023-06-19 PROCEDURE — 99215 OFFICE O/P EST HI 40 MIN: CPT

## 2023-06-19 NOTE — PHYSICAL EXAM
[Alert] : alert [Normal Voice/Communication] : normal voice/communication [Healthy Appearing] : healthy appearing [No Acute Distress] : no acute distress [Well Developed] : well developed [Well Nourished] : well nourished [Sclera] : the sclera and conjunctiva were normal [Hearing Threshold Finger Rub Not [place]] : hearing was normal [Normal Lips/Gums] : the lips and gums were normal [Oropharynx] : the oropharynx was normal [Normal Appearance] : the appearance of the neck was normal [No Neck Mass] : no neck mass was observed [No Respiratory Distress] : no respiratory distress [No Acc Muscle Use] : no accessory muscle use [Respiration, Rhythm And Depth] : normal respiratory rhythm and effort [Heart Rate And Rhythm] : heart rate was normal and rhythm regular [Auscultation Breath Sounds / Voice Sounds] : lungs were clear to auscultation bilaterally [Normal S1, S2] : normal S1 and S2 [Murmurs] : no murmurs [Bowel Sounds] : normal bowel sounds [Abdomen Tenderness] : non-tender [No Masses] : no abdominal mass palpated [Abdomen Soft] : soft [] : no hepatosplenomegaly [Oriented To Time, Place, And Person] : oriented to person, place, and time [de-identified] : Deferred at this time.

## 2023-06-19 NOTE — HISTORY OF PRESENT ILLNESS
[FreeTextEntry1] : May 2023.  Normal colonoscopy.  2021.  Negative colonoscopy.  2013.  Colon polyps. [de-identified] : 2021.  Negative video capsule endoscopy. [de-identified] : 2021.  Negative CAT scan.

## 2023-06-19 NOTE — ASSESSMENT
[FreeTextEntry1] : Impression: Iron deficiency anemia with recent hospitalization in May of this year for symptomatic anemia and melena with negative GI work-up as outlined above rule out small bowel pathology such as AVMs.\par \par Recommendations: Repeat video capsule endoscopy was advised for further evaluation of the above.  The risk versus benefits of video capsule endoscopy were explained to the patient today who appeared to understand all of the above and was agreeable to proceeding with video capsule endoscopy which will be done here in our office.  She appeared to understand all of the above instructions, information, and management plan.

## 2023-06-19 NOTE — REASON FOR VISIT
[Follow-up] : a follow-up of an existing diagnosis [FreeTextEntry1] : Symptomatic anemia and dark stools

## 2023-06-21 ENCOUNTER — RX RENEWAL (OUTPATIENT)
Age: 78
End: 2023-06-21

## 2023-06-21 RX ORDER — AMLODIPINE BESYLATE 5 MG/1
5 TABLET ORAL
Qty: 90 | Refills: 2 | Status: ACTIVE | COMMUNITY
Start: 2019-11-22 | End: 1900-01-01

## 2023-06-28 ENCOUNTER — APPOINTMENT (OUTPATIENT)
Dept: GASTROENTEROLOGY | Facility: CLINIC | Age: 78
End: 2023-06-28
Payer: MEDICARE

## 2023-06-28 PROCEDURE — 91110 GI TRC IMG INTRAL ESOPH-ILE: CPT

## 2023-06-28 PROCEDURE — 99213 OFFICE O/P EST LOW 20 MIN: CPT | Mod: 25

## 2023-06-28 NOTE — REASON FOR VISIT
[Procedure: _________] : a [unfilled] procedure visit [FreeTextEntry1] : Pt here for VCE for symptomatic anemia

## 2023-06-28 NOTE — HISTORY OF PRESENT ILLNESS
[FreeTextEntry1] : May 2023.  Normal colonoscopy.  2021.  Negative colonoscopy.  2013.  Colon polyps. [de-identified] : 2021.  Negative video capsule endoscopy. [de-identified] : 2021.  Negative CAT scan.

## 2023-06-28 NOTE — PHYSICAL EXAM
[Alert] : alert [Normal Voice/Communication] : normal voice/communication [Healthy Appearing] : healthy appearing [No Acute Distress] : no acute distress [Well Developed] : well developed [Well Nourished] : well nourished [Sclera] : the sclera and conjunctiva were normal [Hearing Threshold Finger Rub Not [place]] : hearing was normal [Normal Lips/Gums] : the lips and gums were normal [Oropharynx] : the oropharynx was normal [Normal Appearance] : the appearance of the neck was normal [No Neck Mass] : no neck mass was observed [No Respiratory Distress] : no respiratory distress [No Acc Muscle Use] : no accessory muscle use [Respiration, Rhythm And Depth] : normal respiratory rhythm and effort [Auscultation Breath Sounds / Voice Sounds] : lungs were clear to auscultation bilaterally [Heart Rate And Rhythm] : heart rate was normal and rhythm regular [Normal S1, S2] : normal S1 and S2 [Murmurs] : no murmurs [Bowel Sounds] : normal bowel sounds [Abdomen Tenderness] : non-tender [No Masses] : no abdominal mass palpated [Abdomen Soft] : soft [] : no hepatosplenomegaly [Oriented To Time, Place, And Person] : oriented to person, place, and time [de-identified] : Deferred at this time.

## 2023-06-29 ENCOUNTER — NON-APPOINTMENT (OUTPATIENT)
Age: 78
End: 2023-06-29

## 2023-07-03 ENCOUNTER — RX RENEWAL (OUTPATIENT)
Age: 78
End: 2023-07-03

## 2023-07-10 ENCOUNTER — RX RENEWAL (OUTPATIENT)
Age: 78
End: 2023-07-10

## 2023-07-31 ENCOUNTER — RX RENEWAL (OUTPATIENT)
Age: 78
End: 2023-07-31

## 2023-08-07 ENCOUNTER — APPOINTMENT (OUTPATIENT)
Dept: FAMILY MEDICINE | Facility: CLINIC | Age: 78
End: 2023-08-07
Payer: MEDICARE

## 2023-08-07 VITALS
HEART RATE: 85 BPM | SYSTOLIC BLOOD PRESSURE: 140 MMHG | BODY MASS INDEX: 30.18 KG/M2 | DIASTOLIC BLOOD PRESSURE: 30 MMHG | OXYGEN SATURATION: 99 % | TEMPERATURE: 97.3 F | RESPIRATION RATE: 16 BRPM | WEIGHT: 164 LBS | HEIGHT: 62 IN

## 2023-08-07 VITALS — SYSTOLIC BLOOD PRESSURE: 120 MMHG | DIASTOLIC BLOOD PRESSURE: 40 MMHG

## 2023-08-07 DIAGNOSIS — I35.1 NONRHEUMATIC AORTIC (VALVE) INSUFFICIENCY: ICD-10-CM

## 2023-08-07 DIAGNOSIS — I07.1 RHEUMATIC TRICUSPID INSUFFICIENCY: ICD-10-CM

## 2023-08-07 DIAGNOSIS — K92.1 MELENA: ICD-10-CM

## 2023-08-07 PROCEDURE — 99214 OFFICE O/P EST MOD 30 MIN: CPT

## 2023-08-07 NOTE — HISTORY OF PRESENT ILLNESS
[FreeTextEntry1] : Patient is following up on anemia..s/p GI bleed. 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 She denies any recurrence of black or bloody stools since hospital d/c. No abdominal pain. Saw her cardiologist and her vascular surgeon about 1 month ago. Blood sugars running a little higher than usual. She is cutting carbs. Followed by endocrine. Pt has an appointment for bw in 2 days.

## 2023-08-07 NOTE — PLAN
[FreeTextEntry1] : Pt encounter incorporated clinical review of the medical record including consultation from specialists, review of lab and diagnostic testing with interpretation and discussion of results with patient, general pt counseling, and coordination of care, as well as documentation update within the electronic medical record.  Time spent: 36 mins.

## 2023-08-07 NOTE — PHYSICAL EXAM
[No Acute Distress] : no acute distress [No JVD] : no jugular venous distention [No Lymphadenopathy] : no lymphadenopathy [Normal Rate] : normal [Rhythm Regular] : regular [Normal S1] : normal S1 [Normal S2] : normal S2 [II] : a grade 2 [Rounded] : rounded [Soft, Nontender] : the abdomen was soft and nontender [No HSM] : no hepatosplenomegaly noted [None] : no CVA tenderness [Normal Sphincter Tone] : normal sphincter tone [No Mass] : no mass [No Focal Deficits] : no focal deficits [Normal Mood] : the mood was normal [Normal] : affect was normal and insight and judgment were intact [de-identified] : Trace edema LEs. B/L carotid bruits

## 2023-08-07 NOTE — ASSESSMENT
[FreeTextEntry1] : Pt currently stable. Encouraged to maintain adequate daily hydration. Await blood test results. F/U after AVM ablation.

## 2023-08-08 ENCOUNTER — APPOINTMENT (OUTPATIENT)
Dept: ORTHOPEDIC SURGERY | Facility: CLINIC | Age: 78
End: 2023-08-08
Payer: MEDICARE

## 2023-08-08 VITALS
HEART RATE: 80 BPM | WEIGHT: 164 LBS | HEIGHT: 62 IN | DIASTOLIC BLOOD PRESSURE: 69 MMHG | BODY MASS INDEX: 30.18 KG/M2 | SYSTOLIC BLOOD PRESSURE: 172 MMHG

## 2023-08-08 DIAGNOSIS — M67.951 UNSPECIFIED DISORDER OF SYNOVIUM AND TENDON, RIGHT THIGH: ICD-10-CM

## 2023-08-08 DIAGNOSIS — M48.062 SPINAL STENOSIS, LUMBAR REGION WITH NEUROGENIC CLAUDICATION: ICD-10-CM

## 2023-08-08 DIAGNOSIS — M47.816 SPONDYLOSIS W/OUT MYELOPATHY OR RADICULOPATHY, LUMBAR REGION: ICD-10-CM

## 2023-08-08 PROCEDURE — 72100 X-RAY EXAM L-S SPINE 2/3 VWS: CPT

## 2023-08-08 PROCEDURE — 99214 OFFICE O/P EST MOD 30 MIN: CPT

## 2023-08-08 NOTE — DISCUSSION/SUMMARY
[Medication Risks Reviewed] : Medication risks reviewed [4 Weeks] : in 4 weeks [de-identified] : 45 minutes was spent reviewing the x-rays as well as discussing with the patient their clinical presentation, diagnosis and providing education.  Conservative treatment was discussed with the patient at length. Anticipatory guidance regarding disease process, avoidance of acute exacerbation this was discussed at length and all patients commenting concerns were answered to the patient's satisfaction. Physical therapy for decrease pain and increase function was ordered. Patient was given home exercises as approved by [employer] and works well held directed toward this particular process. Intermittent use of acetaminophen 500 mg 2 tablets t.i.d. p.r.n. mild to moderate pain, tramadol 50 mg p.o. twice daily as needed severe pain, risk benefits alternatives were discussed at length patient understands the details of the prescribed medication.  She does have history of taking lorazepam for which she will hold within 8 hours of taking tramadol.  She is not a candidate for NSAID because she has a lower GI bleed currently she is not a candidate for oral steroids because she has diabetes.  Home exercise including stretching on a daily basis for 20-30 minutes was recommended. Heat, ice, topical were discussed as needed.  CT scan of the lumbar spine is ordered as medically necessary for evaluation of her lumbar stenosis unfortunately she cannot have a MRI which would be of much more substantial information because she has a cardiac defibrillator.  After ablation of lower GI bleed possibly entertain an injection with pain management.  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  Follow-up in 4 weeks or as needed.

## 2023-08-08 NOTE — PHYSICAL EXAM
[de-identified] : CONSTITUTIONAL: The patient is a very pleasant individual who is well-nourished and who appears stated age. PSYCHIATRIC: The patient is alert and oriented X 3 and in no apparent distress, and participates with orthopedic evaluation well. HEAD: Atraumatic and is nonsyndromic in appearance. EENT: No visible thyromegaly, EOMI. RESPIRATORY: Respiratory rate is regular, not dyspneic on examination. LYMPHATICS: There is no inguinal lymphadenopathy INTEGUMENTARY: Skin is clean, dry, and intact about the bilateral lower extremities and lumbar spine.  Skin changes consistent with peripheral vascular disease bilaterally. VASCULAR: There is slow capillary refill about the bilateral lower extremities. NEUROLOGIC: There are no pathologic reflexes. There is no decrease in sensation of the bilateral lower extremities on Wartenberg pinwheel examination. Deep tendon reflexes are well maintained at 2+/4 of the bilateral lower extremities and are symmetric.. MUSCULOSKELETAL: There is no visible muscular atrophy. Manual motor strength is well maintained in the bilateral lower extremities. Range of motion of lumbar spine is well maintained. The patient ambulates in a non-myelopathic manner. Negative tension sign and straight leg raise bilaterally. Quad extension, ankle dorsiflexion, EHL, plantar flexion, and ankle eversion are well preserved. Normal secondary orthopaedic exam of bilateral hips, greater trochanteric area, knees and ankles Patient flexes forward in the office after standing greater than 1 minute at a time consistent with neurogenic claudication.  She does have some mild back pain on range of motion mechanical in nature.  She has reproducible tenderness to palpation at the right gluteus consistent with gluteus tendinopathy. [de-identified] : X-rays been reviewed of the lumbar spine shows a very subtle spondylolisthesis at L4-L5 age-appropriate lumbar spondylosis is noted.  2 views lumbar spine reviewed August 8, 2023.

## 2023-08-08 NOTE — REVIEW OF SYSTEMS
[Joint Pain] : joint pain [Joint Stiffness] : joint stiffness [Joint Swelling] : joint swelling [Negative] : Heme/Lymph [FreeTextEntry9] : lower back pain

## 2023-08-08 NOTE — HISTORY OF PRESENT ILLNESS
[de-identified] : 77-year-old female is here for acute exacerbation of lower extremity pain and weakness with walking and standing has been going on approximately 6 months without any precipitating incident.  She has had this symptom on and off throughout the last several years and attributes to lumbar stenosis.  She also does have peripheral arterial disease and does have some claudication radiating from her feet upward when she walks and stands as well.  She is under the care of the vascular surgeon she used to be on Xarelto but was taken off because she does have a current active lower GI bleed for which she has an ablation scheduled next week  She cannot walk greater than 1 minute without having to stop and flex forward.  She cannot cook a meal because she has to sit down often.  She has not had recent physical therapy chiropractic or pain management injections.  She does have a pacemaker.  She also has focal right buttock pain which has been going on approximately 1 day.  Past medical social surgical family allergy history was reviewed.

## 2023-08-09 LAB
INR PPP: 0.93 RATIO
PT BLD: 10.6 SEC

## 2023-08-10 LAB
ALBUMIN SERPL ELPH-MCNC: 4.4 G/DL
ALP BLD-CCNC: 73 U/L
ALT SERPL-CCNC: 21 U/L
ANION GAP SERPL CALC-SCNC: 19 MMOL/L
AST SERPL-CCNC: 22 U/L
BILIRUB SERPL-MCNC: 0.6 MG/DL
BUN SERPL-MCNC: 59 MG/DL
CALCIUM SERPL-MCNC: 9.7 MG/DL
CHLORIDE SERPL-SCNC: 101 MMOL/L
CO2 SERPL-SCNC: 19 MMOL/L
CREAT SERPL-MCNC: 1.58 MG/DL
EGFR: 33 ML/MIN/1.73M2
GLUCOSE SERPL-MCNC: 168 MG/DL
POTASSIUM SERPL-SCNC: 5.4 MMOL/L
PROT SERPL-MCNC: 7.3 G/DL
SODIUM SERPL-SCNC: 139 MMOL/L

## 2023-08-22 ENCOUNTER — APPOINTMENT (OUTPATIENT)
Dept: GASTROENTEROLOGY | Facility: HOSPITAL | Age: 78
End: 2023-08-22

## 2023-08-22 ENCOUNTER — OUTPATIENT (OUTPATIENT)
Dept: OUTPATIENT SERVICES | Facility: HOSPITAL | Age: 78
LOS: 1 days | End: 2023-08-22
Payer: MEDICARE

## 2023-08-22 DIAGNOSIS — Z46.89 ENCOUNTER FOR FITTING AND ADJUSTMENT OF OTHER SPECIFIED DEVICES: Chronic | ICD-10-CM

## 2023-08-22 DIAGNOSIS — Z98.890 OTHER SPECIFIED POSTPROCEDURAL STATES: Chronic | ICD-10-CM

## 2023-08-22 DIAGNOSIS — Z98.89 OTHER SPECIFIED POSTPROCEDURAL STATES: Chronic | ICD-10-CM

## 2023-08-22 DIAGNOSIS — H26.9 UNSPECIFIED CATARACT: Chronic | ICD-10-CM

## 2023-08-22 DIAGNOSIS — H34.8311 TRIBUTARY (BRANCH) RETINAL VEIN OCCLUSION, RIGHT EYE, WITH RETINAL NEOVASCULARIZATION: Chronic | ICD-10-CM

## 2023-08-23 ENCOUNTER — RX RENEWAL (OUTPATIENT)
Age: 78
End: 2023-08-23

## 2023-08-23 RX ORDER — LANCETS 33 GAUGE
EACH MISCELLANEOUS
Qty: 300 | Refills: 3 | Status: ACTIVE | COMMUNITY
Start: 2023-01-18 | End: 1900-01-01

## 2023-08-25 DIAGNOSIS — D50.0 IRON DEFICIENCY ANEMIA SECONDARY TO BLOOD LOSS (CHRONIC): ICD-10-CM

## 2023-08-25 PROCEDURE — 82962 GLUCOSE BLOOD TEST: CPT

## 2023-08-30 NOTE — ED PROVIDER NOTE - CPE EDP RESP NORM
Your Child's Health  Over 15 Year Old      Yovany Rosales  August 30, 2023    Visit Vitals  /61   Pulse 100   Ht 5' 8\" (1.727 m)   Wt 68.9 kg (152 lb)   SpO2 99%   BMI 23.11 kg/m²     Weight:       Your Teen Check-Up Visit  Body Image  Teens are faced with a lot of pressure from the media to look a certain way. Many of the body images portrayed in the media are not healthy. The age old combination of healthy eating and being physically active is the best way to stay at a healthy weight.    Eating, Drinking & Exercise  As teens spend more time away from home, the temptation to eat more high-fat, high calorie convenience foods is common. (It's also tempting to eat more than you need, especially when hanging out with friends, but it's important to stop eating when you feel full.) Learn about how to make healthy choices when not at home, and consider carrying healthy snacks from home rather than relying on vending machines and fast food when out. (The [employer] FRESSmyplate.gov is a great educational website about nutrition.) Getting enough vitamin D and calcium is important for good bone health. Teens need to get 3 to 4 servings of a good source of calcium daily (low-fat or skim milk, yogurt, cheese, calcium-fortified orange juice or other calcium-fortified foods). Vitamin D is needed along with calcium to optimize bone health; 600 IU of vitamin D daily is recommended. Most people cannot meet their vitamin D needs with their usual diet, so a multivitamin with iron supplement is a good way to get it. (A pure vitamin D supplement with 400 or 600 IU is also okay.)    Choosing to drink plenty of water and avoiding or limiting sodas, energy drinks, juices and other sweet drinks (iced tea, etc) is an easy and healthy choice to make. It is common for teenagers to skip breakfast due to time constraints and simply not feeling hungry in the morning. However, eating something healthy in the morning is important in order to  function best at school and avoid overeating later in the day. Teens should set a goal of being physically active for at least 60 minutes a day. This can be tough because of more homework and because gym classes are often not required in the higher grades. If you're not involved in sports, find activities that you like, such as biking, swimming, and dancing. [When participating in sports, make sure to use appropriate safety equipment (helmet, mouth guard, eye protection, wrist guards, elbow and knee pads, athletic supporter).] Choose biking and walking as your mode of transportation whenever it is safe to do so. Choosing to spend time on your phone or computer is a lot easier and a lot more tempting than putting your electronics down and making yourself move - but it is really important to your overall health.      Sleep   Teenagers need a lot of sleep. It can be difficult because most teens don't feel the need to go to sleep until later in the evening, and then they have to get up for school before they've had adequate rest. Keeping your phone, TV, and other electronic media out of your room and avoiding using them in the hour so before bedtime can help you sleep better. Also, avoid drinking a lot of caffeine, especially later in the day.     Dental  It is important to take good care of your permanent teeth - this is the last set you are going to get! Brush at least twice a day (always before bed) with fluoridated toothpaste, floss regularly and see a dentist twice a year. If your home water supply is from a well, let us know - fluoride supplements may be recommended up to 16 years of age.    Violence & Bullying  Violence is out there. Any exposure to violence (as a victim, witness or perpetrator) is dangerous and harmful (emotionally and physically). Do your best to avoid situations where you know there is a risk of violence, bullying or fighting. Try to stay in a group when you are going between places or on  outings. If you are being teased or bullied, stand up to the person doing it if you can--remember, bullies want to see their victims upset, so be calm, don’t appear flustered, tell them to stop it and walk away. Laugh at them if you can; joking will throw a bully off guard because that is not the response they expect. If you or someone you know is being bullied or harmed, ask a guidance counselor, , health care provider or other trusted adult about what you can do to resolve the situation. Most schools have strict policies in place to protect against bullying. Don’t start skipping school to avoid a bully; talk to someone because things can be better.     Be very careful about what you post online--to protect yourself from being attacked as well as to make sure something you post will not be interpreted as hurtful or critical. Nothing is truly private in cyberspace; make sure you do not post anything online (texts, photos, emails) that you would not be comfortable having read out loud in a public place.     If you are dating, violence should NEVER be tolerated in a relationship-- this includes physical violence, emotional abuse (shaming, embarrassing, threatening, controlling) or sexual abuse (forcing a partner to participate in a sex act when they do not or cannot consent to it).  If you are uncomfortable with anything in your relationship, talk about it now. If you can’t talk to your partner, talk to a trusted adult, guidance counselor, teacher, or health care provider. If you (or a friend) need help right away, there are hotlines are available. One is through [employer]: call 1-368.789.8521, chat at loveisrespect.org or text “loveis” to 44398 (available 24/7/365). You can also call the National Domestic Violence 1-603.407.1234.     Healthy Emotions  Being a teen can be tough. Demands of school, relationship challenges (friends, family, dating), and new responsibilities and expectations can lead to  stress that may sometimes seem overwhelming. Depression and anxiety can affect teens; they can interfere with your day to day functioning and keep you from enjoying things that you usually enjoy. When you're feeling stressed out and overwhelmed, the most important thing to do is talk to someone that you trust and who cares about you. Alcohol, drugs or self-harming acts will not solve any of your problems and will ultimately only make things worse. If you (or a friend) are ever feeling overwhelmed by sadness or fear or anxiety to the point that don't feel you can do what you need to do from day to day or that you wished you were not alive, you need to ask for help. If you don't have a trusted adult in your life, talk to a friend, a teacher, a counselor or health care provider. There is an anonymous emergency hotline (through National Suicide Prevention Lifeline) for teens who are feeling desperate enough to hurt themselves: chat at https://suicidepreventionlifeline.org/ or call 1-813.565.4978. Hopefully you will get through your teenage years without any significant emotional difficulties, but you may have friends who struggle. Be there for them, and help them get help if you can see that they need it.    Sex, Drugs, & Rock n Roll  Thinking about sex and relationships at your age is normal. Teens may have questions about their sexual orientation and gender identity. You can always talk to your healthcare providers when you have questions about these issues. You should know that everyone is entitled to complete, nonjudgmental and confidential health at this clinic. We also encourage you to talk to your family and friends, if you believe they will be supportive. If you are unable to talk to your family or if you need additional support, there are plenty of resources which we can refer you to. Good online resources include https://www.cdc.gov/lgbthealth/youth-resources.htm and  http://www.[facility].org/programs/youth/.    You are undoubtedly aware of the risks associated with having sex. Because teens who have babies or father a child are faced with the tremendous responsibilities and challenges of caring for a child, their own goals and dreams become more difficult to fulfill and may even be altered because of the responsibility of caring for a child. Also, pregnancy can carry high medical risks in young teens. There are several contraception (birth control) options for teens to help prevent pregnancy - but the most effective one is still choosing not to have sex. (Large national studies of teenagers show that most teens who had sex at a young age wish they had waited longer.) Sexually-transmitted infections (STIs) are another major risk of having sex. It is estimated that about 20 million new STIs occur every year, and about half of those are in teens and young adults between 15 to 24 years of age. Female teens, in particular, are at risk for complications from STIs; some of which can cause them to have problems with their fertility (their ability to have children) later in life when they want to be pregnant. To protect against STI's, condoms need to be used during every sexual encounter, even if a female is using another form of contraception. For teens who choose to be sexually active, it is very important that they have regular health care check-ups to discuss contraception and perform STI screening if indicated. Plus, you should see a health care provided any time you are concerned about a possible problem (pregnancy, STI) or want to discuss birth control. Despite what you see on TV and in the media, you should know that most teenagers your age choose NOT to have sex. There is a lot of pressure out there for teens to experiment sexually, so make decisions to avoid places (and people) where you know that kind of pressure might be put on you. For reliable information about sex and birth  control, good websites are safeteens.org and bedsider.org.    If you make the decision to become sexually active, you should know that in the [facility], teens who are age 14 or older are entitled to confidential reproductive health care - that means that you can talk to your providers and receive care for sex-related issues (contraception, STIs, pregnancy) without your parents being notified. We encourage teens to talk to their parents when they are considering starting to have sex, but not everyone can--that is why this law exists. (The only exception to confidential care would be if your providers believe you are at risk for harming yourself or someone else.) Sexually active teens should learn about emergency contraception (\"the morning after pill \"or \"Plan B) which can significantly reduce the chance of pregnancy if a young woman takes it shortly after having unprotected sex. You should also know that even if you make the decision to have sex once, you do not have to keep doing it if you didn’t feel like it was the right decision for you.     Most teens have heard plenty by now about the dangers and health risks of alcohol, drugs and smoking. Some people think vaping is a safe alternative to smoking, but there is no proof that it is. Any inhaled substance is going to cause harm to your lungs and most can cause harm to the brain as well. You should not take prescription medicines if they were not prescribed for you, and you should never let anyone else take your prescription drugs. Avoid situations where you know there is likely to be alcohol and drugs which you will be pressured to try; hang out with friends who share your decision not to use these substances. In addition to the dangers associated with drug use, legal involvement and drug testing policies mean using drugs today can interfere with sports, job and college opportunities. You can talk to your health care provider about drug use if you have  questions or concerns; good online resources include https://teens.drugabuse.gov/ and https://www.thecoolspot.gov/real_life4.aspx  .    Industries have rules about protecting workers from loud noises because they are known to cause hearing loss. Nearly 6% of teenagers were identified as having a mild level of permanent hearing loss due to loud music and ear bud use in a national study. To protect your hearing, avoid prolonged use of earbuds and music at loud volumes and protect yourself with earplugs or other hearing protection devices) when exposed to loud noises (concerts, lawnmowers, snowblowers, etc.).    Safety on Wheels  The leading cause of death for adolescents is motor vehicle accidents. Wearing a seatbelt significantly reduces your chance of serious injury or death when riding in a car. If you ever find yourself in a situation where do not feel safe with the  of your car (whether that is yourself or someone else), the right decision is to call for a ride from someone else. This could be a matter of life or death-do not hesitate to make this type of call.    Texting, calling or using any kind of electronic device is distracting to a  and the cause of many serious accidents. Whether it is you or someone else driving, drivers should never be using mobile devices when they are behind the wheel. Put your phone out of reach or in the trunk if you do not trust yourself to ignore it while driving.    Safety in the Sun  Protecting yourself from the sun’s UV radiation is your responsibility - your parent is no longer going to jose david after you to apply sunscreen. Whether you tan or burn, spending time in the sun without sunscreen protection increases your risk of skin cancer and premature skin aging. To protect yourself, always wear a generous amount of sunscreen with an SPF of 15 or higher, reapply it frequently, avoid prolonged time in the sun between 11 AM and 3 PM (when the sun is the hottest), and  wear sunglasses and sun protective clothing when in the sun. Use of tanning beds further increases the risk of skin cancer; tanning bed use is illegal for teens under age 16 years old in [place] and many  want  to increase that law to apply to everyone under 18 years of age because of the health risks.    Helmets should always be worn in riding a skateboard, bike, motorcycle, or ATV. They should also be worn when skating or skateboarding. When boating or doing water sports, always wear a [employer] approved lifejacket, even if you feel you are a good swimmer.    Gun Safety  Firearms are dangerous. If someone you know has a firearm, you should not attempt handle it or use it. Gun safety courses are usually available for teens who want to hunt, but even with training, you should never handle or use a firearm without experienced supervision. Carrying a handgun for protection is not recommended because it is dangerous, and it is illegal under the age of 21.      MEDICATION FOR FEVER OR PAIN:   Acetaminophen liquid (e.g., Tylenol or Tempra) may be given every four hours as needed for pain or fever.  Acetaminophen liquid is less concentrated than the infant dropper bottle type.  Be sure to check which product CONCENTRATION you are using.      CHILDREN’S Tylenol/Acetaminophen  (160 MG/5 mL)    Child’s Weight:  Dose:  36 - 47 pounds:    240 mg (7.5 mL (1 1/2 Teaspoons))  48 - 59 pounds:    320 mg (10.0 mL (2 Teaspoons))  60 - 71 pounds:    400 mg (12.5 mL (2 1/2 Teaspoons))  72 - 95 pounds:    480 mg (15.0 mL (3 Teaspoons))  Greater than 96 pounds:   640 mg (20.0 mL (4 Teaspoons))    CHILDREN’S Tylenol/Acetaminophen MELTAWAYS ( 80 MG tablets)    Child’s Weight:  Dose:  36 - 47 pounds:    240 mg (3 meltaway tablets)  48 - 59 pounds:    320 mg (4 meltaway tablets)  60 - 71 pounds:    400 mg (5 meltaway tablets)  72 - 95 pounds:    480 mg (6 meltaway tablets)  Greater than 96 pounds:   640 mg (8 meltaway  tablets)     () Tylenol/Acetaminophen MELTAWAYS (160 MG tablets)    Child’s Weight:  Dose:  36 - 47 pounds:    240 mg (1 1/2 meltaway tablets)  48 - 59 pounds:    320 mg (2 meltaway tablets)  60 - 71 pounds:    400 mg (2 1/2 meltaway tablets)  72 - 95 pounds:    480 mg (3 meltaway tablets)  Greater than 96 pounds:   640 mg (4 meltaway tablets)      CHILDREN'S Ibuprofen (e.g., Advil or Motrin) may be given every six hours as needed for pain or fever.    48 - 59 pounds:                       200 mg (2 Teaspoons)  60 - 71 pounds:                       250 mg (2 1/2 Teaspoons)  72 - 95 pounds:                       300 mg (3 Teaspoons)    NEXT VISIT: 1 year      Thank you for entrusting your care to [facility].    Also, check out “Children’s Health” on the [facility] Blog for updates on timely topics regarding children’s health!   normal...

## 2023-09-20 ENCOUNTER — APPOINTMENT (OUTPATIENT)
Dept: DERMATOLOGY | Facility: CLINIC | Age: 78
End: 2023-09-20
Payer: MEDICARE

## 2023-09-20 PROCEDURE — 99214 OFFICE O/P EST MOD 30 MIN: CPT

## 2023-09-25 ENCOUNTER — RX RENEWAL (OUTPATIENT)
Age: 78
End: 2023-09-25

## 2023-10-10 NOTE — HISTORY OF PRESENT ILLNESS
[None] : had no significant interval events [Heartburn] : denies heartburn [Nausea] : denies nausea [Vomiting] : denies vomiting [Diarrhea] : denies diarrhea [Constipation] : denies constipation [Yellow Skin Or Eyes (Jaundice)] : denies jaundice [Abdominal Pain] : denies abdominal pain [Abdominal Swelling] : denies abdominal swelling [Rectal Pain] : denies rectal pain [GERD] : no gastroesophageal reflux disease [Hiatus Hernia] : no hiatus hernia [Peptic Ulcer Disease] : no peptic ulcer disease [Pancreatitis] : no pancreatitis [Cholelithiasis] : no cholelithiasis [Kidney Stone] : no kidney stone [Inflammatory Bowel Disease] : no inflammatory bowel disease [Irritable Bowel Syndrome] : no irritable bowel syndrome [Diverticulitis] : no diverticulitis [Alcohol Abuse] : no alcohol abuse [Malignancy] : no malignancy [Abdominal Surgery] : no abdominal surgery [Appendectomy] : no appendectomy [Cholecystectomy] : no cholecystectomy [de-identified] : March 10 2021 [de-identified] : Scheduling VCE [de-identified] : Pt presents today for VCE for further evaluation of EDDI. Negative recent EGD and colonoscopy. no

## 2023-10-11 ENCOUNTER — APPOINTMENT (OUTPATIENT)
Dept: FAMILY MEDICINE | Facility: CLINIC | Age: 78
End: 2023-10-11
Payer: MEDICARE

## 2023-10-11 VITALS
DIASTOLIC BLOOD PRESSURE: 52 MMHG | WEIGHT: 163 LBS | BODY MASS INDEX: 30 KG/M2 | OXYGEN SATURATION: 99 % | HEART RATE: 69 BPM | HEIGHT: 62 IN | SYSTOLIC BLOOD PRESSURE: 130 MMHG

## 2023-10-11 DIAGNOSIS — E03.9 HYPOTHYROIDISM, UNSPECIFIED: ICD-10-CM

## 2023-10-11 DIAGNOSIS — Z23 ENCOUNTER FOR IMMUNIZATION: ICD-10-CM

## 2023-10-11 DIAGNOSIS — M25.561 PAIN IN RIGHT KNEE: ICD-10-CM

## 2023-10-11 DIAGNOSIS — G89.29 PAIN IN RIGHT KNEE: ICD-10-CM

## 2023-10-11 DIAGNOSIS — Z86.018 PERSONAL HISTORY OF OTHER BENIGN NEOPLASM: ICD-10-CM

## 2023-10-11 PROCEDURE — 99214 OFFICE O/P EST MOD 30 MIN: CPT | Mod: 25

## 2023-10-11 PROCEDURE — G0008: CPT

## 2023-10-11 PROCEDURE — 36415 COLL VENOUS BLD VENIPUNCTURE: CPT

## 2023-10-11 PROCEDURE — 90662 IIV NO PRSV INCREASED AG IM: CPT

## 2023-10-11 RX ORDER — PANTOPRAZOLE SODIUM 40 MG/1
40 TABLET, DELAYED RELEASE ORAL
Refills: 0 | Status: DISCONTINUED | COMMUNITY
Start: 2023-05-22 | End: 2023-10-11

## 2023-10-11 RX ORDER — RIVAROXABAN 2.5 MG/1
2.5 TABLET, FILM COATED ORAL
Refills: 0 | Status: DISCONTINUED | COMMUNITY
End: 2023-10-11

## 2023-10-12 LAB
ANION GAP SERPL CALC-SCNC: 17 MMOL/L
BASOPHILS # BLD AUTO: 0.09 K/UL
BASOPHILS NFR BLD AUTO: 0.9 %
BUN SERPL-MCNC: 49 MG/DL
CALCIUM SERPL-MCNC: 9.7 MG/DL
CHLORIDE SERPL-SCNC: 100 MMOL/L
CO2 SERPL-SCNC: 20 MMOL/L
CREAT SERPL-MCNC: 1.63 MG/DL
EGFR: 32 ML/MIN/1.73M2
EOSINOPHIL # BLD AUTO: 0.19 K/UL
EOSINOPHIL NFR BLD AUTO: 2 %
FERRITIN SERPL-MCNC: 243 NG/ML
GLUCOSE SERPL-MCNC: 250 MG/DL
HCT VFR BLD CALC: 39.1 %
HGB BLD-MCNC: 12.4 G/DL
IMM GRANULOCYTES NFR BLD AUTO: 0.4 %
IRON SATN MFR SERPL: 16 %
IRON SERPL-MCNC: 50 UG/DL
LYMPHOCYTES # BLD AUTO: 1.43 K/UL
LYMPHOCYTES NFR BLD AUTO: 14.7 %
MAN DIFF?: NORMAL
MCHC RBC-ENTMCNC: 31.2 PG
MCHC RBC-ENTMCNC: 31.7 GM/DL
MCV RBC AUTO: 98.2 FL
MONOCYTES # BLD AUTO: 0.67 K/UL
MONOCYTES NFR BLD AUTO: 6.9 %
NEUTROPHILS # BLD AUTO: 7.3 K/UL
NEUTROPHILS NFR BLD AUTO: 75.1 %
PLATELET # BLD AUTO: 219 K/UL
POTASSIUM SERPL-SCNC: 5 MMOL/L
RBC # BLD: 3.98 M/UL
RBC # FLD: 16.3 %
SODIUM SERPL-SCNC: 137 MMOL/L
T4 FREE SERPL-MCNC: 1.5 NG/DL
TIBC SERPL-MCNC: 310 UG/DL
TRANSFERRIN SERPL-MCNC: 259 MG/DL
TSH SERPL-ACNC: 0.8 UIU/ML
UIBC SERPL-MCNC: 260 UG/DL
URATE SERPL-MCNC: 9.1 MG/DL
VIT B12 SERPL-MCNC: 1182 PG/ML
WBC # FLD AUTO: 9.72 K/UL

## 2023-10-12 RX ORDER — ALLOPURINOL 100 MG/1
100 TABLET ORAL DAILY
Qty: 90 | Refills: 1 | Status: ACTIVE | COMMUNITY
Start: 2023-10-12 | End: 1900-01-01

## 2023-11-14 NOTE — ASSESSMENT
Stroke Education Note    Patient: Dipak Fenton Date: 2023   : 1955 Attending: Jerry Navarrete MD       Patient admitted with R/O CVA/TIA. Intervention none.    Stroke Folder provided Understanding Stroke Booklet, BE FAST magnet, Quit Line pamphlet, Quitting Tobacco FYWB and Mediterranean Diet and Individual Risk Assessment form and contents discussed.     Stroke causes and different types of stroke discussed.  Signs and symptoms of stroke and when to call 911 were discussed. PMD f/u recommended after discharge.    Patient's uncontrollable risk factors are Age over 55 years;Male;Family history (23). Patient's controllable risk factors High blood pressure;High cholesterol;Alcohol abuse (23).      Patient was given the following supplemental education materials from the American Heart Association web site; Hypertension, Cholesterol, Exercise, Substance Use, Diet-Nutrition-Well-Being and ETOH Use and contents discussed.     Patient was given the following medication information; Lovenox, Aspirin and Clopidogril and contents discussed.    Diagnostic Tests: CT Head CT Angiogram cholesterol EEG results were discussed. Explanation of upcoming planned procedures MRI TTE HgA1C were discussed.     The following lifestyle modifications were recommended: monitor blood pressure, reduce alcohol use, start an exercise program with physician approval, take medication as prescribed decrease ETOH use and Marijuana use..    Patient allowed to verbalize fears and concerns. Questions were encouraged and answered. Reviewed BE FAST and encouraged call to 911 if patient experiencing s/s of stroke, Discussed increasing activity.  Encouraged pt to raise heart rate 30 minutes, 3 times a week.  , Encouraged pt to check BP at home.  Discussed checking different times of the day and encouraged pt record readings to show PCP., Discussed f/u with PCP 7-10 days after discharge and Discussed f/u with  [FreeTextEntry1] : Monitor renal function on Gabapentin, low dose.\par Start PT 2x/wk x 4 wks.\par LIdocaine patch otc.\par F/U 1 month. neurology after discharge. Will reinforce teaching and risk factor modification throughout patient's hospitalization.      Ni Hewitt, NANY  Stroke Nurse Navigator

## 2023-11-28 ENCOUNTER — APPOINTMENT (OUTPATIENT)
Dept: ORTHOPEDIC SURGERY | Facility: CLINIC | Age: 78
End: 2023-11-28

## 2023-11-30 NOTE — PATIENT PROFILE ADULT - NSPRESCRUSEDDRG_GEN_A_NUR
Render Post-Care Instructions In Note?: yes
Duration Of Freeze Thaw-Cycle (Seconds): 5
Consent: The patient's consent was obtained including but not limited to risks of crusting, scabbing, blistering, scarring, darker or lighter pigmentary change, recurrence, incomplete removal and infection.
No
Number Of Freeze-Thaw Cycles: 1 freeze-thaw cycle
Application Tool (Optional): Liquid Nitrogen Sprayer
Detail Level: Detailed
Render Note In Bullet Format When Appropriate: No
Post-Care Instructions: I reviewed with the patient in detail post-care instructions. Patient is to wear sunprotection, and avoid picking at any of the treated lesions. Pt may apply Vaseline to crusted or scabbing areas.

## 2023-12-01 ENCOUNTER — NON-APPOINTMENT (OUTPATIENT)
Age: 78
End: 2023-12-01

## 2023-12-06 ENCOUNTER — LABORATORY RESULT (OUTPATIENT)
Age: 78
End: 2023-12-06

## 2023-12-11 ENCOUNTER — APPOINTMENT (OUTPATIENT)
Dept: ENDOCRINOLOGY | Facility: CLINIC | Age: 78
End: 2023-12-11
Payer: MEDICARE

## 2023-12-11 ENCOUNTER — INPATIENT (INPATIENT)
Facility: HOSPITAL | Age: 78
LOS: 0 days | Discharge: HOME | DRG: 378 | End: 2023-12-12
Attending: STUDENT IN AN ORGANIZED HEALTH CARE EDUCATION/TRAINING PROGRAM | Admitting: STUDENT IN AN ORGANIZED HEALTH CARE EDUCATION/TRAINING PROGRAM
Payer: COMMERCIAL

## 2023-12-11 VITALS
BODY MASS INDEX: 29.81 KG/M2 | HEIGHT: 62 IN | OXYGEN SATURATION: 98 % | SYSTOLIC BLOOD PRESSURE: 110 MMHG | DIASTOLIC BLOOD PRESSURE: 70 MMHG | HEART RATE: 67 BPM | WEIGHT: 162 LBS

## 2023-12-11 VITALS
WEIGHT: 163.14 LBS | RESPIRATION RATE: 17 BRPM | OXYGEN SATURATION: 99 % | TEMPERATURE: 98 F | DIASTOLIC BLOOD PRESSURE: 68 MMHG | SYSTOLIC BLOOD PRESSURE: 130 MMHG | HEART RATE: 76 BPM

## 2023-12-11 DIAGNOSIS — K92.2 GASTROINTESTINAL HEMORRHAGE, UNSPECIFIED: ICD-10-CM

## 2023-12-11 DIAGNOSIS — Z46.89 ENCOUNTER FOR FITTING AND ADJUSTMENT OF OTHER SPECIFIED DEVICES: Chronic | ICD-10-CM

## 2023-12-11 DIAGNOSIS — Z98.89 OTHER SPECIFIED POSTPROCEDURAL STATES: Chronic | ICD-10-CM

## 2023-12-11 DIAGNOSIS — Z98.890 OTHER SPECIFIED POSTPROCEDURAL STATES: Chronic | ICD-10-CM

## 2023-12-11 DIAGNOSIS — H34.8311 TRIBUTARY (BRANCH) RETINAL VEIN OCCLUSION, RIGHT EYE, WITH RETINAL NEOVASCULARIZATION: Chronic | ICD-10-CM

## 2023-12-11 DIAGNOSIS — H26.9 UNSPECIFIED CATARACT: Chronic | ICD-10-CM

## 2023-12-11 LAB
ALBUMIN SERPL ELPH-MCNC: 4.2 G/DL — SIGNIFICANT CHANGE UP (ref 3.3–5.2)
ALBUMIN SERPL ELPH-MCNC: 4.2 G/DL — SIGNIFICANT CHANGE UP (ref 3.3–5.2)
ALP SERPL-CCNC: 70 U/L — SIGNIFICANT CHANGE UP (ref 40–120)
ALP SERPL-CCNC: 70 U/L — SIGNIFICANT CHANGE UP (ref 40–120)
ALT FLD-CCNC: 26 U/L — SIGNIFICANT CHANGE UP
ALT FLD-CCNC: 26 U/L — SIGNIFICANT CHANGE UP
ANION GAP SERPL CALC-SCNC: 14 MMOL/L — SIGNIFICANT CHANGE UP (ref 5–17)
ANION GAP SERPL CALC-SCNC: 14 MMOL/L — SIGNIFICANT CHANGE UP (ref 5–17)
ANION GAP SERPL CALC-SCNC: 16 MMOL/L — SIGNIFICANT CHANGE UP (ref 5–17)
ANION GAP SERPL CALC-SCNC: 16 MMOL/L — SIGNIFICANT CHANGE UP (ref 5–17)
APTT BLD: 33.2 SEC — SIGNIFICANT CHANGE UP (ref 24.5–35.6)
APTT BLD: 33.2 SEC — SIGNIFICANT CHANGE UP (ref 24.5–35.6)
AST SERPL-CCNC: 30 U/L — SIGNIFICANT CHANGE UP
AST SERPL-CCNC: 30 U/L — SIGNIFICANT CHANGE UP
BASOPHILS # BLD AUTO: 0.04 K/UL — SIGNIFICANT CHANGE UP (ref 0–0.2)
BASOPHILS # BLD AUTO: 0.04 K/UL — SIGNIFICANT CHANGE UP (ref 0–0.2)
BASOPHILS NFR BLD AUTO: 0.6 % — SIGNIFICANT CHANGE UP (ref 0–2)
BASOPHILS NFR BLD AUTO: 0.6 % — SIGNIFICANT CHANGE UP (ref 0–2)
BILIRUB SERPL-MCNC: 0.3 MG/DL — LOW (ref 0.4–2)
BILIRUB SERPL-MCNC: 0.3 MG/DL — LOW (ref 0.4–2)
BLD GP AB SCN SERPL QL: SIGNIFICANT CHANGE UP
BLD GP AB SCN SERPL QL: SIGNIFICANT CHANGE UP
BUN SERPL-MCNC: 71.2 MG/DL — HIGH (ref 8–20)
BUN SERPL-MCNC: 71.2 MG/DL — HIGH (ref 8–20)
BUN SERPL-MCNC: 76.7 MG/DL — HIGH (ref 8–20)
BUN SERPL-MCNC: 76.7 MG/DL — HIGH (ref 8–20)
CALCIUM SERPL-MCNC: 8.9 MG/DL — SIGNIFICANT CHANGE UP (ref 8.4–10.5)
CALCIUM SERPL-MCNC: 8.9 MG/DL — SIGNIFICANT CHANGE UP (ref 8.4–10.5)
CALCIUM SERPL-MCNC: 9.6 MG/DL — SIGNIFICANT CHANGE UP (ref 8.4–10.5)
CALCIUM SERPL-MCNC: 9.6 MG/DL — SIGNIFICANT CHANGE UP (ref 8.4–10.5)
CHLORIDE SERPL-SCNC: 104 MMOL/L — SIGNIFICANT CHANGE UP (ref 96–108)
CHLORIDE SERPL-SCNC: 104 MMOL/L — SIGNIFICANT CHANGE UP (ref 96–108)
CHLORIDE SERPL-SCNC: 99 MMOL/L — SIGNIFICANT CHANGE UP (ref 96–108)
CHLORIDE SERPL-SCNC: 99 MMOL/L — SIGNIFICANT CHANGE UP (ref 96–108)
CO2 SERPL-SCNC: 18 MMOL/L — LOW (ref 22–29)
CO2 SERPL-SCNC: 18 MMOL/L — LOW (ref 22–29)
CO2 SERPL-SCNC: 19 MMOL/L — LOW (ref 22–29)
CO2 SERPL-SCNC: 19 MMOL/L — LOW (ref 22–29)
CREAT SERPL-MCNC: 2.17 MG/DL — HIGH (ref 0.5–1.3)
CREAT SERPL-MCNC: 2.17 MG/DL — HIGH (ref 0.5–1.3)
CREAT SERPL-MCNC: 2.39 MG/DL — HIGH (ref 0.5–1.3)
CREAT SERPL-MCNC: 2.39 MG/DL — HIGH (ref 0.5–1.3)
EGFR: 20 ML/MIN/1.73M2 — LOW
EGFR: 20 ML/MIN/1.73M2 — LOW
EGFR: 23 ML/MIN/1.73M2 — LOW
EGFR: 23 ML/MIN/1.73M2 — LOW
EOSINOPHIL # BLD AUTO: 0.22 K/UL — SIGNIFICANT CHANGE UP (ref 0–0.5)
EOSINOPHIL # BLD AUTO: 0.22 K/UL — SIGNIFICANT CHANGE UP (ref 0–0.5)
EOSINOPHIL NFR BLD AUTO: 3.2 % — SIGNIFICANT CHANGE UP (ref 0–6)
EOSINOPHIL NFR BLD AUTO: 3.2 % — SIGNIFICANT CHANGE UP (ref 0–6)
GLUCOSE BLDC GLUCOMTR-MCNC: 204
GLUCOSE SERPL-MCNC: 108 MG/DL — HIGH (ref 70–99)
GLUCOSE SERPL-MCNC: 108 MG/DL — HIGH (ref 70–99)
GLUCOSE SERPL-MCNC: 193 MG/DL — HIGH (ref 70–99)
GLUCOSE SERPL-MCNC: 193 MG/DL — HIGH (ref 70–99)
HCT VFR BLD CALC: 37 % — SIGNIFICANT CHANGE UP (ref 34.5–45)
HCT VFR BLD CALC: 37 % — SIGNIFICANT CHANGE UP (ref 34.5–45)
HGB BLD-MCNC: 12.5 G/DL — SIGNIFICANT CHANGE UP (ref 11.5–15.5)
HGB BLD-MCNC: 12.5 G/DL — SIGNIFICANT CHANGE UP (ref 11.5–15.5)
IMM GRANULOCYTES NFR BLD AUTO: 0.3 % — SIGNIFICANT CHANGE UP (ref 0–0.9)
IMM GRANULOCYTES NFR BLD AUTO: 0.3 % — SIGNIFICANT CHANGE UP (ref 0–0.9)
INR BLD: 0.96 RATIO — SIGNIFICANT CHANGE UP (ref 0.85–1.18)
INR BLD: 0.96 RATIO — SIGNIFICANT CHANGE UP (ref 0.85–1.18)
LYMPHOCYTES # BLD AUTO: 1.17 K/UL — SIGNIFICANT CHANGE UP (ref 1–3.3)
LYMPHOCYTES # BLD AUTO: 1.17 K/UL — SIGNIFICANT CHANGE UP (ref 1–3.3)
LYMPHOCYTES # BLD AUTO: 17.3 % — SIGNIFICANT CHANGE UP (ref 13–44)
LYMPHOCYTES # BLD AUTO: 17.3 % — SIGNIFICANT CHANGE UP (ref 13–44)
MCHC RBC-ENTMCNC: 32.6 PG — SIGNIFICANT CHANGE UP (ref 27–34)
MCHC RBC-ENTMCNC: 32.6 PG — SIGNIFICANT CHANGE UP (ref 27–34)
MCHC RBC-ENTMCNC: 33.8 GM/DL — SIGNIFICANT CHANGE UP (ref 32–36)
MCHC RBC-ENTMCNC: 33.8 GM/DL — SIGNIFICANT CHANGE UP (ref 32–36)
MCV RBC AUTO: 96.4 FL — SIGNIFICANT CHANGE UP (ref 80–100)
MCV RBC AUTO: 96.4 FL — SIGNIFICANT CHANGE UP (ref 80–100)
MONOCYTES # BLD AUTO: 0.59 K/UL — SIGNIFICANT CHANGE UP (ref 0–0.9)
MONOCYTES # BLD AUTO: 0.59 K/UL — SIGNIFICANT CHANGE UP (ref 0–0.9)
MONOCYTES NFR BLD AUTO: 8.7 % — SIGNIFICANT CHANGE UP (ref 2–14)
MONOCYTES NFR BLD AUTO: 8.7 % — SIGNIFICANT CHANGE UP (ref 2–14)
NEUTROPHILS # BLD AUTO: 4.74 K/UL — SIGNIFICANT CHANGE UP (ref 1.8–7.4)
NEUTROPHILS # BLD AUTO: 4.74 K/UL — SIGNIFICANT CHANGE UP (ref 1.8–7.4)
NEUTROPHILS NFR BLD AUTO: 69.9 % — SIGNIFICANT CHANGE UP (ref 43–77)
NEUTROPHILS NFR BLD AUTO: 69.9 % — SIGNIFICANT CHANGE UP (ref 43–77)
OB PNL STL: POSITIVE
OB PNL STL: POSITIVE
PLATELET # BLD AUTO: 210 K/UL — SIGNIFICANT CHANGE UP (ref 150–400)
PLATELET # BLD AUTO: 210 K/UL — SIGNIFICANT CHANGE UP (ref 150–400)
POTASSIUM SERPL-MCNC: 4 MMOL/L — SIGNIFICANT CHANGE UP (ref 3.5–5.3)
POTASSIUM SERPL-MCNC: 4 MMOL/L — SIGNIFICANT CHANGE UP (ref 3.5–5.3)
POTASSIUM SERPL-MCNC: 5.8 MMOL/L — HIGH (ref 3.5–5.3)
POTASSIUM SERPL-MCNC: 5.8 MMOL/L — HIGH (ref 3.5–5.3)
POTASSIUM SERPL-SCNC: 4 MMOL/L — SIGNIFICANT CHANGE UP (ref 3.5–5.3)
POTASSIUM SERPL-SCNC: 4 MMOL/L — SIGNIFICANT CHANGE UP (ref 3.5–5.3)
POTASSIUM SERPL-SCNC: 5.8 MMOL/L — HIGH (ref 3.5–5.3)
POTASSIUM SERPL-SCNC: 5.8 MMOL/L — HIGH (ref 3.5–5.3)
PROT SERPL-MCNC: 8 G/DL — SIGNIFICANT CHANGE UP (ref 6.6–8.7)
PROT SERPL-MCNC: 8 G/DL — SIGNIFICANT CHANGE UP (ref 6.6–8.7)
PROTHROM AB SERPL-ACNC: 10.7 SEC — SIGNIFICANT CHANGE UP (ref 9.5–13)
PROTHROM AB SERPL-ACNC: 10.7 SEC — SIGNIFICANT CHANGE UP (ref 9.5–13)
RBC # BLD: 3.84 M/UL — SIGNIFICANT CHANGE UP (ref 3.8–5.2)
RBC # BLD: 3.84 M/UL — SIGNIFICANT CHANGE UP (ref 3.8–5.2)
RBC # FLD: 15.2 % — HIGH (ref 10.3–14.5)
RBC # FLD: 15.2 % — HIGH (ref 10.3–14.5)
SODIUM SERPL-SCNC: 134 MMOL/L — LOW (ref 135–145)
SODIUM SERPL-SCNC: 134 MMOL/L — LOW (ref 135–145)
SODIUM SERPL-SCNC: 136 MMOL/L — SIGNIFICANT CHANGE UP (ref 135–145)
SODIUM SERPL-SCNC: 136 MMOL/L — SIGNIFICANT CHANGE UP (ref 135–145)
WBC # BLD: 6.78 K/UL — SIGNIFICANT CHANGE UP (ref 3.8–10.5)
WBC # BLD: 6.78 K/UL — SIGNIFICANT CHANGE UP (ref 3.8–10.5)
WBC # FLD AUTO: 6.78 K/UL — SIGNIFICANT CHANGE UP (ref 3.8–10.5)
WBC # FLD AUTO: 6.78 K/UL — SIGNIFICANT CHANGE UP (ref 3.8–10.5)

## 2023-12-11 PROCEDURE — 99223 1ST HOSP IP/OBS HIGH 75: CPT | Mod: AI

## 2023-12-11 PROCEDURE — 82962 GLUCOSE BLOOD TEST: CPT

## 2023-12-11 PROCEDURE — 99222 1ST HOSP IP/OBS MODERATE 55: CPT

## 2023-12-11 PROCEDURE — 93010 ELECTROCARDIOGRAM REPORT: CPT

## 2023-12-11 PROCEDURE — 99291 CRITICAL CARE FIRST HOUR: CPT

## 2023-12-11 PROCEDURE — 99214 OFFICE O/P EST MOD 30 MIN: CPT | Mod: 25

## 2023-12-11 RX ORDER — SODIUM CHLORIDE 9 MG/ML
1000 INJECTION, SOLUTION INTRAVENOUS
Refills: 0 | Status: DISCONTINUED | OUTPATIENT
Start: 2023-12-11 | End: 2023-12-12

## 2023-12-11 RX ORDER — SODIUM CHLORIDE 9 MG/ML
1000 INJECTION INTRAMUSCULAR; INTRAVENOUS; SUBCUTANEOUS ONCE
Refills: 0 | Status: COMPLETED | OUTPATIENT
Start: 2023-12-11 | End: 2023-12-11

## 2023-12-11 RX ORDER — AMLODIPINE BESYLATE 2.5 MG/1
5 TABLET ORAL DAILY
Refills: 0 | Status: DISCONTINUED | OUTPATIENT
Start: 2023-12-12 | End: 2023-12-12

## 2023-12-11 RX ORDER — GLUCAGON INJECTION, SOLUTION 0.5 MG/.1ML
1 INJECTION, SOLUTION SUBCUTANEOUS ONCE
Refills: 0 | Status: DISCONTINUED | OUTPATIENT
Start: 2023-12-11 | End: 2023-12-12

## 2023-12-11 RX ORDER — DEXTROSE 50 % IN WATER 50 %
25 SYRINGE (ML) INTRAVENOUS ONCE
Refills: 0 | Status: DISCONTINUED | OUTPATIENT
Start: 2023-12-11 | End: 2023-12-12

## 2023-12-11 RX ORDER — DEXTROSE 50 % IN WATER 50 %
12.5 SYRINGE (ML) INTRAVENOUS ONCE
Refills: 0 | Status: DISCONTINUED | OUTPATIENT
Start: 2023-12-11 | End: 2023-12-12

## 2023-12-11 RX ORDER — INSULIN GLARGINE 100 [IU]/ML
20 INJECTION, SOLUTION SUBCUTANEOUS AT BEDTIME
Refills: 0 | Status: DISCONTINUED | OUTPATIENT
Start: 2023-12-11 | End: 2023-12-12

## 2023-12-11 RX ORDER — CARVEDILOL PHOSPHATE 80 MG/1
12.5 CAPSULE, EXTENDED RELEASE ORAL EVERY 12 HOURS
Refills: 0 | Status: DISCONTINUED | OUTPATIENT
Start: 2023-12-11 | End: 2023-12-12

## 2023-12-11 RX ORDER — INSULIN HUMAN 100 [IU]/ML
8 INJECTION, SOLUTION SUBCUTANEOUS ONCE
Refills: 0 | Status: COMPLETED | OUTPATIENT
Start: 2023-12-11 | End: 2023-12-11

## 2023-12-11 RX ORDER — PANTOPRAZOLE SODIUM 20 MG/1
40 TABLET, DELAYED RELEASE ORAL
Refills: 0 | Status: DISCONTINUED | OUTPATIENT
Start: 2023-12-11 | End: 2023-12-12

## 2023-12-11 RX ORDER — LANOLIN ALCOHOL/MO/W.PET/CERES
3 CREAM (GRAM) TOPICAL AT BEDTIME
Refills: 0 | Status: DISCONTINUED | OUTPATIENT
Start: 2023-12-11 | End: 2023-12-12

## 2023-12-11 RX ORDER — LEVOTHYROXINE SODIUM 125 MCG
112 TABLET ORAL DAILY
Refills: 0 | Status: DISCONTINUED | OUTPATIENT
Start: 2023-12-11 | End: 2023-12-12

## 2023-12-11 RX ORDER — ONDANSETRON 8 MG/1
4 TABLET, FILM COATED ORAL EVERY 8 HOURS
Refills: 0 | Status: DISCONTINUED | OUTPATIENT
Start: 2023-12-11 | End: 2023-12-12

## 2023-12-11 RX ORDER — DEXTROSE 50 % IN WATER 50 %
15 SYRINGE (ML) INTRAVENOUS ONCE
Refills: 0 | Status: DISCONTINUED | OUTPATIENT
Start: 2023-12-11 | End: 2023-12-12

## 2023-12-11 RX ORDER — GABAPENTIN 400 MG/1
100 CAPSULE ORAL
Refills: 0 | Status: DISCONTINUED | OUTPATIENT
Start: 2023-12-11 | End: 2023-12-12

## 2023-12-11 RX ORDER — INSULIN LISPRO 100/ML
VIAL (ML) SUBCUTANEOUS
Refills: 0 | Status: DISCONTINUED | OUTPATIENT
Start: 2023-12-11 | End: 2023-12-12

## 2023-12-11 RX ORDER — DEXTROSE 50 % IN WATER 50 %
50 SYRINGE (ML) INTRAVENOUS ONCE
Refills: 0 | Status: COMPLETED | OUTPATIENT
Start: 2023-12-11 | End: 2023-12-11

## 2023-12-11 RX ORDER — ACETAMINOPHEN 500 MG
650 TABLET ORAL EVERY 6 HOURS
Refills: 0 | Status: DISCONTINUED | OUTPATIENT
Start: 2023-12-11 | End: 2023-12-12

## 2023-12-11 RX ORDER — SODIUM CHLORIDE 9 MG/ML
1000 INJECTION INTRAMUSCULAR; INTRAVENOUS; SUBCUTANEOUS
Refills: 0 | Status: DISCONTINUED | OUTPATIENT
Start: 2023-12-11 | End: 2023-12-12

## 2023-12-11 RX ORDER — SODIUM ZIRCONIUM CYCLOSILICATE 10 G/10G
10 POWDER, FOR SUSPENSION ORAL ONCE
Refills: 0 | Status: COMPLETED | OUTPATIENT
Start: 2023-12-11 | End: 2023-12-11

## 2023-12-11 RX ORDER — INFLUENZA VIRUS VACCINE 15; 15; 15; 15 UG/.5ML; UG/.5ML; UG/.5ML; UG/.5ML
0.7 SUSPENSION INTRAMUSCULAR ONCE
Refills: 0 | Status: DISCONTINUED | OUTPATIENT
Start: 2023-12-11 | End: 2023-12-12

## 2023-12-11 RX ADMIN — INSULIN HUMAN 8 UNIT(S): 100 INJECTION, SOLUTION SUBCUTANEOUS at 16:59

## 2023-12-11 RX ADMIN — SODIUM ZIRCONIUM CYCLOSILICATE 10 GRAM(S): 10 POWDER, FOR SUSPENSION ORAL at 16:59

## 2023-12-11 RX ADMIN — SODIUM CHLORIDE 84 MILLILITER(S): 9 INJECTION INTRAMUSCULAR; INTRAVENOUS; SUBCUTANEOUS at 18:25

## 2023-12-11 RX ADMIN — INSULIN GLARGINE 20 UNIT(S): 100 INJECTION, SOLUTION SUBCUTANEOUS at 21:50

## 2023-12-11 RX ADMIN — PANTOPRAZOLE SODIUM 40 MILLIGRAM(S): 20 TABLET, DELAYED RELEASE ORAL at 18:24

## 2023-12-11 RX ADMIN — SODIUM CHLORIDE 1000 MILLILITER(S): 9 INJECTION INTRAMUSCULAR; INTRAVENOUS; SUBCUTANEOUS at 17:00

## 2023-12-11 RX ADMIN — Medication 50 MILLILITER(S): at 16:59

## 2023-12-11 NOTE — ED ADULT NURSE NOTE - OBJECTIVE STATEMENT
BREAK RN: 77 yo F p/w c/o black stools. A&Ox4 and amb. phx GI bleeds. States she had a blood transfusion here in october for same issue. AC were stopped by her doctor. Endorsing 2 days dark black stools. Denies weakness, lethargy, CP, SOB, abdominal pain. Skin warm, pink. Pt. states she was due to have a procedure this Friday to fix this issue. Abdomen soft, ND/NT. Resp. equal and unlabored b/l. VSS. NAD. Comfort measures provided, safety measures implemented, call bell in reach. MD at bedside. BREAK RN: 79 yo F p/w c/o black stools. A&Ox4 and amb. phx GI bleeds. States she had a blood transfusion here in october for same issue. AC were stopped by her doctor. Endorsing 2 days dark black stools. Denies weakness, lethargy, CP, SOB, abdominal pain. Skin warm, pink. Pt. states she was due to have a procedure this Friday to fix this issue. Abdomen soft, ND/NT. Resp. equal and unlabored b/l. VSS. NAD. Comfort measures provided, safety measures implemented, call bell in reach. MD at bedside.

## 2023-12-11 NOTE — ED PROVIDER NOTE - OBJECTIVE STATEMENT
78-year-old female with GERD hypertension hypothyroidism CAD diabetes with recurrent GI bleeds with unknown source in the past.  Currently only on aspirin.  Recently had a UTI which she was started on Bactrim completed on Saturday, Sunday started with dark stool not sticky no diarrhea no abdominal pain no bright red blood no vomiting no weakness.  no fevers no chills.  Had a capsule study with Dr. Christy which identified source of bleeding and supposed to have procedure on Friday for cauterization.  spoke with Dr. Rubalcava who recommended evaluation in the ED.      No chest pain or shortness of breath no headache

## 2023-12-11 NOTE — H&P ADULT - NSHPPHYSICALEXAM_GEN_ALL_CORE
Gen : Non toxic appearing, comfortable, NAD  HEENT : NCAT, MMM, No cervical lymphadenopathy, no JVD  CVS : S1S2, regular rate and rhythm, no murmurs  Resp : CTA b/l, no rhonchi or wheezes appreciated   Abd : Soft, non distended, non tender, BS +ve   MSK : No joint swelling or tenderness, no digital clubbing, no distal cyanosis  Neuro : CN II-XII intact, no focal motor or sensory deficits   Psych : Pleasant, no anxiety, normal affect    Vital Signs Last 24 Hrs  T(C): 36.4 (11 Dec 2023 15:44), Max: 36.8 (11 Dec 2023 13:10)  T(F): 97.6 (11 Dec 2023 15:44), Max: 98.3 (11 Dec 2023 13:10)  HR: 62 (11 Dec 2023 15:44) (62 - 76)  BP: 132/62 (11 Dec 2023 15:44) (130/68 - 132/62)  BP(mean): --  RR: 18 (11 Dec 2023 15:44) (17 - 18)  SpO2: 99% (11 Dec 2023 15:44) (99% - 99%)    Parameters below as of 11 Dec 2023 15:44  Patient On (Oxygen Delivery Method): room air

## 2023-12-11 NOTE — CONSULT NOTE ADULT - SUBJECTIVE AND OBJECTIVE BOX
HISTORY OF PRESENT ILLNESS: This is a 78y old woman with a past medical history significant for CKD, CAD, DM, HTN and PVD who presents with 1 day of melena. She reports one black bowel movement yesterday followed by 2 loose black bowel movements today. She denies associated abdominal pain, nausea, vomiting or rectal bleeding.   She denies nsaid or anticoagulation use  She is scheduled for an enteroscopy this Friday  She underwent a capsule endoscopy notable for duodenal AVMs     Family at bedside.   PAST MEDICAL/SURGICAL HISTORY:  Diabetes  IDDM    Hypothyroid    Hypertension    CAD (coronary artery disease)    AICD (automatic cardioverter/defibrillator) present  rep notified [facility]    PVD (peripheral vascular disease) with claudication    CKD (chronic kidney disease) stage 3, GFR 30-59 ml/min    Anxiety    Peripheral neuropathy    History of anemia due to chronic kidney disease    Psoriasis    Aorto-iliac disease    Carotid stenosis, bilateral    Spinal stenosis, lumbar region, with neurogenic claudication    Lung nodule    HLD (hyperlipidemia)    Pulmonic regurgitation    NICM (nonischemic cardiomyopathy)    Acute cataract    History of intravascular stent placement  femoral artery angioplasty and stents, 3/24/16    H/O vascular surgery  Infrarenal aortic endarterectomy with aortic bi iliac bypass, and superficial femoral artery  angioplasty and stenting, 3/24/16.    Branch retinal vein occlusion with neovascularization of right eye  treated with laser    Pessary maintenance    S/P evacuation of hematoma  right groin, then required wound vac      SOCIAL HISTORY:  - TOBACCO: Denies  - ALCOHOL: Denies  - ILLICIT DRUG USE: Denies    FAMILY HISTORY:  No known history of gastrointestinal or liver disease;  Family history of colon cancer in mother    Family history of heart attack    Family history of heart attack (Sibling)        HOME MEDICATIONS:  allopurinol 100 mg oral tablet: 1 tab(s) orally once a day (17 May 2023 16:49)  amLODIPine 5 mg oral tablet: 1 tab(s) orally once a day (17 May 2023 16:39)  aspirin 81 mg oral delayed release tablet: 1 tab(s) orally once a day (27 Nov 2019 06:50)  candesartan 16 mg oral tablet: 1 tab(s) orally once a day (17 May 2023 16:39)  Coreg 25 mg oral tablet: orally 3 times a day (17 May 2023 16:39)  empagliflozin 10 mg oral tablet: 1 tab(s) orally once a day (17 May 2023 16:49)  ferrous sulfate 325 mg (65 mg elemental iron) oral tablet: 1 orally once a day (17 May 2023 16:39)  furosemide 20 mg oral tablet: 1 orally once a day (17 May 2023 16:49)  gabapentin 100 mg oral capsule: 1 cap(s) orally 2 times a day (24 Feb 2021 22:32)  Levemir 100 units/mL subcutaneous solution: 40 unit(s) subcutaneous once a day (17 May 2023 16:39)  levothyroxine 112 mcg (0.112 mg) oral tablet: 1 tab(s) orally once a day (24 Feb 2021 22:32)  LORazepam 0.5 mg oral tablet: 1 tab(s) orally 3 times a day, As Needed (26 Nov 2019 07:31)  lovastatin 40 mg oral tablet: 1 tab(s) orally once a day (17 May 2023 16:39)  Multiple Vitamins oral tablet: 1 tab(s) orally once a day (24 Feb 2021 22:35)  pantoprazole 40 mg oral delayed release tablet: 1 tab(s) orally once a day (before a meal) (19 May 2023 14:55)  Vitamin B12 1000 mcg oral tablet: 1 tab(s) orally once a day (24 Feb 2021 22:36)    INPATIENT MEDICATIONS:  MEDICATIONS  (STANDING):    MEDICATIONS  (PRN):    ALLERGIES:  latex (Rash)  No Known Drug Allergies    T(C): 36.8 (12-11-23 @ 13:10), Max: 36.8 (12-11-23 @ 13:10)  HR: 76 (12-11-23 @ 13:10) (76 - 76)  BP: 130/68 (12-11-23 @ 13:10) (130/68 - 130/68)  RR: 17 (12-11-23 @ 13:10) (17 - 17)  SpO2: 99% (12-11-23 @ 13:10) (99% - 99%)      PHYSICAL EXAM:  Constitutional:  in no apparent distress  Eyes: Sclerae anicteric, conjunctivae normal  ENMT: Mucus membranes moist, no oropharyngeal thrush noted  Respiratory: Breathing nonlabored; clear to auscultation  Cardiovascular: Regular rate   Gastrointestinal: Soft, nontender, nondistended, normoactive bowel sounds; no hepatosplenomegaly appreciated; no rebound tenderness or involuntary guarding  Extremities: No edema  Neurological: Alert and oriented to person, place and time;  Skin: No jaundice  Musculoskeletal: No significant peripheral atrophy  Psychiatric: Affect and mood appropriate      LABS:             12.5   6.78  )-----------( 210      ( 12-11 @ 14:05 )             37.0                         Capsule reviewed SB AVMs

## 2023-12-11 NOTE — ED ADULT NURSE NOTE - ED STAT RN HANDOFF DETAILS
Pt report given to CDU TREVOR Lobo. PT placed on telebox. Respirations even & unlabored. NAD. Pt made aware of plan of care and verbalized understanding.

## 2023-12-11 NOTE — ED PROVIDER NOTE - CRITICAL CARE ATTENDING CONTRIBUTION TO CARE
patient critically ill requiring medications with intensive monitoring, discussion with consultants, discussion with patient and family, interpretation of diagnostic studies.     hyperkalemia requiring urgent intervention with IV insulin, dextrose and cardiac monitoring

## 2023-12-11 NOTE — H&P ADULT - ASSESSMENT
79 yo F with PMHx of CKD, CAD, DM, HTN and PVD who presented with 2 days of dark bowel movement. She has history of small bowel AVMs seen on previous capsule endoscopy. She was scheduled for enteroscopy this am. GI saw and recommended plan for EGD tomorrow.    #GI Bleed 2/2 Likely Small bowel AVMs  Admit to medicine/Telemetry  Melena on admission, Guaiac positive  Hgb stable  PPI BID  GI following and noted appreciated  NPO for EGD tomorrow  CLD tonight  Monitor CBC      #Hyperkalemia  K 5.8  EKG reviewed  Given Hyperkalemia cocktail by ED  Follow repeat BMP    #YUDI on ?CKD  - Was taking jardiance. furosemide and candasartan  - Avoid nephrotoxins  - Monitor renal functions  - C/w IVF  - If worsens, will need Nephrology    #DM2  - Takes levemir 40 units QHS and SSI  - C/w Lantus 20units for now in light of impending procedure.  - SSI  - Monitor POCTs    #CAD/PAD/NICM  - Has BIV AICD  - takes aspirin, will hold for now  - Hold Furosemide    #Hypothyroidism  - C/w Levothyroxine 112mcg QD    # HTN  - C/w Coreg 12.5mg BID  - C/w Amlodipine 5mg QD    #Anxiety  - Ativan prn  - C/w gabapentin 100mg BID    DVT ppx: SCDs  Diet: CLD, NPO at midnight.

## 2023-12-11 NOTE — PATIENT PROFILE ADULT - FALL HARM RISK - UNIVERSAL INTERVENTIONS
Bed in lowest position, wheels locked, appropriate side rails in place/Call bell, personal items and telephone in reach/Instruct patient to call for assistance before getting out of bed or chair/Non-slip footwear when patient is out of bed/[place] to call system/Physically safe environment - no spills, clutter or unnecessary equipment/Purposeful Proactive Rounding/Room/bathroom lighting operational, light cord in reach Bed in lowest position, wheels locked, appropriate side rails in place/Call bell, personal items and telephone in reach/Instruct patient to call for assistance before getting out of bed or chair/Non-slip footwear when patient is out of bed/[place] to call system/Physically safe environment - no spills, clutter or unnecessary equipment/Purposeful Proactive Rounding/Room/bathroom lighting operational, light cord in reach

## 2023-12-11 NOTE — CONSULT NOTE ADULT - ASSESSMENT
78 year old female with a history of DM, PVD, CAD and CKD who presents with melena in the setting of known small bowel avms    Melena  Small Bowel AVMs  Pantoprazole 40mg BID  Avoid nsaids  Plan for EGD tomorrow to identify and ablate source of bleeding  Procedure reviewed with patient and family   NPO after midnight  Clear liquids today ( in light of previous uncontrolled DM this will ensure empty study for exam)

## 2023-12-11 NOTE — H&P ADULT - HISTORY OF PRESENT ILLNESS
78 year old female with PMH HTN, T2DM, Hypothyroidism, CKD II, CAD s/p PCI, PAD s/p PTA, multiple recurrent GI bleed  on ASA only presented to ED after having 2-3 dark bowel movements since yesterday. She was advised by PCP to come to ED. She denies any sob, dizziness, headaches, blurred vision, chest pain, abdominal pain and n/v/diarrhea. She was seen by GI and recommended admission for possible EGD tomorrow. She recently had capsule endoscopy notable for duodenal AVMS and was scheduled to undergo an enteroscopy this Friday.    In ED, Vitals were WNL. Labs showed hgb 12.5, hct 37, K 5.8, Bun/Crea 76/2.39. she was given hyperkalemia cocktail. She is admitted to medicine for further management.

## 2023-12-11 NOTE — ED PROVIDER NOTE - CARE PLAN
1 Principal Discharge DX:	GI bleed  Secondary Diagnosis:	YUDI (acute kidney injury)  Secondary Diagnosis:	Hyperkalemia

## 2023-12-11 NOTE — ED PROVIDER NOTE - PHYSICAL EXAMINATION
Gen: NAD, AOx3  Head: NCAT  HEENT: EOMI, oral mucosa moist, normal conjunctiva, neck supple  Lung: CTAB, no respiratory distress  CV: rrr, no murmur, Normal perfusion  Abd: soft, NTND  VERÓNICA: no hemorrhoids ,no active bleeding, no stool in vault, some dark mucous  MSK: No edema, no visible deformities  Neuro: No focal neurologic deficits  Skin: No rash   Psych: normal affect

## 2023-12-11 NOTE — ED ADULT NURSE NOTE - NSFALLUNIVINTERV_ED_ALL_ED
negative Bed/Stretcher in lowest position, wheels locked, appropriate side rails in place/Call bell, personal items and telephone in reach/Instruct patient to call for assistance before getting out of bed/chair/stretcher/Non-slip footwear applied when patient is off stretcher/[place] to call system/Physically safe environment - no spills, clutter or unnecessary equipment/Purposeful proactive rounding/Room/bathroom lighting operational, light cord in reach Bed/Stretcher in lowest position, wheels locked, appropriate side rails in place/Call bell, personal items and telephone in reach/Instruct patient to call for assistance before getting out of bed/chair/stretcher/Non-slip footwear applied when patient is off stretcher/[place] to call system/Physically safe environment - no spills, clutter or unnecessary equipment/Purposeful proactive rounding/Room/bathroom lighting operational, light cord in reach

## 2023-12-11 NOTE — ED ADULT NURSE NOTE - CHIEF COMPLAINT QUOTE
black tarry stools x 2 days; hx anemia , last blood tx 10/2023. has bene off xarelto since october. denies SOB/cp/dizziness

## 2023-12-11 NOTE — H&P ADULT - NSHPLABSRESULTS_GEN_ALL_CORE
LABS:                        12.5   6.78  )-----------( 210      ( 11 Dec 2023 14:05 )             37.0     12-11    134<L>  |  99  |  76.7<H>  ----------------------------<  193<H>  5.8<H>   |  19.0<L>  |  2.39<H>    Ca    9.6      11 Dec 2023 14:05    TPro  8.0  /  Alb  4.2  /  TBili  0.3<L>  /  DBili  x   /  AST  30  /  ALT  26  /  AlkPhos  70  12-11    PT/INR - ( 11 Dec 2023 14:05 )   PT: 10.7 sec;   INR: 0.96 ratio         PTT - ( 11 Dec 2023 14:05 )  PTT:33.2 sec      Urinalysis Basic - ( 11 Dec 2023 14:05 )    Color: x / Appearance: x / SG: x / pH: x  Gluc: 193 mg/dL / Ketone: x  / Bili: x / Urobili: x   Blood: x / Protein: x / Nitrite: x   Leuk Esterase: x / RBC: x / WBC x   Sq Epi: x / Non Sq Epi: x / Bacteria: x

## 2023-12-11 NOTE — ED PROVIDER NOTE - CLINICAL SUMMARY MEDICAL DECISION MAKING FREE TEXT BOX
Patient presenting with melena in the setting of known AVM,  hemodynamically stable no abdominal pain on exam only scant melanotic mucus.  hemoglobin stable occult positive.  Found to have YUDI.  appears prerenal.  Also hyperkalemic.  EKG no concerning changes given IV insulin and dextrose as well as p.o. Lokelma.  Possibly related to recent Bactrim use with UTI.  Seen by gastroenterology discussed with them the plan for EGD tomorrow if cleared by medicine.  Will admit

## 2023-12-12 ENCOUNTER — TRANSCRIPTION ENCOUNTER (OUTPATIENT)
Age: 78
End: 2023-12-12

## 2023-12-12 VITALS
DIASTOLIC BLOOD PRESSURE: 57 MMHG | OXYGEN SATURATION: 97 % | TEMPERATURE: 98 F | RESPIRATION RATE: 18 BRPM | HEART RATE: 61 BPM | SYSTOLIC BLOOD PRESSURE: 153 MMHG

## 2023-12-12 LAB
A1C WITH ESTIMATED AVERAGE GLUCOSE RESULT: 8.2 % — HIGH (ref 4–5.6)
A1C WITH ESTIMATED AVERAGE GLUCOSE RESULT: 8.2 % — HIGH (ref 4–5.6)
ALBUMIN SERPL ELPH-MCNC: 3.7 G/DL — SIGNIFICANT CHANGE UP (ref 3.3–5.2)
ALBUMIN SERPL ELPH-MCNC: 3.7 G/DL — SIGNIFICANT CHANGE UP (ref 3.3–5.2)
ALP SERPL-CCNC: 65 U/L — SIGNIFICANT CHANGE UP (ref 40–120)
ALP SERPL-CCNC: 65 U/L — SIGNIFICANT CHANGE UP (ref 40–120)
ALT FLD-CCNC: 22 U/L — SIGNIFICANT CHANGE UP
ALT FLD-CCNC: 22 U/L — SIGNIFICANT CHANGE UP
ANION GAP SERPL CALC-SCNC: 14 MMOL/L — SIGNIFICANT CHANGE UP (ref 5–17)
ANION GAP SERPL CALC-SCNC: 14 MMOL/L — SIGNIFICANT CHANGE UP (ref 5–17)
AST SERPL-CCNC: 22 U/L — SIGNIFICANT CHANGE UP
AST SERPL-CCNC: 22 U/L — SIGNIFICANT CHANGE UP
BASOPHILS # BLD AUTO: 0.03 K/UL — SIGNIFICANT CHANGE UP (ref 0–0.2)
BASOPHILS # BLD AUTO: 0.03 K/UL — SIGNIFICANT CHANGE UP (ref 0–0.2)
BASOPHILS NFR BLD AUTO: 0.5 % — SIGNIFICANT CHANGE UP (ref 0–2)
BASOPHILS NFR BLD AUTO: 0.5 % — SIGNIFICANT CHANGE UP (ref 0–2)
BILIRUB SERPL-MCNC: 0.3 MG/DL — LOW (ref 0.4–2)
BILIRUB SERPL-MCNC: 0.3 MG/DL — LOW (ref 0.4–2)
BUN SERPL-MCNC: 52.2 MG/DL — HIGH (ref 8–20)
BUN SERPL-MCNC: 52.2 MG/DL — HIGH (ref 8–20)
CALCIUM SERPL-MCNC: 8.8 MG/DL — SIGNIFICANT CHANGE UP (ref 8.4–10.5)
CALCIUM SERPL-MCNC: 8.8 MG/DL — SIGNIFICANT CHANGE UP (ref 8.4–10.5)
CHLORIDE SERPL-SCNC: 106 MMOL/L — SIGNIFICANT CHANGE UP (ref 96–108)
CHLORIDE SERPL-SCNC: 106 MMOL/L — SIGNIFICANT CHANGE UP (ref 96–108)
CHOLEST SERPL-MCNC: 124 MG/DL — SIGNIFICANT CHANGE UP
CHOLEST SERPL-MCNC: 124 MG/DL — SIGNIFICANT CHANGE UP
CO2 SERPL-SCNC: 17 MMOL/L — LOW (ref 22–29)
CO2 SERPL-SCNC: 17 MMOL/L — LOW (ref 22–29)
CREAT SERPL-MCNC: 1.46 MG/DL — HIGH (ref 0.5–1.3)
CREAT SERPL-MCNC: 1.46 MG/DL — HIGH (ref 0.5–1.3)
EGFR: 36 ML/MIN/1.73M2 — LOW
EGFR: 36 ML/MIN/1.73M2 — LOW
EOSINOPHIL # BLD AUTO: 0.21 K/UL — SIGNIFICANT CHANGE UP (ref 0–0.5)
EOSINOPHIL # BLD AUTO: 0.21 K/UL — SIGNIFICANT CHANGE UP (ref 0–0.5)
EOSINOPHIL NFR BLD AUTO: 3.4 % — SIGNIFICANT CHANGE UP (ref 0–6)
EOSINOPHIL NFR BLD AUTO: 3.4 % — SIGNIFICANT CHANGE UP (ref 0–6)
ESTIMATED AVERAGE GLUCOSE: 189 MG/DL — HIGH (ref 68–114)
ESTIMATED AVERAGE GLUCOSE: 189 MG/DL — HIGH (ref 68–114)
GLUCOSE BLDC GLUCOMTR-MCNC: 203 MG/DL — HIGH (ref 70–99)
GLUCOSE BLDC GLUCOMTR-MCNC: 203 MG/DL — HIGH (ref 70–99)
GLUCOSE BLDC GLUCOMTR-MCNC: 218 MG/DL — HIGH (ref 70–99)
GLUCOSE BLDC GLUCOMTR-MCNC: 218 MG/DL — HIGH (ref 70–99)
GLUCOSE SERPL-MCNC: 180 MG/DL — HIGH (ref 70–99)
GLUCOSE SERPL-MCNC: 180 MG/DL — HIGH (ref 70–99)
HCT VFR BLD CALC: 34.6 % — SIGNIFICANT CHANGE UP (ref 34.5–45)
HCT VFR BLD CALC: 34.6 % — SIGNIFICANT CHANGE UP (ref 34.5–45)
HDLC SERPL-MCNC: 33 MG/DL — LOW
HDLC SERPL-MCNC: 33 MG/DL — LOW
HGB BLD-MCNC: 11.2 G/DL — LOW (ref 11.5–15.5)
HGB BLD-MCNC: 11.2 G/DL — LOW (ref 11.5–15.5)
IMM GRANULOCYTES NFR BLD AUTO: 0.3 % — SIGNIFICANT CHANGE UP (ref 0–0.9)
IMM GRANULOCYTES NFR BLD AUTO: 0.3 % — SIGNIFICANT CHANGE UP (ref 0–0.9)
LIPID PNL WITH DIRECT LDL SERPL: 69 MG/DL — SIGNIFICANT CHANGE UP
LIPID PNL WITH DIRECT LDL SERPL: 69 MG/DL — SIGNIFICANT CHANGE UP
LYMPHOCYTES # BLD AUTO: 1.21 K/UL — SIGNIFICANT CHANGE UP (ref 1–3.3)
LYMPHOCYTES # BLD AUTO: 1.21 K/UL — SIGNIFICANT CHANGE UP (ref 1–3.3)
LYMPHOCYTES # BLD AUTO: 19.8 % — SIGNIFICANT CHANGE UP (ref 13–44)
LYMPHOCYTES # BLD AUTO: 19.8 % — SIGNIFICANT CHANGE UP (ref 13–44)
MCHC RBC-ENTMCNC: 31.6 PG — SIGNIFICANT CHANGE UP (ref 27–34)
MCHC RBC-ENTMCNC: 31.6 PG — SIGNIFICANT CHANGE UP (ref 27–34)
MCHC RBC-ENTMCNC: 32.4 GM/DL — SIGNIFICANT CHANGE UP (ref 32–36)
MCHC RBC-ENTMCNC: 32.4 GM/DL — SIGNIFICANT CHANGE UP (ref 32–36)
MCV RBC AUTO: 97.7 FL — SIGNIFICANT CHANGE UP (ref 80–100)
MCV RBC AUTO: 97.7 FL — SIGNIFICANT CHANGE UP (ref 80–100)
MONOCYTES # BLD AUTO: 0.49 K/UL — SIGNIFICANT CHANGE UP (ref 0–0.9)
MONOCYTES # BLD AUTO: 0.49 K/UL — SIGNIFICANT CHANGE UP (ref 0–0.9)
MONOCYTES NFR BLD AUTO: 8 % — SIGNIFICANT CHANGE UP (ref 2–14)
MONOCYTES NFR BLD AUTO: 8 % — SIGNIFICANT CHANGE UP (ref 2–14)
NEUTROPHILS # BLD AUTO: 4.16 K/UL — SIGNIFICANT CHANGE UP (ref 1.8–7.4)
NEUTROPHILS # BLD AUTO: 4.16 K/UL — SIGNIFICANT CHANGE UP (ref 1.8–7.4)
NEUTROPHILS NFR BLD AUTO: 68 % — SIGNIFICANT CHANGE UP (ref 43–77)
NEUTROPHILS NFR BLD AUTO: 68 % — SIGNIFICANT CHANGE UP (ref 43–77)
NON HDL CHOLESTEROL: 91 MG/DL — SIGNIFICANT CHANGE UP
NON HDL CHOLESTEROL: 91 MG/DL — SIGNIFICANT CHANGE UP
PLATELET # BLD AUTO: 169 K/UL — SIGNIFICANT CHANGE UP (ref 150–400)
PLATELET # BLD AUTO: 169 K/UL — SIGNIFICANT CHANGE UP (ref 150–400)
POTASSIUM SERPL-MCNC: 4.2 MMOL/L — SIGNIFICANT CHANGE UP (ref 3.5–5.3)
POTASSIUM SERPL-MCNC: 4.2 MMOL/L — SIGNIFICANT CHANGE UP (ref 3.5–5.3)
POTASSIUM SERPL-SCNC: 4.2 MMOL/L — SIGNIFICANT CHANGE UP (ref 3.5–5.3)
POTASSIUM SERPL-SCNC: 4.2 MMOL/L — SIGNIFICANT CHANGE UP (ref 3.5–5.3)
PROT SERPL-MCNC: 7 G/DL — SIGNIFICANT CHANGE UP (ref 6.6–8.7)
PROT SERPL-MCNC: 7 G/DL — SIGNIFICANT CHANGE UP (ref 6.6–8.7)
RBC # BLD: 3.54 M/UL — LOW (ref 3.8–5.2)
RBC # BLD: 3.54 M/UL — LOW (ref 3.8–5.2)
RBC # FLD: 15.1 % — HIGH (ref 10.3–14.5)
RBC # FLD: 15.1 % — HIGH (ref 10.3–14.5)
SODIUM SERPL-SCNC: 137 MMOL/L — SIGNIFICANT CHANGE UP (ref 135–145)
SODIUM SERPL-SCNC: 137 MMOL/L — SIGNIFICANT CHANGE UP (ref 135–145)
TRIGL SERPL-MCNC: 112 MG/DL — SIGNIFICANT CHANGE UP
TRIGL SERPL-MCNC: 112 MG/DL — SIGNIFICANT CHANGE UP
WBC # BLD: 6.12 K/UL — SIGNIFICANT CHANGE UP (ref 3.8–10.5)
WBC # BLD: 6.12 K/UL — SIGNIFICANT CHANGE UP (ref 3.8–10.5)
WBC # FLD AUTO: 6.12 K/UL — SIGNIFICANT CHANGE UP (ref 3.8–10.5)
WBC # FLD AUTO: 6.12 K/UL — SIGNIFICANT CHANGE UP (ref 3.8–10.5)

## 2023-12-12 PROCEDURE — 96375 TX/PRO/DX INJ NEW DRUG ADDON: CPT

## 2023-12-12 PROCEDURE — 80048 BASIC METABOLIC PNL TOTAL CA: CPT

## 2023-12-12 PROCEDURE — 86900 BLOOD TYPING SEROLOGIC ABO: CPT

## 2023-12-12 PROCEDURE — 83036 HEMOGLOBIN GLYCOSYLATED A1C: CPT

## 2023-12-12 PROCEDURE — 86850 RBC ANTIBODY SCREEN: CPT

## 2023-12-12 PROCEDURE — 86901 BLOOD TYPING SEROLOGIC RH(D): CPT

## 2023-12-12 PROCEDURE — 36415 COLL VENOUS BLD VENIPUNCTURE: CPT

## 2023-12-12 PROCEDURE — 82272 OCCULT BLD FECES 1-3 TESTS: CPT

## 2023-12-12 PROCEDURE — 93005 ELECTROCARDIOGRAM TRACING: CPT

## 2023-12-12 PROCEDURE — 85730 THROMBOPLASTIN TIME PARTIAL: CPT

## 2023-12-12 PROCEDURE — 96374 THER/PROPH/DIAG INJ IV PUSH: CPT

## 2023-12-12 PROCEDURE — 80053 COMPREHEN METABOLIC PANEL: CPT

## 2023-12-12 PROCEDURE — 99291 CRITICAL CARE FIRST HOUR: CPT | Mod: 25

## 2023-12-12 PROCEDURE — 44366 SMALL BOWEL ENDOSCOPY: CPT

## 2023-12-12 PROCEDURE — 82962 GLUCOSE BLOOD TEST: CPT

## 2023-12-12 PROCEDURE — 85025 COMPLETE CBC W/AUTO DIFF WBC: CPT

## 2023-12-12 PROCEDURE — 85610 PROTHROMBIN TIME: CPT

## 2023-12-12 PROCEDURE — 80061 LIPID PANEL: CPT

## 2023-12-12 PROCEDURE — 43255 EGD CONTROL BLEEDING ANY: CPT

## 2023-12-12 PROCEDURE — 99239 HOSP IP/OBS DSCHRG MGMT >30: CPT

## 2023-12-12 RX ORDER — CANDESARTAN CILEXETIL 8 MG/1
1 TABLET ORAL
Refills: 0 | DISCHARGE

## 2023-12-12 RX ORDER — ATORVASTATIN CALCIUM 80 MG/1
40 TABLET, FILM COATED ORAL AT BEDTIME
Refills: 0 | Status: DISCONTINUED | OUTPATIENT
Start: 2023-12-12 | End: 2023-12-12

## 2023-12-12 RX ORDER — EMPAGLIFLOZIN 10 MG/1
1 TABLET, FILM COATED ORAL
Refills: 0 | DISCHARGE

## 2023-12-12 RX ADMIN — GABAPENTIN 100 MILLIGRAM(S): 400 CAPSULE ORAL at 05:30

## 2023-12-12 RX ADMIN — CARVEDILOL PHOSPHATE 12.5 MILLIGRAM(S): 80 CAPSULE, EXTENDED RELEASE ORAL at 17:14

## 2023-12-12 RX ADMIN — CARVEDILOL PHOSPHATE 12.5 MILLIGRAM(S): 80 CAPSULE, EXTENDED RELEASE ORAL at 05:30

## 2023-12-12 RX ADMIN — PANTOPRAZOLE SODIUM 40 MILLIGRAM(S): 20 TABLET, DELAYED RELEASE ORAL at 17:14

## 2023-12-12 RX ADMIN — Medication 1 TABLET(S): at 14:33

## 2023-12-12 RX ADMIN — AMLODIPINE BESYLATE 5 MILLIGRAM(S): 2.5 TABLET ORAL at 05:30

## 2023-12-12 RX ADMIN — Medication 112 MICROGRAM(S): at 05:30

## 2023-12-12 RX ADMIN — Medication 2: at 14:32

## 2023-12-12 RX ADMIN — PANTOPRAZOLE SODIUM 40 MILLIGRAM(S): 20 TABLET, DELAYED RELEASE ORAL at 05:30

## 2023-12-12 NOTE — DISCHARGE NOTE PROVIDER - NSDCFUSCHEDAPPT_GEN_ALL_CORE_FT
[facility]  [address]  Scheduled Appointment: 12/15/2023    [facility]  [facility]  Scheduled Appointment: 12/15/2023    [facility]  FAMILYMED [address]  Scheduled Appointment: 01/10/2024     [facility]  [address]  Scheduled Appointment: 12/15/2023    [facility]  [facility]  Scheduled Appointment: 12/15/2023    [facility]  FAMILYMED [address]  Scheduled Appointment: 01/10/2024

## 2023-12-12 NOTE — DISCHARGE NOTE NURSING/CASE MANAGEMENT/SOCIAL WORK - NSDCPEFALRISK_GEN_ALL_CORE
For information on Fall & Injury Prevention, visit: [email or web address] OR  [email or web address] OR  https://www.cdc.gov/steadi/patient.html For information on Fall & Injury Prevention, visit: [email or web address].[facility]/[email or web address]-[email or web address]-mobility OR  [email or web address]://www.[facility]/[email or web address]-[email or web address]-[email or web address]-avoid-injury OR  https://www.cdc.gov/steadi/patient.html

## 2023-12-12 NOTE — PROGRESS NOTE ADULT - ASSESSMENT
79 yo F with PMHx of CKD, CAD, DM, HTN and PVD who presented with 2 days of dark bowel movement. She has history of small bowel AVMs seen on previous capsule endoscopy. She was scheduled for enteroscopy this am. GI saw and recommended plan for EGD.    #GI Bleed 2/2 Likely Small bowel AVMs  Admit to medicine/Telemetry  Melena on admission, Guaiac positive  Hgb stable  PPI BID  GI following and noted appreciated  NPO for EGD today  Monitor CBC      #Hyperkalemia (resolved)  K 5.8-->4.2  EKG reviewed  Given Hyperkalemia cocktail by ED    #YUDI on ?CKD (Improved)  - Was taking jardiance. furosemide and candasartan  - Avoid nephrotoxins  - Monitor renal functions  - C/w IVF  - Improving today    #DM2  - Takes levemir 40 units QHS and SSI  - C/w Lantus 20units for now in light of impending procedure.  - SSI  - Monitor POCTs    #CAD/PAD/NICM  - Has BIV AICD  - takes aspirin, will hold for now  - Hold Furosemide    #Hypothyroidism  - C/w Levothyroxine 112mcg QD    # HTN  - C/w Coreg 12.5mg BID  - C/w Amlodipine 5mg QD    #Anxiety  - Ativan prn  - C/w gabapentin 100mg BID    DVT ppx: SCDs  Diet:    Dispo: Pending EGD and GI clearance.

## 2023-12-12 NOTE — DISCHARGE NOTE PROVIDER - NSDCMRMEDTOKEN_GEN_ALL_CORE_FT
allopurinol 100 mg oral tablet: 1 tab(s) orally once a day  amLODIPine 5 mg oral tablet: 1 tab(s) orally once a day  aspirin 81 mg oral delayed release tablet: 1 tab(s) orally once a day  Coreg 25 mg oral tablet: orally 3 times a day  ferrous sulfate 325 mg (65 mg elemental iron) oral tablet: 1 orally once a day  furosemide 20 mg oral tablet: 1 orally once a day  gabapentin 100 mg oral capsule: 1 cap(s) orally 2 times a day  Levemir 100 units/mL subcutaneous solution: 40 unit(s) subcutaneous once a day  levothyroxine 112 mcg (0.112 mg) oral tablet: 1 tab(s) orally once a day  LORazepam 0.5 mg oral tablet: 1 tab(s) orally 3 times a day, As Needed  lovastatin 40 mg oral tablet: 1 tab(s) orally once a day  Multiple Vitamins oral tablet: 1 tab(s) orally once a day  pantoprazole 40 mg oral delayed release tablet: 1 tab(s) orally once a day (before a meal)  Protonix 40 mg oral delayed release tablet: 1 tab(s) orally once a day  Vitamin B12 1000 mcg oral tablet: 1 tab(s) orally once a day

## 2023-12-12 NOTE — PROGRESS NOTE ADULT - SUBJECTIVE AND OBJECTIVE BOX
SUBJECTIVE / OVERNIGHT EVENTS: Seen and examined. Feels well. no bm today. NPO for EGD today. hgb slightly downtrended. Creatinine better today.    Patient denies chest pain, SOB, abd pain, N/V, fever, chills, dysuria or any other complaints. All remainder ROS negative.     MEDICATIONS  (STANDING):  amLODIPine   Tablet 5 milliGRAM(s) Oral daily  atorvastatin 40 milliGRAM(s) Oral at bedtime  carvedilol 12.5 milliGRAM(s) Oral every 12 hours  dextrose 5%. 1000 milliLiter(s) (50 mL/Hr) IV Continuous <Continuous>  dextrose 5%. 1000 milliLiter(s) (100 mL/Hr) IV Continuous <Continuous>  dextrose 50% Injectable 25 Gram(s) IV Push once  dextrose 50% Injectable 25 Gram(s) IV Push once  dextrose 50% Injectable 12.5 Gram(s) IV Push once  gabapentin 100 milliGRAM(s) Oral two times a day  glucagon  Injectable 1 milliGRAM(s) IntraMuscular once  influenza  Vaccine (HIGH DOSE) 0.7 milliLiter(s) IntraMuscular once  insulin glargine Injectable (LANTUS) 20 Unit(s) SubCutaneous at bedtime  insulin lispro (ADMELOG) corrective regimen sliding scale   SubCutaneous three times a day before meals  levothyroxine 112 MICROGram(s) Oral daily  multivitamin 1 Tablet(s) Oral daily  pantoprazole  Injectable 40 milliGRAM(s) IV Push two times a day  sodium chloride 0.9%. 1000 milliLiter(s) (84 mL/Hr) IV Continuous <Continuous>    MEDICATIONS  (PRN):  acetaminophen     Tablet .. 650 milliGRAM(s) Oral every 6 hours PRN Temp greater or equal to 38C (100.4F), Mild Pain (1 - 3)  aluminum hydroxide/magnesium hydroxide/simethicone Suspension 30 milliLiter(s) Oral every 4 hours PRN Dyspepsia  dextrose Oral Gel 15 Gram(s) Oral once PRN Blood Glucose LESS THAN 70 milliGRAM(s)/deciliter  LORazepam     Tablet 0.5 milliGRAM(s) Oral three times a day PRN Anxiety  melatonin 3 milliGRAM(s) Oral at bedtime PRN Insomnia  ondansetron Injectable 4 milliGRAM(s) IV Push every 8 hours PRN Nausea and/or Vomiting        I&O's Summary      PHYSICAL EXAM:  Vital Signs Last 24 Hrs  T(C): 36.6 (12 Dec 2023 07:20), Max: 36.8 (11 Dec 2023 13:10)  T(F): 97.9 (12 Dec 2023 07:20), Max: 98.3 (11 Dec 2023 13:10)  HR: 63 (12 Dec 2023 07:20) (54 - 106)  BP: 129/69 (12 Dec 2023 07:20) (124/47 - 134/70)  BP(mean): --  RR: 18 (12 Dec 2023 07:20) (17 - 18)  SpO2: 99% (12 Dec 2023 07:20) (96% - 99%)    Parameters below as of 12 Dec 2023 07:20  Patient On (Oxygen Delivery Method): room air            CONSTITUTIONAL: NAD, Pleasantly appearing  ENMT: Moist oral mucosa, no pharyngeal injection or exudates; normal dentition  RESPIRATORY: Normal respiratory effort; lungs are clear to auscultation bilaterally  CARDIOVASCULAR: Regular rate and rhythm, normal S1 and S2, no murmur/rub/gallop; No lower extremity edema; Peripheral pulses are 2+ bilaterally  ABDOMEN: Nontender to palpation, normoactive bowel sounds, no rebound/guarding; No hepatosplenomegaly  MUSCLOSKELETAL:  Normal gait; no clubbing or cyanosis of digits; no joint swelling or tenderness to palpation  PSYCH: A+O to person, place, and time; affect appropriate  NEUROLOGY: CN 2-12 are intact and symmetric; no gross sensory deficits;   SKIN: No rashes; no palpable lesions    LABS:                        11.2   6.12  )-----------( 169      ( 12 Dec 2023 07:06 )             34.6     12-12    137  |  106  |  52.2<H>  ----------------------------<  180<H>  4.2   |  17.0<L>  |  1.46<H>    Ca    8.8      12 Dec 2023 07:06    TPro  7.0  /  Alb  3.7  /  TBili  0.3<L>  /  DBili  x   /  AST  22  /  ALT  22  /  AlkPhos  65  12-12    PT/INR - ( 11 Dec 2023 14:05 )   PT: 10.7 sec;   INR: 0.96 ratio         PTT - ( 11 Dec 2023 14:05 )  PTT:33.2 sec      Urinalysis Basic - ( 12 Dec 2023 07:06 )    Color: x / Appearance: x / SG: x / pH: x  Gluc: 180 mg/dL / Ketone: x  / Bili: x / Urobili: x   Blood: x / Protein: x / Nitrite: x   Leuk Esterase: x / RBC: x / WBC x   Sq Epi: x / Non Sq Epi: x / Bacteria: x        CAPILLARY BLOOD GLUCOSE      POCT Blood Glucose.: 218 mg/dL (12 Dec 2023 09:58)  POCT Blood Glucose.: 162 mg/dL (11 Dec 2023 16:58)        RADIOLOGY & ADDITIONAL TESTS:  Results Reviewed:   Imaging Personally Reviewed:  Electrocardiogram Personally Reviewed:

## 2023-12-12 NOTE — DISCHARGE NOTE PROVIDER - HOSPITAL COURSE
77 yo F with PMHx of CKD, CAD, DM, HTN and PVD who presented with 2 days of dark bowel movement. She has history of small bowel AVMs seen on previous capsule endoscopy. She was scheduled for enteroscopy this am. GI saw and recommended plan for EGD. EGD showed 3-4 duodenal AVMs which were obliterated with APC. Follow up outpatient with GI and PCP. Medically stable for discharge.

## 2023-12-12 NOTE — DISCHARGE NOTE PROVIDER - NSDCCPCAREPLAN_GEN_ALL_CORE_FT
PRINCIPAL DISCHARGE DIAGNOSIS  Diagnosis: GI bleed  Assessment and Plan of Treatment: GI recommended EGD and was found to have multiple AVMs which were obliterated. Follow up with GI as outpatient.      SECONDARY DISCHARGE DIAGNOSES  Diagnosis: YUDI (acute kidney injury)  Assessment and Plan of Treatment: Treated with IVF and improved. Follow up with outpatient PCP.    Diagnosis: Hyperkalemia  Assessment and Plan of Treatment: Treated with hyperkalemia cocktail.

## 2023-12-12 NOTE — DISCHARGE NOTE NURSING/CASE MANAGEMENT/SOCIAL WORK - PATIENT PORTAL LINK FT
You can access the FollowMyHealth Patient Portal offered by [facility] by registering at the following website: [email or web address]. By joining [employer]’s FollowMyHealth portal, you will also be able to view your health information using other applications (apps) compatible with our system. You can access the FollowMyHealth Patient Portal offered by [facility] by registering at the following website: [email or web address]. By joining [employer]’s FollowMyHealth portal, you will also be able to view your health information using other applications (apps) compatible with our system.

## 2023-12-12 NOTE — CHART NOTE - NSCHARTNOTEFT_GEN_A_CORE
Enteroscopy: APC with obliteration of distal duodenal AVM's. OK to feed pt and send home. Can resume AC therapy if appropriate.

## 2023-12-12 NOTE — DISCHARGE NOTE NURSING/CASE MANAGEMENT/SOCIAL WORK - NSDCVIVACCINE_GEN_ALL_CORE_FT
COVID-19 vaccine, vector-nr, rS-Ad26, PF, 0.5 mL (Rodriguez); 03-Mar-2021 14:16; Jessica Shelton (TREVOR); Rodriguez; [id number] (Exp. Date: 03-Mar-2021); IntraMuscular; Deltoid Left.; 0.5 milliLiter(s);    COVID-19 vaccine, vector-nr, rS-Ad26, PF, 0.5 mL (Rodriguez); 03-Mar-2021 14:16; Jessica Shelton (TREVOR); Rodriguez; [id number] (Exp. Date: 03-Mar-2021); IntraMuscular; Deltoid Left.; 0.5 milliLiter(s);

## 2023-12-14 ENCOUNTER — NON-APPOINTMENT (OUTPATIENT)
Age: 78
End: 2023-12-14

## 2023-12-15 ENCOUNTER — APPOINTMENT (OUTPATIENT)
Dept: GASTROENTEROLOGY | Facility: HOSPITAL | Age: 78
End: 2023-12-15

## 2023-12-26 ENCOUNTER — NON-APPOINTMENT (OUTPATIENT)
Age: 78
End: 2023-12-26

## 2024-01-02 RX ORDER — LEVOTHYROXINE SODIUM 0.11 MG/1
112 TABLET ORAL
Qty: 90 | Refills: 3 | Status: ACTIVE | COMMUNITY
Start: 2019-03-29 | End: 1900-01-01

## 2024-01-03 RX ORDER — INSULIN DETEMIR 100 [IU]/ML
100 INJECTION, SOLUTION SUBCUTANEOUS
Qty: 45 | Refills: 1 | Status: DISCONTINUED | COMMUNITY
Start: 2016-12-06 | End: 2024-01-03

## 2024-01-08 ENCOUNTER — APPOINTMENT (OUTPATIENT)
Dept: ENDOCRINOLOGY | Facility: CLINIC | Age: 79
End: 2024-01-08
Payer: MEDICARE

## 2024-01-08 VITALS
HEIGHT: 62 IN | DIASTOLIC BLOOD PRESSURE: 70 MMHG | BODY MASS INDEX: 30.55 KG/M2 | OXYGEN SATURATION: 100 % | SYSTOLIC BLOOD PRESSURE: 118 MMHG | HEART RATE: 75 BPM | WEIGHT: 166 LBS

## 2024-01-08 DIAGNOSIS — E78.5 HYPERLIPIDEMIA, UNSPECIFIED: ICD-10-CM

## 2024-01-08 LAB — GLUCOSE BLDC GLUCOMTR-MCNC: 174

## 2024-01-08 PROCEDURE — 99214 OFFICE O/P EST MOD 30 MIN: CPT | Mod: 25

## 2024-01-08 PROCEDURE — 82962 GLUCOSE BLOOD TEST: CPT

## 2024-01-08 NOTE — ASSESSMENT
[FreeTextEntry1] : Type 2 DM: stable control on insulin. Renal function improving however most recent GFR 36. Recommend holding off on restarting Jardiance due to second UTI. Informed pt. to contact cardiology regarding restarting Jardiance as medication will not improve diabetic control with low GFR however it can help with cardiac disease. Repeat A1c before next visit.   Hyperlipidemia: LDL controlled, continue Lovastatin  RTO in 3 months with Dr. Pierre  Plan reviewed with Dr. Pierre

## 2024-01-08 NOTE — HISTORY OF PRESENT ILLNESS
[FreeTextEntry1] : Patient had UTI on 12/2/23 then hospitalized for protein in urine and dark stools with hx of bleeding, had endoscopy and colonoscopy. bleeding was cauterized   Quality: Type 2. Severity: Moderate Duration: 25 years Associated Symptoms: Retinopathy. Hypoglycemia. Nephropathy Modifying Factors: Better with diet. Notes: Current regimen:  metformin 1000 bid - stopped due to renal insufficiency  glimepiride 2 bid - off  januvia, precose ineffective. On avandia in the past. Tresiba 40 units at bedtime  novolog before meals:   150-199 2     200-249 4     250-299 6     300+ 8 jardiance 10 -> stopped after hospitalization due to renal function and recent UTIs   Routine cardiac evaluation-fluid around the heart. Eventual w/u included PET scan and sonography revealing subcentimeric thyroid nodules, pulmonary abnormality, and fatty liver. SHASHA 1:160, speckled, no diagnosis made by rheumatology.     Diet: weight watchers Weight History: gained a couple of pounds  Blood Glucose Testing Information - per logs - fasting 121-218, before lunch , dinner 106-233, 163-208/ Patient is using the meter and is testing 4 times per day. Current   Labs from [facility] on 12/12/23 Cr 1.4 GFR 36 A1c 8.2  Past Medical History Notes: ASHD, pacemaker, defibrillator. Needs to have lead replaced PVD, s/p stents. renal cysts thyroid nodules, subcentimeric vitamin D deficiency anemia due to GI bleeding, AVM, scheduled for ablation

## 2024-01-08 NOTE — REVIEW OF SYSTEMS
[Recent Weight Gain (___ Lbs)] : recent weight gain: [unfilled] lbs [Dysuria] : dysuria [Fatigue] : no fatigue [Decreased Appetite] : appetite not decreased [Visual Field Defect] : no visual field defect [Dysphagia] : no dysphagia [Neck Pain] : no neck pain [Dysphonia] : no dysphonia [Chest Pain] : no chest pain [Palpitations] : no palpitations [Constipation] : no constipation [Diarrhea] : no diarrhea [Polyuria] : no polyuria [Headaches] : no headaches [Tremors] : no tremors [Depression] : no depression [Anxiety] : no anxiety [Polydipsia] : no polydipsia

## 2024-01-10 ENCOUNTER — APPOINTMENT (OUTPATIENT)
Dept: FAMILY MEDICINE | Facility: CLINIC | Age: 79
End: 2024-01-10
Payer: MEDICARE

## 2024-01-10 VITALS
HEIGHT: 62 IN | DIASTOLIC BLOOD PRESSURE: 44 MMHG | WEIGHT: 166 LBS | OXYGEN SATURATION: 99 % | BODY MASS INDEX: 30.55 KG/M2 | HEART RATE: 71 BPM | RESPIRATION RATE: 16 BRPM | SYSTOLIC BLOOD PRESSURE: 118 MMHG

## 2024-01-10 VITALS — DIASTOLIC BLOOD PRESSURE: 60 MMHG | SYSTOLIC BLOOD PRESSURE: 142 MMHG

## 2024-01-10 DIAGNOSIS — E11.51 TYPE 2 DIABETES MELLITUS WITH DIABETIC PERIPHERAL ANGIOPATHY W/OUT GANGRENE: ICD-10-CM

## 2024-01-10 DIAGNOSIS — I50.9 HEART FAILURE, UNSPECIFIED: ICD-10-CM

## 2024-01-10 DIAGNOSIS — N39.0 URINARY TRACT INFECTION, SITE NOT SPECIFIED: ICD-10-CM

## 2024-01-10 DIAGNOSIS — K92.2 GASTROINTESTINAL HEMORRHAGE, UNSPECIFIED: ICD-10-CM

## 2024-01-10 DIAGNOSIS — I42.8 OTHER CARDIOMYOPATHIES: ICD-10-CM

## 2024-01-10 DIAGNOSIS — I73.9 PERIPHERAL VASCULAR DISEASE, UNSPECIFIED: ICD-10-CM

## 2024-01-10 PROCEDURE — 99215 OFFICE O/P EST HI 40 MIN: CPT | Mod: 25

## 2024-01-10 PROCEDURE — 36415 COLL VENOUS BLD VENIPUNCTURE: CPT

## 2024-01-10 NOTE — DATA REVIEWED
[FreeTextEntry1] : Labs on admission 12/11/2023 BUN 71.2 creatinine 2.17 potassium 4.0 Feces occult blood positive Labs 12/12/2023: WBC 6.12 hemoglobin 11.2 hematocrit 34.6 platelets 169 BUN 52 creatinine 1.46 LFTs WNL Hemoglobin A1c 8.2% Total cholesterol 124 triglycerides 112 HDL 33 LDL 69

## 2024-01-10 NOTE — PHYSICAL EXAM
[No Acute Distress] : no acute distress [No JVD] : no jugular venous distention [No Lymphadenopathy] : no lymphadenopathy [Supple] : supple [Normal Rate] : normal [Rhythm Regular] : regular [Normal S1] : normal S1 [Normal S2] : normal S2 [II] : a grade 2 [Rounded] : rounded [Soft, Nontender] : the abdomen was soft and nontender [No Mass] : no masses were palpated [No HSM] : no hepatosplenomegaly noted [None] : no CVA tenderness [No Rash] : no rash [No Focal Deficits] : no focal deficits [Normal Mood] : the mood was normal [Normal] : affect was normal and insight and judgment were intact [de-identified] : Trace edema LEs. B/L carotid bruits [de-identified] : Right knee with tenderness to palpation at medial aspect. No edema. Mild crepitus. FROM. Neg McMurrays test. Neg ant/post draw test.

## 2024-01-10 NOTE — HISTORY OF PRESENT ILLNESS
[FreeTextEntry1] : Pt was seen by her endocrinologist 1 month ago and mentioned that she had black stools for 3 days. She was sent to ER on 12/11/23 and admitted for tx. Upper endo revealed several areas of bleeding. These were cauterized pt d/carley home in stable condition. She has a known hx of colonic AVMs, Black BMs stopped after cautery and pt restarted her ASA 81 mg/d. While in hospital, Candesartan was held due to elevated creatinine. She is wondering if she should restart it. She has noticed mild increase in LE edema since holding the Candesartan. She denies any cp or sob. LE pain is unchanged and she attributes to lumbar radiculopathy. Pt was seen in UC 2 wks later on 12/27 for UTI and was noted to have high bp of 191/70 and 188/67. She was txed with Macrobid and urine symptoms resolved. Home bps have [id number] / [id number].

## 2024-01-10 NOTE — ASSESSMENT
[FreeTextEntry1] : Recent UGI bleed with CKD exacerbation, off candesartan. Check for H&H stability and renal function. Pt to continue to monitor for melena recurrence and to seek immediate medical attn if it recurs. Will consider restart of Candesartan pending lab results. F/U in 1 month. Labs drawn in office.

## 2024-01-12 LAB
ALBUMIN SERPL ELPH-MCNC: 4.5 G/DL
ALP BLD-CCNC: 77 U/L
ALT SERPL-CCNC: 21 U/L
ANION GAP SERPL CALC-SCNC: 16 MMOL/L
AST SERPL-CCNC: 22 U/L
BASOPHILS # BLD AUTO: 0.07 K/UL
BASOPHILS NFR BLD AUTO: 0.8 %
BILIRUB SERPL-MCNC: 0.5 MG/DL
BUN SERPL-MCNC: 35 MG/DL
CALCIUM SERPL-MCNC: 9.3 MG/DL
CHLORIDE SERPL-SCNC: 102 MMOL/L
CO2 SERPL-SCNC: 24 MMOL/L
CREAT SERPL-MCNC: 1.24 MG/DL
EGFR: 44 ML/MIN/1.73M2
EOSINOPHIL # BLD AUTO: 0.21 K/UL
EOSINOPHIL NFR BLD AUTO: 2.3 %
FERRITIN SERPL-MCNC: 179 NG/ML
GLUCOSE SERPL-MCNC: 137 MG/DL
HCT VFR BLD CALC: 34.5 %
HGB BLD-MCNC: 10.9 G/DL
IMM GRANULOCYTES NFR BLD AUTO: 0.4 %
IRON SATN MFR SERPL: 14 %
IRON SERPL-MCNC: 44 UG/DL
LYMPHOCYTES # BLD AUTO: 1.28 K/UL
LYMPHOCYTES NFR BLD AUTO: 14.1 %
MAN DIFF?: NORMAL
MCHC RBC-ENTMCNC: 31.6 GM/DL
MCHC RBC-ENTMCNC: 31.9 PG
MCV RBC AUTO: 100.9 FL
MONOCYTES # BLD AUTO: 0.7 K/UL
MONOCYTES NFR BLD AUTO: 7.7 %
NEUTROPHILS # BLD AUTO: 6.76 K/UL
NEUTROPHILS NFR BLD AUTO: 74.7 %
PLATELET # BLD AUTO: 213 K/UL
POTASSIUM SERPL-SCNC: 4.3 MMOL/L
PROT SERPL-MCNC: 7.2 G/DL
RBC # BLD: 3.42 M/UL
RBC # FLD: 15.9 %
SODIUM SERPL-SCNC: 141 MMOL/L
TIBC SERPL-MCNC: 311 UG/DL
TRANSFERRIN SERPL-MCNC: 236 MG/DL
TSH SERPL-ACNC: 1.32 UIU/ML
UIBC SERPL-MCNC: 266 UG/DL
VIT B12 SERPL-MCNC: 1190 PG/ML
WBC # FLD AUTO: 9.06 K/UL

## 2024-01-16 RX ORDER — BLOOD SUGAR DIAGNOSTIC
STRIP MISCELLANEOUS
Qty: 300 | Refills: 1 | Status: ACTIVE | COMMUNITY
Start: 2020-06-17 | End: 1900-01-01

## 2024-02-05 ENCOUNTER — RX RENEWAL (OUTPATIENT)
Age: 79
End: 2024-02-05

## 2024-02-14 ENCOUNTER — APPOINTMENT (OUTPATIENT)
Dept: FAMILY MEDICINE | Facility: CLINIC | Age: 79
End: 2024-02-14

## 2024-03-11 ENCOUNTER — APPOINTMENT (OUTPATIENT)
Dept: FAMILY MEDICINE | Facility: CLINIC | Age: 79
End: 2024-03-11
Payer: MEDICARE

## 2024-03-11 VITALS
TEMPERATURE: 97.8 F | HEIGHT: 62 IN | DIASTOLIC BLOOD PRESSURE: 60 MMHG | OXYGEN SATURATION: 98 % | HEART RATE: 70 BPM | WEIGHT: 160 LBS | SYSTOLIC BLOOD PRESSURE: 116 MMHG | BODY MASS INDEX: 29.44 KG/M2

## 2024-03-11 DIAGNOSIS — B37.2 CANDIDIASIS OF SKIN AND NAIL: ICD-10-CM

## 2024-03-11 DIAGNOSIS — D50.0 IRON DEFICIENCY ANEMIA SECONDARY TO BLOOD LOSS (CHRONIC): ICD-10-CM

## 2024-03-11 DIAGNOSIS — I70.1 ATHEROSCLEROSIS OF RENAL ARTERY: ICD-10-CM

## 2024-03-11 DIAGNOSIS — I70.209 UNSPECIFIED ATHEROSCLEROSIS OF NATIVE ARTERIES OF EXTREMITIES, UNSPECIFIED EXTREMITY: ICD-10-CM

## 2024-03-11 LAB
BILIRUB UR QL STRIP: NEGATIVE
CLARITY UR: NORMAL
COLLECTION METHOD: NORMAL
GLUCOSE UR-MCNC: NEGATIVE
HCG UR QL: 0.2 EU/DL
HGB UR QL STRIP.AUTO: ABNORMAL
KETONES UR-MCNC: NEGATIVE
LEUKOCYTE ESTERASE UR QL STRIP: ABNORMAL
NITRITE UR QL STRIP: NEGATIVE
PH UR STRIP: 5.5
PROT UR STRIP-MCNC: 30
SP GR UR STRIP: <=1.005

## 2024-03-11 PROCEDURE — G2211 COMPLEX E/M VISIT ADD ON: CPT

## 2024-03-11 PROCEDURE — 99214 OFFICE O/P EST MOD 30 MIN: CPT

## 2024-03-11 PROCEDURE — 81003 URINALYSIS AUTO W/O SCOPE: CPT | Mod: QW

## 2024-03-11 RX ORDER — TRAMADOL HYDROCHLORIDE 50 MG/1
50 TABLET, COATED ORAL
Qty: 12 | Refills: 0 | Status: DISCONTINUED | COMMUNITY
Start: 2023-08-08 | End: 2024-03-11

## 2024-03-11 RX ORDER — CANDESARTAN CILEXETIL 8 MG/1
8 TABLET ORAL
Qty: 90 | Refills: 0 | Status: ACTIVE | COMMUNITY
Start: 2021-03-15 | End: 1900-01-01

## 2024-03-11 RX ORDER — ESTRADIOL 0.1 MG/G
0.1 CREAM VAGINAL
Refills: 0 | Status: DISCONTINUED | COMMUNITY
Start: 2024-02-15 | End: 2024-03-11

## 2024-03-11 RX ORDER — EMPAGLIFLOZIN 10 MG/1
10 TABLET, FILM COATED ORAL DAILY
Qty: 90 | Refills: 1 | Status: DISCONTINUED | COMMUNITY
Start: 2021-09-21 | End: 2024-03-11

## 2024-03-11 RX ORDER — GABAPENTIN 100 MG/1
100 CAPSULE ORAL
Qty: 180 | Refills: 1 | Status: ACTIVE | COMMUNITY
Start: 2019-07-02 | End: 1900-01-01

## 2024-03-11 RX ORDER — NYSTATIN 100000 [USP'U]/G
100000 CREAM TOPICAL TWICE DAILY
Qty: 1 | Refills: 1 | Status: ACTIVE | COMMUNITY
Start: 2024-03-11 | End: 1900-01-01

## 2024-03-11 RX ORDER — ALLOPURINOL 100 MG/1
100 TABLET ORAL
Qty: 90 | Refills: 1 | Status: DISCONTINUED | COMMUNITY
Start: 2022-08-10 | End: 2024-03-11

## 2024-03-19 ENCOUNTER — APPOINTMENT (OUTPATIENT)
Dept: GASTROENTEROLOGY | Facility: CLINIC | Age: 79
End: 2024-03-19
Payer: MEDICARE

## 2024-03-19 VITALS
RESPIRATION RATE: 14 BRPM | OXYGEN SATURATION: 98 % | HEIGHT: 62 IN | DIASTOLIC BLOOD PRESSURE: 70 MMHG | SYSTOLIC BLOOD PRESSURE: 115 MMHG | HEART RATE: 74 BPM | BODY MASS INDEX: 29.81 KG/M2 | WEIGHT: 162 LBS

## 2024-03-19 DIAGNOSIS — K31.819 ANGIODYSPLASIA OF STOMACH AND DUODENUM W/OUT BLEEDING: ICD-10-CM

## 2024-03-19 DIAGNOSIS — D50.9 IRON DEFICIENCY ANEMIA, UNSPECIFIED: ICD-10-CM

## 2024-03-19 LAB
ANION GAP SERPL CALC-SCNC: 13 MMOL/L
APPEARANCE: ABNORMAL
BACTERIA UR CULT: NORMAL
BACTERIA: NEGATIVE /HPF
BASOPHILS # BLD AUTO: 0.09 K/UL
BASOPHILS NFR BLD AUTO: 0.8 %
BILIRUBIN URINE: NEGATIVE
BLOOD URINE: ABNORMAL
BUN SERPL-MCNC: 34 MG/DL
CALCIUM SERPL-MCNC: 10 MG/DL
CAST: 0 /LPF
CHLORIDE SERPL-SCNC: 103 MMOL/L
CO2 SERPL-SCNC: 24 MMOL/L
COLOR: YELLOW
CREAT SERPL-MCNC: 1.34 MG/DL
EGFR: 40 ML/MIN/1.73M2
EOSINOPHIL # BLD AUTO: 0.26 K/UL
EOSINOPHIL NFR BLD AUTO: 2.3 %
EPITHELIAL CELLS: 2 /HPF
FERRITIN SERPL-MCNC: 245 NG/ML
GLUCOSE QUALITATIVE U: NEGATIVE MG/DL
GLUCOSE SERPL-MCNC: 72 MG/DL
HCT VFR BLD CALC: 35.8 %
HGB BLD-MCNC: 11.2 G/DL
IMM GRANULOCYTES NFR BLD AUTO: 0.4 %
IRON SATN MFR SERPL: 20 %
IRON SERPL-MCNC: 54 UG/DL
KETONES URINE: NEGATIVE MG/DL
LEUKOCYTE ESTERASE URINE: ABNORMAL
LYMPHOCYTES # BLD AUTO: 1.44 K/UL
LYMPHOCYTES NFR BLD AUTO: 12.8 %
MAN DIFF?: NORMAL
MCHC RBC-ENTMCNC: 30.4 PG
MCHC RBC-ENTMCNC: 31.3 GM/DL
MCV RBC AUTO: 97.3 FL
MICROSCOPIC-UA: NORMAL
MONOCYTES # BLD AUTO: 0.82 K/UL
MONOCYTES NFR BLD AUTO: 7.3 %
NEUTROPHILS # BLD AUTO: 8.63 K/UL
NEUTROPHILS NFR BLD AUTO: 76.4 %
NITRITE URINE: NEGATIVE
PH URINE: 6
PLATELET # BLD AUTO: 247 K/UL
POTASSIUM SERPL-SCNC: 4.4 MMOL/L
PROTEIN URINE: NORMAL MG/DL
RBC # BLD: 3.68 M/UL
RBC # FLD: 15.9 %
RED BLOOD CELLS URINE: 1 /HPF
REVIEW: NORMAL
SODIUM SERPL-SCNC: 140 MMOL/L
SPECIFIC GRAVITY URINE: 1.01
TIBC SERPL-MCNC: 276 UG/DL
TRANSFERRIN SERPL-MCNC: 230 MG/DL
UIBC SERPL-MCNC: 222 UG/DL
UROBILINOGEN URINE: 0.2 MG/DL
WBC # FLD AUTO: 11.28 K/UL
WHITE BLOOD CELLS URINE: 612 /HPF

## 2024-03-19 PROCEDURE — 99214 OFFICE O/P EST MOD 30 MIN: CPT

## 2024-03-19 NOTE — ASSESSMENT
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  Recommendations: Continue current medical management with repeat CBC every 3 to 6 months.  She is to return here in 3 to 6 months time for follow-up evaluation or sooner if necessary and appeared to understand all of the above instructions, information, and management plan.

## 2024-03-19 NOTE — HISTORY OF PRESENT ILLNESS
[de-identified] : May 2023.  Normal EGD.  2021.  Normal EGD.  December 2023.  EGD with enteroscopy and APC obliteration of duodenal AVMs. [de-identified] : December 2023.  Duodenal AVMs that were obliterated with APC. [FreeTextEntry1] : May 2023.  Normal colonoscopy.  2021.  Negative colonoscopy.  2013.  Colon polyps. [de-identified] : 2021.  Negative video capsule endoscopy.

## 2024-03-19 NOTE — PHYSICAL EXAM
[Alert] : alert [Normal Voice/Communication] : normal voice/communication [Healthy Appearing] : healthy appearing [No Acute Distress] : no acute distress [Well Developed] : well developed [Well Nourished] : well nourished [Sclera] : the sclera and conjunctiva were normal [Hearing Threshold Finger Rub Not [place]] : hearing was normal [Normal Lips/Gums] : the lips and gums were normal [Oropharynx] : the oropharynx was normal [Normal Appearance] : the appearance of the neck was normal [No Neck Mass] : no neck mass was observed [No Respiratory Distress] : no respiratory distress [No Acc Muscle Use] : no accessory muscle use [Respiration, Rhythm And Depth] : normal respiratory rhythm and effort [Auscultation Breath Sounds / Voice Sounds] : lungs were clear to auscultation bilaterally [Heart Rate And Rhythm] : heart rate was normal and rhythm regular [Normal S1, S2] : normal S1 and S2 [Murmurs] : no murmurs [Bowel Sounds] : normal bowel sounds [No Masses] : no abdominal mass palpated [Abdomen Tenderness] : non-tender [Abdomen Soft] : soft [] : no hepatosplenomegaly [Oriented To Time, Place, And Person] : oriented to person, place, and time [de-identified] : Deferred at this time.

## 2024-03-19 NOTE — REASON FOR VISIT
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

## 2024-03-21 ENCOUNTER — RX RENEWAL (OUTPATIENT)
Age: 79
End: 2024-03-21

## 2024-04-04 ENCOUNTER — RX RENEWAL (OUTPATIENT)
Age: 79
End: 2024-04-04

## 2024-04-04 PROBLEM — R35.0 URINE FREQUENCY: Status: ACTIVE | Noted: 2024-03-11

## 2024-04-04 RX ORDER — ERGOCALCIFEROL 1.25 MG/1
1.25 MG CAPSULE, LIQUID FILLED ORAL
Qty: 6 | Refills: 0 | Status: ACTIVE | COMMUNITY
Start: 2018-10-08 | End: 1900-01-01

## 2024-04-04 NOTE — OB HISTORY
[Vaginal ___] : [unfilled] vaginal delivery(s) [Approximately ___ (Month)] : the LMP was approximately [unfilled] month(s) ago [Last Pap Smear ___] : date of last pap smear was on [unfilled] [Abnormal Pap Smear] : abnormal pap smear [Taking Estrogens] : is not taking estrogen replacement [Sexually Active] : is not sexually active [FreeTextEntry1] : largest baby: 7lbs 6oz

## 2024-04-05 LAB
HBA1C MFR BLD HPLC: 7.9
LDLC SERPL DIRECT ASSAY-MCNC: 51
MICROALBUMIN/CREAT 24H UR-RTO: 174
TSH SERPL-ACNC: 1.36

## 2024-04-08 ENCOUNTER — APPOINTMENT (OUTPATIENT)
Dept: ENDOCRINOLOGY | Facility: CLINIC | Age: 79
End: 2024-04-08
Payer: MEDICARE

## 2024-04-08 VITALS
HEIGHT: 62 IN | OXYGEN SATURATION: 98 % | SYSTOLIC BLOOD PRESSURE: 148 MMHG | BODY MASS INDEX: 29.44 KG/M2 | HEART RATE: 63 BPM | DIASTOLIC BLOOD PRESSURE: 60 MMHG | WEIGHT: 160 LBS

## 2024-04-08 DIAGNOSIS — E11.40 TYPE 2 DIABETES MELLITUS WITH DIABETIC NEUROPATHY, UNSPECIFIED: ICD-10-CM

## 2024-04-08 DIAGNOSIS — Z79.4 TYPE 2 DIABETES MELLITUS WITH DIABETIC NEUROPATHY, UNSPECIFIED: ICD-10-CM

## 2024-04-08 LAB — GLUCOSE BLDC GLUCOMTR-MCNC: 142

## 2024-04-08 PROCEDURE — G2211 COMPLEX E/M VISIT ADD ON: CPT

## 2024-04-08 PROCEDURE — 82962 GLUCOSE BLOOD TEST: CPT

## 2024-04-08 PROCEDURE — 99214 OFFICE O/P EST MOD 30 MIN: CPT

## 2024-04-08 NOTE — HISTORY OF PRESENT ILLNESS
[FreeTextEntry1] : Quality:  Type 2.    Severity:  Moderate Duration:  26 years Associated Symptoms:  Retinopathy. Hypoglycemia. Nephropathy Modifying Factors:  Better with diet.    Notes:  Current regimen: 	metformin 1000 bid - stopped due to renal insufficiency 	glimepiride 2 bid - off  	januvia, precose ineffective. On avandia in the past. tresiba 36 novolog before meals: 		150-199 2                                  200-249 4                                  250-299 6                                  300+       8 jardiance 10- stopped due to UTI  Routine cardiac evaluation-fluid around the heart. Eventual w/u included PET scan and sonography revealing subcentimeric thyroid nodules, pulmonary abnormality, and fatty liver. SHASHA 1:160, speckled, no diagnosis made by rheumatology.      Diet:  weight watchers  Weight History:   losing weight  Blood Glucose Testing Information - pt. reports variable glucose levels, and some lows after eating  Patient is using the  meter and is testing 4 times per day.  Past Medical History Notes:  ASHD, pacemaker, defibrillator.  PVD, s/p stents.  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

## 2024-04-08 NOTE — ASSESSMENT
[FreeTextEntry1] : DM type 2, insulin treated, stable control. Discussed avoidance of hypoglycemia and option of sensor use.

## 2024-04-10 NOTE — HISTORY OF PRESENT ILLNESS
[FreeTextEntry1] : Patient had UTI on 12/2/23 then hospitalized for protein in urine and dark stools with hx of bleeding, had endoscopy and colonoscopy. bleeding was cauterized   Quality: Type 2. Severity: Moderate Duration: 26 years Associated Symptoms: Retinopathy. Hypoglycemia. Nephropathy Modifying Factors: Better with diet. Notes: Current regimen:  metformin 1000 bid - stopped due to renal insufficiency  glimepiride 2 bid - off  januvia, precose ineffective. On avandia in the past. Tresiba 40 units at bedtime  novolog before meals:   150-199 2     200-249 4     250-299 6     300+ 8 jardiance 10 -> stopped after hospitalization due to renal function and recent UTIs   Routine cardiac evaluation-fluid around the heart. Eventual w/u included PET scan and sonography revealing subcentimeric thyroid nodules, pulmonary abnormality, and fatty liver. SHASHA 1:160, speckled, no diagnosis made by rheumatology.     Diet: weight watchers Weight History: gained a couple of pounds  Blood Glucose Testing Information - per logs - fasting 121-218, before lunch , dinner 106-233, 163-208/ Patient is using the meter and is testing 4 times per day. Current   Labs from [facility] on 12/12/23 Cr 1.4 GFR 36 A1c 8.2  Past Medical History Notes: ASHD, pacemaker, defibrillator. Needs to have lead replaced PVD, s/p stents. 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

## 2024-04-11 ENCOUNTER — APPOINTMENT (OUTPATIENT)
Dept: UROGYNECOLOGY | Facility: CLINIC | Age: 79
End: 2024-04-11
Payer: MEDICARE

## 2024-04-11 ENCOUNTER — APPOINTMENT (OUTPATIENT)
Dept: UROGYNECOLOGY | Facility: CLINIC | Age: 79
End: 2024-04-11

## 2024-04-11 VITALS
SYSTOLIC BLOOD PRESSURE: 168 MMHG | DIASTOLIC BLOOD PRESSURE: 70 MMHG | BODY MASS INDEX: 30 KG/M2 | HEART RATE: 73 BPM | HEIGHT: 62 IN | WEIGHT: 163 LBS

## 2024-04-11 DIAGNOSIS — R35.0 FREQUENCY OF MICTURITION: ICD-10-CM

## 2024-04-11 DIAGNOSIS — Z80.0 FAMILY HISTORY OF MALIGNANT NEOPLASM OF DIGESTIVE ORGANS: ICD-10-CM

## 2024-04-11 LAB
BILIRUB UR QL STRIP: NEGATIVE
CLARITY UR: CLEAR
COLLECTION METHOD: NORMAL
GLUCOSE UR-MCNC: NEGATIVE
HCG UR QL: 0.2 EU/DL
HGB UR QL STRIP.AUTO: NORMAL
KETONES UR-MCNC: NEGATIVE
LEUKOCYTE ESTERASE UR QL STRIP: NORMAL
NITRITE UR QL STRIP: NEGATIVE
PH UR STRIP: 5.5
PROT UR STRIP-MCNC: 100
SP GR UR STRIP: 1.02

## 2024-04-11 PROCEDURE — 99459 PELVIC EXAMINATION: CPT

## 2024-04-11 PROCEDURE — 99213 OFFICE O/P EST LOW 20 MIN: CPT

## 2024-04-11 PROCEDURE — 81003 URINALYSIS AUTO W/O SCOPE: CPT | Mod: QW

## 2024-04-11 RX ORDER — PNV NO.95/FERROUS FUM/FOLIC AC 28MG-0.8MG
100 TABLET ORAL
Refills: 0 | Status: ACTIVE | COMMUNITY

## 2024-04-11 NOTE — PROCEDURE
[Straight Catheterization] : insertion of a straight catheter [Urinary Retention] : urinary retention [No Complications] : no complications [Tolerated Well] : the patient tolerated the procedure well [Post procedure instructions and information given] : Post procedure instructions and information were given and reviewed with patient. [Good Fit] : fits well [Pessary Inserted] : inserted [None] : no bleeding [Refit] : refit needed [Discharge] : no vaginal discharge [Infection] : no evidence of infection [FreeTextEntry1] : #3 ring with support [de-identified] : #2 ring with support

## 2024-04-11 NOTE — REASON FOR VISIT
[Questionnaire Received] : Patient questionnaire received [Painful Urination] : painful urination [Pelvic Organ Prolapse] : pelvic organ prolapse [Urinary Urgency] : urinary urgency [Urine Frequency] : urine frequency

## 2024-04-11 NOTE — PHYSICAL EXAM
[Chaperone Present] : A chaperone was present in the examining room during all aspects of the physical examination [[id number]] : A chaperone was present during the pelvic exam. [Normal] : was normal [Normal Appearance] : general appearance was normal [Atrophy] : atrophy [Aa ____] : Aa [unfilled] [Ba ____] : Ba [unfilled] [C ____] : C [unfilled] [GH ____] : GH [unfilled] [PB ____] : PB [unfilled] [TVL ____] : TVL  [unfilled] [Ap ____] : Ap [unfilled] [Bp ____] : Bp [unfilled] [D ____] : D [unfilled] [Post Void Residual ____ml] : post void residual was [unfilled] ml [FreeTextEntry2] : Maria Antonia [FreeTextEntry3] : small caruncle

## 2024-04-11 NOTE — HISTORY OF PRESENT ILLNESS
[FreeTextEntry1] : Tahira has been dealing with prolapse for many years.  It has been largely managed with a pessary over the last 6 years since our initial visit.  She was last seen here in January 2023 where she had some vaginal irritation and a decision was made to keep the pessary out.  She has not been seen in our office since then for the last 15 months.  She presents today for follow-up. She feels the prolapse remained reduced for almost a year. She noticed the prolapse came back in December r2023. She also developed some burning and was found to have UTI that was treated with antibiotics. She sees nephrology and urology. She is taking d mannose. She was prescribed estrogen cream but because of concerns for cancer she opted not to use it. She feels she empties fully. She has some urgency and will sometimes leak with a strong urge. She denies leaking with cough or sneeze. Moving bowels well. She was using a ring with support # 5. She saw primary who said she had a cyst on her urethra so she wanted to be checked. She denies any vaginal bleeding.

## 2024-04-11 NOTE — DISCUSSION/SUMMARY
[FreeTextEntry1] : Tahira has a long history of pelvic organ prolapse that was managed with a pessary.  In the past she has had elevated PVR's but it was not elevated today. She has a complicated medical history so she is not a good surgical candidate.  She does have some vaginal burning and a small urethral caruncle.  We talked about the systemic side effects of a small amount of vaginal estrogen and I printed out some literature on this.  Her urine was quite cloudy and I sent it to the lab for culture.  I did recommend an office cystoscopy.  I put in a #3 ring with support.  She had a #5 in the past however the #5 was way too big.  She walked around a little bit with the #3 and it fell out however when I put it back in, I could see that it was right at her introitus.  Her anatomy has changed somewhat so I felt like a smaller pessary would be better so I put a #2 ring with support.  She is scheduled to come back to see our NP here in the office to do a pessary check.  If this pessary falls out then we will try a different style.

## 2024-04-12 LAB
APPEARANCE: ABNORMAL
BACTERIA: ABNORMAL /HPF
BILIRUBIN URINE: NEGATIVE
BLOOD URINE: ABNORMAL
CAST: 2 /LPF
COLOR: YELLOW
EPITHELIAL CELLS: 1 /HPF
GLUCOSE QUALITATIVE U: NEGATIVE MG/DL
KETONES URINE: NEGATIVE MG/DL
LEUKOCYTE ESTERASE URINE: ABNORMAL
MICROSCOPIC-UA: NORMAL
NITRITE URINE: NEGATIVE
PH URINE: 5.5
PROTEIN URINE: 100 MG/DL
RED BLOOD CELLS URINE: 0 /HPF
REVIEW: NORMAL
SPECIFIC GRAVITY URINE: 1.02
UROBILINOGEN URINE: 0.2 MG/DL
WBC CLUMPS: PRESENT
WHITE BLOOD CELLS URINE: 685 /HPF

## 2024-04-15 LAB — BACTERIA UR CULT: NORMAL

## 2024-04-17 ENCOUNTER — RESULT CHARGE (OUTPATIENT)
Age: 79
End: 2024-04-17

## 2024-04-17 ENCOUNTER — APPOINTMENT (OUTPATIENT)
Dept: UROGYNECOLOGY | Facility: CLINIC | Age: 79
End: 2024-04-17
Payer: MEDICARE

## 2024-04-17 DIAGNOSIS — R30.0 DYSURIA: ICD-10-CM

## 2024-04-17 DIAGNOSIS — N81.9 FEMALE GENITAL PROLAPSE, UNSPECIFIED: ICD-10-CM

## 2024-04-17 DIAGNOSIS — N94.9 UNSPECIFIED CONDITION ASSOCIATED WITH FEMALE GENITAL ORGANS AND MENSTRUAL CYCLE: ICD-10-CM

## 2024-04-17 LAB
BILIRUB UR QL STRIP: NEGATIVE
COLLECTION METHOD: NORMAL
GLUCOSE UR-MCNC: NEGATIVE
HCG UR QL: 0.2 EU/DL
HGB UR QL STRIP.AUTO: NORMAL
KETONES UR-MCNC: NEGATIVE
LEUKOCYTE ESTERASE UR QL STRIP: NORMAL
NITRITE UR QL STRIP: NEGATIVE
PH UR STRIP: 6
PROT UR STRIP-MCNC: 300
SP GR UR STRIP: 1.02

## 2024-04-17 PROCEDURE — 51701 INSERT BLADDER CATHETER: CPT | Mod: 59

## 2024-04-17 PROCEDURE — 81003 URINALYSIS AUTO W/O SCOPE: CPT | Mod: QW

## 2024-04-17 PROCEDURE — 99214 OFFICE O/P EST MOD 30 MIN: CPT | Mod: 25

## 2024-04-17 NOTE — PHYSICAL EXAM
[No Acute Distress] : in no acute distress [Well developed] : well developed [Well Nourished] : ~L well nourished [Good Hygeine] : demonstrates good hygeine [Oriented x3] : oriented to person, place, and time [Normal Memory] : ~T memory was ~L unimpaired [Normal Mood/Affect] : mood and affect are normal [Soft, Nontender] : the abdomen was soft and nontender [Warm and Dry] : was warm and dry to touch [Normal Gait] : gait was normal [Vulvar Atrophy] : vulvar atrophy [Labia Majora] : were normal [Labia Minora] : were normal [Normal] : was normal [Normal Appearance] : general appearance was normal [Atrophy] : atrophy [No Bleeding] : there was no active vaginal bleeding [FreeTextEntry4] : short vaginal length

## 2024-04-17 NOTE — DISCUSSION/SUMMARY
[FreeTextEntry1] : Urine cloudy, in office dip +.  Will send CS and treat based on results.  Advised continue use of vaginal estrogen, adequate water intake and AZO PRN.  After long discussion, she does not want to proceed with pessary refitting today.  She will f/u in 2 months.  Discussed scheduling cysto.  Instructed to call with any questions or concerns and she verbalizes understanding.

## 2024-04-17 NOTE — PROCEDURE
[Straight Catheterization] : insertion of a straight catheter [Urinary Tract Infection] : a urinary tract infection [Patient] : the patient [___ Fr Straight Tip] : a [unfilled] in [place] straight tip catheter [None] : there were no complications with the catheter insertion [Cloudy] : cloudy [Culture] : culture [Urinalysis] : urinalysis [No Complications] : no complications [Tolerated Well] : the patient tolerated the procedure well [Post procedure instructions and information given] : Post procedure instructions and information were given and reviewed with patient. [0] : 0 [FreeTextEntry9] : PVR 30ml

## 2024-04-17 NOTE — HISTORY OF PRESENT ILLNESS
[FreeTextEntry1] : Pt had POP managed with RS #5 in the past.  This had been left out d/t bleeding 1/2023 and pt did not desire reinsertion.  Notes she felt the prolapse again 12/2023.  When she was seen last week, RS #2 pessary was inserted.  She was given rx for vaginal estrogen for small urethral caruncle.  Today she reports pessary fell out after about 2 days.  She denies that bulge is bothersome, just states she can feel it.  She does c/o dysuria and vaginal burning which has been going on for months, since her + culture in December.  Culture from last appointment negative.  Denies gross hematuria, back pain, fever or chills.  Did not schedule cysto yet.

## 2024-04-23 LAB — BACTERIA UR CULT: NORMAL

## 2024-04-24 LAB
APPEARANCE: ABNORMAL
BACTERIA: ABNORMAL /HPF
BILIRUBIN URINE: NEGATIVE
BLOOD URINE: ABNORMAL
COLOR: YELLOW
GLUCOSE QUALITATIVE U: NEGATIVE MG/DL
HYALINE CASTS: NORMAL
KETONES URINE: NEGATIVE MG/DL
LEUKOCYTE ESTERASE URINE: ABNORMAL
MICROSCOPIC-UA: NORMAL
NITRITE URINE: NEGATIVE
PH URINE: 5.5
PROTEIN URINE: 300 MG/DL
RED BLOOD CELLS URINE: 20 /HPF
SPECIFIC GRAVITY URINE: 1.02
SQUAMOUS EPITHELIAL CELLS: NORMAL
UROBILINOGEN URINE: 1 MG/DL
WHITE BLOOD CELLS URINE: ABNORMAL /HPF

## 2024-05-10 NOTE — H&P ADULT - NSHPPHYSICALEXAM_GEN_ALL_CORE
OBJECTIVE:  Vital Signs Last 24 Hrs  T(C): 36.7 (17 May 2023 15:52), Max: 36.7 (17 May 2023 13:08)  T(F): 98 (17 May 2023 15:52), Max: 98 (17 May 2023 13:08)  HR: 87 (17 May 2023 15:52) (69 - 87)  BP: 143/65 (17 May 2023 15:52) (126/46 - 143/65)  BP(mean): --  RR: 18 (17 May 2023 15:52) (18 - 20)  SpO2: 100% (17 May 2023 15:52) (98% - 100%)    Parameters below as of 17 May 2023 15:52  Patient On (Oxygen Delivery Method): room air        PHYSICAL EXAMINATION  General: Pale elderly female sitting up in bed, NAD  HEENT:  Conjunctival pallor  NECK:  Supple  CVS: regular rate and rhythm S1 S2  RESP:  CTAB  GI:  Soft nondistended nontender BS+  : No suprapubic tenderness  MSK:  FROM  CNS:  AAOX3. No gross global   deficit   INTEG:  Pale  PSYCH:  Fair mood Opt out

## 2024-05-13 ENCOUNTER — RX RENEWAL (OUTPATIENT)
Age: 79
End: 2024-05-13

## 2024-05-15 ENCOUNTER — APPOINTMENT (OUTPATIENT)
Dept: FAMILY MEDICINE | Facility: CLINIC | Age: 79
End: 2024-05-15
Payer: MEDICARE

## 2024-05-15 VITALS
WEIGHT: 160 LBS | HEIGHT: 62 IN | HEART RATE: 71 BPM | OXYGEN SATURATION: 96 % | DIASTOLIC BLOOD PRESSURE: 46 MMHG | SYSTOLIC BLOOD PRESSURE: 124 MMHG | BODY MASS INDEX: 29.44 KG/M2 | TEMPERATURE: 97.5 F

## 2024-05-15 DIAGNOSIS — N18.30 CHRONIC KIDNEY DISEASE, STAGE 3 UNSPECIFIED: ICD-10-CM

## 2024-05-15 DIAGNOSIS — I10 ESSENTIAL (PRIMARY) HYPERTENSION: ICD-10-CM

## 2024-05-15 DIAGNOSIS — D63.1 CHRONIC KIDNEY DISEASE, STAGE 3 UNSPECIFIED: ICD-10-CM

## 2024-05-15 DIAGNOSIS — E79.0 HYPERURICEMIA W/OUT SIGNS OF INFLAMMATORY ARTHRITIS AND TOPHACEOUS DISEASE: ICD-10-CM

## 2024-05-15 DIAGNOSIS — N36.2 URETHRAL CARUNCLE: ICD-10-CM

## 2024-05-15 DIAGNOSIS — N20.0 CALCULUS OF KIDNEY: ICD-10-CM

## 2024-05-15 PROCEDURE — G2211 COMPLEX E/M VISIT ADD ON: CPT

## 2024-05-15 PROCEDURE — 36415 COLL VENOUS BLD VENIPUNCTURE: CPT

## 2024-05-15 PROCEDURE — 99214 OFFICE O/P EST MOD 30 MIN: CPT

## 2024-05-15 NOTE — PHYSICAL EXAM
[No Acute Distress] : no acute distress [Normal Sclera/Conjunctiva] : normal sclera/conjunctiva [No Lymphadenopathy] : no lymphadenopathy [No Respiratory Distress] : no respiratory distress  [Clear to Auscultation] : lungs were clear to auscultation bilaterally [Normal Rate] : normal [Rhythm Regular] : regular [Normal S1] : normal S1 [Normal S2] : normal S2 [II] : a grade 2 [Soft] : abdomen soft [Non Tender] : non-tender [Non-distended] : non-distended [No Masses] : no abdominal mass palpated [No HSM] : no HSM [Normal Bowel Sounds] : normal bowel sounds [No Focal Deficits] : no focal deficits [Normal] : affect was normal and insight and judgment were intact [de-identified] : trace b/l LE edema

## 2024-05-15 NOTE — HISTORY OF PRESENT ILLNESS
[Spouse] : spouse [FreeTextEntry1] : The pt c/o urinary burning x several days. She was rxed Nitrofurantoin by her urologist and has taken it for 3 days now and symptoms are improving. She has a urethral caruncle and microhematuria. She had recent CT urogram that showed bladder thickening and b/l subcentimeter renal stones. She is scheduled for cystoscopy next month. She denies any fever or abdominal pain. +urine frequency.

## 2024-05-15 NOTE — PLAN
[FreeTextEntry1] : Contine with Nitrofurantoin and urogynecology f/u. F/U 2-3 months, sooner if worse. Labs drawn in office.    Pt encounter incorporated clinical review of the medical record including consultation from specialists, review of lab and diagnostic testing with interpretation and discussion of results with patient, general pt counseling, and coordination of care, as well as documentation update within the electronic medical record.  Time spent: 33 mins.

## 2024-05-16 ENCOUNTER — RX RENEWAL (OUTPATIENT)
Age: 79
End: 2024-05-16

## 2024-05-16 RX ORDER — INSULIN ASPART 100 [IU]/ML
100 INJECTION, SOLUTION INTRAVENOUS; SUBCUTANEOUS
Qty: 2 | Refills: 3 | Status: ACTIVE | COMMUNITY
Start: 2021-04-01 | End: 1900-01-01

## 2024-05-22 LAB
ANION GAP SERPL CALC-SCNC: 15 MMOL/L
BASOPHILS # BLD AUTO: 0.05 K/UL
BASOPHILS NFR BLD AUTO: 0.7 %
BUN SERPL-MCNC: 35 MG/DL
CALCIUM SERPL-MCNC: 9.7 MG/DL
CALCIUM SERPL-MCNC: 9.7 MG/DL
CHLORIDE SERPL-SCNC: 103 MMOL/L
CO2 SERPL-SCNC: 24 MMOL/L
CREAT SERPL-MCNC: 1.43 MG/DL
EGFR: 37 ML/MIN/1.73M2
EOSINOPHIL # BLD AUTO: 0.18 K/UL
EOSINOPHIL NFR BLD AUTO: 2.5 %
FERRITIN SERPL-MCNC: 315 NG/ML
GLUCOSE SERPL-MCNC: 142 MG/DL
HCT VFR BLD CALC: 34.9 %
HGB BLD-MCNC: 11.1 G/DL
IMM GRANULOCYTES NFR BLD AUTO: 0.4 %
IRON SATN MFR SERPL: 18 %
IRON SERPL-MCNC: 56 UG/DL
LYMPHOCYTES # BLD AUTO: 1.21 K/UL
LYMPHOCYTES NFR BLD AUTO: 16.7 %
MAN DIFF?: NORMAL
MCHC RBC-ENTMCNC: 30.8 PG
MCHC RBC-ENTMCNC: 31.8 GM/DL
MCV RBC AUTO: 96.9 FL
MONOCYTES # BLD AUTO: 0.5 K/UL
MONOCYTES NFR BLD AUTO: 6.9 %
NEUTROPHILS # BLD AUTO: 5.29 K/UL
NEUTROPHILS NFR BLD AUTO: 72.8 %
PARATHYROID HORMONE INTACT: 38 PG/ML
PLATELET # BLD AUTO: 211 K/UL
POTASSIUM SERPL-SCNC: 5 MMOL/L
RBC # BLD: 3.6 M/UL
RBC # FLD: 16.4 %
SODIUM SERPL-SCNC: 141 MMOL/L
TIBC SERPL-MCNC: 310 UG/DL
TRANSFERRIN SERPL-MCNC: 246 MG/DL
UIBC SERPL-MCNC: 254 UG/DL
WBC # FLD AUTO: 7.26 K/UL

## 2024-06-17 ENCOUNTER — RX RENEWAL (OUTPATIENT)
Age: 79
End: 2024-06-17

## 2024-06-17 ENCOUNTER — APPOINTMENT (OUTPATIENT)
Dept: UROGYNECOLOGY | Facility: CLINIC | Age: 79
End: 2024-06-17
Payer: MEDICARE

## 2024-06-17 DIAGNOSIS — R31.29 OTHER MICROSCOPIC HEMATURIA: ICD-10-CM

## 2024-06-17 PROCEDURE — 52000 CYSTOURETHROSCOPY: CPT

## 2024-06-17 RX ORDER — INSULIN DEGLUDEC INJECTION 100 U/ML
100 INJECTION, SOLUTION SUBCUTANEOUS
Qty: 5 | Refills: 1 | Status: ACTIVE | COMMUNITY
Start: 2024-01-03 | End: 1900-01-01

## 2024-07-08 ENCOUNTER — RX RENEWAL (OUTPATIENT)
Age: 79
End: 2024-07-08

## 2024-07-23 ENCOUNTER — RX RENEWAL (OUTPATIENT)
Age: 79
End: 2024-07-23

## 2024-08-07 ENCOUNTER — RX RENEWAL (OUTPATIENT)
Age: 79
End: 2024-08-07

## 2024-08-21 ENCOUNTER — APPOINTMENT (OUTPATIENT)
Dept: FAMILY MEDICINE | Facility: CLINIC | Age: 79
End: 2024-08-21
Payer: MEDICARE

## 2024-08-21 VITALS
SYSTOLIC BLOOD PRESSURE: 134 MMHG | HEART RATE: 64 BPM | WEIGHT: 158.5 LBS | DIASTOLIC BLOOD PRESSURE: 78 MMHG | OXYGEN SATURATION: 98 % | BODY MASS INDEX: 29.17 KG/M2 | HEIGHT: 62 IN

## 2024-08-21 DIAGNOSIS — E11.40 TYPE 2 DIABETES MELLITUS WITH DIABETIC NEUROPATHY, UNSPECIFIED: ICD-10-CM

## 2024-08-21 DIAGNOSIS — Z79.4 TYPE 2 DIABETES MELLITUS WITH DIABETIC NEUROPATHY, UNSPECIFIED: ICD-10-CM

## 2024-08-21 DIAGNOSIS — E11.51 TYPE 2 DIABETES MELLITUS WITH DIABETIC PERIPHERAL ANGIOPATHY W/OUT GANGRENE: ICD-10-CM

## 2024-08-21 DIAGNOSIS — N18.30 CHRONIC KIDNEY DISEASE, STAGE 3 UNSPECIFIED: ICD-10-CM

## 2024-08-21 DIAGNOSIS — D63.1 CHRONIC KIDNEY DISEASE, STAGE 3 UNSPECIFIED: ICD-10-CM

## 2024-08-21 DIAGNOSIS — E03.9 HYPOTHYROIDISM, UNSPECIFIED: ICD-10-CM

## 2024-08-21 DIAGNOSIS — U07.1 COVID-19: ICD-10-CM

## 2024-08-21 DIAGNOSIS — I10 ESSENTIAL (PRIMARY) HYPERTENSION: ICD-10-CM

## 2024-08-21 DIAGNOSIS — I42.8 OTHER CARDIOMYOPATHIES: ICD-10-CM

## 2024-08-21 PROCEDURE — G2211 COMPLEX E/M VISIT ADD ON: CPT

## 2024-08-21 PROCEDURE — 99214 OFFICE O/P EST MOD 30 MIN: CPT

## 2024-08-21 NOTE — PHYSICAL EXAM
[No Acute Distress] : no acute distress [Normal Sclera/Conjunctiva] : normal sclera/conjunctiva [No Lymphadenopathy] : no lymphadenopathy [No Respiratory Distress] : no respiratory distress  [Clear to Auscultation] : lungs were clear to auscultation bilaterally [Normal Rate] : normal [Rhythm Regular] : regular [Normal S1] : normal S1 [Normal S2] : normal S2 [II] : a grade 2 [No Edema] : there was no peripheral edema [1+] : left 1+ [Soft] : abdomen soft [Non Tender] : non-tender [Non-distended] : non-distended [No Masses] : no abdominal mass palpated [No HSM] : no HSM [Normal Bowel Sounds] : normal bowel sounds [No Focal Deficits] : no focal deficits [Normal] : affect was normal and insight and judgment were intact [de-identified] : No cyanosis LEs.

## 2024-08-21 NOTE — PLAN
[FreeTextEntry1] : Pt is stable. Continue current medications. Monitor for anemia. Has CKD and hx of GI bleed due to duodenal AVM and diverticulosis. Check lipid panel. Pt due for bw soon with orders from endocrine and nephrology. Pt to have above tests at her next blood draw. F/U 3 months.   Pt encounter incorporated clinical review of the medical record including consultation from specialists, review of lab and diagnostic testing with interpretation and discussion of results with patient, general pt counseling, and coordination of care, as well as documentation update within the electronic medical record.  Time spent: 30 mins.

## 2024-08-21 NOTE — PHYSICAL EXAM
[No Acute Distress] : no acute distress [Normal Sclera/Conjunctiva] : normal sclera/conjunctiva [No Lymphadenopathy] : no lymphadenopathy [No Respiratory Distress] : no respiratory distress  [Clear to Auscultation] : lungs were clear to auscultation bilaterally [Normal Rate] : normal [Rhythm Regular] : regular [Normal S1] : normal S1 [Normal S2] : normal S2 [II] : a grade 2 [No Edema] : there was no peripheral edema [1+] : left 1+ [Soft] : abdomen soft [Non Tender] : non-tender [Non-distended] : non-distended [No Masses] : no abdominal mass palpated [No HSM] : no HSM [Normal Bowel Sounds] : normal bowel sounds [No Focal Deficits] : no focal deficits [Normal] : affect was normal and insight and judgment were intact [de-identified] : No cyanosis LEs.

## 2024-08-21 NOTE — HISTORY OF PRESENT ILLNESS
[FreeTextEntry1] : Patient is following up on hypertension. Pt had Covid 1 month ago. Has some residual fatigue. Her blood sugars improved when she was on mediterranean diet. She could not keep up with the food prep with Covid and now just getting back to the mediterranean diet.

## 2024-09-02 ENCOUNTER — RX RENEWAL (OUTPATIENT)
Age: 79
End: 2024-09-02

## 2024-09-23 ENCOUNTER — APPOINTMENT (OUTPATIENT)
Dept: DERMATOLOGY | Facility: CLINIC | Age: 79
End: 2024-09-23
Payer: MEDICARE

## 2024-09-23 ENCOUNTER — RX RENEWAL (OUTPATIENT)
Age: 79
End: 2024-09-23

## 2024-09-23 PROCEDURE — 17000 DESTRUCT PREMALG LESION: CPT | Mod: 59

## 2024-09-23 PROCEDURE — 99213 OFFICE O/P EST LOW 20 MIN: CPT | Mod: 25

## 2024-09-23 PROCEDURE — 11102 TANGNTL BX SKIN SINGLE LES: CPT

## 2024-09-23 PROCEDURE — 17003 DESTRUCT PREMALG LES 2-14: CPT | Mod: 59

## 2024-10-12 LAB
HBA1C MFR BLD HPLC: 7.8
LDLC SERPL DIRECT ASSAY-MCNC: 77
MICROALBUMIN/CREAT 24H UR-RTO: 553
TSH SERPL-ACNC: 0.88

## 2024-10-14 ENCOUNTER — APPOINTMENT (OUTPATIENT)
Dept: ENDOCRINOLOGY | Facility: CLINIC | Age: 79
End: 2024-10-14
Payer: MEDICARE

## 2024-10-14 VITALS
DIASTOLIC BLOOD PRESSURE: 50 MMHG | HEART RATE: 69 BPM | SYSTOLIC BLOOD PRESSURE: 140 MMHG | WEIGHT: 161 LBS | BODY MASS INDEX: 29.63 KG/M2 | OXYGEN SATURATION: 99 % | HEIGHT: 62 IN

## 2024-10-14 DIAGNOSIS — E03.9 HYPOTHYROIDISM, UNSPECIFIED: ICD-10-CM

## 2024-10-14 DIAGNOSIS — E11.51 TYPE 2 DIABETES MELLITUS WITH DIABETIC PERIPHERAL ANGIOPATHY W/OUT GANGRENE: ICD-10-CM

## 2024-10-14 LAB — GLUCOSE BLDC GLUCOMTR-MCNC: 159

## 2024-10-14 PROCEDURE — 99214 OFFICE O/P EST MOD 30 MIN: CPT

## 2024-10-14 PROCEDURE — 82962 GLUCOSE BLOOD TEST: CPT

## 2024-10-14 PROCEDURE — G2211 COMPLEX E/M VISIT ADD ON: CPT

## 2024-10-14 RX ORDER — EMPAGLIFLOZIN 10 MG/1
10 TABLET, FILM COATED ORAL
Refills: 0 | Status: ACTIVE | COMMUNITY

## 2024-10-14 RX ORDER — SEMAGLUTIDE 0.68 MG/ML
2 INJECTION, SOLUTION SUBCUTANEOUS
Qty: 3 | Refills: 3 | Status: ACTIVE | COMMUNITY
Start: 2024-10-14 | End: 1900-01-01

## 2024-10-21 ENCOUNTER — APPOINTMENT (OUTPATIENT)
Dept: GASTROENTEROLOGY | Facility: CLINIC | Age: 79
End: 2024-10-21
Payer: MEDICARE

## 2024-10-21 VITALS
HEIGHT: 62 IN | OXYGEN SATURATION: 97 % | WEIGHT: 162 LBS | HEART RATE: 79 BPM | SYSTOLIC BLOOD PRESSURE: 170 MMHG | DIASTOLIC BLOOD PRESSURE: 58 MMHG | BODY MASS INDEX: 29.81 KG/M2

## 2024-10-21 DIAGNOSIS — D50.9 IRON DEFICIENCY ANEMIA, UNSPECIFIED: ICD-10-CM

## 2024-10-21 DIAGNOSIS — Z80.0 FAMILY HISTORY OF MALIGNANT NEOPLASM OF DIGESTIVE ORGANS: ICD-10-CM

## 2024-10-21 PROCEDURE — 99214 OFFICE O/P EST MOD 30 MIN: CPT

## 2024-10-31 NOTE — PLAN
[FreeTextEntry1] : Advised to continue medications as directed. Life style and diet modifications were discussed.\par Tolerated vaccine well 20-Oct-2024

## 2024-12-19 ENCOUNTER — RX RENEWAL (OUTPATIENT)
Age: 79
End: 2024-12-19

## 2025-01-08 ENCOUNTER — APPOINTMENT (OUTPATIENT)
Dept: UROGYNECOLOGY | Facility: CLINIC | Age: 80
End: 2025-01-08

## 2025-01-20 LAB
HBA1C MFR BLD HPLC: 7.4
LDLC SERPL DIRECT ASSAY-MCNC: 56
MICROALBUMIN/CREAT 24H UR-RTO: 171
TSH SERPL-ACNC: 0.62

## 2025-01-21 ENCOUNTER — APPOINTMENT (OUTPATIENT)
Dept: ENDOCRINOLOGY | Facility: CLINIC | Age: 80
End: 2025-01-21
Payer: MEDICARE

## 2025-01-21 VITALS
HEART RATE: 81 BPM | OXYGEN SATURATION: 98 % | HEIGHT: 62 IN | SYSTOLIC BLOOD PRESSURE: 110 MMHG | WEIGHT: 138 LBS | DIASTOLIC BLOOD PRESSURE: 64 MMHG | BODY MASS INDEX: 25.4 KG/M2

## 2025-01-21 DIAGNOSIS — E03.9 HYPOTHYROIDISM, UNSPECIFIED: ICD-10-CM

## 2025-01-21 DIAGNOSIS — E11.51 TYPE 2 DIABETES MELLITUS WITH DIABETIC PERIPHERAL ANGIOPATHY W/OUT GANGRENE: ICD-10-CM

## 2025-01-21 DIAGNOSIS — N18.30 CHRONIC KIDNEY DISEASE, STAGE 3 UNSPECIFIED: ICD-10-CM

## 2025-01-21 DIAGNOSIS — Z13.820 ENCOUNTER FOR SCREENING FOR OSTEOPOROSIS: ICD-10-CM

## 2025-01-21 DIAGNOSIS — Z79.4 TYPE 2 DIABETES MELLITUS WITH DIABETIC NEUROPATHY, UNSPECIFIED: ICD-10-CM

## 2025-01-21 DIAGNOSIS — I10 ESSENTIAL (PRIMARY) HYPERTENSION: ICD-10-CM

## 2025-01-21 DIAGNOSIS — E11.40 TYPE 2 DIABETES MELLITUS WITH DIABETIC NEUROPATHY, UNSPECIFIED: ICD-10-CM

## 2025-01-21 DIAGNOSIS — E78.5 HYPERLIPIDEMIA, UNSPECIFIED: ICD-10-CM

## 2025-01-21 DIAGNOSIS — M85.80 OTHER SPECIFIED DISORDERS OF BONE DENSITY AND STRUCTURE, UNSPECIFIED SITE: ICD-10-CM

## 2025-01-21 DIAGNOSIS — E11.319 TYPE 2 DIABETES MELLITUS WITH UNSPECIFIED DIABETIC RETINOPATHY W/OUT MACULAR EDEMA: ICD-10-CM

## 2025-01-21 LAB — GLUCOSE BLDC GLUCOMTR-MCNC: 100

## 2025-01-21 PROCEDURE — 99214 OFFICE O/P EST MOD 30 MIN: CPT

## 2025-01-21 PROCEDURE — G2211 COMPLEX E/M VISIT ADD ON: CPT

## 2025-01-21 PROCEDURE — 82962 GLUCOSE BLOOD TEST: CPT

## 2025-02-19 ENCOUNTER — APPOINTMENT (OUTPATIENT)
Dept: UROGYNECOLOGY | Facility: CLINIC | Age: 80
End: 2025-02-19
Payer: MEDICARE

## 2025-02-19 VITALS — HEART RATE: 82 BPM | DIASTOLIC BLOOD PRESSURE: 78 MMHG | SYSTOLIC BLOOD PRESSURE: 132 MMHG

## 2025-02-19 DIAGNOSIS — N95.2 POSTMENOPAUSAL ATROPHIC VAGINITIS: ICD-10-CM

## 2025-02-19 DIAGNOSIS — R39.15 URGENCY OF URINATION: ICD-10-CM

## 2025-02-19 DIAGNOSIS — N81.9 FEMALE GENITAL PROLAPSE, UNSPECIFIED: ICD-10-CM

## 2025-02-19 PROCEDURE — 99213 OFFICE O/P EST LOW 20 MIN: CPT

## 2025-02-19 RX ORDER — ESTRADIOL 0.1 MG/G
0.1 CREAM VAGINAL
Qty: 42.5 | Refills: 3 | Status: ACTIVE | COMMUNITY
Start: 2025-02-19 | End: 1900-01-01

## 2025-04-04 ENCOUNTER — APPOINTMENT (OUTPATIENT)
Dept: GASTROENTEROLOGY | Facility: CLINIC | Age: 80
End: 2025-04-04
Payer: MEDICARE

## 2025-04-04 VITALS
BODY MASS INDEX: 24.29 KG/M2 | SYSTOLIC BLOOD PRESSURE: 134 MMHG | OXYGEN SATURATION: 97 % | WEIGHT: 132 LBS | DIASTOLIC BLOOD PRESSURE: 52 MMHG | HEIGHT: 62 IN | HEART RATE: 85 BPM

## 2025-04-04 DIAGNOSIS — D50.9 IRON DEFICIENCY ANEMIA, UNSPECIFIED: ICD-10-CM

## 2025-04-04 PROCEDURE — 99213 OFFICE O/P EST LOW 20 MIN: CPT

## 2025-05-02 NOTE — ASSESSMENT
----- Message from Lamar Chopra PA-C sent at 5/2/2025  3:34 PM CDT -----  Fecal calprotectin normal, recommend OV follow up as scheduled    [FreeTextEntry1] : Cholesterol is increased significantly compared to last lipid test ordered by Dr. Pierre.\par Rec adherence to medication and low cholesterol diet and repeat in 3-4 months.\par Creatinine improved. \par Pt to f/u with Nephrologist

## 2025-05-16 LAB
HBA1C MFR BLD HPLC: 6.9
LDLC SERPL DIRECT ASSAY-MCNC: 59
TSH SERPL-ACNC: 0.59

## 2025-05-19 ENCOUNTER — APPOINTMENT (OUTPATIENT)
Dept: ENDOCRINOLOGY | Facility: CLINIC | Age: 80
End: 2025-05-19
Payer: MEDICARE

## 2025-05-19 VITALS
DIASTOLIC BLOOD PRESSURE: 70 MMHG | BODY MASS INDEX: 23.92 KG/M2 | WEIGHT: 130 LBS | HEIGHT: 62 IN | OXYGEN SATURATION: 98 % | HEART RATE: 75 BPM | SYSTOLIC BLOOD PRESSURE: 130 MMHG

## 2025-05-19 DIAGNOSIS — E03.9 HYPOTHYROIDISM, UNSPECIFIED: ICD-10-CM

## 2025-05-19 DIAGNOSIS — E11.40 TYPE 2 DIABETES MELLITUS WITH DIABETIC NEUROPATHY, UNSPECIFIED: ICD-10-CM

## 2025-05-19 DIAGNOSIS — Z79.4 TYPE 2 DIABETES MELLITUS WITH DIABETIC NEUROPATHY, UNSPECIFIED: ICD-10-CM

## 2025-05-19 LAB — GLUCOSE BLDC GLUCOMTR-MCNC: 111

## 2025-05-19 PROCEDURE — G2211 COMPLEX E/M VISIT ADD ON: CPT

## 2025-05-19 PROCEDURE — 99214 OFFICE O/P EST MOD 30 MIN: CPT

## 2025-05-19 PROCEDURE — 82962 GLUCOSE BLOOD TEST: CPT

## 2025-05-19 RX ORDER — AMLODIPINE BESYLATE 2.5 MG/1
2.5 TABLET ORAL
Refills: 0 | Status: ACTIVE | COMMUNITY

## 2025-05-19 RX ORDER — CANDESARTAN CILEXETIL 4 MG/1
4 TABLET ORAL DAILY
Refills: 0 | Status: ACTIVE | COMMUNITY

## 2025-05-19 RX ORDER — AMLODIPINE BESYLATE 2.5 MG/1
2.5 TABLET ORAL DAILY
Refills: 0 | Status: ACTIVE | COMMUNITY

## 2025-05-19 RX ORDER — CANDESARTAN CILEXETIL 4 MG/1
4 TABLET ORAL
Refills: 0 | Status: ACTIVE | COMMUNITY

## 2025-06-04 ENCOUNTER — RX RENEWAL (OUTPATIENT)
Age: 80
End: 2025-06-04

## 2025-08-15 LAB
HBA1C MFR BLD HPLC: 7
LDLC SERPL DIRECT ASSAY-MCNC: 60
MICROALBUMIN/CREAT 24H UR-RTO: 35
TSH SERPL-ACNC: 1.11

## 2025-08-18 ENCOUNTER — APPOINTMENT (OUTPATIENT)
Dept: ENDOCRINOLOGY | Facility: CLINIC | Age: 80
End: 2025-08-18
Payer: MEDICARE

## 2025-08-18 VITALS
SYSTOLIC BLOOD PRESSURE: 120 MMHG | BODY MASS INDEX: 23.55 KG/M2 | OXYGEN SATURATION: 99 % | HEART RATE: 76 BPM | WEIGHT: 128 LBS | DIASTOLIC BLOOD PRESSURE: 64 MMHG | HEIGHT: 62 IN

## 2025-08-18 DIAGNOSIS — E11.51 TYPE 2 DIABETES MELLITUS WITH DIABETIC PERIPHERAL ANGIOPATHY W/OUT GANGRENE: ICD-10-CM

## 2025-08-18 DIAGNOSIS — E03.9 HYPOTHYROIDISM, UNSPECIFIED: ICD-10-CM

## 2025-08-18 DIAGNOSIS — E78.5 HYPERLIPIDEMIA, UNSPECIFIED: ICD-10-CM

## 2025-08-18 DIAGNOSIS — E11.40 TYPE 2 DIABETES MELLITUS WITH DIABETIC NEUROPATHY, UNSPECIFIED: ICD-10-CM

## 2025-08-18 DIAGNOSIS — I10 ESSENTIAL (PRIMARY) HYPERTENSION: ICD-10-CM

## 2025-08-18 DIAGNOSIS — Z79.4 TYPE 2 DIABETES MELLITUS WITH DIABETIC NEUROPATHY, UNSPECIFIED: ICD-10-CM

## 2025-08-18 DIAGNOSIS — E11.319 TYPE 2 DIABETES MELLITUS WITH UNSPECIFIED DIABETIC RETINOPATHY W/OUT MACULAR EDEMA: ICD-10-CM

## 2025-08-18 DIAGNOSIS — E11.3293 TYPE 2 DIABETES MELLITUS WITH MILD NONPROLIFERATIVE DIABETIC RETINOPATHY WITHOUT MACULAR EDEMA, BILATERAL: ICD-10-CM

## 2025-08-18 DIAGNOSIS — N18.30 CHRONIC KIDNEY DISEASE, STAGE 3 UNSPECIFIED: ICD-10-CM

## 2025-08-18 DIAGNOSIS — Z13.820 ENCOUNTER FOR SCREENING FOR OSTEOPOROSIS: ICD-10-CM

## 2025-08-18 DIAGNOSIS — M85.80 OTHER SPECIFIED DISORDERS OF BONE DENSITY AND STRUCTURE, UNSPECIFIED SITE: ICD-10-CM

## 2025-08-18 LAB — GLUCOSE BLDC GLUCOMTR-MCNC: 84

## 2025-08-18 PROCEDURE — G2211 COMPLEX E/M VISIT ADD ON: CPT

## 2025-08-18 PROCEDURE — 82962 GLUCOSE BLOOD TEST: CPT

## 2025-08-18 PROCEDURE — 99214 OFFICE O/P EST MOD 30 MIN: CPT

## 2025-08-22 ENCOUNTER — RX RENEWAL (OUTPATIENT)
Age: 80
End: 2025-08-22

## (undated) DEVICE — SYR IV FLUSH SALINE 10ML 30/TY

## (undated) DEVICE — DENTURE CUP PINK

## (undated) DEVICE — SYR SLIP 10CC

## (undated) DEVICE — TUBING IV EXTENSION MACRO W CLAVE 7"

## (undated) DEVICE — MASK PROCED EARLOOP 50/BX LRC COVID ADD

## (undated) DEVICE — CATH IV SAFE BC 22G X 1" (BLUE)

## (undated) DEVICE — SOL BAG NS 0.9% 1000ML

## (undated) DEVICE — ELCTR GROUNDING PAD ADULT COVIDIEN

## (undated) DEVICE — MASK PROC EAR LOOP

## (undated) DEVICE — PACK IV START WITH CHG

## (undated) DEVICE — SOL IRR BAG H2O 1000ML

## (undated) DEVICE — VENODYNE/SCD SLEEVE CALF MEDIUM

## (undated) DEVICE — UNDERPAD LINEN SAVER 23 X 36"

## (undated) DEVICE — BITE BLOCK ADULT 20 X 27MM (GREEN)

## (undated) DEVICE — DRSG 2X2

## (undated) DEVICE — WARMING BLANKET FULL ADULT

## (undated) DEVICE — SOL IRR BAG NS 0.9% 1000ML

## (undated) DEVICE — TUBING ALARIS PUMP MODULE NON-DEHP

## (undated) DEVICE — FORCEP RADIAL JAW 4 W NDL 2.4MM 2.8MM 240CM ORANGE DISP

## (undated) DEVICE — GOWN IMPERV XL

## (undated) DEVICE — FORCEP BIOPSY ENDOSCOPIC 2.8MM DISP

## (undated) DEVICE — SYR LUER SLIP TIP 50CC

## (undated) DEVICE — DRSG CURITY GAUZE SPONGE 4 X 4" 12-PLY NON-STERILE

## (undated) DEVICE — SENSOR O2 FINGER ADULT

## (undated) DEVICE — PROBE FIAPC DIA 2.3MM/7FR LNTH 220CM/7.2FT

## (undated) DEVICE — STERIS DEFENDO 3-PIECE KIT (AIR/WATER, SUCTION & BIOPSY VALVES)